# Patient Record
Sex: FEMALE | Race: WHITE | Employment: OTHER | URBAN - METROPOLITAN AREA
[De-identification: names, ages, dates, MRNs, and addresses within clinical notes are randomized per-mention and may not be internally consistent; named-entity substitution may affect disease eponyms.]

---

## 2017-08-31 PROBLEM — R01.1 MURMUR: Status: ACTIVE | Noted: 2017-08-31

## 2017-10-04 PROBLEM — M25.511 CHRONIC RIGHT SHOULDER PAIN: Status: ACTIVE | Noted: 2017-10-04

## 2017-10-04 PROBLEM — M25.512 CHRONIC LEFT SHOULDER PAIN: Status: ACTIVE | Noted: 2017-10-04

## 2017-10-04 PROBLEM — G89.29 CHRONIC LEFT SHOULDER PAIN: Status: ACTIVE | Noted: 2017-10-04

## 2017-10-04 PROBLEM — G89.29 CHRONIC RIGHT SHOULDER PAIN: Status: RESOLVED | Noted: 2017-10-04 | Resolved: 2017-10-04

## 2017-10-04 PROBLEM — M25.511 CHRONIC RIGHT SHOULDER PAIN: Status: RESOLVED | Noted: 2017-10-04 | Resolved: 2017-10-04

## 2017-10-04 PROBLEM — G89.29 CHRONIC RIGHT SHOULDER PAIN: Status: ACTIVE | Noted: 2017-10-04

## 2018-02-09 ENCOUNTER — HOSPITAL ENCOUNTER (OUTPATIENT)
Dept: LAB | Age: 78
Discharge: HOME | End: 2018-02-09

## 2018-02-09 PROCEDURE — 88312 SPECIAL STAINS GROUP 1: CPT | Performed by: INTERNAL MEDICINE

## 2018-02-09 PROCEDURE — 88305 TISSUE EXAM BY PATHOLOGIST: CPT | Performed by: INTERNAL MEDICINE

## 2018-03-02 ENCOUNTER — HOSPITAL ENCOUNTER (OUTPATIENT)
Dept: CT IMAGING | Age: 78
Discharge: HOME | End: 2018-03-02
Attending: INTERNAL MEDICINE
Payer: MEDICARE

## 2018-03-02 DIAGNOSIS — R10.13 ABDOMINAL PAIN, EPIGASTRIC: ICD-10-CM

## 2018-03-02 DIAGNOSIS — K92.2 INTESTINAL BLEEDING: ICD-10-CM

## 2018-03-02 DIAGNOSIS — Q27.33 CONGENITAL GASTROINTESTINAL VESSEL ANOMALY: ICD-10-CM

## 2018-03-02 LAB — CREAT BLD-MCNC: 0.8 MG/DL (ref 0.8–1.5)

## 2018-03-02 PROCEDURE — 74011000255 HC RX REV CODE- 255: Performed by: INTERNAL MEDICINE

## 2018-03-02 PROCEDURE — 74177 CT ABD & PELVIS W/CONTRAST: CPT

## 2018-03-02 PROCEDURE — 74011636320 HC RX REV CODE- 636/320: Performed by: INTERNAL MEDICINE

## 2018-03-02 PROCEDURE — 82565 ASSAY OF CREATININE: CPT

## 2018-03-02 PROCEDURE — 74011000258 HC RX REV CODE- 258: Performed by: INTERNAL MEDICINE

## 2018-03-02 RX ORDER — SODIUM CHLORIDE 0.9 % (FLUSH) 0.9 %
10 SYRINGE (ML) INJECTION
Status: COMPLETED | OUTPATIENT
Start: 2018-03-02 | End: 2018-03-02

## 2018-03-02 RX ADMIN — SODIUM CHLORIDE 100 ML: 900 INJECTION, SOLUTION INTRAVENOUS at 14:49

## 2018-03-02 RX ADMIN — IOPAMIDOL 100 ML: 755 INJECTION, SOLUTION INTRAVENOUS at 14:49

## 2018-03-02 RX ADMIN — BARIUM SULFATE 1350 ML: 1 SUSPENSION ORAL at 14:49

## 2018-03-02 RX ADMIN — Medication 10 ML: at 14:49

## 2018-04-04 PROBLEM — I83.893 VARICOSE VEINS OF BOTH LEGS WITH EDEMA: Status: ACTIVE | Noted: 2018-04-04

## 2018-04-04 PROBLEM — I10 ESSENTIAL HYPERTENSION: Status: ACTIVE | Noted: 2018-04-04

## 2018-11-17 ENCOUNTER — APPOINTMENT (OUTPATIENT)
Dept: CT IMAGING | Age: 78
End: 2018-11-17
Attending: EMERGENCY MEDICINE
Payer: MEDICARE

## 2018-11-17 ENCOUNTER — HOSPITAL ENCOUNTER (EMERGENCY)
Age: 78
Discharge: HOME | End: 2018-11-17
Attending: EMERGENCY MEDICINE
Payer: MEDICARE

## 2018-11-17 VITALS
HEART RATE: 74 BPM | OXYGEN SATURATION: 95 % | HEIGHT: 63 IN | RESPIRATION RATE: 16 BRPM | WEIGHT: 152 LBS | SYSTOLIC BLOOD PRESSURE: 110 MMHG | DIASTOLIC BLOOD PRESSURE: 60 MMHG | BODY MASS INDEX: 26.93 KG/M2 | TEMPERATURE: 98 F

## 2018-11-17 DIAGNOSIS — R10.31 ABDOMINAL PAIN, RIGHT LOWER QUADRANT: ICD-10-CM

## 2018-11-17 DIAGNOSIS — N20.0 KIDNEY STONE: ICD-10-CM

## 2018-11-17 DIAGNOSIS — R19.7 DIARRHEA, UNSPECIFIED TYPE: ICD-10-CM

## 2018-11-17 DIAGNOSIS — D72.829 LEUKOCYTOSIS, UNSPECIFIED TYPE: ICD-10-CM

## 2018-11-17 DIAGNOSIS — N39.0 URINARY TRACT INFECTION WITHOUT HEMATURIA, SITE UNSPECIFIED: Primary | ICD-10-CM

## 2018-11-17 LAB
ALBUMIN SERPL-MCNC: 3.2 G/DL (ref 3.2–4.6)
ALBUMIN/GLOB SERPL: 0.7 {RATIO}
ALP SERPL-CCNC: 65 U/L (ref 50–130)
ALT SERPL-CCNC: 34 U/L (ref 12–65)
ANION GAP SERPL CALC-SCNC: 14 MMOL/L
AST SERPL-CCNC: 35 U/L (ref 15–37)
BACTERIA URNS QL MICRO: NORMAL /HPF
BASOPHILS # BLD: 0.1 K/UL (ref 0–0.2)
BASOPHILS NFR BLD: 0 % (ref 0–2)
BILIRUB SERPL-MCNC: 0.4 MG/DL (ref 0.2–1.1)
BUN SERPL-MCNC: 34 MG/DL (ref 8–23)
CALCIUM SERPL-MCNC: 10.1 MG/DL (ref 8.3–10.4)
CASTS URNS QL MICRO: 0 /LPF
CHLORIDE SERPL-SCNC: 95 MMOL/L (ref 98–107)
CO2 SERPL-SCNC: 26 MMOL/L (ref 21–32)
CREAT SERPL-MCNC: 1.11 MG/DL (ref 0.6–1)
CRYSTALS URNS QL MICRO: 0 /LPF
DIFFERENTIAL METHOD BLD: ABNORMAL
EOSINOPHIL # BLD: 0.1 K/UL (ref 0–0.8)
EOSINOPHIL NFR BLD: 1 % (ref 0.5–7.8)
EPI CELLS #/AREA URNS HPF: NORMAL /HPF
ERYTHROCYTE [DISTWIDTH] IN BLOOD BY AUTOMATED COUNT: 12.9 %
GLOBULIN SER CALC-MCNC: 4.6 G/DL (ref 2.3–3.5)
GLUCOSE SERPL-MCNC: 109 MG/DL (ref 65–100)
HCT VFR BLD AUTO: 38.7 % (ref 35.8–46.3)
HGB BLD-MCNC: 12.6 G/DL (ref 11.7–15.4)
IMM GRANULOCYTES # BLD: 0.1 K/UL (ref 0–0.5)
IMM GRANULOCYTES NFR BLD AUTO: 1 % (ref 0–5)
LACTATE BLD-SCNC: 1.13 MMOL/L (ref 0.5–1.9)
LYMPHOCYTES # BLD: 1.4 K/UL (ref 0.5–4.6)
LYMPHOCYTES NFR BLD: 10 % (ref 13–44)
MCH RBC QN AUTO: 29.2 PG (ref 26.1–32.9)
MCHC RBC AUTO-ENTMCNC: 32.6 G/DL (ref 31.4–35)
MCV RBC AUTO: 89.8 FL (ref 79.6–97.8)
MONOCYTES # BLD: 0.8 K/UL (ref 0.1–1.3)
MONOCYTES NFR BLD: 6 % (ref 4–12)
MUCOUS THREADS URNS QL MICRO: 0 /LPF
NEUTS SEG # BLD: 11.1 K/UL (ref 1.7–8.2)
NEUTS SEG NFR BLD: 82 % (ref 43–78)
NRBC # BLD: 0 K/UL (ref 0–0.2)
OTHER OBSERVATIONS,UCOM: NORMAL
PLATELET # BLD AUTO: 366 K/UL (ref 150–450)
PMV BLD AUTO: 9.7 FL (ref 9.4–12.3)
POTASSIUM SERPL-SCNC: 3.5 MMOL/L (ref 3.5–5.1)
PROT SERPL-MCNC: 7.8 G/DL
RBC # BLD AUTO: 4.31 M/UL (ref 4.05–5.2)
RBC #/AREA URNS HPF: >100 /HPF
SODIUM SERPL-SCNC: 135 MMOL/L (ref 136–145)
WBC # BLD AUTO: 13.5 K/UL (ref 4.3–11.1)
WBC URNS QL MICRO: >100 /HPF

## 2018-11-17 PROCEDURE — 74011636320 HC RX REV CODE- 636/320: Performed by: EMERGENCY MEDICINE

## 2018-11-17 PROCEDURE — 85025 COMPLETE CBC W/AUTO DIFF WBC: CPT

## 2018-11-17 PROCEDURE — 83605 ASSAY OF LACTIC ACID: CPT

## 2018-11-17 PROCEDURE — 74011000258 HC RX REV CODE- 258: Performed by: EMERGENCY MEDICINE

## 2018-11-17 PROCEDURE — 87086 URINE CULTURE/COLONY COUNT: CPT

## 2018-11-17 PROCEDURE — 99284 EMERGENCY DEPT VISIT MOD MDM: CPT | Performed by: EMERGENCY MEDICINE

## 2018-11-17 PROCEDURE — 80053 COMPREHEN METABOLIC PANEL: CPT

## 2018-11-17 PROCEDURE — 96365 THER/PROPH/DIAG IV INF INIT: CPT | Performed by: EMERGENCY MEDICINE

## 2018-11-17 PROCEDURE — 81003 URINALYSIS AUTO W/O SCOPE: CPT | Performed by: EMERGENCY MEDICINE

## 2018-11-17 PROCEDURE — 74011250637 HC RX REV CODE- 250/637: Performed by: EMERGENCY MEDICINE

## 2018-11-17 PROCEDURE — 74177 CT ABD & PELVIS W/CONTRAST: CPT

## 2018-11-17 PROCEDURE — 81015 MICROSCOPIC EXAM OF URINE: CPT

## 2018-11-17 PROCEDURE — 96375 TX/PRO/DX INJ NEW DRUG ADDON: CPT | Performed by: EMERGENCY MEDICINE

## 2018-11-17 PROCEDURE — 74011250636 HC RX REV CODE- 250/636: Performed by: EMERGENCY MEDICINE

## 2018-11-17 RX ORDER — HYDROCODONE BITARTRATE AND ACETAMINOPHEN 5; 325 MG/1; MG/1
1-2 TABLET ORAL
Qty: 12 TAB | Refills: 0 | Status: SHIPPED | OUTPATIENT
Start: 2018-11-17 | End: 2019-01-22 | Stop reason: ALTCHOICE

## 2018-11-17 RX ORDER — HYOSCYAMINE SULFATE 0.12 MG/1
0.25 TABLET SUBLINGUAL
Status: COMPLETED | OUTPATIENT
Start: 2018-11-17 | End: 2018-11-17

## 2018-11-17 RX ORDER — ONDANSETRON 2 MG/ML
4 INJECTION INTRAMUSCULAR; INTRAVENOUS
Status: COMPLETED | OUTPATIENT
Start: 2018-11-17 | End: 2018-11-17

## 2018-11-17 RX ORDER — HYOSCYAMINE SULFATE 0.12 MG/1
0.25 TABLET SUBLINGUAL
Qty: 20 TAB | Refills: 0 | Status: SHIPPED | OUTPATIENT
Start: 2018-11-17 | End: 2019-04-03 | Stop reason: ALTCHOICE

## 2018-11-17 RX ORDER — ONDANSETRON 8 MG/1
8 TABLET, ORALLY DISINTEGRATING ORAL
Status: COMPLETED | OUTPATIENT
Start: 2018-11-17 | End: 2018-11-17

## 2018-11-17 RX ORDER — ONDANSETRON 8 MG/1
8 TABLET, ORALLY DISINTEGRATING ORAL
Qty: 12 TAB | Refills: 1 | Status: SHIPPED | OUTPATIENT
Start: 2018-11-17 | End: 2018-12-05 | Stop reason: ALTCHOICE

## 2018-11-17 RX ORDER — HYDROCODONE BITARTRATE AND ACETAMINOPHEN 10; 325 MG/1; MG/1
1 TABLET ORAL
Status: COMPLETED | OUTPATIENT
Start: 2018-11-17 | End: 2018-11-17

## 2018-11-17 RX ORDER — DIPHENOXYLATE HYDROCHLORIDE AND ATROPINE SULFATE 2.5; .025 MG/1; MG/1
2 TABLET ORAL
Qty: 20 TAB | Refills: 0 | Status: SHIPPED | OUTPATIENT
Start: 2018-11-17 | End: 2018-12-05

## 2018-11-17 RX ORDER — SODIUM CHLORIDE 0.9 % (FLUSH) 0.9 %
10 SYRINGE (ML) INJECTION
Status: DISCONTINUED | OUTPATIENT
Start: 2018-11-17 | End: 2018-11-17 | Stop reason: HOSPADM

## 2018-11-17 RX ADMIN — ONDANSETRON 8 MG: 8 TABLET, ORALLY DISINTEGRATING ORAL at 19:23

## 2018-11-17 RX ADMIN — ONDANSETRON 4 MG: 2 INJECTION INTRAMUSCULAR; INTRAVENOUS at 17:29

## 2018-11-17 RX ADMIN — IOPAMIDOL 100 ML: 755 INJECTION, SOLUTION INTRAVENOUS at 18:35

## 2018-11-17 RX ADMIN — SODIUM CHLORIDE 1000 ML: 900 INJECTION, SOLUTION INTRAVENOUS at 15:33

## 2018-11-17 RX ADMIN — CEFTRIAXONE 1 G: 1 INJECTION, POWDER, FOR SOLUTION INTRAMUSCULAR; INTRAVENOUS at 15:34

## 2018-11-17 RX ADMIN — HYOSCYAMINE SULFATE 0.25 MG: 0.12 TABLET ORAL; SUBLINGUAL at 19:23

## 2018-11-17 RX ADMIN — HYDROCODONE BITARTRATE AND ACETAMINOPHEN 1 TABLET: 10; 325 TABLET ORAL at 19:23

## 2018-11-17 RX ADMIN — SODIUM CHLORIDE 100 ML: 900 INJECTION, SOLUTION INTRAVENOUS at 18:35

## 2018-11-17 NOTE — ED TRIAGE NOTES
Pt states she has been sick for one week. Pt states she went to PCP yesterday and they called her today and told her to come to ER for fluids. She is being treated for UTI also.

## 2018-11-17 NOTE — ED PROVIDER NOTES
78-year-old female presents with 1 week of nausea, vomiting, diarrhea and right flank pain. She reports history of kidney stones with prior lithotripsy. Recently seen by her PCP and started on Cipro for a urinary tract infection and told to come to the emergency department because she was dehydrated and her white blood cell count was high. She denies any fevers, significant abdominal pain, chest pain or shortness of breath. PCP gave her ceftriaxone yesterday in the office The history is provided by the patient. No  was used. Abnormal Lab Results Associated symptoms include abdominal pain. Pertinent negatives include no headaches and no shortness of breath. Past Medical History:  
Diagnosis Date  Adverse effect of anesthesia   
 hypotension with anesthesia  Back pain   
 upper back  CAD (coronary artery disease) 11/25/2014  
 stent x 1  
 COPD (chronic obstructive pulmonary disease) (formerly [facility])   
 prn inhaler  Difficulty swallowing 8/23/2013  H/O bone density study 08/15/2017  
 osteoporosis  H/O myocardial perfusion scan 09/08/2017  
 normal  
 History of gastritis  History of kidney stones   
 x 2 with surgical intervention  Hx of skin malignancy   
 removed from face  Hyperlipidemia 8/23/2013  
 no present treatment  Hypotensive episode   
 occassionally  Hypothyroidism   
 managed with medication  Mitral regurgitation   
 mild  Osteoarthritis   
 managed with medication  Osteoporosis   
 managed with medication  Pain and swelling of left ankle 4/9/2015  
 no recent episodes  Palpitations 5/12/2016  
 occassionally  Polymyalgia ([address]   
 managed with medication  Purpura ([address] 8/23/2013 SECONDARY TO STEROID TREATMENT  Rotator cuff tear   
 left  Rotator cuff tear, left 8/23/2013  Rotator cuff tear, right Past Surgical History:  
Procedure Laterality Date  CARDIAC SURG PROCEDURE UNLIST cardiac stent x1  
 HX BLADDER REPAIR    
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  
 gastritis and esophageal polyp-Dr. Mary Jo Palma  HX HYSTERECTOMY  HX HYSTERECTOMY complete  HX LITHOTRIPSY  HX MALIGNANT SKIN LESION EXCISION Dr. Juan Ramon Hightower- right upper ear- SCC  HX OPEN CHOLECYSTECTOMY  HX TUBAL LIGATION Family History:  
Problem Relation Age of Onset  COPD Mother  Alcohol abuse Mother  Stroke Maternal Grandfather  Arthritis-osteo Other GRANDMOTHER  
 Malignant Hyperthermia Neg Hx  Pseudocholinesterase Deficiency Neg Hx  Delayed Awakening Neg Hx  Post-op Nausea/Vomiting Neg Hx  Emergence Delirium Neg Hx  Post-op Cognitive Dysfunction Neg Hx   
 Other Neg Hx Social History Socioeconomic History  Marital status:  Spouse name: Not on file  Number of children: Not on file  Years of education: Not on file  Highest education level: Not on file Social Needs  Financial resource strain: Not on file  Food insecurity - worry: Not on file  Food insecurity - inability: Not on file  Transportation needs - medical: Not on file  Transportation needs - non-medical: Not on file Occupational History  Not on file Tobacco Use  Smoking status: Current Every Day Smoker Packs/day: 1.00 Years: 46.00 Pack years: 46.00  Smokeless tobacco: Never Used  Tobacco comment: would like to quit but don't think that she can.  11/28/17 Substance and Sexual Activity  Alcohol use: No  
 Drug use: No  
 Sexual activity: No  
Other Topics Concern  Not on file Social History Narrative  Not on file ALLERGIES: Codeine; Demerol [meperidine]; Lactose; Levaquin [levofloxacin]; Nuts [tree nut]; and Zocor [simvastatin] Review of Systems Constitutional: Positive for fatigue. Negative for fever. HENT: Negative for sore throat. Respiratory: Negative for cough, chest tightness and shortness of breath. Cardiovascular: Negative for leg swelling. Gastrointestinal: Positive for abdominal pain, nausea and vomiting. Genitourinary: Positive for flank pain. Negative for dysuria. Musculoskeletal: Negative for back pain. Neurological: Negative for syncope and headaches. Psychiatric/Behavioral: Negative for confusion. Vitals:  
 11/17/18 1408 BP: 106/58 Pulse: 75 Resp: 17 Temp: 98.8 °F (37.1 °C) SpO2: 93% Weight: 68.9 kg (152 lb) Height: 5' 3\" (1.6 m) Physical Exam  
Constitutional: She is oriented to person, place, and time. She appears well-developed and well-nourished. No distress. HENT:  
Head: Normocephalic and atraumatic. Eyes: Conjunctivae and EOM are normal. Pupils are equal, round, and reactive to light. Neck: Normal range of motion. Neck supple. Cardiovascular: Normal rate, regular rhythm and normal heart sounds. Pulmonary/Chest: Effort normal and breath sounds normal. No respiratory distress. She has no wheezes. She has no rales. Abdominal: Soft. She exhibits no distension. There is no tenderness. There is no rebound. Positive CVA tenderness on the right. Musculoskeletal: Normal range of motion. She exhibits no edema or tenderness. Neurological: She is alert and oriented to person, place, and time. Skin: Skin is warm and dry. No rash noted. She is not diaphoretic. Psychiatric: She has a normal mood and affect. Her behavior is normal.  
Vitals reviewed. MDM Number of Diagnoses or Management Options Diarrhea, unspecified type: new and requires workup Leukocytosis, unspecified type: new and requires workup Urinary tract infection without hematuria, site unspecified: new and requires workup Diagnosis management comments: Leukocytosis noted.   Urine has evidence of possible infection as well as hematuria suggesting possible kidney stone. We will obtain CT scan to evaluate for possible obstructing stone, diverticulitis or other acute pathology. Signed out to oncoming provider. Discussed plan of care with patient and oncoming provider. Matthew Boogie MD; 11/17/2018 @5:32 PM Voice dictation software was used during the making of this note. This software is not perfect and grammatical and other typographical errors may be present. This note has not been proofread for errors. 
=================================================================== Amount and/or Complexity of Data Reviewed Clinical lab tests: reviewed and ordered (Results for orders placed or performed during the hospital encounter of 11/17/18 
-CBC WITH AUTOMATED DIFF Result                      Value             Ref Range WBC                         13.5 (H)          4.3 - 11.1 K* 
     RBC                         4.31              4.05 - 5.2 M* HGB                         12.6              11.7 - 15.4 * HCT                         38.7              35.8 - 46.3 % MCV                         89.8              79.6 - 97.8 * MCH                         29.2              26.1 - 32.9 * MCHC                        32.6              31.4 - 35.0 * RDW                         12.9              % PLATELET                    366               150 - 450 K/* MPV                         9.7               9.4 - 12.3 FL ABSOLUTE NRBC               0.00              0.0 - 0.2 K/* DF                          AUTOMATED NEUTROPHILS                 82 (H)            43 - 78 % LYMPHOCYTES                 10 (L)            13 - 44 % MONOCYTES                   6                 4.0 - 12.0 % EOSINOPHILS                 1                 0.5 - 7.8 % BASOPHILS                   0                 0.0 - 2.0 % IMMATURE GRANULOCYTES       1                 0.0 - 5.0 %   
     ABS. NEUTROPHILS            11.1 (H)          1.7 - 8.2 K/* ABS. LYMPHOCYTES            1.4               0.5 - 4.6 K/* ABS. MONOCYTES              0.8               0.1 - 1.3 K/* ABS. EOSINOPHILS            0.1               0.0 - 0.8 K/* ABS. BASOPHILS              0.1               0.0 - 0.2 K/* ABS. IMM. GRANS.            0.1               0.0 - 0.5 K/* 
-METABOLIC PANEL, COMPREHENSIVE Result                      Value             Ref Range Sodium                      135 (L)           136 - 145 mm* Potassium                   3.5               3.5 - 5.1 mm* Chloride                    95 (L)            98 - 107 mmo* CO2                         26                21 - 32 mmol* Anion gap                   14                mmol/L Glucose                     109 (H)           65 - 100 mg/* BUN                         34 (H)            8 - 23 MG/DL Creatinine                  1.11 (H)          0.6 - 1.0 MG* 
     GFR est AA                  >60               >60 ml/min/1* GFR est non-AA              50                ml/min/1.73m2 Calcium                     10.1              8.3 - 10.4 M* Bilirubin, total            0.4               0.2 - 1.1 MG* ALT (SGPT)                  34                12 - 65 U/L   
     AST (SGOT)                  35                15 - 37 U/L Alk. phosphatase            65                50 - 130 U/L Protein, total              7.8               g/dL Albumin                     3.2               3.2 - 4.6 g/* Globulin                    4.6 (H)           2.3 - 3.5 g/* A-G Ratio                   0.7 -URINE MICROSCOPIC 
 Result                      Value             Ref Range WBC                         >100              0 /hpf        
     RBC                         >100              0 /hpf Epithelial cells            5-10              0 /hpf Bacteria                    TRACE             0 /hpf Casts                       0                 0 /lpf Crystals, urine             0                 0 /LPF Mucus                       0                 0 /lpf Other observations RESULTS VERIFIED MANUALLY 
-POC LACTIC ACID Result                      Value             Ref Range Lactic Acid (POC)           1.13              0.5 - 1.9 mm* 
) Tests in the radiology section of CPT®: ordered Review and summarize past medical records: yes Discuss the patient with other providers: yes Independent visualization of images, tracings, or specimens: yes Risk of Complications, Morbidity, and/or Mortality Presenting problems: moderate Diagnostic procedures: moderate Management options: moderate Patient Progress Patient progress: stable Procedures

## 2018-11-18 NOTE — ED NOTES
I have reviewed discharge instructions with the patient. The patient verbalized understanding. Patient left ED via Discharge Method: ambulatory to Home with self. Opportunity for questions and clarification provided. Patient given 4 scripts. To continue your aftercare when you leave the hospital, you may receive an automated call from our care team to check in on how you are doing. This is a free service and part of our promise to provide the best care and service to meet your aftercare needs.  If you have questions, or wish to unsubscribe from this service please call [phone]. Thank you for Choosing our [facility] Emergency Department.

## 2018-11-18 NOTE — DISCHARGE INSTRUCTIONS
Abdominal Pain: Care Instructions  Your Care Instructions    Abdominal pain has many possible causes. Some aren't serious and get better on their own in a few days. Others need more testing and treatment. If your pain continues or gets worse, you need to be rechecked and may need more tests to find out what is wrong. You may need surgery to correct the problem. Don't ignore new symptoms, such as fever, nausea and vomiting, urination problems, pain that gets worse, and dizziness. These may be signs of a more serious problem. Your doctor may have recommended a follow-up visit in the next 8 to 12 hours. If you are not getting better, you may need more tests or treatment. The doctor has checked you carefully, but problems can develop later. If you notice any problems or new symptoms, get medical treatment right away. Follow-up care is a key part of your treatment and safety. Be sure to make and go to all appointments, and call your doctor if you are having problems. It's also a good idea to know your test results and keep a list of the medicines you take. How can you care for yourself at home? · Rest until you feel better. · To prevent dehydration, drink plenty of fluids, enough so that your urine is light yellow or clear like water. Choose water and other caffeine-free clear liquids until you feel better. If you have kidney, heart, or liver disease and have to limit fluids, talk with your doctor before you increase the amount of fluids you drink. · If your stomach is upset, eat mild foods, such as rice, dry toast or crackers, bananas, and applesauce. Try eating several small meals instead of two or three large ones. · Wait until 48 hours after all symptoms have gone away before you have spicy foods, alcohol, and drinks that contain caffeine. · Do not eat foods that are high in fat. · Avoid anti-inflammatory medicines such as aspirin, ibuprofen (Advil, Motrin), and naproxen (Aleve).  These can cause stomach upset. Talk to your doctor if you take daily aspirin for another health problem. When should you call for help? Call 911 anytime you think you may need emergency care. For example, call if:    · You passed out (lost consciousness).     · You pass maroon or very bloody stools.     · You vomit blood or what looks like coffee grounds.     · You have new, severe belly pain.    Call your doctor now or seek immediate medical care if:    · Your pain gets worse, especially if it becomes focused in one area of your belly.     · You have a new or higher fever.     · Your stools are black and look like tar, or they have streaks of blood.     · You have unexpected vaginal bleeding.     · You have symptoms of a urinary tract infection. These may include:  ? Pain when you urinate. ? Urinating more often than usual.  ? Blood in your urine.     · You are dizzy or lightheaded, or you feel like you may faint.    Watch closely for changes in your health, and be sure to contact your doctor if:    · You are not getting better after 1 day (24 hours). Where can you learn more? Go to http://wendy-rut.info/. Enter J759 in the search box to learn more about \"Abdominal Pain: Care Instructions. \"  Current as of: November 20, 2017  Content Version: 11.8  © 6506-6630 [employer]. Care instructions adapted under license by [employer] (which disclaims liability or warranty for this information). If you have questions about a medical condition or this instruction, always ask your healthcare professional. [place] [address] any warranty or liability for your use of this information. Kidney Stone: Care Instructions  Your Care Instructions    Kidney stones are formed when salts, minerals, and other substances normally found in the urine clump together. They can be as small as grains of sand or, rarely, as large as golf balls.   While the stone is traveling through the ureter, which is the tube that carries urine from the kidney to the bladder, you will probably feel pain. The pain may be mild or very severe. You may also have some blood in your urine. As soon as the stone reaches the bladder, any intense pain should go away. If a stone is too large to pass on its own, you may need a medical procedure to help you pass the stone. The doctor has checked you carefully, but problems can develop later. If you notice any problems or new symptoms, get medical treatment right away. Follow-up care is a key part of your treatment and safety. Be sure to make and go to all appointments, and call your doctor if you are having problems. It's also a good idea to know your test results and keep a list of the medicines you take. How can you care for yourself at home? · Drink plenty of fluids, enough so that your urine is light yellow or clear like water. If you have kidney, heart, or liver disease and have to limit fluids, talk with your doctor before you increase the amount of fluids you drink. · Take pain medicines exactly as directed. Call your doctor if you think you are having a problem with your medicine. ? If the doctor gave you a prescription medicine for pain, take it as prescribed. ? If you are not taking a prescription pain medicine, ask your doctor if you can take an over-the-counter medicine. Read and follow all instructions on the label. · Your doctor may ask you to strain your urine so that you can collect your kidney stone when it passes. You can use a kitchen strainer or a tea strainer to catch the stone. Store it in a plastic bag until you see your doctor again. Preventing future kidney stones  Some changes in your diet may help prevent kidney stones. Depending on the cause of your stones, your doctor may recommend that you:  · Drink plenty of fluids, enough so that your urine is light yellow or clear like water.  If you have kidney, heart, or liver disease and have to limit fluids, talk with your doctor before you increase the amount of fluids you drink. · Limit coffee, tea, and alcohol. Also avoid grapefruit juice. · Do not take more than the recommended daily dose of vitamins C and D.  · Avoid antacids such as Gaviscon, Maalox, Mylanta, or Tums. · Limit the amount of salt (sodium) in your diet. · Eat a balanced diet that is not too high in protein. · Limit foods that are high in a substance called oxalate, which can cause kidney stones. These foods include dark green vegetables, rhubarb, chocolate, wheat bran, nuts, cranberries, and beans. When should you call for help? Call your doctor now or seek immediate medical care if:    · You cannot keep down fluids.     · Your pain gets worse.     · You have a fever or chills.     · You have new or worse pain in your back just below your rib cage (the flank area).     · You have new or more blood in your urine.    Watch closely for changes in your health, and be sure to contact your doctor if:    · You do not get better as expected. Where can you learn more? Go to http://wendy-rut.info/. Enter V202 in the search box to learn more about \"Kidney Stone: Care Instructions. \"  Current as of: March 15, 2018  Content Version: 11.8  © 4037-8071 [employer]. Care instructions adapted under license by [employer] (which disclaims liability or warranty for this information). If you have questions about a medical condition or this instruction, always ask your healthcare professional. [place] [address] any warranty or liability for your use of this information. Urinary Tract Infection in Pregnancy: Care Instructions  Your Care Instructions    A urinary tract infection, or UTI, is an infection of the bladder and other urinary structures. Most UTIs occur in the bladder. They often cause pain or burning when you urinate.  UTI is the most common bacterial infection in pregnancy. If untreated, a UTI could lead to problems such as a kidney infection or  labor. Most UTIs can be cured with antibiotics. Your doctor will prescribe an antibiotic that is safe during pregnancy. Be sure to finish your medicine so that the infection does not spread to your kidneys. Follow-up care is a key part of your treatment and safety. Be sure to make and go to all appointments, and call your doctor if you are having problems. It's also a good idea to know your test results and keep a list of the medicines you take. How can you care for yourself at home? · Take your antibiotics as directed. Do not stop taking them just because you feel better. You need to take the full course of antibiotics. · Drink extra water and other fluids for the next day or two. This will help wash out the bacteria causing the infection. If you have kidney, heart, or liver disease and have to limit fluids, talk with your doctor before you increase the amount of fluids you drink. · Do not drink alcohol. · Urinate often. Try to empty your bladder each time. Preventing UTIs  · Drink plenty of fluids, enough so that your urine is light yellow or clear like water. This helps you urinate often, which clears bacteria from your system. If you have kidney, heart, or liver disease and have to limit fluids, talk with your doctor before you increase the amount of fluids you drink. · Urinate when you first have the urge. · Urinate right after you have sex. This is the best way for women to avoid UTIs. · When going to the bathroom, wipe from front to back to keep bacteria from entering the vagina or urethra. When should you call for help? Call your doctor now or seek immediate medical care if:    · You have symptoms of a worse urinary tract infection. These may include:  ? Pain or burning when you urinate. ? A frequent need to urinate without being able to pass much urine.   ? Pain in the flank, which is just below the rib cage and above the waist on either side of the back. ? Blood in your urine. ? A fever.    Watch closely for changes in your health, and be sure to contact your doctor if:    · You do not get better as expected. Where can you learn more? Go to http://wendy-rut.info/. Enter M982 in the search box to learn more about \"Urinary Tract Infection in Pregnancy: Care Instructions. \"  Current as of: November 21, 2017  Content Version: 11.8  © 9574-3475 [employer]. Care instructions adapted under license by [employer] (which disclaims liability or warranty for this information). If you have questions about a medical condition or this instruction, always ask your healthcare professional. [address] any warranty or liability for your use of this information.

## 2018-11-20 LAB
BACTERIA SPEC CULT: NORMAL
SERVICE CMNT-IMP: NORMAL

## 2019-04-03 PROBLEM — N18.30 STAGE 3 CHRONIC KIDNEY DISEASE (HCC): Status: ACTIVE | Noted: 2019-04-03

## 2019-04-10 ENCOUNTER — HOSPITAL ENCOUNTER (OUTPATIENT)
Dept: GENERAL RADIOLOGY | Age: 79
Discharge: HOME | End: 2019-04-10

## 2019-04-10 DIAGNOSIS — R50.9 FEVER, UNSPECIFIED FEVER CAUSE: ICD-10-CM

## 2019-04-10 DIAGNOSIS — R05.9 COUGH: ICD-10-CM

## 2019-04-10 NOTE — PROGRESS NOTES
Area in lingula that could be early pneumonia  Stop the Macrodantin  Start Ceftin 250mg twice daily #20 and will treat the lung and kidney infection

## 2019-04-10 NOTE — PROGRESS NOTES
Pt notified of chest x-ray results. She will stop the Macrodantin and start on the Ceftin that was sent to [facility].  [phone])

## 2019-09-03 ENCOUNTER — HOSPITAL ENCOUNTER (OUTPATIENT)
Dept: CT IMAGING | Age: 79
Discharge: HOME | End: 2019-09-03
Attending: NURSE PRACTITIONER

## 2019-09-03 DIAGNOSIS — R50.9 FEVER AND CHILLS: ICD-10-CM

## 2019-09-03 DIAGNOSIS — Z72.0 TOBACCO USE: ICD-10-CM

## 2019-09-03 DIAGNOSIS — R22.2 LOCALIZED SWELLING OF CHEST WALL: ICD-10-CM

## 2019-09-03 RX ORDER — SODIUM CHLORIDE 0.9 % (FLUSH) 0.9 %
10 SYRINGE (ML) INJECTION
Status: COMPLETED | OUTPATIENT
Start: 2019-09-03 | End: 2019-09-03

## 2019-09-03 RX ADMIN — Medication 10 ML: at 14:30

## 2019-09-03 NOTE — PROGRESS NOTES
Left detailed message on personal VM letting patient know chest CT is normal. Ask that patient return call with any questions or concerns.

## 2019-09-04 RX ORDER — CEFAZOLIN SODIUM/WATER 2 G/20 ML
2 SYRINGE (ML) INTRAVENOUS
Status: CANCELLED | OUTPATIENT
Start: 2019-09-04 | End: 2019-09-04

## 2019-09-05 ENCOUNTER — ANESTHESIA EVENT (OUTPATIENT)
Dept: SURGERY | Age: 79
End: 2019-09-05
Payer: MEDICARE

## 2019-09-06 ENCOUNTER — ANESTHESIA (OUTPATIENT)
Dept: SURGERY | Age: 79
End: 2019-09-06
Payer: MEDICARE

## 2019-09-06 ENCOUNTER — HOSPITAL ENCOUNTER (OUTPATIENT)
Age: 79
Setting detail: OUTPATIENT SURGERY
Discharge: HOME | End: 2019-09-06
Attending: UROLOGY | Admitting: UROLOGY
Payer: MEDICARE

## 2019-09-06 VITALS
DIASTOLIC BLOOD PRESSURE: 49 MMHG | HEART RATE: 82 BPM | BODY MASS INDEX: 24.51 KG/M2 | WEIGHT: 142.8 LBS | OXYGEN SATURATION: 96 % | RESPIRATION RATE: 16 BRPM | TEMPERATURE: 97.6 F | SYSTOLIC BLOOD PRESSURE: 110 MMHG

## 2019-09-06 DIAGNOSIS — N20.0 NEPHROLITHIASIS: Primary | ICD-10-CM

## 2019-09-06 LAB
POTASSIUM BLD-SCNC: 3.5 MMOL/L (ref 3.5–5.1)
POTASSIUM BLD-SCNC: 7.1 MMOL/L (ref 3.5–5.1)

## 2019-09-06 PROCEDURE — 74011250636 HC RX REV CODE- 250/636: Performed by: ANESTHESIOLOGY

## 2019-09-06 PROCEDURE — 76060000032 HC ANESTHESIA 0.5 TO 1 HR: Performed by: UROLOGY

## 2019-09-06 PROCEDURE — 50590 FRAGMENTING OF KIDNEY STONE: CPT | Performed by: UROLOGY

## 2019-09-06 PROCEDURE — 74011000250 HC RX REV CODE- 250

## 2019-09-06 PROCEDURE — 74011250636 HC RX REV CODE- 250/636

## 2019-09-06 PROCEDURE — 76210000021 HC REC RM PH II 0.5 TO 1 HR: Performed by: UROLOGY

## 2019-09-06 PROCEDURE — 74011250636 HC RX REV CODE- 250/636: Performed by: UROLOGY

## 2019-09-06 PROCEDURE — 76010000138 HC OR TIME 0.5 TO 1 HR: Performed by: UROLOGY

## 2019-09-06 PROCEDURE — 76210000063 HC OR PH I REC FIRST 0.5 HR: Performed by: UROLOGY

## 2019-09-06 PROCEDURE — 77030010509 HC AIRWY LMA MSK TELE -A: Performed by: ANESTHESIOLOGY

## 2019-09-06 PROCEDURE — 77030040361 HC SLV COMPR DVT MDII -B: Performed by: UROLOGY

## 2019-09-06 PROCEDURE — 74011250637 HC RX REV CODE- 250/637: Performed by: ANESTHESIOLOGY

## 2019-09-06 PROCEDURE — 84132 ASSAY OF SERUM POTASSIUM: CPT

## 2019-09-06 RX ORDER — MIDAZOLAM HYDROCHLORIDE 1 MG/ML
2 INJECTION, SOLUTION INTRAMUSCULAR; INTRAVENOUS
Status: DISCONTINUED | OUTPATIENT
Start: 2019-09-06 | End: 2019-09-06 | Stop reason: HOSPADM

## 2019-09-06 RX ORDER — ACETAMINOPHEN 500 MG
1000 TABLET ORAL
Status: DISCONTINUED | OUTPATIENT
Start: 2019-09-06 | End: 2019-09-06 | Stop reason: HOSPADM

## 2019-09-06 RX ORDER — TAMSULOSIN HYDROCHLORIDE 0.4 MG/1
0.4 CAPSULE ORAL DAILY
Qty: 30 CAP | Refills: 1 | Status: SHIPPED | OUTPATIENT
Start: 2019-09-06 | End: 2019-11-12

## 2019-09-06 RX ORDER — HYDROMORPHONE HYDROCHLORIDE 2 MG/ML
0.5 INJECTION, SOLUTION INTRAMUSCULAR; INTRAVENOUS; SUBCUTANEOUS
Status: DISCONTINUED | OUTPATIENT
Start: 2019-09-06 | End: 2019-09-06 | Stop reason: HOSPADM

## 2019-09-06 RX ORDER — GUAIFENESIN 100 MG/5ML
81 LIQUID (ML) ORAL ONCE
Status: COMPLETED | OUTPATIENT
Start: 2019-09-06 | End: 2019-09-06

## 2019-09-06 RX ORDER — CEFAZOLIN SODIUM/WATER 2 G/20 ML
2 SYRINGE (ML) INTRAVENOUS
Status: COMPLETED | OUTPATIENT
Start: 2019-09-06 | End: 2019-09-06

## 2019-09-06 RX ORDER — EPHEDRINE SULFATE 50 MG/ML
INJECTION, SOLUTION INTRAVENOUS AS NEEDED
Status: DISCONTINUED | OUTPATIENT
Start: 2019-09-06 | End: 2019-09-06 | Stop reason: HOSPADM

## 2019-09-06 RX ORDER — SODIUM CHLORIDE, SODIUM LACTATE, POTASSIUM CHLORIDE, CALCIUM CHLORIDE 600; 310; 30; 20 MG/100ML; MG/100ML; MG/100ML; MG/100ML
150 INJECTION, SOLUTION INTRAVENOUS CONTINUOUS
Status: DISCONTINUED | OUTPATIENT
Start: 2019-09-06 | End: 2019-09-06 | Stop reason: HOSPADM

## 2019-09-06 RX ORDER — FAMOTIDINE 20 MG/1
20 TABLET, FILM COATED ORAL ONCE
Status: COMPLETED | OUTPATIENT
Start: 2019-09-06 | End: 2019-09-06

## 2019-09-06 RX ORDER — LIDOCAINE HYDROCHLORIDE 10 MG/ML
0.1 INJECTION INFILTRATION; PERINEURAL AS NEEDED
Status: DISCONTINUED | OUTPATIENT
Start: 2019-09-06 | End: 2019-09-06 | Stop reason: HOSPADM

## 2019-09-06 RX ORDER — PROPOFOL 10 MG/ML
INJECTION, EMULSION INTRAVENOUS AS NEEDED
Status: DISCONTINUED | OUTPATIENT
Start: 2019-09-06 | End: 2019-09-06 | Stop reason: HOSPADM

## 2019-09-06 RX ORDER — HYDROCODONE BITARTRATE AND ACETAMINOPHEN 5; 325 MG/1; MG/1
1 TABLET ORAL AS NEEDED
Status: DISCONTINUED | OUTPATIENT
Start: 2019-09-06 | End: 2019-09-06 | Stop reason: HOSPADM

## 2019-09-06 RX ORDER — LIDOCAINE HYDROCHLORIDE 20 MG/ML
INJECTION, SOLUTION EPIDURAL; INFILTRATION; INTRACAUDAL; PERINEURAL AS NEEDED
Status: DISCONTINUED | OUTPATIENT
Start: 2019-09-06 | End: 2019-09-06 | Stop reason: HOSPADM

## 2019-09-06 RX ORDER — ONDANSETRON 2 MG/ML
INJECTION INTRAMUSCULAR; INTRAVENOUS AS NEEDED
Status: DISCONTINUED | OUTPATIENT
Start: 2019-09-06 | End: 2019-09-06 | Stop reason: HOSPADM

## 2019-09-06 RX ORDER — GUAIFENESIN 100 MG/5ML
81 LIQUID (ML) ORAL DAILY
Status: DISCONTINUED | OUTPATIENT
Start: 2019-09-07 | End: 2019-09-06

## 2019-09-06 RX ORDER — DEXAMETHASONE SODIUM PHOSPHATE 4 MG/ML
INJECTION, SOLUTION INTRA-ARTICULAR; INTRALESIONAL; INTRAMUSCULAR; INTRAVENOUS; SOFT TISSUE AS NEEDED
Status: DISCONTINUED | OUTPATIENT
Start: 2019-09-06 | End: 2019-09-06 | Stop reason: HOSPADM

## 2019-09-06 RX ORDER — SODIUM CHLORIDE 9 MG/ML
50 INJECTION, SOLUTION INTRAVENOUS CONTINUOUS
Status: DISCONTINUED | OUTPATIENT
Start: 2019-09-06 | End: 2019-09-06 | Stop reason: HOSPADM

## 2019-09-06 RX ORDER — FENTANYL CITRATE 50 UG/ML
100 INJECTION, SOLUTION INTRAMUSCULAR; INTRAVENOUS ONCE
Status: DISCONTINUED | OUTPATIENT
Start: 2019-09-06 | End: 2019-09-06 | Stop reason: HOSPADM

## 2019-09-06 RX ORDER — OXYCODONE AND ACETAMINOPHEN 5; 325 MG/1; MG/1
1 TABLET ORAL
Qty: 20 TAB | Refills: 0 | Status: SHIPPED | OUTPATIENT
Start: 2019-09-06 | End: 2019-09-13

## 2019-09-06 RX ADMIN — FAMOTIDINE 20 MG: 20 TABLET ORAL at 11:27

## 2019-09-06 RX ADMIN — EPHEDRINE SULFATE 10 MG: 50 INJECTION, SOLUTION INTRAVENOUS at 14:03

## 2019-09-06 RX ADMIN — ASPIRIN 81 MG 81 MG: 81 TABLET ORAL at 12:20

## 2019-09-06 RX ADMIN — ONDANSETRON 4 MG: 2 INJECTION INTRAMUSCULAR; INTRAVENOUS at 14:23

## 2019-09-06 RX ADMIN — SODIUM CHLORIDE, SODIUM LACTATE, POTASSIUM CHLORIDE, AND CALCIUM CHLORIDE 150 ML/HR: 600; 310; 30; 20 INJECTION, SOLUTION INTRAVENOUS at 11:27

## 2019-09-06 RX ADMIN — Medication 2 G: at 13:52

## 2019-09-06 RX ADMIN — PROPOFOL 150 MG: 10 INJECTION, EMULSION INTRAVENOUS at 13:50

## 2019-09-06 RX ADMIN — LIDOCAINE HYDROCHLORIDE 100 MG: 20 INJECTION, SOLUTION EPIDURAL; INFILTRATION; INTRACAUDAL; PERINEURAL at 13:50

## 2019-09-06 RX ADMIN — DEXAMETHASONE SODIUM PHOSPHATE 4 MG: 4 INJECTION, SOLUTION INTRA-ARTICULAR; INTRALESIONAL; INTRAMUSCULAR; INTRAVENOUS; SOFT TISSUE at 14:23

## 2019-09-06 NOTE — PERIOP NOTES
trev-op flank skin position warm, dry and intact.   Lead shield : yes  Patient ear protection : yes  Gel applied to patient's flank :  Yes  Starting power level:  7  Shock start time: 1355  Shock stop time: 1425   Ending power level : 11  Total shocks: 3,000  Total fluoroscopy time : 1.50  Postoperative flank skin condition:WNL

## 2019-09-06 NOTE — ANESTHESIA PREPROCEDURE EVALUATION
Anesthetic History   No history of anesthetic complications            Review of Systems / Medical History  Patient summary reviewed, nursing notes reviewed and pertinent labs reviewed    Pulmonary    COPD (Emphysema): moderate      Smoker         Neuro/Psych   Within defined limits           Cardiovascular    Hypertension: well controlled          CAD (2 yrs s/p TOMA), cardiac stents (2014, no angina, taking her asa) and hyperlipidemia    Exercise tolerance[de-identified] Borderline 4 METS     GI/Hepatic/Renal     GERD: well controlled    Renal disease: stones      Comments: Dysphagia Endo/Other      Hypothyroidism: well controlled  Arthritis    Comments: Cushingoid Other Findings              Physical Exam    Airway  Mallampati: I  TM Distance: 4 - 6 cm  Neck ROM: normal range of motion   Mouth opening: Normal     Cardiovascular  Regular rate and rhythm,  S1 and S2 normal,  no murmur, click, rub, or gallop  Rhythm: regular  Rate: normal         Dental    Dentition: Full upper dentures     Pulmonary      Decreased breath sounds: bilateral           Abdominal  GI exam deferred       Other Findings            Anesthetic Plan    ASA: 3  Anesthesia type: general          Induction: Intravenous  Anesthetic plan and risks discussed with: Patient

## 2019-09-06 NOTE — OP NOTES
Operative Note                Patient: Arnoldo Yanez [id number]    Date of Surgery: 09/06/19    Preoperative Diagnosis: Right Kidney Stone    Postoperative Diagnosis:  same    Surgeon(s) and Role:     * Bubba Garcia M.D. - Primary     Anesthesia:  General     Procedure: Procedure(s):  RIGHT EXTRACORPORAL SHOCKWAVE LITHOTRIPSY (ESWL)     Indications:     Discussed the risk of surgery including infection, hematoma, bleeding, recurrence or persistence of symptoms or stone, and the risks of general anesthetic. The patient understands the risks, any and all questions were answered to the patient's satisfaction and they freely signed the consent for operation. Procedure in Detail:  Patient was taken to the lithotripsy suite. Anesthesia was induced via the anesthesiology service. Using flouroscopy for guidance, a total of 3000 shocks were delivered. We began in a non-gaited fashion. Minor cardiac ectopy was experienced at 200 shocks. The remainder of the case was then completed in a gaited fashion with no further cardiac ectopy experienced. Shock rate was begun at 60 shocks per minute. Energy level was begun at 3 and gradually increased to a maximum of 11. Findings: Patient tolerated the procedure well. At the end of the procedure, the stone appeared to have fractured into a few visible pieces. Estimated Blood Loss:  none    Specimens: * No specimens in log *             Drains: None                 Implants: * No implants in log *    Bubba Garcia M.D.     [facility]  [place] [address]  Phone: [phone]  Fax: [phone]

## 2019-10-18 ENCOUNTER — HOSPITAL ENCOUNTER (OUTPATIENT)
Dept: MAMMOGRAPHY | Age: 79
Discharge: HOME | End: 2019-10-18
Attending: NURSE PRACTITIONER

## 2019-10-18 DIAGNOSIS — Z12.31 SCREENING MAMMOGRAM, ENCOUNTER FOR: ICD-10-CM

## 2019-10-23 ENCOUNTER — ANESTHESIA EVENT (OUTPATIENT)
Dept: SURGERY | Age: 79
End: 2019-10-23
Payer: MEDICARE

## 2019-10-24 ENCOUNTER — ANESTHESIA (OUTPATIENT)
Dept: SURGERY | Age: 79
End: 2019-10-24
Payer: MEDICARE

## 2019-10-24 ENCOUNTER — HOSPITAL ENCOUNTER (OUTPATIENT)
Age: 79
Setting detail: OUTPATIENT SURGERY
Discharge: HOME | End: 2019-10-24
Attending: UROLOGY | Admitting: UROLOGY
Payer: MEDICARE

## 2019-10-24 VITALS
DIASTOLIC BLOOD PRESSURE: 67 MMHG | BODY MASS INDEX: 24.41 KG/M2 | HEART RATE: 65 BPM | WEIGHT: 143 LBS | HEIGHT: 64 IN | SYSTOLIC BLOOD PRESSURE: 123 MMHG | RESPIRATION RATE: 16 BRPM | TEMPERATURE: 98 F | OXYGEN SATURATION: 98 %

## 2019-10-24 DIAGNOSIS — N20.0 NEPHROLITHIASIS: Primary | ICD-10-CM

## 2019-10-24 LAB
APPEARANCE UR: ABNORMAL
BACTERIA URNS QL MICRO: ABNORMAL /HPF
BILIRUB UR QL: NEGATIVE
COLOR UR: YELLOW
EPI CELLS #/AREA URNS HPF: ABNORMAL /HPF
GLUCOSE UR STRIP.AUTO-MCNC: NEGATIVE MG/DL
HGB UR QL STRIP: ABNORMAL
KETONES UR QL STRIP.AUTO: NEGATIVE MG/DL
LEUKOCYTE ESTERASE UR QL STRIP.AUTO: NEGATIVE
NITRITE UR QL STRIP.AUTO: POSITIVE
OTHER OBSERVATIONS,UCOM: ABNORMAL
PH UR STRIP: 6 [PH] (ref 5–9)
POTASSIUM BLD-SCNC: 4.1 MMOL/L (ref 3.5–5.1)
PROT UR STRIP-MCNC: NEGATIVE MG/DL
RBC #/AREA URNS HPF: ABNORMAL /HPF
SP GR UR REFRACTOMETRY: 1.02 (ref 1–1.02)
UROBILINOGEN UR QL STRIP.AUTO: 0.2 EU/DL (ref 0.2–1)
WBC URNS QL MICRO: ABNORMAL /HPF

## 2019-10-24 PROCEDURE — 74011250636 HC RX REV CODE- 250/636: Performed by: UROLOGY

## 2019-10-24 PROCEDURE — 87086 URINE CULTURE/COLONY COUNT: CPT

## 2019-10-24 PROCEDURE — 87186 SC STD MICRODIL/AGAR DIL: CPT

## 2019-10-24 PROCEDURE — 77030018836 HC SOL IRR NACL ICUM -A: Performed by: UROLOGY

## 2019-10-24 PROCEDURE — 74011000250 HC RX REV CODE- 250: Performed by: REGISTERED NURSE

## 2019-10-24 PROCEDURE — 77030010509 HC AIRWY LMA MSK TELE -A: Performed by: ANESTHESIOLOGY

## 2019-10-24 PROCEDURE — 76210000020 HC REC RM PH II FIRST 0.5 HR: Performed by: UROLOGY

## 2019-10-24 PROCEDURE — 76060000032 HC ANESTHESIA 0.5 TO 1 HR: Performed by: UROLOGY

## 2019-10-24 PROCEDURE — 76210000063 HC OR PH I REC FIRST 0.5 HR: Performed by: UROLOGY

## 2019-10-24 PROCEDURE — C1894 INTRO/SHEATH, NON-LASER: HCPCS | Performed by: UROLOGY

## 2019-10-24 PROCEDURE — 76010000138 HC OR TIME 0.5 TO 1 HR: Performed by: UROLOGY

## 2019-10-24 PROCEDURE — 84132 ASSAY OF SERUM POTASSIUM: CPT

## 2019-10-24 PROCEDURE — 77030040361 HC SLV COMPR DVT MDII -B: Performed by: UROLOGY

## 2019-10-24 PROCEDURE — C1769 GUIDE WIRE: HCPCS | Performed by: UROLOGY

## 2019-10-24 PROCEDURE — C2617 STENT, NON-COR, TEM W/O DEL: HCPCS | Performed by: UROLOGY

## 2019-10-24 PROCEDURE — 74011000250 HC RX REV CODE- 250

## 2019-10-24 PROCEDURE — 77030019927 HC TBNG IRR CYSTO BAXT -A: Performed by: UROLOGY

## 2019-10-24 PROCEDURE — 74011250636 HC RX REV CODE- 250/636: Performed by: REGISTERED NURSE

## 2019-10-24 PROCEDURE — 87088 URINE BACTERIA CULTURE: CPT

## 2019-10-24 PROCEDURE — 74011250636 HC RX REV CODE- 250/636: Performed by: ANESTHESIOLOGY

## 2019-10-24 PROCEDURE — 81003 URINALYSIS AUTO W/O SCOPE: CPT

## 2019-10-24 DEVICE — URETERAL STENT
Type: IMPLANTABLE DEVICE | Site: URETER | Status: FUNCTIONAL
Brand: PERCUFLEX™ PLUS

## 2019-10-24 RX ORDER — SODIUM CHLORIDE, SODIUM LACTATE, POTASSIUM CHLORIDE, CALCIUM CHLORIDE 600; 310; 30; 20 MG/100ML; MG/100ML; MG/100ML; MG/100ML
75 INJECTION, SOLUTION INTRAVENOUS CONTINUOUS
Status: DISCONTINUED | OUTPATIENT
Start: 2019-10-24 | End: 2019-10-24 | Stop reason: HOSPADM

## 2019-10-24 RX ORDER — PROPOFOL 10 MG/ML
INJECTION, EMULSION INTRAVENOUS AS NEEDED
Status: DISCONTINUED | OUTPATIENT
Start: 2019-10-24 | End: 2019-10-24 | Stop reason: HOSPADM

## 2019-10-24 RX ORDER — OXYCODONE HYDROCHLORIDE 5 MG/1
5 TABLET ORAL
Status: DISCONTINUED | OUTPATIENT
Start: 2019-10-24 | End: 2019-10-24 | Stop reason: HOSPADM

## 2019-10-24 RX ORDER — HYDROMORPHONE HYDROCHLORIDE 2 MG/ML
0.2 INJECTION, SOLUTION INTRAMUSCULAR; INTRAVENOUS; SUBCUTANEOUS
Status: DISCONTINUED | OUTPATIENT
Start: 2019-10-24 | End: 2019-10-24 | Stop reason: HOSPADM

## 2019-10-24 RX ORDER — DEXAMETHASONE SODIUM PHOSPHATE 4 MG/ML
INJECTION, SOLUTION INTRA-ARTICULAR; INTRALESIONAL; INTRAMUSCULAR; INTRAVENOUS; SOFT TISSUE AS NEEDED
Status: DISCONTINUED | OUTPATIENT
Start: 2019-10-24 | End: 2019-10-24 | Stop reason: HOSPADM

## 2019-10-24 RX ORDER — PHENAZOPYRIDINE HYDROCHLORIDE 200 MG/1
200 TABLET, FILM COATED ORAL
Qty: 9 TAB | Refills: 0 | Status: SHIPPED | OUTPATIENT
Start: 2019-10-24 | End: 2019-10-27

## 2019-10-24 RX ORDER — HYDROCODONE BITARTRATE AND ACETAMINOPHEN 5; 325 MG/1; MG/1
1 TABLET ORAL
Qty: 20 TAB | Refills: 0 | Status: SHIPPED | OUTPATIENT
Start: 2019-10-24 | End: 2019-10-31

## 2019-10-24 RX ORDER — NITROFURANTOIN 25; 75 MG/1; MG/1
100 CAPSULE ORAL 2 TIMES DAILY
Qty: 14 CAP | Refills: 0 | Status: SHIPPED | OUTPATIENT
Start: 2019-10-24 | End: 2019-10-31

## 2019-10-24 RX ORDER — HYOSCYAMINE SULFATE 0.12 MG/1
0.12 TABLET SUBLINGUAL
Qty: 30 TAB | Refills: 1 | Status: SHIPPED | OUTPATIENT
Start: 2019-10-24 | End: 2019-11-12

## 2019-10-24 RX ORDER — FENTANYL CITRATE 50 UG/ML
INJECTION, SOLUTION INTRAMUSCULAR; INTRAVENOUS AS NEEDED
Status: DISCONTINUED | OUTPATIENT
Start: 2019-10-24 | End: 2019-10-24 | Stop reason: HOSPADM

## 2019-10-24 RX ORDER — ONDANSETRON 2 MG/ML
4 INJECTION INTRAMUSCULAR; INTRAVENOUS
Status: DISCONTINUED | OUTPATIENT
Start: 2019-10-24 | End: 2019-10-24 | Stop reason: HOSPADM

## 2019-10-24 RX ORDER — EPHEDRINE SULFATE 50 MG/ML
INJECTION, SOLUTION INTRAVENOUS AS NEEDED
Status: DISCONTINUED | OUTPATIENT
Start: 2019-10-24 | End: 2019-10-24 | Stop reason: HOSPADM

## 2019-10-24 RX ORDER — LIDOCAINE HYDROCHLORIDE 10 MG/ML
0.1 INJECTION INFILTRATION; PERINEURAL AS NEEDED
Status: DISCONTINUED | OUTPATIENT
Start: 2019-10-24 | End: 2019-10-24 | Stop reason: HOSPADM

## 2019-10-24 RX ORDER — ONDANSETRON 2 MG/ML
INJECTION INTRAMUSCULAR; INTRAVENOUS AS NEEDED
Status: DISCONTINUED | OUTPATIENT
Start: 2019-10-24 | End: 2019-10-24 | Stop reason: HOSPADM

## 2019-10-24 RX ORDER — DIPHENHYDRAMINE HYDROCHLORIDE 50 MG/ML
12.5 INJECTION, SOLUTION INTRAMUSCULAR; INTRAVENOUS
Status: DISCONTINUED | OUTPATIENT
Start: 2019-10-24 | End: 2019-10-24 | Stop reason: HOSPADM

## 2019-10-24 RX ORDER — LIDOCAINE HYDROCHLORIDE 20 MG/ML
INJECTION, SOLUTION EPIDURAL; INFILTRATION; INTRACAUDAL; PERINEURAL AS NEEDED
Status: DISCONTINUED | OUTPATIENT
Start: 2019-10-24 | End: 2019-10-24 | Stop reason: HOSPADM

## 2019-10-24 RX ORDER — CEFAZOLIN SODIUM/WATER 2 G/20 ML
2 SYRINGE (ML) INTRAVENOUS
Status: COMPLETED | OUTPATIENT
Start: 2019-10-24 | End: 2019-10-24

## 2019-10-24 RX ORDER — SODIUM CHLORIDE, SODIUM LACTATE, POTASSIUM CHLORIDE, CALCIUM CHLORIDE 600; 310; 30; 20 MG/100ML; MG/100ML; MG/100ML; MG/100ML
100 INJECTION, SOLUTION INTRAVENOUS CONTINUOUS
Status: DISCONTINUED | OUTPATIENT
Start: 2019-10-24 | End: 2019-10-24 | Stop reason: HOSPADM

## 2019-10-24 RX ADMIN — EPHEDRINE SULFATE 5 MG: 50 INJECTION, SOLUTION INTRAVENOUS at 10:58

## 2019-10-24 RX ADMIN — DEXAMETHASONE SODIUM PHOSPHATE 4 MG: 4 INJECTION, SOLUTION INTRAMUSCULAR; INTRAVENOUS at 11:18

## 2019-10-24 RX ADMIN — EPHEDRINE SULFATE 5 MG: 50 INJECTION, SOLUTION INTRAVENOUS at 11:07

## 2019-10-24 RX ADMIN — EPHEDRINE SULFATE 5 MG: 50 INJECTION, SOLUTION INTRAVENOUS at 11:19

## 2019-10-24 RX ADMIN — PROPOFOL 150 MG: 10 INJECTION, EMULSION INTRAVENOUS at 10:58

## 2019-10-24 RX ADMIN — EPHEDRINE SULFATE 5 MG: 50 INJECTION, SOLUTION INTRAVENOUS at 11:15

## 2019-10-24 RX ADMIN — FENTANYL CITRATE 25 MCG: 50 INJECTION INTRAMUSCULAR; INTRAVENOUS at 11:12

## 2019-10-24 RX ADMIN — ONDANSETRON 4 MG: 2 INJECTION INTRAMUSCULAR; INTRAVENOUS at 11:18

## 2019-10-24 RX ADMIN — LIDOCAINE HYDROCHLORIDE 40 MG: 20 INJECTION, SOLUTION EPIDURAL; INFILTRATION; INTRACAUDAL; PERINEURAL at 10:58

## 2019-10-24 RX ADMIN — SODIUM CHLORIDE, SODIUM LACTATE, POTASSIUM CHLORIDE, AND CALCIUM CHLORIDE 100 ML/HR: 600; 310; 30; 20 INJECTION, SOLUTION INTRAVENOUS at 10:07

## 2019-10-24 RX ADMIN — Medication 2 G: at 11:04

## 2019-10-24 NOTE — DISCHARGE INSTRUCTIONS
CYSTOSCOPY    ACTIVITY   · As tolerated and as directed by your doctor. · Bathe or shower as directed by your doctor. DIET  · Clear liquids until no nausea or vomiting; then light diet for the first day. · Drink plenty of liquids. At least 8 glasses of water to help flush out bladder. Limit amount of caffeine. · Advance to regular diet on second day, unless your doctor orders otherwise. · If nausea and vomiting continues, call your doctor. PAIN  · Take pain medication as directed by your doctor. · Call your doctor if pain is NOT relieved by medication. · DO NOT take aspirin or blood thinners until directed by your doctor. CALL YOUR DOCTOR IF  · Expect blood-tinged urine. Call your doctor if it lasts more than 72 hours or if you cannot see through the urine. · Temperature of 101 degrees or above. · Unable to empty bladder. AFTER ANESTHESIA  · For the next 24 hours: DO NOT Drive, drink alcoholic beverages, or Make important decisions. · Be aware of dizziness following anesthesia and while taking pain medications    APPOINTMENT DATE/ TIME    YOUR DOCTOR'S PHONE NUMBER      DISCHARGE SUMMARY from Nurse    PATIENT INSTRUCTIONS:    After general anesthesia or intravenous sedation, for 24 hours or while taking prescription Narcotics:  · Limit your activities  · Do not drive and operate hazardous machinery  · Do not make important personal or business decisions  · Do  not drink alcoholic beverages  · If you have not urinated within 8 hours after discharge, please contact your surgeon on call. *  Please give a list of your current medications to your Primary Care Provider. *  Please update this list whenever your medications are discontinued, doses are      changed, or new medications (including over-the-counter products) are added. *  Please carry medication information at all times in case of emergency situations.       These are general instructions for a healthy lifestyle:    No smoking/ No tobacco products/ Avoid exposure to second hand smoke    Surgeon General's Warning:  Quitting smoking now greatly reduces serious risk to your health. Obesity, smoking, and sedentary lifestyle greatly increases your risk for illness    A healthy diet, regular physical exercise & weight monitoring are important for maintaining a healthy lifestyle    You may be retaining fluid if you have a history of heart failure or if you experience any of the following symptoms:  Weight gain of 3 pounds or more overnight or 5 pounds in a week, increased swelling in our hands or feet or shortness of breath while lying flat in bed. Please call your doctor as soon as you notice any of these symptoms; do not wait until your next office visit. Recognize signs and symptoms of STROKE:    F-face looks uneven    A-arms unable to move or move unevenly    S-speech slurred or non-existent    T-time-call 911 as soon as signs and symptoms begin-DO NOT go       Back to bed or wait to see if you get better-TIME IS BRAIN. ACTIVITY  · As tolerated and as directed by your doctor. · Bathe or shower as directed by your doctor. DIET  · Clear liquids until no nausea or vomiting; then light diet for the first day. · Advance to regular diet on second day, unless your doctor orders otherwise. · If nausea and vomiting continues, call your doctor. PAIN  · Take pain medication as directed by your doctor. · Call your doctor if pain is NOT relieved by medication. · DO NOT take aspirin of blood thinners unless directed by your doctor. DRESSING CARE       CALL YOUR DOCTOR IF   · Excessive bleeding that does not stop after holding pressure over the area  · Temperature of 101 degrees F or above  · Excessive redness, swelling or bruising, and/ or green or yellow, smelly discharge from incision    AFTER ANESTHESIA   · For the first 24 hours: DO NOT Drive, Drink alcoholic beverages, or Make important decisions.    · Be aware of dizziness following anesthesia and while taking pain medication. APPOINTMENT DATE/ TIME    YOUR DOCTOR'S PHONE NUMBER       DISCHARGE SUMMARY from Nurse    PATIENT INSTRUCTIONS:    After general anesthesia or intravenous sedation, for 24 hours or while taking prescription Narcotics:  · Limit your activities  · Do not drive and operate hazardous machinery  · Do not make important personal or business decisions  · Do  not drink alcoholic beverages  · If you have not urinated within 8 hours after discharge, please contact your surgeon on call. *  Please give a list of your current medications to your Primary Care Provider. *  Please update this list whenever your medications are discontinued, doses are      changed, or new medications (including over-the-counter products) are added. *  Please carry medication information at all times in case of emergency situations. These are general instructions for a healthy lifestyle:    No smoking/ No tobacco products/ Avoid exposure to second hand smoke    Surgeon General's Warning:  Quitting smoking now greatly reduces serious risk to your health. Obesity, smoking, and sedentary lifestyle greatly increases your risk for illness    A healthy diet, regular physical exercise & weight monitoring are important for maintaining a healthy lifestyle    You may be retaining fluid if you have a history of heart failure or if you experience any of the following symptoms:  Weight gain of 3 pounds or more overnight or 5 pounds in a week, increased swelling in our hands or feet or shortness of breath while lying flat in bed. Please call your doctor as soon as you notice any of these symptoms; do not wait until your next office visit.     Recognize signs and symptoms of STROKE:    F-face looks uneven    A-arms unable to move or move unevenly    S-speech slurred or non-existent    T-time-call 911 as soon as signs and symptoms begin-DO NOT go       Back to bed or wait to see if you get better-TIME IS BRAIN.

## 2019-10-24 NOTE — ANESTHESIA PREPROCEDURE EVALUATION
Anesthetic History   No history of anesthetic complications            Review of Systems / Medical History  Patient summary reviewed and pertinent labs reviewed    Pulmonary    COPD (Emphysema): moderate      Smoker         Neuro/Psych   Within defined limits           Cardiovascular    Hypertension: well controlled          CAD (2 yrs s/p TOMA), cardiac stents (2014, no angina, taking her asa) and hyperlipidemia    Exercise tolerance[de-identified] Borderline 4 METS  Comments: Normal stress test 6/2019   GI/Hepatic/Renal     GERD: well controlled    Renal disease: stones      Comments: Dysphagia Endo/Other      Hypothyroidism: well controlled  Arthritis (OA)     Other Findings   Comments: Polymyalgia rheumatica - on Prednisone 5mg daily           Physical Exam    Airway  Mallampati: I  TM Distance: 4 - 6 cm  Neck ROM: normal range of motion   Mouth opening: Normal     Cardiovascular  Regular rate and rhythm,  S1 and S2 normal,  no murmur, click, rub, or gallop  Rhythm: regular  Rate: normal         Dental    Dentition: Full upper dentures     Pulmonary      Decreased breath sounds: bilateral           Abdominal  GI exam deferred       Other Findings            Anesthetic Plan    ASA: 3  Anesthesia type: general          Induction: Intravenous  Anesthetic plan and risks discussed with: Patient

## 2019-10-24 NOTE — ANESTHESIA POSTPROCEDURE EVALUATION
Procedure(s):  CYSTOSCOPY RIGHT STENT PLACEMENT. general    Anesthesia Post Evaluation      Multimodal analgesia: multimodal analgesia used between 6 hours prior to anesthesia start to PACU discharge  Patient location during evaluation: PACU  Patient participation: complete - patient participated  Level of consciousness: awake  Pain management: adequate  Airway patency: patent  Anesthetic complications: no  Cardiovascular status: acceptable  Respiratory status: spontaneous ventilation and acceptable  Hydration status: acceptable  Post anesthesia nausea and vomiting:  none      Vitals Value Taken Time   /53 10/24/2019 12:05 PM   Temp 36.4 °C (97.6 °F) 10/24/2019 11:40 AM   Pulse 68 10/24/2019 12:03 PM   Resp 14 10/24/2019 12:04 PM   SpO2 97 % 10/24/2019 12:03 PM   Vitals shown include unvalidated device data.

## 2019-10-24 NOTE — OP NOTES
[address] [place] [address]  OPERATIVE REPORT    Name:  Kimberlee Hernandez  MR#:  [id number]  :  1940  ACCOUNT #:  [de-identified]  DATE OF SERVICE:  10/24/2019    PREOPERATIVE DIAGNOSIS:  Right ureteral stone. POSTOPERATIVE DIAGNOSIS:  Right ureteral stone. PROCEDURE PERFORMED:  Cystoscopy, right ureteral stent placement. SURGEON:  Amy Caicedo MD    ASSISTANT:  None. ANESTHESIA:  General.    COMPLICATIONS:  None. SPECIMENS REMOVED:  None. IMPLANTS:  A 6 x 24 double-J right ureteral stent without strings. ESTIMATED BLOOD LOSS:  1 mL. FINDINGS:  Cloudy urine. No purulent drainage from right UO noted, however. INDICATIONS FOR OPERATIVE PROCEDURE:  The patient is an 58-year-old female with a history of symptomatic right UPJ stone. She underwent a right ESWL, but unfortunately had a fragment that did not pass and is still symptomatic. She presented to the OR today after counseling for a right-sided ureteroscopy with laser litho; however, unfortunately, her preop urine culture was concerning for a UTI and she had not been on preop antibiotics. At this point, she was counseled on options and we opted to go ahead and proceed just with a right ureteral stent placement instead of the ureteroscopy given the concern for an active UTI. She will then be started on empiric antibiotics and urine culture sent today from preop. She will be then treated in the future with an interval right ureteroscopy once her infection has cleared from the urine. DESCRIPTION OF OPERATIVE PROCEDURE:  After informed consent was obtained and the correct patient was identified in the preoperative holding area, she was taken back to the operating suite and placed on table in the supine position. Time-out was performed confirming the correct patient and planned procedure. She received 2 g of IV Ancef prior to smooth induction of general endotracheal anesthesia.   She was moved into the dorsal lithotomy position, prepped and draped in usual sterile fashion. Upon initial inspection, she had significant pelvic organ prolapse predominantly from the apex of the vagina, which did make entry into the bladder somewhat difficult as the urethral meatus was obscured by her severe pelvic organ prolapse. Eventually, I was able to navigate my 22-Welsh rigid cystoscope with a 30-degree lens into the urethra and advanced it into the patient's bladder. Overall, the bladder appeared to be well supported, so the majority of the prolapse appeared to be vaginal and the ureteral orifice was identified on the right side in the orthotopic position. I cannulated it with a guidewire passed this under fluoroscopic guidance into the right renal pelvis. Once the wire was in place on fluoroscopy, I then passed a 6 x 24 double-J right ureteral stent without strings over the wire under fluoroscopic guidance into the right renal pelvis. Once the stent was in position, I pulled the wire noting good curl in the renal pelvis as well as the bladder. The stent was noted to be effluxing cloudy urine at the end of the case. I then drained the bladder completely and removed my rigid cystoscope. The patient was awoken from anesthesia, transferred to the PACU in stable condition. She tolerated the procedure well. There were no complications. All counts were correct at the end of procedure. I will go ahead and follow up on her urine culture and start her on empiric antibiotics for likely UTI and I will have my surgery scheduler call to schedule her for an interval right ureteroscopy with laser lithotripsy once her infection has cleared. Strict warnings were also given for her to call immediately if she develops fevers above 100.5, chills, nausea, vomiting, uncontrolled pain.       Yoselyn Hatfield MD      [id number]  D:  10/24/2019 11:41  T:  10/24/2019 15:01  JOB #:  [id number]

## 2019-10-24 NOTE — PERIOP NOTES
UA + for blood, nitrates, and bacteria. Dr. Shanika Del Rosario informed. Order for UA culture received. This nurse called lab, the sample is still there so cx will be run that sample. Dr. Shanika Del Rosario aware.    Signed By: Ben Byrd     October 24, 2019

## 2019-10-24 NOTE — BRIEF OP NOTE
BRIEF OPERATIVE NOTE    Date of Procedure: 10/24/2019   Preoperative Diagnosis: Kidney stone [N20.0]  Postoperative Diagnosis: Kidney stone [N20.0]    Procedure(s):  CYSTOSCOPY RIGHT STENT PLACEMENT  Surgeon(s) and Role:     * Marco A Trinh MD - Primary         Surgical Assistant: None    Surgical Staff:  Circ-1: Raúl Mathis RN  Event Time In Time Out   Incision Start 1113    Incision Close 1121      Anesthesia: General   Estimated Blood Loss: 1 cc  Specimens: * No specimens in log *   Findings: Cloudy urine. No purulent drainage. Complications: None   Implants:   Implant Name Type Inv.  Item Serial No.  Lot No. LRB No. Used Action   STENT URET 6F 24CM -- PERCUFLEX PLUS [id number] - [id number]  STENT URET 6F 24CM -- [facility] [id number]  [employer] [address] [id number] Right 1 Implanted

## 2019-10-24 NOTE — H&P
H&P Update:  Teri Chandra was seen and examined. History and physical has been reviewed. Significant clinical changes have occurred as noted:      U/A in pre-op today shows concern for UTI. Patient denies fevers/chills or UTI symptoms. However, she is not currently on antibiotics. I recommended that we just perform cystoscopy, right ureteral stent placement today to decompress her kidney and relieve per pain from the obstructing R ureteral stone, as it is not safe to perform ureteroscopy with a UTI. I will also send urine for culture, give her a dose of IV antibiotic prior to stent placement and then start her on empiric treatment for a UTI with antibiotics while we await culture results. Her stone will then need to be treated at a future date after the UTI has resolved. She understands the need for possible hospitalization if purulent drainage is found upon stent placement or if fevers develop. Risks/benefits of stent discussed in detail and she agrees to proceed. Bubba Garcia M.D. [facility]  [address]  Phone: [phone]  Fax: [phone]       Teri Chandra  : 1940         Chief Complaint   Patient presents with    Follow-up      HPI      Teri Chandra is a [de-identified] y.o. female with a symptomatic R kidney stone who is s/p R ESWL on 19. Returns today for post-op follow up.     She denies passage of stone fragment and remains with intermittent R flank pain since the surgery. KUB today shows a persistent 5 mm R stone fragment near the R UPJ. No ureteral stones identified.       No hematuria. Some urgency/frequency.   No fevers/chills.           Past Medical History:   Diagnosis Date    Adverse effect of anesthesia       hypotension with anesthesia    Back pain       upper back    CAD (coronary artery disease) 2014     Followed by [facility] nicotine dependence      COPD (chronic obstructive pulmonary disease) ([facility])       prn inhaler    Difficulty swallowing 08/23/2013     pt denies    GERD (gastroesophageal reflux disease)       managed well with meds    H/O bone density study 08/15/2017     osteoporosis    H/O heart artery stent       X1    H/O myocardial perfusion scan 09/08/2017     normal    History of gastritis      History of kidney stones       x 2 with surgical intervention     History of squamous cell carcinoma       removed from face and right ear    Hyperlipidemia 8/23/2013     no present treatment     Hypotensive episode       occassionally    Hypothyroidism       managed with medication     Kidney stone      Mitral regurgitation       mild    On prednisone therapy      Osteoarthritis       managed with medication     Osteoporosis       managed with medication     Pain and swelling of left ankle 4/9/2015     no recent episodes    Palpitations 5/12/2016     occassionally    Polymyalgia rheumatica (HCC)       Prednisone 5mg daily and Prednisone 1mg at bedtime    Purpura ([address] 8/23/2013     SECONDARY TO STEROID TREATMENT    Rotator cuff tear, left 8/23/2013    Rotator cuff tear, right      Statin intolerance              Past Surgical History:   Procedure Laterality Date    CARDIAC SURG PROCEDURE UNLIST         cardiac stent x1    HX BLADDER REPAIR        HX CARPAL TUNNEL RELEASE Right      HX COLONOSCOPY   02/09/2018     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 Heather Chavis    HX LITHOTRIPSY        HX MALIGNANT SKIN LESION EXCISION         Dr. Griffin Mallory- right upper ear- SCC    HX OPEN CHOLECYSTECTOMY        HX MIREYA AND BSO        HX TUBAL LIGATION                 Current Outpatient Medications   Medication Sig Dispense Refill    acetaminophen (TYLENOL ARTHRITIS PAIN) 650 mg TbER Take 650 mg by mouth every eight (8) hours.        losartan (COZAAR) 50 mg tablet TAKE ONE TABLET BY MOUTH ONCE DAILY 30 Tab 10    tamsulosin (FLOMAX) 0.4 mg capsule Take 1 Cap by mouth daily. 30 Cap 1    Cetirizine (ZYRTEC) 10 mg cap Take 10 mg by mouth daily.        levothyroxine (SYNTHROID) 150 mcg tablet TAKE ONE TABLET BY MOUTH EVERY DAY BEFORE BREAKFAST 90 Tab 2    nicotine (NICODERM CQ) 21 mg/24 hr 1 Patch by TransDERmal route every twenty-four (24) hours.        estradiol (ESTRACE) 0.5 mg tablet TAKE ONE TABLET BY MOUTH ONCE DAILY 90 Tab 1    furosemide (LASIX) 40 mg tablet TAKE 1 TAB BY MOUTH EVERY MORNING. 90 Tab 2    omeprazole (PRILOSEC) 40 mg capsule TAKE ONE CAPSULE BY MOUTH ONCE DAILY 90 Cap 0    predniSONE (DELTASONE) 5 mg tablet Take 5 mg by mouth daily.        albuterol (PROVENTIL HFA, VENTOLIN HFA, PROAIR HFA) 90 mcg/actuation inhaler Take 2 Puffs by inhalation every four (4) hours as needed for Wheezing or Shortness of Breath. 1 Inhaler 5    diphenoxylate-atropine (LOMOTIL) 2.5-0.025 mg per tablet Take 1 Tab by mouth four (4) times daily as needed for Diarrhea. Max Daily Amount: 4 Tabs. 20 Tab 1    predniSONE (DELTASONE) 1 mg tablet TAKE 1 TABLET BY MOUTH NIGHTLY. 100 Tab 3    nitroglycerin (NITROSTAT) 0.4 mg SL tablet 1 Tab by SubLINGual route every five (5) minutes as needed for Chest Pain. Up to 3 in 5 min 1 Bottle 5    CALCIUM CARBONATE/VITAMIN D3 (CALCIUM 500 + D PO) Take 2 Tabs by mouth every evening.  Ca 250, D 350         cholecalciferol (VITAMIN D3) 1,000 unit cap Take 1,000 Units by mouth every evening.        magnesium 250 mg tab Take 250 mg by mouth every evening.        aspirin delayed-release 81 mg tablet Take 81 mg by mouth every morning.                Allergies   Allergen Reactions    Codeine Nausea and Vomiting    Demerol [Meperidine] Unknown (comments)    Lactose Diarrhea    Levaquin [Levofloxacin] Diarrhea    Nuts [Tree Nut] Shortness of Breath       Specifically black walnuts per an allergy test.    Zocor [Simvastatin] Myalgia      Social History            Socioeconomic History    Marital status:        Spouse name: Not on file    Number of children: Not on file    Years of education: Not on file    Highest education level: Not on file   Occupational History    Not on file   Social Needs    Financial resource strain: Not on file    Food insecurity:       Worry: Not on file       Inability: Not on file    Transportation needs:       Medical: Not on file       Non-medical: Not on file   Tobacco Use    Smoking status: Current Every Day Smoker       Packs/day: 1.00       Years: 46.00       Pack years: 46.00    Smokeless tobacco: Never Used    Tobacco comment: would like to quit but don't think that she can.  11/28/17   Substance and Sexual Activity    Alcohol use: No    Drug use: No    Sexual activity: Never   Lifestyle    Physical activity:       Days per week: Not on file       Minutes per session: Not on file    Stress: Not on file   Relationships    Social connections:       Talks on phone: Not on file       Gets together: Not on file       Attends Orthodox service: Not on file       Active member of club or organization: Not on file       Attends meetings of clubs or organizations: Not on file       Relationship status: Not on file    Intimate partner violence:       Fear of current or ex partner: Not on file       Emotionally abused: Not on file       Physically abused: Not on file       Forced sexual activity: Not on file   Other Topics Concern    Not on file   Social History Narrative    Not on file            Family History   Problem Relation Age of Onset    COPD Mother      Alcohol abuse Mother      Stroke Maternal Grandfather      Arthritis-osteo Other           GRANDMOTHER         Review of Systems  GI: Positive for abdominal pain. Genitourinary: Positive for urgency and frequent urination.   Musculoskeletal: Positive for back pain.        Urinalysis  UA - Dipstick Results for orders placed or performed in visit on 10/04/19   AMB POC URINALYSIS DIP STICK AUTO W/O MICRO (PGU)     Status: None   Result Value Ref Range Status     Glucose (UA POC) Negative Negative mg/dL Final     Bilirubin (UA POC) Negative Negative Final     Ketones (UA POC) Negative Negative Final     Specific gravity (UA POC) 1.010 1.001 - 1.035 Final     Blood (UA POC) Negative Negative Final     pH (UA POC) 6.0 4.6 - 8.0 Final     Protein (UA POC) Negative Negative Final     Urobilinogen (POC) 0.2 mg/dL   Final     Nitrites (UA POC) Negative Negative Final     Leukocyte esterase (UA POC) Negative Negative Final   Results for orders placed or performed in visit on 01/22/19   AMB POC URINALYSIS DIP STICK AUTO W/ MICRO (PGU)     Status: None   Result Value Ref Range Status     Glucose (UA POC) Negative Negative mg/dL Final     Bilirubin (UA POC) Negative Negative Final     Ketones (UA POC) Negative Negative Final     Specific gravity (UA POC) 1.010 1.001 - 1.035 Final     Blood (UA POC) Negative Negative Final     pH (UA POC) 5.5 4.6 - 8.0 Final     Protein (UA POC) Negative Negative Final     Urobilinogen (POC) 0.2 mg/dL   Final     Nitrites (UA POC) Negative Negative Final     Leukocyte esterase (UA POC) Negative Negative Final         Visit Vitals  /53   Pulse 92   Temp 96.2 °F (35.7 °C) (Tympanic)   Wt 143 lb (64.9 kg)   BMI (P) 24.55 kg/m²         GENERAL: No acute distress, Awake, Alert, Oriented X 3, Gait normal  CARDIAC: regular rate and rhythm  CHEST AND LUNG: Easy work of breathing, clear to auscultation bilaterally, no cyanosis  ABDOMEN: soft, non tender, non-distended, positive bowel sounds, no organomegaly, no palpable masses, no guarding, no rebound tenderness  : R CVA TTP   SKIN: No rash, no erythema, no lacerations or abrasions, no ecchymosis  NEUROLOGIC: cranial nerves 2-12 grossly intact               Assessment and Plan      ICD-10-CM ICD-9-CM     1.  Kidney stones N20.0 592.0 AMB POC URINALYSIS DIP STICK AUTO W/O MICRO (PGU)         AMB POC XRAY ABDOMEN 1 VIEW      R Kidney Stones:   I reviewed the risks/benefits/alternatives for stone treatment today in detail with the patient. Treatment options discussed include medical expulsive therapy (MET) vs. Repeat ESWL vs. Ureteroscopy/laser lithotropsy vs. PCNL. After our discussion, the patient would like to proceed with RIGHT Ureteroscopy/Laser Lithotripsy/Basket Extraction of Stone/Stent Placement. Risks of ureteroscopy that we discussed were bleeding, infection, injury to the bladder/ureter/kidney and surrounding structures, incomplete fragmentation of stones, steinstrasse, inability to pass stone fragments requiring additional operative intervention in the form of second look ureteroscopy/laser lithotripsy and/or stent placement, ureteral stricture, stent migration, stent pain, urinary retention, risks of general anesthesia, recurrent stones. The patient expressed understanding of the above.       Surgery scheduler will call to schedule     >50% of visit spent counseling patient on the above.     Bubba Myers M.D.  [facility]  [address]  Phone: [phone]  Fax: [phone]

## 2019-10-27 LAB
BACTERIA SPEC CULT: ABNORMAL
SERVICE CMNT-IMP: ABNORMAL

## 2019-10-27 NOTE — PROGRESS NOTES
S[place],    Please call Mrs. Janita Sicard to let her know that her urine culture returned positive for a UTI. Please have her stop the macrobid and start bactrim DS 1 tab PO BID X 7 days instead. Dispense # 14. Refills 0. Please call this into her pharmacy. Her culture is resistant to the macrobid that she is on. Thanks!

## 2019-11-01 RX ORDER — CEFAZOLIN SODIUM/WATER 2 G/20 ML
2 SYRINGE (ML) INTRAVENOUS
Status: CANCELLED | OUTPATIENT
Start: 2019-11-01 | End: 2019-11-01

## 2019-11-06 ENCOUNTER — ANESTHESIA EVENT (OUTPATIENT)
Dept: SURGERY | Age: 79
End: 2019-11-06
Payer: MEDICARE

## 2019-11-07 ENCOUNTER — HOSPITAL ENCOUNTER (OUTPATIENT)
Age: 79
Setting detail: OUTPATIENT SURGERY
Discharge: HOME | End: 2019-11-07
Attending: UROLOGY | Admitting: UROLOGY
Payer: MEDICARE

## 2019-11-07 ENCOUNTER — ANESTHESIA (OUTPATIENT)
Dept: SURGERY | Age: 79
End: 2019-11-07
Payer: MEDICARE

## 2019-11-07 VITALS
DIASTOLIC BLOOD PRESSURE: 64 MMHG | SYSTOLIC BLOOD PRESSURE: 141 MMHG | HEART RATE: 61 BPM | WEIGHT: 140 LBS | RESPIRATION RATE: 16 BRPM | OXYGEN SATURATION: 92 % | BODY MASS INDEX: 23.9 KG/M2 | TEMPERATURE: 97.6 F | HEIGHT: 64 IN

## 2019-11-07 DIAGNOSIS — N20.0 NEPHROLITHIASIS: Primary | ICD-10-CM

## 2019-11-07 LAB
APPEARANCE UR: ABNORMAL
BACTERIA URNS QL MICRO: 0 /HPF
BILIRUB UR QL: NEGATIVE
CASTS URNS QL MICRO: ABNORMAL /LPF
COLOR UR: YELLOW
EPI CELLS #/AREA URNS HPF: ABNORMAL /HPF
GLUCOSE UR STRIP.AUTO-MCNC: NEGATIVE MG/DL
HGB UR QL STRIP: ABNORMAL
KETONES UR QL STRIP.AUTO: NEGATIVE MG/DL
LEUKOCYTE ESTERASE UR QL STRIP.AUTO: ABNORMAL
NITRITE UR QL STRIP.AUTO: NEGATIVE
PH UR STRIP: 5 [PH] (ref 5–9)
POTASSIUM BLD-SCNC: 4.6 MMOL/L (ref 3.5–5.1)
PROT UR STRIP-MCNC: 30 MG/DL
RBC #/AREA URNS HPF: ABNORMAL /HPF
SP GR UR REFRACTOMETRY: 1.01 (ref 1–1.02)
UROBILINOGEN UR QL STRIP.AUTO: 0.2 EU/DL (ref 0.2–1)
WBC URNS QL MICRO: ABNORMAL /HPF

## 2019-11-07 PROCEDURE — 77030038846 HC SCPE URETSCP LITHOVUE DISP BSC -H: Performed by: UROLOGY

## 2019-11-07 PROCEDURE — 84132 ASSAY OF SERUM POTASSIUM: CPT

## 2019-11-07 PROCEDURE — 81001 URINALYSIS AUTO W/SCOPE: CPT

## 2019-11-07 PROCEDURE — 82355 CALCULUS ANALYSIS QUAL: CPT

## 2019-11-07 PROCEDURE — 76060000033 HC ANESTHESIA 1 TO 1.5 HR: Performed by: UROLOGY

## 2019-11-07 PROCEDURE — 77030040361 HC SLV COMPR DVT MDII -B: Performed by: UROLOGY

## 2019-11-07 PROCEDURE — 74011250636 HC RX REV CODE- 250/636: Performed by: NURSE ANESTHETIST, CERTIFIED REGISTERED

## 2019-11-07 PROCEDURE — C1769 GUIDE WIRE: HCPCS | Performed by: UROLOGY

## 2019-11-07 PROCEDURE — 76210000063 HC OR PH I REC FIRST 0.5 HR: Performed by: UROLOGY

## 2019-11-07 PROCEDURE — 74011250636 HC RX REV CODE- 250/636: Performed by: UROLOGY

## 2019-11-07 PROCEDURE — 76210000020 HC REC RM PH II FIRST 0.5 HR: Performed by: UROLOGY

## 2019-11-07 PROCEDURE — 74011000258 HC RX REV CODE- 258: Performed by: UROLOGY

## 2019-11-07 PROCEDURE — 77030020463 HC FCPS ENDOSC STN BSC -C: Performed by: UROLOGY

## 2019-11-07 PROCEDURE — 76010000161 HC OR TIME 1 TO 1.5 HR INTENSV-TIER 1: Performed by: UROLOGY

## 2019-11-07 PROCEDURE — 74011000250 HC RX REV CODE- 250: Performed by: NURSE ANESTHETIST, CERTIFIED REGISTERED

## 2019-11-07 PROCEDURE — 74011636320 HC RX REV CODE- 636/320: Performed by: UROLOGY

## 2019-11-07 PROCEDURE — 77030035011 HC LSR FBR HOLM FLXIVA TRAC TIP BSC -F: Performed by: UROLOGY

## 2019-11-07 PROCEDURE — 74011250636 HC RX REV CODE- 250/636: Performed by: ANESTHESIOLOGY

## 2019-11-07 PROCEDURE — 88300 SURGICAL PATH GROSS: CPT

## 2019-11-07 PROCEDURE — 77030039425 HC BLD LARYNG TRULITE DISP TELE -A: Performed by: ANESTHESIOLOGY

## 2019-11-07 PROCEDURE — 77030018836 HC SOL IRR NACL ICUM -A: Performed by: UROLOGY

## 2019-11-07 PROCEDURE — C2617 STENT, NON-COR, TEM W/O DEL: HCPCS | Performed by: UROLOGY

## 2019-11-07 PROCEDURE — 77030037088 HC TUBE ENDOTRACH ORAL NSL COVD-A: Performed by: ANESTHESIOLOGY

## 2019-11-07 PROCEDURE — C1894 INTRO/SHEATH, NON-LASER: HCPCS | Performed by: UROLOGY

## 2019-11-07 DEVICE — URETERAL STENT
Type: IMPLANTABLE DEVICE | Site: URETER | Status: FUNCTIONAL
Brand: PERCUFLEX™ PLUS

## 2019-11-07 RX ORDER — MIDAZOLAM HYDROCHLORIDE 1 MG/ML
2 INJECTION, SOLUTION INTRAMUSCULAR; INTRAVENOUS
Status: DISCONTINUED | OUTPATIENT
Start: 2019-11-07 | End: 2019-11-07 | Stop reason: HOSPADM

## 2019-11-07 RX ORDER — GLYCOPYRROLATE 0.2 MG/ML
INJECTION INTRAMUSCULAR; INTRAVENOUS AS NEEDED
Status: DISCONTINUED | OUTPATIENT
Start: 2019-11-07 | End: 2019-11-07 | Stop reason: HOSPADM

## 2019-11-07 RX ORDER — HYDROCODONE BITARTRATE AND ACETAMINOPHEN 5; 325 MG/1; MG/1
1 TABLET ORAL
Qty: 10 TAB | Refills: 0 | Status: SHIPPED | OUTPATIENT
Start: 2019-11-07 | End: 2019-11-10

## 2019-11-07 RX ORDER — SODIUM CHLORIDE, SODIUM LACTATE, POTASSIUM CHLORIDE, CALCIUM CHLORIDE 600; 310; 30; 20 MG/100ML; MG/100ML; MG/100ML; MG/100ML
75 INJECTION, SOLUTION INTRAVENOUS CONTINUOUS
Status: DISCONTINUED | OUTPATIENT
Start: 2019-11-07 | End: 2019-11-07 | Stop reason: HOSPADM

## 2019-11-07 RX ORDER — PROPOFOL 10 MG/ML
INJECTION, EMULSION INTRAVENOUS AS NEEDED
Status: DISCONTINUED | OUTPATIENT
Start: 2019-11-07 | End: 2019-11-07 | Stop reason: HOSPADM

## 2019-11-07 RX ORDER — OXYCODONE HYDROCHLORIDE 5 MG/1
5 TABLET ORAL
Status: DISCONTINUED | OUTPATIENT
Start: 2019-11-07 | End: 2019-11-07 | Stop reason: HOSPADM

## 2019-11-07 RX ORDER — HYDROCODONE BITARTRATE AND ACETAMINOPHEN 5; 325 MG/1; MG/1
2 TABLET ORAL AS NEEDED
Status: DISCONTINUED | OUTPATIENT
Start: 2019-11-07 | End: 2019-11-07 | Stop reason: HOSPADM

## 2019-11-07 RX ORDER — ROCURONIUM BROMIDE 10 MG/ML
INJECTION, SOLUTION INTRAVENOUS AS NEEDED
Status: DISCONTINUED | OUTPATIENT
Start: 2019-11-07 | End: 2019-11-07 | Stop reason: HOSPADM

## 2019-11-07 RX ORDER — ONDANSETRON 2 MG/ML
INJECTION INTRAMUSCULAR; INTRAVENOUS AS NEEDED
Status: DISCONTINUED | OUTPATIENT
Start: 2019-11-07 | End: 2019-11-07 | Stop reason: HOSPADM

## 2019-11-07 RX ORDER — FENTANYL CITRATE 50 UG/ML
INJECTION, SOLUTION INTRAMUSCULAR; INTRAVENOUS AS NEEDED
Status: DISCONTINUED | OUTPATIENT
Start: 2019-11-07 | End: 2019-11-07 | Stop reason: HOSPADM

## 2019-11-07 RX ORDER — LIDOCAINE HYDROCHLORIDE 10 MG/ML
0.1 INJECTION INFILTRATION; PERINEURAL AS NEEDED
Status: DISCONTINUED | OUTPATIENT
Start: 2019-11-07 | End: 2019-11-07 | Stop reason: HOSPADM

## 2019-11-07 RX ORDER — EPHEDRINE SULFATE/0.9% NACL/PF 50 MG/5 ML
SYRINGE (ML) INTRAVENOUS AS NEEDED
Status: DISCONTINUED | OUTPATIENT
Start: 2019-11-07 | End: 2019-11-07 | Stop reason: HOSPADM

## 2019-11-07 RX ORDER — FENTANYL CITRATE 50 UG/ML
100 INJECTION, SOLUTION INTRAMUSCULAR; INTRAVENOUS ONCE
Status: DISCONTINUED | OUTPATIENT
Start: 2019-11-07 | End: 2019-11-07 | Stop reason: HOSPADM

## 2019-11-07 RX ORDER — DEXAMETHASONE SODIUM PHOSPHATE 4 MG/ML
INJECTION, SOLUTION INTRA-ARTICULAR; INTRALESIONAL; INTRAMUSCULAR; INTRAVENOUS; SOFT TISSUE AS NEEDED
Status: DISCONTINUED | OUTPATIENT
Start: 2019-11-07 | End: 2019-11-07 | Stop reason: HOSPADM

## 2019-11-07 RX ORDER — HYDROMORPHONE HYDROCHLORIDE 2 MG/ML
0.5 INJECTION, SOLUTION INTRAMUSCULAR; INTRAVENOUS; SUBCUTANEOUS
Status: DISCONTINUED | OUTPATIENT
Start: 2019-11-07 | End: 2019-11-07 | Stop reason: HOSPADM

## 2019-11-07 RX ORDER — MIDAZOLAM HYDROCHLORIDE 1 MG/ML
5 INJECTION, SOLUTION INTRAMUSCULAR; INTRAVENOUS ONCE
Status: DISCONTINUED | OUTPATIENT
Start: 2019-11-07 | End: 2019-11-07 | Stop reason: HOSPADM

## 2019-11-07 RX ORDER — LIDOCAINE HYDROCHLORIDE 20 MG/ML
INJECTION, SOLUTION EPIDURAL; INFILTRATION; INTRACAUDAL; PERINEURAL AS NEEDED
Status: DISCONTINUED | OUTPATIENT
Start: 2019-11-07 | End: 2019-11-07 | Stop reason: HOSPADM

## 2019-11-07 RX ADMIN — DEXAMETHASONE SODIUM PHOSPHATE 4 MG: 4 INJECTION, SOLUTION INTRAMUSCULAR; INTRAVENOUS at 12:35

## 2019-11-07 RX ADMIN — SODIUM CHLORIDE, SODIUM LACTATE, POTASSIUM CHLORIDE, AND CALCIUM CHLORIDE 75 ML/HR: 600; 310; 30; 20 INJECTION, SOLUTION INTRAVENOUS at 10:45

## 2019-11-07 RX ADMIN — Medication 10 MG: at 12:17

## 2019-11-07 RX ADMIN — Medication 10 MG: at 12:40

## 2019-11-07 RX ADMIN — LIDOCAINE HYDROCHLORIDE 100 MG: 20 INJECTION, SOLUTION EPIDURAL; INFILTRATION; INTRACAUDAL; PERINEURAL at 12:05

## 2019-11-07 RX ADMIN — CEFTRIAXONE 2 G: 1 INJECTION, POWDER, FOR SOLUTION INTRAMUSCULAR; INTRAVENOUS at 12:09

## 2019-11-07 RX ADMIN — ONDANSETRON 4 MG: 2 INJECTION INTRAMUSCULAR; INTRAVENOUS at 12:36

## 2019-11-07 RX ADMIN — GLYCOPYRROLATE 0.4 MG: 0.2 INJECTION, SOLUTION INTRAMUSCULAR; INTRAVENOUS at 12:57

## 2019-11-07 RX ADMIN — PROPOFOL 150 MG: 10 INJECTION, EMULSION INTRAVENOUS at 12:05

## 2019-11-07 RX ADMIN — ROCURONIUM BROMIDE 35 MG: 10 INJECTION, SOLUTION INTRAVENOUS at 12:05

## 2019-11-07 RX ADMIN — FENTANYL CITRATE 50 MCG: 50 INJECTION INTRAMUSCULAR; INTRAVENOUS at 12:26

## 2019-11-07 NOTE — OP NOTES
[address] [place] [address]  OPERATIVE REPORT    Name:  Sylvia Jacinto  MR#:  [id number]  :  1940  ACCOUNT #:  [de-identified]  DATE OF SERVICE:  2019    PREOPERATIVE DIAGNOSIS:  Right ureter stone. POSTOPERATIVE DIAGNOSIS:  Right kidney stone. PROCEDURE PERFORMED:  Cystoscopy, right ureteroscopy, laser lithotripsy, retrograde pyelogram, basket of stone, stent exchange, and flexible ureteroscopy. SURGEON:  Blayne Gonzalez MD    SURGICAL ASSISTANT:  None. ANESTHESIA:  General.    COMPLICATIONS:  None. SPECIMENS REMOVED:  Right kidney stone for analysis. IMPLANTS:  A 6 x 24 right ureteral stent with strings. ESTIMATED BLOOD LOSS:  1 mL. FINDINGS:  5-mm right lower pole kidney stone fragmented and removed. INDICATIONS FOR OPERATIVE PROCEDURE:  The patient is an 79-year-old female with a right kidney stone that failed to pass after ESWL who presented today for ureteroscopy for removal of the stone. She was previously stented and treated for a UTI with a preop urine culture today showing no further evidence of infection. DESCRIPTION OF PROCEDURE:  After informed consent was obtained, the correct patient was identified in preoperative holding area. She was taken back to operating room suite and placed on table in the supine position. Time-out was performed confirming the correct patient and planned procedure. She received IV Rocephin 1 g prior to smooth induction of general endotracheal anesthesia. She was moved into the dorsal lithotomy position and prepped and draped in the usual sterile fashion. I began the case by inserting a 22-[place] rigid cystoscope with a 30-degree lens in the urethra and advanced into the patient's bladder. Pancystoscopy was performed was unremarkable, except for a right ureteral stent extruding from the right ureteral orifice.   I cannulated the orifice with a sensor wire and passed alongside the stent under fluoroscopic guidance into the right renal pelvis. Once the wire was in position alongside the stent, I then used a flexible stent grasper to grasp the stent and removed it completely intact leaving the wire in place. I then switched to pressure bag and my semi-rigid ureteroscope. I inserted this into the ureter under direct vision and did not visualize any ureteral stones. I did see a 5 mm lower pole kidney stone on x-ray however and therefore placed a second wire through my semi-rigid ureteroscope and then removed the semi-rigid ureteroscope and placed 11 x 13 Axxcess catheter into the proximal ureter. I removed the wire and the inner sheath of the Axxcess catheter leaving the safety wire in place. I then switched to my flexible ureteroscope and went into the patient's proximal ureter. The proximal ureter was very tortuous and it was very difficult to enter the renal pelvis  S-shaped deformity. Likely from the patient's prior right upper quadrant surgery, she likely has some scar tissue kinking the ureter where it enters the kidney. However with some manipulation, I was able to get my ureteroscope into the kidney. I performed pan pyeloscopy and the only stone was noted to be is one in the right lower pole. I used a 200-micron laser fiber with the settings of 0.8 joules and 8 Hz. I lasered the stone into very small pieces. A few of the larger pieces were basketed using a Tricep basket and sent off for analysis. Remainder of the pieces was too small to basket. Once I had gotten all of the sizable stone pieces, I again performed pan pyeloscopy. I shot a retrograde pyelogram which was normal and used it as a map to inspect each and every luci. I then backed my ureteroscope down the patient's ureter and did not note any ureter stones or abnormalities on the way out. I left a sensor wire in place.   I loaded this onto the rigid cystoscope and then passed a 6 x 24 right ureteral stent with strings attached over the wire under fluoroscopic guidance into the right renal pelvis. Once the stent was in position, I pulled the wire noting good curl in the right renal pelvis as well as the bladder. I drained the bladder completely. I left a string extruding from the meatus. The patient was awoken from anesthesia and transferred to PACU in stable condition. She tolerated the procedure well. There were no complications. All counts were correct at the end of procedure.       MD LEXII [id number]  D:  11/07/2019 13:07  T:  11/07/2019 14:42  JOB #:  [id number]

## 2019-11-07 NOTE — ANESTHESIA POSTPROCEDURE EVALUATION
Procedure(s):  CYSTOSCOPY RIGHT URETEROSCOPY/ RIGHT RETROGRADE PYELOGRAM/  LASER LITHO RIGHT STENT EXCHANGE. general    Anesthesia Post Evaluation      Multimodal analgesia: multimodal analgesia used between 6 hours prior to anesthesia start to PACU discharge  Patient location during evaluation: bedside  Patient participation: complete - patient participated  Level of consciousness: awake and alert  Pain score: 3  Pain management: adequate  Airway patency: patent  Anesthetic complications: no  Cardiovascular status: acceptable and hemodynamically stable  Respiratory status: acceptable  Hydration status: acceptable  Post anesthesia nausea and vomiting:  none      Vitals Value Taken Time   /64 11/7/2019  1:40 PM   Temp 36.4 °C (97.6 °F) 11/7/2019  1:38 PM   Pulse 60 11/7/2019  1:41 PM   Resp 16 11/7/2019  1:40 PM   SpO2 93 % 11/7/2019  1:41 PM   Vitals shown include unvalidated device data.

## 2019-11-07 NOTE — H&P
[address] Urology H&P Note                                           11/07/19     Patient: Fatmata Rolon  MRN: [id number]    Admission Date:  11/7/2019, 0  Admission Diagnosis: Ureteral stone [N20.1]    ASSESSMENT: [de-identified] y.o. female with a residual R ureteral fragment after ESWL who has a R ureteral stent placed due to positive UTI in the setting of obstructing fragment. Completed course of antibiotics and now presents for stone treatment. PLAN:  -To OR for Cystoscopy, RIGHT Ureteroscopy, Laser Lithotripsy, RIGHT Ureteral Stent Exchange  -F/U U/A in pre-op - NO BACTERIA SEEN. OK to proceed. -NPO for surgery  -Antibiotics on call to OR      __________________________________________________________________________________    HPI:     Fatmata Rolon is a [de-identified] y.o. female with a residual R ureteral fragment after ESWL who has a R ureteral stent placed due to positive UTI in the setting of obstructing fragment. Completed course of antibiotics and now presents for stone treatment. Completed course of antibiotics. No changes in medical history since last seen. No UTI symptoms today.      Past Medical History:  Past Medical History:   Diagnosis Date    Adverse effect of anesthesia     hypotension with anesthesia    Back pain     upper back    CAD (coronary artery disease) 11/25/2014    2014- 1 stent, denies Mi    COPD (chronic obstructive pulmonary disease) ([facility])     prn inhaler- smoker- 1 ppd- can climb 2 flights of steps    Difficulty swallowing 08/23/2013    pt denies    GERD (gastroesophageal reflux disease)     managed well with meds-    H/O bone density study 08/15/2017    osteoporosis    H/O heart artery stent     X1    H/O myocardial perfusion scan 09/08/2017    normal    History of gastritis     History of kidney stones     x 2 with surgical intervention     History of squamous cell carcinoma     removed from face and right ear    Hyperlipidemia 8/23/2013    no present treatment     Hypotensive episode     occassionally    Hypothyroidism     managed with medication     Kidney stone     Murmur 09/22/2017 9/22/17 ECHO LVEF 65% Mitral regurgitation -     On prednisone therapy     Osteoarthritis     managed with medication     Osteoporosis     managed with medication     Pain and swelling of left ankle 4/9/2015    no recent episodes    Palpitations 5/12/2016    occassionally    Polymyalgia rheumatica (HCC)     Prednisone 5mg daily and Prednisone 1mg at bedtime    Purpura ([address] 8/23/2013    SECONDARY TO STEROID TREATMENT    Recurrent UTI 10/2019    treated with Bactrim    Rotator cuff tear, left 8/23/2013    Rotator cuff tear, right     Smoker     smokes 1 ppd x since age 28    Statin intolerance        Past Surgical History:  Past Surgical History:   Procedure Laterality Date    HX BLADDER REPAIR      HX CARPAL TUNNEL RELEASE Right     HX COLONOSCOPY  02/09/2018    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 Tati Bills    HX HEART CATHETERIZATION  2014    cardiac stent x1    HX LITHOTRIPSY  2019    HX Garima Murguia- right upper ear- SCC    HX OPEN CHOLECYSTECTOMY      HX MIREYA AND BSO      HX TUBAL LIGATION         Medications:  No current facility-administered medications on file prior to encounter. Current Outpatient Medications on File Prior to Encounter   Medication Sig Dispense Refill    trimethoprim-sulfamethoxazole (BACTRIM, SEPTRA)  mg per tablet Take 1 Tab by mouth daily for 7 days. Start after current prescription completed. 7 Tab 0    hyoscyamine SL (LEVSIN/SL) 0.125 mg SL tablet 1 Tab by SubLINGual route every four (4) hours as needed for Cramping (bladder spasms). 30 Tab 1    predniSONE (DELTASONE) 1 mg tablet TAKE 1 TABLET BY MOUTH NIGHTLY.  90 Tab 2    losartan (COZAAR) 50 mg tablet TAKE ONE TABLET BY MOUTH ONCE DAILY 30 Tab 10    tamsulosin (FLOMAX) 0.4 mg capsule Take 1 Cap by mouth daily. 30 Cap 1    Cetirizine (ZYRTEC) 10 mg cap Take 10 mg by mouth daily.  levothyroxine (SYNTHROID) 150 mcg tablet TAKE ONE TABLET BY MOUTH EVERY DAY BEFORE BREAKFAST 90 Tab 2    estradiol (ESTRACE) 0.5 mg tablet TAKE ONE TABLET BY MOUTH ONCE DAILY 90 Tab 1    furosemide (LASIX) 40 mg tablet TAKE 1 TAB BY MOUTH EVERY MORNING. 90 Tab 2    omeprazole (PRILOSEC) 40 mg capsule TAKE ONE CAPSULE BY MOUTH ONCE DAILY (Patient taking differently: Take 40 mg by mouth every morning.) 90 Cap 0    albuterol (PROVENTIL HFA, VENTOLIN HFA, PROAIR HFA) 90 mcg/actuation inhaler Take 2 Puffs by inhalation every four (4) hours as needed for Wheezing or Shortness of Breath. 1 Inhaler 5    diphenoxylate-atropine (LOMOTIL) 2.5-0.025 mg per tablet Take 1 Tab by mouth four (4) times daily as needed for Diarrhea. Max Daily Amount: 4 Tabs. 20 Tab 1    nitroglycerin (NITROSTAT) 0.4 mg SL tablet 1 Tab by SubLINGual route every five (5) minutes as needed for Chest Pain. Up to 3 in 5 min 1 Bottle 5    CALCIUM CARBONATE/VITAMIN D3 (CALCIUM 500 + D PO) Take 2 Tabs by mouth every evening. Ca 250, D 350       cholecalciferol (VITAMIN D3) 1,000 unit cap Take 1,000 Units by mouth every evening.  magnesium 250 mg tab Take 250 mg by mouth every other day.  aspirin delayed-release 81 mg tablet Take 81 mg by mouth every morning. Allergies:   Allergies   Allergen Reactions    Macrobid [Nitrofurantoin Monohyd/M-Cryst] Other (comments)     delirium    Codeine Nausea and Vomiting    Demerol [Meperidine] Nausea and Vomiting    Lactose Diarrhea    Levaquin [Levofloxacin] Diarrhea    Nuts [Tree Nut] Shortness of Breath     Specifically black walnuts per an allergy test.    Zocor [Simvastatin] Myalgia       Social History:  Social History     Socioeconomic History    Marital status:      Spouse name: Not on file    Number of children: Not on file    Years of education: Not on file    Highest education level: Not on file   Occupational History    Not on file   Social Needs    Financial resource strain: Not on file    Food insecurity:     Worry: Not on file     Inability: Not on file    Transportation needs:     Medical: Not on file     Non-medical: Not on file   Tobacco Use    Smoking status: Current Every Day Smoker     Packs/day: 1.00     Years: 46.00     Pack years: 46.00    Smokeless tobacco: Never Used    Tobacco comment: would like to quit but don't think that she can.  11/28/17   Substance and Sexual Activity    Alcohol use: No    Drug use: No    Sexual activity: Never   Lifestyle    Physical activity:     Days per week: Not on file     Minutes per session: Not on file    Stress: Not on file   Relationships    Social connections:     Talks on phone: Not on file     Gets together: Not on file     Attends Bahai service: Not on file     Active member of club or organization: Not on file     Attends meetings of clubs or organizations: Not on file     Relationship status: Not on file    Intimate partner violence:     Fear of current or ex partner: Not on file     Emotionally abused: Not on file     Physically abused: Not on file     Forced sexual activity: Not on file   Other Topics Concern    Not on file   Social History Narrative    Not on file       Family History:  Family History   Problem Relation Age of Onset    COPD Mother     Alcohol abuse Mother     Stroke Maternal Grandfather     Arthritis-osteo Other         GRANDMOTHER       ROS:  Review of Systems - General ROS: negative for - chills, fatigue or fever  Respiratory ROS: no cough, shortness of breath, or wheezing  Cardiovascular ROS: no chest pain or dyspnea on exertion  Gastrointestinal ROS: no abdominal pain, change in bowel habits, or black or bloody stools    Vitals:  Visit Vitals  /57 (BP 1 Location: Left arm, BP Patient Position: Sitting)   Pulse (!) 53   Temp 98.4 °F (36.9 °C)   Resp 18   Wt 140 lb (63.5 kg)   SpO2 97%   BMI 24.03 kg/m²       Intake/Output:  No intake or output data in the 24 hours ending 11/07/19 1013     Physical Exam:   Visit Vitals  /57 (BP 1 Location: Left arm, BP Patient Position: Sitting)   Pulse (!) 53   Temp 98.4 °F (36.9 °C)   Resp 18   Wt 140 lb (63.5 kg)   SpO2 97%   BMI 24.03 kg/m²        General AAO *3  CARDIAC: regular rate and rhythm  CHEST AND LUNG: Easy work of breathing  ABDOMEN: soft, non tender, non-distended  SKIN: No rash, no erythema, no lacerations or abrasions, no ecchymosis  NEUROLOGIC: cranial nerves 2-12 grossly intact     Lab/Radiology/Other Diagnostic Tests:  No results for input(s): HGB, HCT, WBC, NA, K, CL, CO2, BUN, CR, GLU, CA, MG, ALBUMIN, ALT, PREALB, INR, HGBEXT, HCTEXT, INREXT in the last 72 hours. No lab exists for component: PLTCT, PO4, TOTPROT, TOTBILI, AST, ALKPHOS, LAURENT, LIPASE, PT, PTT      Bubba Topete M.D.     [facility]  [facility]  [address] [place] [address] [address]  Phone: [phone]  Fax: [phone]

## 2019-11-07 NOTE — BRIEF OP NOTE
BRIEF OPERATIVE NOTE    Date of Procedure: 11/7/2019   Preoperative Diagnosis: Ureteral stone [N20.1]  Postoperative Diagnosis: Ureteral stone [N20.1]    Procedure(s):  CYSTOSCOPY RIGHT URETEROSCOPY/ RIGHT RETROGRADE PYELOGRAM/  LASER LITHO RIGHT STENT EXCHANGE  Surgeon(s) and Role:     * Alexander Horne MD - Primary         Surgical Assistant: None    Surgical Staff:  Circ-1: Anali Childs RN  Event Time In Time Out   Incision Start 1217    Incision Close 1256      Anesthesia: General   Estimated Blood Loss: 1 cc   Specimens: Right Kidney Stone - analyssi  Findings: 5 mm R lower pole stone fragmented/removed. Complications: None   Implants:   Implant Name Type Inv.  Item Serial No.  Lot No. LRB No. Used Action   [place] [employer] 6F 24CM - [id number]  STENT URET 6F 24CM  [employer] [facility] [id number] Right 1 Implanted

## 2019-11-07 NOTE — DISCHARGE INSTRUCTIONS
Ureteral Stent Placement: What to Expect at [address] [place] [address]  A ureteral (say \"you-REE-ter-ul\") stent is a thin, hollow tube that is placed in the ureter to help urine pass from the kidney into the bladder. Ureters are the tubes that connect the kidneys to the bladder. You may have a small amount of blood in your urine for 1 to 3 days after the procedure. While the stent is in place, you may have to urinate more often, feel a sudden need to urinate, or feel like you cannot completely empty your bladder. You may feel some pain when you urinate or do strenuous activity. You also may notice a small amount of blood in your urine after strenuous activities. These side effects usually do not prevent people from doing their normal daily activities. You may have a string attached to your stent. Do not to pull the string unless the doctor tells you to. The doctor will use the string to pull out the stent when you no longer need it. After the procedure, urine may flow better from your kidneys to your bladder. A ureteral stent may be left in place for several days or for as long as several months. Your doctor will take it out when you no longer need it. Cystoscopy: What to Expect at [address] [facility]  A cystoscopy is a procedure that lets a doctor look inside of the bladder and the urethra. The urethra is the tube that carries urine from the bladder to outside the body. The doctor uses a thin, lighted tube called a cystoscope to look for kidney or bladder stones, tumors, bleeding, or infection. After the cystoscopy, your urethra may be sore at first, and it may burn when you urinate for the first few days after the procedure. You may feel the need to urinate more often, and your urine may be pink. These symptoms should get better in 1 or 2 days. You will probably be able to go back to work or most of your usual activities in 1 or 2 days. How can you care for yourself at home?   Activity  · Rest when you feel tired. Getting enough sleep will help you recover. · Try to walk each day. Start by walking a little more than you did the day before. Bit by bit, increase the amount you walk. Walking boosts blood flow and helps prevent pneumonia and constipation. · Avoid strenuous activities, such as bicycle riding, jogging, weight lifting, or aerobic exercise, until your doctor says it is okay. · Ask your doctor when you can drive again. · Most people are able to return to work within 1 or 2 days after the procedure. · You may shower and take baths as usual.  · Ask your doctor when it is okay for you to have sex. Diet  · You can eat your normal diet. If your stomach is upset, try bland, low-fat foods like plain rice, broiled chicken, toast, and yogurt. · Drink plenty of fluids (unless your doctor tells you not to). Medicines  · Take pain medicines exactly as directed. ¨ If the doctor gave you a prescription medicine for pain, take it as prescribed. ¨ If you are not taking a prescription pain medicine, ask your doctor if you can take an over-the-counter medicine. · If you think your pain medicine is making you sick to your stomach:  ¨ Take your medicine after meals (unless your doctor has told you not to). ¨ Ask your doctor for a different pain medicine. · If your doctor prescribed antibiotics, take them as directed. Do not stop taking them just because you feel better. You need to take the full course of antibiotics. Follow-up care is a key part of your treatment and safety. Be sure to make and go to all appointments, and call your doctor if you are having problems. It's also a good idea to know your test results and keep a list of the medicines you take. When should you call for help? Call 911 anytime you think you may need emergency care. For example, call if:  · You passed out (lost consciousness). · You have severe trouble breathing.   · You have sudden chest pain and shortness of breath, or you cough up blood.  · You have severe belly pain. Call your doctor now or seek immediate medical care if:  · You are sick to your stomach or cannot keep fluids down. · Your urine is still red or you see blood clots after you have urinated several times. · You have trouble passing urine or stool, especially if you have pain or swelling in your lower belly. · You have signs of a blood clot, such as:  ¨ Pain in your calf, back of the knee, thigh, or groin. ¨ Redness and swelling in your leg or groin. · You develop a fever or severe chills. · You have pain in your back just below your rib cage. This is called flank pain. Watch closely for changes in your health, and be sure to contact your doctor if:  · A burning feeling is normal for a day or two after the test, but call if it does not get better. · You have a frequent urge to urinate but can pass only small amounts of urine. · It is normal for the urine to have a pinkish color for a few days after the test, but call if it does not get better or if your urine is cloudy, smells bad, or has pus in it. After general anesthesia or intravenous sedation, for 24 hours or while taking prescription Narcotics:  · Limit your activities  · A responsible adult needs to be with you for the next 24 hours  · Do not drive and operate hazardous machinery  · Do not make important personal or business decisions  · Do  not drink alcoholic beverages  · If you have not urinated within 8 hours after discharge, please contact your surgeon on call. · If you have sleep apnea and have a CPAP machine, please use it for all naps and sleeping. · Please use caution when taking narcotics and any of your home medications that may cause drowsiness. *  Please give a list of your current medications to your Primary Care Provider. *  Please update this list whenever your medications are discontinued, doses are      changed, or new medications (including over-the-counter products) are added.   * Please carry medication information at all times in case of emergency situations. These are general instructions for a healthy lifestyle:  No smoking/ No tobacco products/ Avoid exposure to second hand smoke  Surgeon General's Warning:  Quitting smoking now greatly reduces serious risk to your health. Obesity, smoking, and sedentary lifestyle greatly increases your risk for illness  A healthy diet, regular physical exercise & weight monitoring are important for maintaining a healthy lifestyle    You may be retaining fluid if you have a history of heart failure or if you experience any of the following symptoms:  Weight gain of 3 pounds or more overnight or 5 pounds in a week, increased swelling in our hands or feet or shortness of breath while lying flat in bed. Please call your doctor as soon as you notice any of these symptoms; do not wait until your next office visit.

## 2019-11-07 NOTE — ANESTHESIA PREPROCEDURE EVALUATION
Anesthetic History   No history of anesthetic complications            Review of Systems / Medical History  Patient summary reviewed and pertinent labs reviewed    Pulmonary    COPD (Emphysema): moderate      Smoker         Neuro/Psych   Within defined limits           Cardiovascular    Hypertension: well controlled          CAD (2 yrs s/p TOMA), cardiac stents (2014, no angina, taking her asa) and hyperlipidemia    Exercise tolerance[de-identified] Borderline 4 METS  Comments: Normal stress test 6/2019   GI/Hepatic/Renal     GERD: well controlled    Renal disease: stones      Comments: Dysphagia Endo/Other      Hypothyroidism: well controlled  Arthritis (OA)     Other Findings   Comments: Polymyalgia rheumatica - on Prednisone 5mg daily           Physical Exam    Airway  Mallampati: I  TM Distance: 4 - 6 cm  Neck ROM: normal range of motion   Mouth opening: Normal     Cardiovascular  Regular rate and rhythm,  S1 and S2 normal,  no murmur, click, rub, or gallop  Rhythm: regular  Rate: normal         Dental    Dentition: Full upper dentures     Pulmonary  Breath sounds clear to auscultation               Abdominal  GI exam deferred       Other Findings            Anesthetic Plan    ASA: 3  Anesthesia type: general          Induction: Intravenous  Anesthetic plan and risks discussed with: Patient

## 2020-02-13 ENCOUNTER — HOSPITAL ENCOUNTER (OUTPATIENT)
Dept: LAB | Age: 80
Discharge: HOME | End: 2020-02-13
Payer: MEDICARE

## 2020-02-13 DIAGNOSIS — I25.119 ATHEROSCLEROSIS OF NATIVE CORONARY ARTERY OF NATIVE HEART WITH ANGINA PECTORIS (HCC): ICD-10-CM

## 2020-02-13 LAB
ANION GAP SERPL CALC-SCNC: 3 MMOL/L (ref 7–16)
BASOPHILS # BLD: 0.1 K/UL (ref 0–0.2)
BASOPHILS NFR BLD: 1 % (ref 0–2)
BUN SERPL-MCNC: 18 MG/DL (ref 8–23)
CALCIUM SERPL-MCNC: 9.9 MG/DL (ref 8.3–10.4)
CHLORIDE SERPL-SCNC: 104 MMOL/L (ref 98–107)
CO2 SERPL-SCNC: 29 MMOL/L (ref 21–32)
CREAT SERPL-MCNC: 0.97 MG/DL (ref 0.6–1)
DIFFERENTIAL METHOD BLD: ABNORMAL
EOSINOPHIL # BLD: 0.3 K/UL (ref 0–0.8)
EOSINOPHIL NFR BLD: 2 % (ref 0.5–7.8)
ERYTHROCYTE [DISTWIDTH] IN BLOOD BY AUTOMATED COUNT: 13.2 % (ref 11.9–14.6)
GLUCOSE SERPL-MCNC: 107 MG/DL (ref 65–100)
HCT VFR BLD AUTO: 37.5 % (ref 35.8–46.3)
HGB BLD-MCNC: 12.3 G/DL (ref 11.7–15.4)
IMM GRANULOCYTES # BLD AUTO: 0.1 K/UL (ref 0–0.5)
IMM GRANULOCYTES NFR BLD AUTO: 1 % (ref 0–5)
INR PPP: 0.9
LYMPHOCYTES # BLD: 2.7 K/UL (ref 0.5–4.6)
LYMPHOCYTES NFR BLD: 23 % (ref 13–44)
MCH RBC QN AUTO: 30.8 PG (ref 26.1–32.9)
MCHC RBC AUTO-ENTMCNC: 32.8 G/DL (ref 31.4–35)
MCV RBC AUTO: 93.8 FL (ref 79.6–97.8)
MONOCYTES # BLD: 0.7 K/UL (ref 0.1–1.3)
MONOCYTES NFR BLD: 6 % (ref 4–12)
NEUTS SEG # BLD: 7.6 K/UL (ref 1.7–8.2)
NEUTS SEG NFR BLD: 67 % (ref 43–78)
NRBC # BLD: 0 K/UL (ref 0–0.2)
PLATELET # BLD AUTO: 291 K/UL (ref 150–450)
PMV BLD AUTO: 9 FL (ref 9.4–12.3)
POTASSIUM SERPL-SCNC: 4.5 MMOL/L (ref 3.5–5.1)
PROTHROMBIN TIME: 12.1 SEC (ref 12–14.7)
RBC # BLD AUTO: 4 M/UL (ref 4.05–5.2)
SODIUM SERPL-SCNC: 136 MMOL/L (ref 136–145)
WBC # BLD AUTO: 11.4 K/UL (ref 4.3–11.1)

## 2020-02-13 PROCEDURE — 80048 BASIC METABOLIC PNL TOTAL CA: CPT

## 2020-02-13 PROCEDURE — 36415 COLL VENOUS BLD VENIPUNCTURE: CPT

## 2020-02-13 PROCEDURE — 85610 PROTHROMBIN TIME: CPT

## 2020-02-13 PROCEDURE — 85025 COMPLETE CBC W/AUTO DIFF WBC: CPT

## 2020-02-18 ENCOUNTER — HOSPITAL ENCOUNTER (OUTPATIENT)
Dept: CARDIAC CATH/INVASIVE PROCEDURES | Age: 80
Discharge: HOME | End: 2020-02-19
Attending: INTERNAL MEDICINE | Admitting: INTERNAL MEDICINE
Payer: MEDICARE

## 2020-02-18 PROBLEM — R07.9 CHEST PAIN: Status: ACTIVE | Noted: 2020-02-18

## 2020-02-18 LAB
ANION GAP SERPL CALC-SCNC: 8 MMOL/L (ref 7–16)
BUN SERPL-MCNC: 14 MG/DL (ref 8–23)
CALCIUM SERPL-MCNC: 9.6 MG/DL (ref 8.3–10.4)
CHLORIDE SERPL-SCNC: 106 MMOL/L (ref 98–107)
CO2 SERPL-SCNC: 26 MMOL/L (ref 21–32)
CREAT SERPL-MCNC: 0.99 MG/DL (ref 0.6–1)
GLUCOSE SERPL-MCNC: 129 MG/DL (ref 65–100)
POTASSIUM SERPL-SCNC: 3.6 MMOL/L (ref 3.5–5.1)
SODIUM SERPL-SCNC: 140 MMOL/L (ref 136–145)

## 2020-02-18 PROCEDURE — 94640 AIRWAY INHALATION TREATMENT: CPT

## 2020-02-18 PROCEDURE — 93458 L HRT ARTERY/VENTRICLE ANGIO: CPT

## 2020-02-18 PROCEDURE — 77030016699 HC CATH ANGI DX INFN1 CARD -A

## 2020-02-18 PROCEDURE — C1769 GUIDE WIRE: HCPCS

## 2020-02-18 PROCEDURE — 99152 MOD SED SAME PHYS/QHP 5/>YRS: CPT

## 2020-02-18 PROCEDURE — 74011250636 HC RX REV CODE- 250/636: Performed by: INTERNAL MEDICINE

## 2020-02-18 PROCEDURE — C1887 CATHETER, GUIDING: HCPCS

## 2020-02-18 PROCEDURE — 92928 PRQ TCAT PLMT NTRAC ST 1 LES: CPT

## 2020-02-18 PROCEDURE — 94760 N-INVAS EAR/PLS OXIMETRY 1: CPT

## 2020-02-18 PROCEDURE — 74011250637 HC RX REV CODE- 250/637: Performed by: INTERNAL MEDICINE

## 2020-02-18 PROCEDURE — 74011000258 HC RX REV CODE- 258: Performed by: INTERNAL MEDICINE

## 2020-02-18 PROCEDURE — 77030015766

## 2020-02-18 PROCEDURE — 77030012468 HC VLV BLEEDBK CNTRL ABBT -B

## 2020-02-18 PROCEDURE — C1725 CATH, TRANSLUMIN NON-LASER: HCPCS

## 2020-02-18 PROCEDURE — 77030029997 HC DEV COM RDL R BND TELE -B

## 2020-02-18 PROCEDURE — C1874 STENT, COATED/COV W/DEL SYS: HCPCS

## 2020-02-18 PROCEDURE — 80048 BASIC METABOLIC PNL TOTAL CA: CPT

## 2020-02-18 PROCEDURE — C1894 INTRO/SHEATH, NON-LASER: HCPCS

## 2020-02-18 PROCEDURE — 74011000250 HC RX REV CODE- 250: Performed by: INTERNAL MEDICINE

## 2020-02-18 PROCEDURE — 99153 MOD SED SAME PHYS/QHP EA: CPT

## 2020-02-18 PROCEDURE — 74011636320 HC RX REV CODE- 636/320: Performed by: INTERNAL MEDICINE

## 2020-02-18 RX ORDER — FUROSEMIDE 40 MG/1
40 TABLET ORAL
Status: DISCONTINUED | OUTPATIENT
Start: 2020-02-19 | End: 2020-02-19 | Stop reason: HOSPADM

## 2020-02-18 RX ORDER — HEPARIN SODIUM 200 [USP'U]/100ML
2 INJECTION, SOLUTION INTRAVENOUS CONTINUOUS
Status: DISCONTINUED | OUTPATIENT
Start: 2020-02-18 | End: 2020-02-18 | Stop reason: HOSPADM

## 2020-02-18 RX ORDER — FAMOTIDINE 20 MG/1
40 TABLET, FILM COATED ORAL
Status: DISCONTINUED | OUTPATIENT
Start: 2020-02-19 | End: 2020-02-19 | Stop reason: HOSPADM

## 2020-02-18 RX ORDER — LOSARTAN POTASSIUM 50 MG/1
50 TABLET ORAL
Status: DISCONTINUED | OUTPATIENT
Start: 2020-02-18 | End: 2020-02-19 | Stop reason: HOSPADM

## 2020-02-18 RX ORDER — CLOPIDOGREL BISULFATE 75 MG/1
600 TABLET ORAL ONCE
Status: COMPLETED | OUTPATIENT
Start: 2020-02-18 | End: 2020-02-18

## 2020-02-18 RX ORDER — SODIUM CHLORIDE 9 MG/ML
75 INJECTION, SOLUTION INTRAVENOUS CONTINUOUS
Status: DISCONTINUED | OUTPATIENT
Start: 2020-02-18 | End: 2020-02-19 | Stop reason: HOSPADM

## 2020-02-18 RX ORDER — CALCIUM CARBONATE 500(1250)
1000 TABLET ORAL EVERY EVENING
Status: DISCONTINUED | OUTPATIENT
Start: 2020-02-18 | End: 2020-02-19 | Stop reason: HOSPADM

## 2020-02-18 RX ORDER — LEVOTHYROXINE SODIUM 150 UG/1
150 TABLET ORAL
Status: DISCONTINUED | OUTPATIENT
Start: 2020-02-19 | End: 2020-02-19 | Stop reason: HOSPADM

## 2020-02-18 RX ORDER — ASPIRIN 81 MG/1
81 TABLET ORAL
Status: DISCONTINUED | OUTPATIENT
Start: 2020-02-19 | End: 2020-02-19 | Stop reason: HOSPADM

## 2020-02-18 RX ORDER — ESTRADIOL 1 MG/1
0.5 TABLET ORAL DAILY
Status: DISCONTINUED | OUTPATIENT
Start: 2020-02-19 | End: 2020-02-19 | Stop reason: HOSPADM

## 2020-02-18 RX ORDER — LANOLIN ALCOHOL/MO/W.PET/CERES
200 CREAM (GRAM) TOPICAL EVERY OTHER DAY
Status: DISCONTINUED | OUTPATIENT
Start: 2020-02-18 | End: 2020-02-19 | Stop reason: HOSPADM

## 2020-02-18 RX ORDER — GUAIFENESIN 100 MG/5ML
81 LIQUID (ML) ORAL ONCE
Status: ACTIVE | OUTPATIENT
Start: 2020-02-18 | End: 2020-02-18

## 2020-02-18 RX ORDER — PANTOPRAZOLE SODIUM 40 MG/1
40 TABLET, DELAYED RELEASE ORAL
Status: DISCONTINUED | OUTPATIENT
Start: 2020-02-19 | End: 2020-02-19 | Stop reason: HOSPADM

## 2020-02-18 RX ORDER — FENTANYL CITRATE 50 UG/ML
25-100 INJECTION, SOLUTION INTRAMUSCULAR; INTRAVENOUS
Status: DISCONTINUED | OUTPATIENT
Start: 2020-02-18 | End: 2020-02-18 | Stop reason: HOSPADM

## 2020-02-18 RX ORDER — ALBUTEROL SULFATE 0.83 MG/ML
2.5 SOLUTION RESPIRATORY (INHALATION)
Status: DISCONTINUED | OUTPATIENT
Start: 2020-02-18 | End: 2020-02-19 | Stop reason: HOSPADM

## 2020-02-18 RX ORDER — MAG HYDROX/ALUMINUM HYD/SIMETH 200-200-20
30 SUSPENSION, ORAL (FINAL DOSE FORM) ORAL AS NEEDED
Status: DISCONTINUED | OUTPATIENT
Start: 2020-02-18 | End: 2020-02-18 | Stop reason: HOSPADM

## 2020-02-18 RX ORDER — CLOPIDOGREL BISULFATE 75 MG/1
75 TABLET ORAL DAILY
Status: DISCONTINUED | OUTPATIENT
Start: 2020-02-19 | End: 2020-02-19 | Stop reason: HOSPADM

## 2020-02-18 RX ORDER — LIDOCAINE HYDROCHLORIDE 10 MG/ML
2-20 INJECTION, SOLUTION EPIDURAL; INFILTRATION; INTRACAUDAL; PERINEURAL
Status: DISCONTINUED | OUTPATIENT
Start: 2020-02-18 | End: 2020-02-18 | Stop reason: HOSPADM

## 2020-02-18 RX ORDER — DIPHENOXYLATE HYDROCHLORIDE AND ATROPINE SULFATE 2.5; .025 MG/1; MG/1
1 TABLET ORAL
Status: DISCONTINUED | OUTPATIENT
Start: 2020-02-18 | End: 2020-02-19 | Stop reason: HOSPADM

## 2020-02-18 RX ORDER — NITROGLYCERIN 0.4 MG/1
0.4 TABLET SUBLINGUAL
Status: DISCONTINUED | OUTPATIENT
Start: 2020-02-18 | End: 2020-02-19 | Stop reason: HOSPADM

## 2020-02-18 RX ORDER — MIDAZOLAM HYDROCHLORIDE 1 MG/ML
.5-2 INJECTION, SOLUTION INTRAMUSCULAR; INTRAVENOUS
Status: DISCONTINUED | OUTPATIENT
Start: 2020-02-18 | End: 2020-02-18 | Stop reason: HOSPADM

## 2020-02-18 RX ORDER — ONDANSETRON 2 MG/ML
4 INJECTION INTRAMUSCULAR; INTRAVENOUS
Status: DISCONTINUED | OUTPATIENT
Start: 2020-02-18 | End: 2020-02-19 | Stop reason: HOSPADM

## 2020-02-18 RX ADMIN — Medication 200 MG: at 16:46

## 2020-02-18 RX ADMIN — LOSARTAN POTASSIUM 50 MG: 50 TABLET, FILM COATED ORAL at 20:26

## 2020-02-18 RX ADMIN — BIVALIRUDIN 1.75 MG/KG/HR: 250 INJECTION, POWDER, LYOPHILIZED, FOR SOLUTION INTRAVENOUS at 13:04

## 2020-02-18 RX ADMIN — HEPARIN SODIUM 2 ML/HR: 200 INJECTION, SOLUTION INTRAVENOUS at 12:31

## 2020-02-18 RX ADMIN — NITROGLYCERIN 0.15 MG: 200 INJECTION, SOLUTION INTRAVENOUS at 13:24

## 2020-02-18 RX ADMIN — DIPHENOXYLATE HYDROCHLORIDE AND ATROPINE SULFATE 1 TABLET: 2.5; .025 TABLET ORAL at 20:30

## 2020-02-18 RX ADMIN — IOPAMIDOL 135 ML: 755 INJECTION, SOLUTION INTRAVENOUS at 13:28

## 2020-02-18 RX ADMIN — HEPARIN SODIUM 2 ML: 10000 INJECTION, SOLUTION INTRAVENOUS; SUBCUTANEOUS at 12:57

## 2020-02-18 RX ADMIN — ONDANSETRON 4 MG: 2 INJECTION INTRAMUSCULAR; INTRAVENOUS at 18:15

## 2020-02-18 RX ADMIN — ALBUTEROL SULFATE 2.5 MG: 2.5 SOLUTION RESPIRATORY (INHALATION) at 15:00

## 2020-02-18 RX ADMIN — Medication 1000 MG: at 18:21

## 2020-02-18 RX ADMIN — ALUMINUM HYDROXIDE, MAGNESIUM HYDROXIDE, AND SIMETHICONE 30 ML: 200; 200; 20 SUSPENSION ORAL at 13:31

## 2020-02-18 RX ADMIN — ALBUTEROL SULFATE 2.5 MG: 2.5 SOLUTION RESPIRATORY (INHALATION) at 21:25

## 2020-02-18 RX ADMIN — CLOPIDOGREL BISULFATE 600 MG: 75 TABLET ORAL at 13:31

## 2020-02-18 RX ADMIN — FENTANYL CITRATE 25 MCG: 50 INJECTION, SOLUTION INTRAMUSCULAR; INTRAVENOUS at 13:07

## 2020-02-18 RX ADMIN — LIDOCAINE HYDROCHLORIDE 4 ML: 10 INJECTION, SOLUTION EPIDURAL; INFILTRATION; INTRACAUDAL; PERINEURAL at 12:55

## 2020-02-18 RX ADMIN — FENTANYL CITRATE 25 MCG: 50 INJECTION, SOLUTION INTRAMUSCULAR; INTRAVENOUS at 12:47

## 2020-02-18 RX ADMIN — MIDAZOLAM 1 MG: 1 INJECTION INTRAMUSCULAR; INTRAVENOUS at 12:47

## 2020-02-18 RX ADMIN — MIDAZOLAM 1 MG: 1 INJECTION INTRAMUSCULAR; INTRAVENOUS at 13:07

## 2020-02-18 RX ADMIN — SODIUM CHLORIDE 75 ML/HR: 900 INJECTION, SOLUTION INTRAVENOUS at 11:03

## 2020-02-18 NOTE — PROGRESS NOTES
TRANSFER - IN REPORT:    Verbal report received from [facility] RN(name) on Naren Butcher  being received from cath lab(unit) for routine progression of care      Report consisted of patients Situation, Background, Assessment and   Recommendations(SBAR). Information from the following report(s) Procedure Summary was reviewed with the receiving nurse. Opportunity for questions and clarification was provided. Assessment completed upon patients arrival to unit and care assumed.

## 2020-02-18 NOTE — PROCEDURES
Brief Cardiac Procedure Note    Patient: Pa Stout MRN: [id number]  SSN: [id number]    YOB: 1940  Age: [de-identified] y.o. Sex: female      Date of Procedure: 2/18/2020     Pre-procedure Diagnosis: Typical Angina    Post-procedure Diagnosis: Coronary Artery Disease    Reason for Procedure: New Onset Angina < or = 2 Months    Procedure: Left Heart Catheterization with Percutaneous Coronary Intervention    Brief Description of Procedure: lhc via right radial, tr    Performed By: Kayleen Rayo MD     Assistants: none    Anesthesia: Moderate Sedation    Estimated Blood Loss: Less than 10 mL      Specimens: None    Implants: None    Findings: mickie lad, nl lvef    Complications: None    Recommendations: Continue medical therapy.     Signed By: Kayleen Rayo MD     February 18, 2020

## 2020-02-18 NOTE — PROGRESS NOTES
Report received from [facility] RN. Procedural findings communicated. Intra procedural  medication administration reviewed. Progression of care discussed.      Patient received into [address] 1 post sheath removal.     Access site without bleeding or swelling yes    Dressing dry and intact yes    Patient instructed to limit movement to right upper extremity    Routine post procedural vital signs and site assessment initiated yes

## 2020-02-18 NOTE — PROGRESS NOTES
Patient received to [address] [place] [address] room # 11  Ambulatory from [facility]. Patient scheduled for [facility] today with Dr Al Petersen. Procedure reviewed & questions answered, voiced good understanding consent obtained & placed on chart. All medications and medical history reviewed. Will prep patient per orders. Patient & family updated on plan of care. The patient has a fraility score of 3-MANAGING WELL, based on ability to perform ADLs independently. Pt took ASA 81mg x 4 at 0600.

## 2020-02-18 NOTE — PROGRESS NOTES
TRANSFER - OUT REPORT:    Verbal report given to Analisa Barraza RN(name) on [facility]  being transferred to cpru(unit) for routine progression of care       Report consisted of patients Situation, Background, Assessment and   Recommendations(SBAR). Information from the following report(s) Procedure Summary, MAR and Cardiac Rhythm SB was reviewed with the receiving nurse. Lines:   Peripheral IV 02/18/20 Right Antecubital (Active)       Peripheral IV 02/18/20 Left Arm (Active)        Opportunity for questions and clarification was provided.       Patient transported with:   Registered Nurse     [facility] Dr Al Petersen  POBA/Stent x1 LAD  Right radial access- R band @ 1329 w/ 9 ml air In band - site C/D/I  Angiomax on @ 1303  Angiomax off @  NTG 0.15 mg IC  Versed 2 mg IV  Fent 50 mcg IV

## 2020-02-18 NOTE — PROGRESS NOTES
TRANSFER - OUT REPORT:    Verbal report given to Siena MCALLISTER on Reginald Gitelman  being transferred to 2232(unit) for routine progression of care       Report consisted of patients Situation, Background, Assessment and   Recommendations(SBAR). Information from the following report(s) Procedure Summary was reviewed with the receiving nurse. Lines:   Peripheral IV 02/18/20 Right Antecubital (Active)       Peripheral IV 02/18/20 Left Arm (Active)        Opportunity for questions and clarification was provided.       Patient transported with:   Registered Nurse

## 2020-02-18 NOTE — PROGRESS NOTES
Patient pre-assessment complete for [facility] poss with Dr Raúl Yen, scheduled for 20 at 12:30pm,, arrival at 10:30am. Patient verified using . Patient instructed to bring all home medications in labeled bottles on the day of procedure. NPO status reinforced. Patient informed to take a full dose aspirin 325mg  or 81 mg x 4 on the day of procedure. Patient instructed to HOLD lasix in am. Instructed they can take all other medications excluding vitamins & supplements. Patient verbalizes understanding of all instructions & denies any questions at this time.

## 2020-02-18 NOTE — PROGRESS NOTES
TRANSFER - IN REPORT:    Verbal report received from Nando Kirkland RN (name) on Yamliex Pardo  being received from [facility]ICU (unit) for routine progression of care      Report consisted of patients Situation, Background, Assessment and   Recommendations(SBAR). Information from the following report(s) SBAR, Kardex, Intake/Output, MAR, Recent Results and Cardiac Rhythm NSR was reviewed with the receiving nurse. Opportunity for questions and clarification was provided. Assessment completed upon patients arrival to unit and care assumed. Dual skin assessment completed with second RN, sacrum intact with no redness or breakdown or noted, heels intact no breakdown or redness noted, right radial cath site, TR band in place no bleeding or hematoma present at this time.

## 2020-02-19 VITALS
WEIGHT: 144.4 LBS | RESPIRATION RATE: 18 BRPM | OXYGEN SATURATION: 96 % | SYSTOLIC BLOOD PRESSURE: 116 MMHG | TEMPERATURE: 97.5 F | DIASTOLIC BLOOD PRESSURE: 57 MMHG | HEART RATE: 64 BPM | HEIGHT: 64 IN | BODY MASS INDEX: 24.65 KG/M2

## 2020-02-19 LAB
ANION GAP SERPL CALC-SCNC: 8 MMOL/L (ref 7–16)
BUN SERPL-MCNC: 11 MG/DL (ref 8–23)
CALCIUM SERPL-MCNC: 10 MG/DL (ref 8.3–10.4)
CHLORIDE SERPL-SCNC: 106 MMOL/L (ref 98–107)
CO2 SERPL-SCNC: 28 MMOL/L (ref 21–32)
CREAT SERPL-MCNC: 0.91 MG/DL (ref 0.6–1)
GLUCOSE SERPL-MCNC: 86 MG/DL (ref 65–100)
POTASSIUM SERPL-SCNC: 3.1 MMOL/L (ref 3.5–5.1)
SODIUM SERPL-SCNC: 142 MMOL/L (ref 136–145)

## 2020-02-19 PROCEDURE — 74011250637 HC RX REV CODE- 250/637: Performed by: NURSE PRACTITIONER

## 2020-02-19 PROCEDURE — 36415 COLL VENOUS BLD VENIPUNCTURE: CPT

## 2020-02-19 PROCEDURE — 94640 AIRWAY INHALATION TREATMENT: CPT

## 2020-02-19 PROCEDURE — 80048 BASIC METABOLIC PNL TOTAL CA: CPT

## 2020-02-19 PROCEDURE — 74011000250 HC RX REV CODE- 250: Performed by: INTERNAL MEDICINE

## 2020-02-19 PROCEDURE — 94760 N-INVAS EAR/PLS OXIMETRY 1: CPT

## 2020-02-19 PROCEDURE — 74011250637 HC RX REV CODE- 250/637: Performed by: INTERNAL MEDICINE

## 2020-02-19 RX ORDER — ROSUVASTATIN CALCIUM 20 MG/1
20 TABLET, COATED ORAL DAILY
Qty: 30 TAB | Refills: 11 | Status: SHIPPED | OUTPATIENT
Start: 2020-02-19 | End: 2020-06-17 | Stop reason: SDUPTHER

## 2020-02-19 RX ORDER — POTASSIUM CHLORIDE 20 MEQ/1
40 TABLET, EXTENDED RELEASE ORAL
Status: COMPLETED | OUTPATIENT
Start: 2020-02-19 | End: 2020-02-19

## 2020-02-19 RX ORDER — CLOPIDOGREL BISULFATE 75 MG/1
75 TABLET ORAL DAILY
Qty: 30 TAB | Refills: 11 | Status: SHIPPED | OUTPATIENT
Start: 2020-02-19 | End: 2020-02-19 | Stop reason: SDUPTHER

## 2020-02-19 RX ORDER — ROSUVASTATIN CALCIUM 20 MG/1
20 TABLET, COATED ORAL DAILY
Qty: 30 TAB | Refills: 11 | Status: SHIPPED | OUTPATIENT
Start: 2020-02-19 | End: 2020-02-19 | Stop reason: SDUPTHER

## 2020-02-19 RX ORDER — ROSUVASTATIN CALCIUM 20 MG/1
20 TABLET, COATED ORAL DAILY
Status: DISCONTINUED | OUTPATIENT
Start: 2020-02-19 | End: 2020-02-19 | Stop reason: HOSPADM

## 2020-02-19 RX ORDER — CLOPIDOGREL BISULFATE 75 MG/1
75 TABLET ORAL DAILY
Qty: 30 TAB | Refills: 11 | Status: SHIPPED | OUTPATIENT
Start: 2020-02-19 | End: 2020-06-17 | Stop reason: SDUPTHER

## 2020-02-19 RX ADMIN — ESTRADIOL 0.5 MG: 1 TABLET ORAL at 09:01

## 2020-02-19 RX ADMIN — POTASSIUM CHLORIDE 40 MEQ: 20 TABLET, EXTENDED RELEASE ORAL at 11:06

## 2020-02-19 RX ADMIN — PANTOPRAZOLE SODIUM 40 MG: 40 TABLET, DELAYED RELEASE ORAL at 05:59

## 2020-02-19 RX ADMIN — CLOPIDOGREL BISULFATE 75 MG: 75 TABLET ORAL at 09:00

## 2020-02-19 RX ADMIN — ROSUVASTATIN CALCIUM 20 MG: 20 TABLET, COATED ORAL at 09:01

## 2020-02-19 RX ADMIN — FAMOTIDINE 40 MG: 20 TABLET, FILM COATED ORAL at 05:59

## 2020-02-19 RX ADMIN — LEVOTHYROXINE SODIUM 150 MCG: 150 TABLET ORAL at 05:59

## 2020-02-19 RX ADMIN — ASPIRIN 81 MG: 81 TABLET ORAL at 05:59

## 2020-02-19 RX ADMIN — FUROSEMIDE 40 MG: 40 TABLET ORAL at 05:59

## 2020-02-19 RX ADMIN — ALBUTEROL SULFATE 2.5 MG: 2.5 SOLUTION RESPIRATORY (INHALATION) at 07:28

## 2020-02-19 NOTE — PROGRESS NOTES
Pt to discharge home this day. No needs voiced for CM at this time. Milestones met.      Care Management Interventions  Transition of Care Consult (CM Consult): Discharge Planning  Discharge Location  Discharge Placement: Home

## 2020-02-19 NOTE — DISCHARGE SUMMARY
[facility] Discharge Summary     Patient ID:  Tamara Spivey  [id number]  89 y.o.  1940    Admit date: 2/18/2020    Discharge date:  02/19/2020    Admitting Physician: Asif Humphrey MD     Discharge Physician: Juana Finney NP/Dr. Sotelo    Admission Diagnoses: Chest pain [R07.9]    Discharge Diagnoses:    Diagnosis    Chest pain        Stage 3 chronic kidney disease ([facility] [address]    Essential hypertension    Chronic left shoulder pain    Murmur    Fatigue    Palpitations    COPD (chronic obstructive pulmonary disease) ([facility] [address]    ASCVD (arteriosclerotic cardiovascular disease)    Esophageal reflux    Insomnia, unspecified    Mixed hyperlipidemia       Cardiology Procedures this admission:  Left heart catheterization with PCI  Consults: None    Hospital Course: Patient was seen at the office of [facility] by Dr. Carmen Spann for complaints of chest pain with exertion and was subsequently scheduled for an AM Admission LH at [facility] on 02/18/2020. Patient underwent cardiac catheterization by Dr. Carmen Spann. Patient was found to have significant stenosis of the LAD that was stented with a Lacho TOMA with 0% residual stenosis. Patient tolerated the procedure well and was taken to the telemetry floor for recovery. The morning of discharge, patient was up feeling well without any complaints of chest pain or shortness of breath. Patient's right radial cath site was clean, dry and intact without hematoma or bruit. Patient's labs were WNL. Patient was seen and examined by Dr. Donna Adkins and determined stable and ready for discharge. Patient was instructed on the importance of medication compliance including taking aspirin and Plavix everyday without missing a dose. After receiving drug eluting stents, the patient will remain on dual anti-platelet therapy for 1 year. For maximized medical therapy for CAD, patient will continue ARB and statin .   The patient will follow up with [address] [facility] -- Dr. Caleb Porter in 2-4 weeks. Patient has been referred to cardiac rehab. DISPOSITION: The patient is being discharged home in stable condition on a low saturated fat, low cholesterol and low salt diet. The patient is instructed to advance activities as tolerated to the limit of fatigue or shortness of breath. The patient is instructed to avoid all heavy lifting, straining, stooping or squatting for 3-5 days. The patient is instructed to watch the cath site for bleeding/oozing; if seen, the patient is instructed to apply firm pressure with a clean cloth and call [address] Cardiology at [phone]. The patient is instructed to watch for signs of infection which include: increasing area of redness, fever/hot to touch or purulent drainage at the catheterization site. The patient is instructed not to soak in a bathtub for 7-10 days, but is cleared to shower. The patient is instructed to call the office or return to the ER for immediate evaluation for any shortness of breath or chest pain not relieved by NTG. Discharge Exam:   Visit Vitals  /58   Pulse 61   Temp 98.4 °F (36.9 °C)   Resp 20   Ht 5' 4\" (1.626 m)   Wt 65.5 kg (144 lb 6.4 oz)   SpO2 92%   Breastfeeding No   BMI 24.79 kg/m²     Patient has been seen by Dr. John Kelly: see his progress note for exam details.     Recent Results (from the past 24 hour(s))   METABOLIC PANEL, BASIC    Collection Time: 02/18/20 10:57 AM   Result Value Ref Range    Sodium 140 136 - 145 mmol/L    Potassium 3.6 3.5 - 5.1 mmol/L    Chloride 106 98 - 107 mmol/L    CO2 26 21 - 32 mmol/L    Anion gap 8 7 - 16 mmol/L    Glucose 129 (H) 65 - 100 mg/dL    BUN 14 8 - 23 MG/DL    Creatinine 0.99 0.6 - 1.0 MG/DL    GFR est AA >60 >60 ml/min/1.73m2    GFR est non-AA 57 (L) >60 ml/min/1.73m2    Calcium 9.6 8.3 - 10.4 MG/DL       Current Discharge Medication List      START taking these medications    Details   rosuvastatin (CRESTOR) 20 mg tablet Take 1 Tab by mouth daily.  Qty: 30 Tab, Refills: 11      clopidogreL (PLAVIX) 75 mg tab Take 1 Tab by mouth daily. Qty: 30 Tab, Refills: 11         CONTINUE these medications which have NOT CHANGED    Details   estradiol (ESTRACE) 0.5 mg tablet TAKE ONE TABLET BY MOUTH ONCE DAILY  Qty: 90 Tab, Refills: 0      omeprazole (PRILOSEC) 40 mg capsule TAKE ONE CAPSULE BY MOUTH ONCE DAILY  Qty: 90 Cap, Refills: 0      raNITIdine (ZANTAC) 300 mg tab TAKE ONE TABLET BY MOUTH ONCE DAILY  Qty: 90 Tab, Refills: 0      !! predniSONE (DELTASONE) 5 mg tablet Take 1 Tab by mouth every morning. Qty: 90 Tab, Refills: 3      !! predniSONE (DELTASONE) 1 mg tablet TAKE 1 TABLET BY MOUTH NIGHTLY. Qty: 90 Tab, Refills: 2      losartan (COZAAR) 50 mg tablet TAKE ONE TABLET BY MOUTH ONCE DAILY  Qty: 30 Tab, Refills: 10      Cetirizine (ZYRTEC) 10 mg cap Take 10 mg by mouth daily. levothyroxine (SYNTHROID) 150 mcg tablet TAKE ONE TABLET BY MOUTH EVERY DAY BEFORE BREAKFAST  Qty: 90 Tab, Refills: 2      furosemide (LASIX) 40 mg tablet TAKE 1 TAB BY MOUTH EVERY MORNING. Qty: 90 Tab, Refills: 2      CALCIUM CARBONATE/VITAMIN D3 (CALCIUM 500 + D PO) Take 2 Tabs by mouth every evening. Ca 250, D 350       cholecalciferol (VITAMIN D3) 1,000 unit cap Take 1,000 Units by mouth every evening.      magnesium 250 mg tab Take 250 mg by mouth every other day. aspirin delayed-release 81 mg tablet Take 81 mg by mouth every morning. albuterol (PROVENTIL HFA, VENTOLIN HFA, PROAIR HFA) 90 mcg/actuation inhaler Take 2 Puffs by inhalation every four (4) hours as needed for Wheezing or Shortness of Breath. Qty: 1 Inhaler, Refills: 5      diphenoxylate-atropine (LOMOTIL) 2.5-0.025 mg per tablet Take 1 Tab by mouth four (4) times daily as needed for Diarrhea. Max Daily Amount: 4 Tabs.   Qty: 20 Tab, Refills: 1    Associated Diagnoses: Diarrhea, unspecified type      nitroglycerin (NITROSTAT) 0.4 mg SL tablet 1 Tab by SubLINGual route every five (5) minutes as needed for Chest Pain. Up to 3 in 5 min  Qty: 1 Bottle, Refills: 5       !! - Potential duplicate medications found. Please discuss with provider.         Patient Instructions:         Signed:  Michael Begum, NP  2/19/2020  7:23 AM

## 2020-02-19 NOTE — PROGRESS NOTES
Cardiac Rehab: Spoke with patient regarding referral to cardiac rehab. Patient meets admission criteria based on PCI (02/18/20). Written information about Cardiac Rehab given and reviewed with patient. Discussed lifestyle modifications to promote cardiac wellness. Patient indicated that she does not want to participate in the cardiac rehab program at this time. 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 She was encouraged to tour our program when in for her follow up appt and to discuss further with her MD at follow up appt. Her Cardiologist is Dr. Denny Calix.       Thank you,  FATEMEH Oswald, RN  Cardiopulmonary Rehabilitation Nurse Liaison  [facility]

## 2020-02-19 NOTE — PROGRESS NOTES
am  2/19/2020 6:19 AM    Admit Date: 2/18/2020    Admit Diagnosis: Chest pain [R07.9]      Subjective:    Patient sp stenting of lad. On dapt. Looks good.      Objective:      Visit Vitals  /58   Pulse 61   Temp 98.4 °F (36.9 °C)   Resp 20   Ht 5' 4\" (1.626 m)   Wt 65.5 kg (144 lb 6.4 oz)   SpO2 92%   Breastfeeding No   BMI 24.79 kg/m²       ROS:  General ROS: negative for - chills  Hematological and Lymphatic ROS: negative for - blood clots or jaundice  Respiratory ROS: no cough, shortness of breath, or wheezing  Cardiovascular ROS: no chest pain or dyspnea on exertion  Gastrointestinal ROS: no abdominal pain, change in bowel habits, or black or bloody stools  Neurological ROS: no TIA or stroke symptoms    Physical Exam:    Physical Examination: General appearance - alert, well appearing, and in no distress  Mental status - alert, oriented to person, place, and time  Eyes - pupils equal and reactive, extraocular eye movements intact  Neck/lymph - supple, no significant adenopathy  Chest/CV - clear to auscultation, no wheezes, rales or rhonchi, symmetric air entry  Heart - normal rate, regular rhythm, normal S1, S2, no murmurs, rubs, clicks or gallops  Abdomen/GI - soft, nontender, nondistended, no masses or organomegaly  Musculoskeletal - no joint tenderness, deformity or swelling  Extremities - peripheral pulses normal, no pedal edema, no clubbing or cyanosis  Skin - normal coloration and turgor, no rashes, no suspicious skin lesions noted    Current Facility-Administered Medications   Medication Dose Route Frequency    0.9% sodium chloride infusion  75 mL/hr IntraVENous CONTINUOUS    bivalirudin (ANGIOMAX) 250 mg in 0.9% sodium chloride (MBP/ADV) 50 mL infusion  1.75 mg/kg/hr IntraVENous CONTINUOUS    aspirin delayed-release tablet 81 mg  81 mg Oral 7am    calcium carbonate (OS-DULCE MARIA) tablet 1,000 mg [elemental]  1,000 mg Oral QPM    magnesium oxide (MAG-OX) tablet 200 mg  200 mg Oral EVERY OTHER DAY    nitroglycerin (NITROSTAT) tablet 0.4 mg  0.4 mg SubLINGual Q5MIN PRN    diphenoxylate-atropine (LOMOTIL) tablet 1 Tab  1 Tab Oral QID PRN    albuterol (PROVENTIL VENTOLIN) nebulizer solution 2.5 mg  2.5 mg Nebulization QID RT    furosemide (LASIX) tablet 40 mg  40 mg Oral 7am    levothyroxine (SYNTHROID) tablet 150 mcg  150 mcg Oral 6am    losartan (COZAAR) tablet 50 mg  50 mg Oral QHS    pantoprazole (PROTONIX) tablet 40 mg  40 mg Oral ACB    famotidine (PEPCID) tablet 40 mg  40 mg Oral ACB    estradioL (ESTRACE) tablet 0.5 mg  0.5 mg Oral DAILY    clopidogreL (PLAVIX) tablet 75 mg  75 mg Oral DAILY    ondansetron (ZOFRAN) injection 4 mg  4 mg IntraVENous Q4H PRN       Data Review:   @LABRCNT(Na,K,BUN,CREA,WBC,HGB,HCT,PLT,INR,TRP,TCHOL*,Triglyceride*,LDL*,LDLCPOC HDL*,HDL])@    TELEMETRY: nsr    Assessment/Plan:     Principal Problem:    ASCVD (arteriosclerotic cardiovascular disease) (10/29/2015)    Active Problems:    Chest pain (2/18/2020)      Stage 3 chronic kidney disease ([address] (4/3/2019)    Lad stenting dapt home today add statin    Pt given an opportunity to ask questions. Issues discussed. Questions addressed. If further issues develop pt instructed to contact office or make appointment.         Severo Rama, MD

## 2020-02-19 NOTE — PROGRESS NOTES
Problem: Falls - Risk of  Goal: *Absence of Falls  Description  Document [place] Fall Risk and appropriate interventions in the flowsheet.   Outcome: Progressing Towards Goal  Note: Fall Risk Interventions:            Medication Interventions: Patient to call before getting OOB

## 2020-02-19 NOTE — PROCEDURES
[facility]  CARDIAC CATH    Name:  Alirio Mcgarry  MR#:  [id number]  :  1940  ACCOUNT #:  [de-identified]  DATE OF SERVICE:  2020    PROCEDURES PERFORMED:  Left heart catheterization was performed via the right radial artery with a 5-Sudanese Tiger catheter and angled pigtail. TR band was placed with good hemostasis. PREOPERATIVE DIAGNOSIS:  Chest pain. POSTOPERATIVE DIAGNOSIS:  Coronary artery disease.     SURGEON:  Yasemin Wetzel MD.    ASSISTANT:  none    ESTIMATED BLOOD LOSS:  5 cc    SPECIMENS REMOVED:  none    COMPLICATIONS:  none    IMPLANTS:  2.5x 18 mickie    ANESTHESIA:  consious sedation given by Niels Baker rn with 2mg versed and 50 mcg fentanyl starting at 1222 and ending 1300    FINDINGS- lm nl  Lad 80 to 0 after 2.5x 18 mickie lad  lcx 50 focal isr  rca 30 prox  lv- ef 55          MD PATTI [email or web address]  D:  2020 13:44  T:  2020 0:41  JOB #:  [id number]

## 2020-02-19 NOTE — PROGRESS NOTES
Bedside and Verbal shift change report given to Broderick Mireles RN (oncoming nurse) by Amparo Hurt (offgoing nurse). Report included the following information SBAR, Kardex, Intake/Output, MAR, Recent Results and Cardiac Rhythm NSR.

## 2020-02-19 NOTE — DISCHARGE INSTRUCTIONS
The patient is being discharged home in stable condition on a low saturated fat, low cholesterol and low salt diet. The patient is instructed to advance activities as tolerated to the limit of fatigue or shortness of breath. The patient is instructed to avoid all heavy lifting, straining, stooping or squatting for 3-5 days. The patient is instructed to watch the cath site for bleeding/oozing; if seen, the patient is instructed to apply firm pressure with a clean cloth and call [address] Cardiology at [phone]. The patient is instructed to watch for signs of infection which include: increasing area of redness, fever/hot to touch or purulent drainage at the catheterization site. The patient is instructed not to soak in a bathtub for 7-10 days, but is cleared to shower. The patient is instructed to call the office or return to the ER for immediate evaluation for any shortness of breath or chest pain not relieved by NTG. DISCHARGE SUMMARY from Nurse    PATIENT INSTRUCTIONS:    After general anesthesia or intravenous sedation, for 24 hours or while taking prescription Narcotics:  · Limit your activities  · Do not drive and operate hazardous machinery  · Do not make important personal or business decisions  · Do  not drink alcoholic beverages    Report the following to your surgeon:  · Excessive pain, swelling, redness or odor of or around the surgical area  · Temperature over 100.5  · Nausea and vomiting lasting longer than 4 hours or if unable to take medications  · Any signs of decreased circulation or nerve impairment to extremity: change in color, persistent  numbness, tingling, coldness or increase pain  · Any questions      *  Please give a list of your current medications to your Primary Care Provider. *  Please update this list whenever your medications are discontinued, doses are      changed, or new medications (including over-the-counter products) are added.     *  Please carry medication information at all times in case of emergency situations. These are general instructions for a healthy lifestyle:    No smoking/ No tobacco products/ Avoid exposure to second hand smoke  Surgeon General's Warning:  Quitting smoking now greatly reduces serious risk to your health. Obesity, smoking, and sedentary lifestyle greatly increases your risk for illness    A healthy diet, regular physical exercise & weight monitoring are important for maintaining a healthy lifestyle    You may be retaining fluid if you have a history of heart failure or if you experience any of the following symptoms:  Weight gain of 3 pounds or more overnight or 5 pounds in a week, increased swelling in our hands or feet or shortness of breath while lying flat in bed. Please call your doctor as soon as you notice any of these symptoms; do not wait until your next office visit. The discharge information has been reviewed with the patient. The patient verbalized understanding. Discharge medications reviewed with the patient and appropriate educational materials and side effects teaching were provided. ___________________________________________________________________________________________________________________________________    Patient Education        Percutaneous Coronary Intervention: What to Expect at Home  Your Recovery    Percutaneous coronary intervention (PCI) is the name for procedures that are used to open a narrowed or blocked coronary artery. The two most common PCI procedures are coronary angioplasty and coronary stent placement. Your groin or arm may have a bruise and feel sore for a day or two after a percutaneous coronary intervention (PCI). You can do light activities around the house, but nothing strenuous for several days. This care sheet gives you a general idea about how long it will take for you to recover. But each person recovers at a different pace.  Follow the steps below to get better as quickly as possible. How can you care for yourself at home? Activity    · If the doctor gave you a sedative:  ? For 24 hours, don't do anything that requires attention to detail, such as going to work, making important decisions, or signing any legal documents. It takes time for the medicine's effects to completely wear off.  ? For your safety, do not drive or operate any machinery that could be dangerous. Wait until the medicine wears off and you can think clearly and react easily.     · Do not do strenuous exercise and do not lift, pull, or push anything heavy until your doctor says it is okay. This may be for a day or two. You can walk around the house and do light activity, such as cooking.     · If the catheter was placed in your groin, try not to walk up stairs for the first couple of days.     · If the catheter was placed in your arm near your wrist, do not bend your wrist deeply for the first couple of days. Be careful using your hand to get into and out of a chair or bed.     · Carry your stent identification card with you at all times.     · If your doctor recommends it, get more exercise. Walking is a good choice. Bit by bit, increase the amount you walk every day. Try for at least 30 minutes on most days of the week. Diet    · Drink plenty of fluids to help your body flush out the dye. If you have kidney, heart, or liver disease and have to limit fluids, talk with your doctor before you increase the amount of fluids you drink.     · Keep eating a heart-healthy diet that has lots of fruits, vegetables, and whole grains. If you have not been eating this way, talk to your doctor. You also may want to talk to a dietitian. This expert can help you to learn about healthy foods and plan meals. Medicines    · Your doctor will tell you if and when you can restart your medicines.  He or she will also give you instructions about taking any new medicines.     · If you take blood thinners, such as warfarin (Coumadin), clopidogrel (Plavix), or aspirin, be sure to talk to your doctor. He or she will tell you if and when to start taking those medicines again. Make sure that you understand exactly what your doctor wants you to do.     · Your doctor will prescribe blood-thinning medicines. You will likely take aspirin plus another antiplatelet, such as clopidogrel (Plavix). It is very important that you take these medicines exactly as directed. These medicines help keep the coronary artery open and reduce your risk of a heart attack.     · Call your doctor if you think you are having a problem with your medicine.    Care of the catheter site    · For 1 or 2 days, keep a bandage over the spot where the catheter was inserted. The bandage probably will fall off in this time.     · Put ice or a cold pack on the area for 10 to 20 minutes at a time to help with soreness or swelling. Put a thin cloth between the ice and your skin.     · You may shower 24 to 48 hours after the procedure, if your doctor okays it. Pat the incision dry.     · Do not soak the catheter site until it is healed. Don't take a bath for 1 week, or until your doctor tells you it is okay.     · Watch for bleeding from the site. A small amount of blood (up to the size of a quarter) on the bandage can be normal.     · If you are bleeding, lie down and press on the area for 15 minutes to try to make it stop. If the bleeding does not stop, call your doctor or seek immediate medical care. Follow-up care is a key part of your treatment and safety. Be sure to make and go to all appointments, and call your doctor if you are having problems. It's also a good idea to know your test results and keep a list of the medicines you take. When should you call for help? Call 911 anytime you think you may need emergency care.  For example, call if:    · You passed out (lost consciousness).     · You have severe trouble breathing.     · You have sudden chest pain and shortness of breath, or you cough up blood.     · You have symptoms of a heart attack, such as:  ? Chest pain or pressure. ? Sweating. ? Shortness of breath. ? Nausea or vomiting. ? Pain that spreads from the chest to the neck, jaw, or one or both shoulders or arms. ? Dizziness or lightheadedness. ? A fast or uneven pulse. After calling 911, chew 1 adult-strength aspirin. Wait for an ambulance. Do not try to drive yourself.     · You have been diagnosed with angina, and you have angina symptoms that do not go away with rest or are not getting better within 5 minutes after you take one dose of nitroglycerin.    Call your doctor now or seek immediate medical care if:    · You are bleeding from the area where the catheter was put in your artery.     · You have a fast-growing, painful lump at the catheter site.     · You have signs of infection, such as:  ? Increased pain, swelling, warmth, or redness. ? Red streaks leading from the catheter site. ? Pus draining from the catheter site. ? A fever.     · Your leg, arm, or hand is painful, looks blue, or feels cold, numb, or tingly.    Watch closely for changes in your health, and be sure to contact your doctor if you have any problems. Where can you learn more? Go to http://wendy-rut.info/. Enter E866 in the search box to learn more about \"Percutaneous Coronary Intervention: What to Expect at Home. \"  Current as of: April 9, 2019  Content Version: 12.2  © 2504-8389 [employer]. Care instructions adapted under license by [employer] (which disclaims liability or warranty for this information). If you have questions about a medical condition or this instruction, always ask your healthcare professional. [place] [address] any warranty or liability for your use of this information.   Patient Education        Cardiac Rehabilitation: Care Instructions  Your Care Instructions    Cardiac rehabilitation is a program for people who have a heart problem, such as a heart attack, heart failure, or a heart valve disease. The program includes exercise, lifestyle changes, education, and emotional support. Cardiac rehab can help you improve the quality of your life through better overall health. It can help you lose weight and feel better about yourself. On your cardiac rehab team, you may have your doctor, a nurse specialist, a dietitian, and a physical therapist. They will design your cardiac rehab program specifically for you. You will learn how to reduce your risk for heart problems, how to manage stress, and how to eat a heart-healthy diet. By the end of the program, you will be ready to maintain a healthier lifestyle on your own. Follow-up care is a key part of your treatment and safety. Be sure to make and go to all appointments, and call your doctor if you are having problems. It's also a good idea to know your test results and keep a list of the medicines you take. How can you care for yourself at home? · Take your medicines exactly as prescribed. Call your doctor if you think you are having a problem with your medicine. You will get more details on the specific medicines your doctor prescribes. · Weigh yourself every day if your doctor tells you to. Watch for sudden weight gain. Weigh yourself on the same scale with the same amount of clothing at the same time of day. · Plan your meals so that you are eating heart-healthy foods. ? Eat a variety of foods daily. Fresh fruits and vegetables and whole-grains are good choices. ? Limit your fat intake, especially saturated and trans fat. ? Limit salt (sodium). ? Increase fiber in your diet. ? Limit alcohol. · Learn how to take your pulse so that you can track your heart rate during exercise. · Always check with your doctor before you begin a new exercise program.  · Warm up before you exercise and cool down afterward for at least 15 minutes each.  This will help your heart gradually prepare for and recover from exercise and avoid pushing your heart too hard. · Stop exercising if you have any unusual discomfort, such as chest pain. · Do not smoke. Smoking can make heart problems worse. If you need help quitting, talk to your doctor about stop-smoking programs and medicines. These can increase your chances of quitting for good. When should you call for help? Call 911 anytime you think you may need emergency care. For example, call if:    · You have severe trouble breathing.     · You cough up pink, foamy mucus and you have trouble breathing.     · You have symptoms of a heart attack. These may include:  ? Chest pain or pressure, or a strange feeling in the chest.  ? Sweating. ? Shortness of breath. ? Nausea or vomiting. ? Pain, pressure, or a strange feeling in the back, neck, jaw, or upper belly or in one or both shoulders or arms. ? Lightheadedness or sudden weakness. ? A fast or irregular heartbeat. After you call 911, the  may tell you to chew 1 adult-strength or 2 to 4 low-dose aspirin. Wait for an ambulance. Do not try to drive yourself.     · You have angina symptoms (such as chest pain or pressure) that do not go away with rest or are not getting better within 5 minutes after you take a dose of nitroglycerin.     · You have symptoms of a stroke. These may include:  ? Sudden numbness, tingling, weakness, or loss of movement in your face, arm, or leg, especially on only one side of your body. ? Sudden vision changes. ? Sudden trouble speaking. ? Sudden confusion or trouble understanding simple statements. ? Sudden problems with walking or balance.   ? A sudden, severe headache that is different from past headaches.     · You passed out (lost consciousness).    Call your doctor now or seek immediate medical care if:    · You have new or increased shortness of breath.     · You are dizzy or lightheaded, or you feel like you may faint.     · You gain weight suddenly, such as more than 2 to 3 pounds in a day or 5 pounds in a week. (Your doctor may suggest a different range of weight gain.)     · You have increased swelling in your legs, ankles, or feet.    Watch closely for changes in your health, and be sure to contact your doctor if you have any problems. Where can you learn more? Go to http://wendy-rut.info/. Enter A908 in the search box to learn more about \"Cardiac Rehabilitation: Care Instructions. \"  Current as of: April 9, 2019  Content Version: 12.2  © 7062-9843 [employer]. Care instructions adapted under license by [employer] (which disclaims liability or warranty for this information). If you have questions about a medical condition or this instruction, always ask your healthcare professional. [address] any warranty or liability for your use of this information.

## 2020-02-19 NOTE — PROCEDURES
[address] [facility]  CARDIAC CATH    Name:  Brielle Tovar  MR#:  [id number]  :  1940  ACCOUNT #:  [de-identified]  DATE OF SERVICE:  2020    PROCEDURES PERFORMED:  Left heart catheterization via the right radial artery with a 5-Micronesian Tiger catheter, angled pigtail. Angioplasty and stenting of the LAD was performed with a 6-Micronesian XB 3.0 guide, a Prowater wire for the LAD, a  50 in the diagonal branch. Angiomax was used for anticoagulation and Plavix was loaded at the end of the procedure. PREOPERATIVE DIAGNOSIS:  Chest pain. POSTOPERATIVE DIAGNOSIS:  Coronary artery disease. SURGEON:  Terri Irby MD    ASSISTANT:  None. ESTIMATED BLOOD LOSS:  10 mL. SPECIMENS REMOVED:  None. COMPLICATIONS:  None. IMPLANTS:  A 2.5 x 18 Lacho drug-eluting stent. ANESTHESIA:  Conscious sedation was given by Sid Zaidi RN beginning at 12:47, concluding at 13:43 with a total of 2 mg Versed, 50 mcg fentanyl. Vital signs and saturation stable throughout    FINDINGS:  The left main coronary artery is in normal anatomic position, free of significant disease. Bifurcates into LAD and circumflex system. The LAD has 20% proximal narrowing. There is a 75-80% mid vessel narrowing. The distal vessel is free of significant disease. Circumflex has a stent with 50% distal edge in-stent restenosis. The remainder of the circumflex vessel is also free of significant disease. The right coronary is a large dominant vessel in normal anatomic position, smooth 30% proximal narrowing. This terminates in the PDA and small posterolateral obtuse marginal branches. Left ventriculogram reveals normal LV systolic function. Ejection fraction is 60%. Left ventricular end-diastolic pressure is 15 mmHg with an opening aortic pressure of 125/55. No gradient across the aortic valve.     After Angiomax was given, a  50 wire was placed in the diagonal branch, placed over, this was less than a 2-mm vessel, but was a long vessel. A Prowater wire was placed in the distal LAD. The lesion was predilated with 2.5 x 12 NC Trek, stented with a 2.5 x 18 Lacho drug-eluting stent and postdilated with 2.5 x 12 NC balloon with inflations to 18 atmospheres. There was 0% residual and GERALDO III flow. The wire had been pulled back and then placed down the diagonal through the stent struts, but a balloon could not be passed through given the very small size of the diagonal branch and it was basically unchanged from the beginning of the procedure. Wire was removed and the procedure was terminated. CONCLUSIONS:  1. Successful angioplasty and stenting of the mid left anterior descending. 2.  Moderate diffuse coronary atherosclerotic heart disease. 3.  Preserved left ventricular systolic function. 4.  TR band for hemostasis.         Constanza Posadas MD      [id number]  D:  02/18/2020 13:47  T:  02/19/2020 9:52  JOB #:  [id number]

## 2020-02-19 NOTE — PROGRESS NOTES
Discharge instructions, follow up appointments and prescriptions reviewed with patient and family. Both verbalize understanding. All personal belongings taken with patient. Family member will drive patient home. Patient escorted to discharge area via wheelchair. Patient is stable at discharge. IV removed.

## 2020-02-19 NOTE — PROGRESS NOTES
[facility] Opportunity for questions, concerns. Patient involved.

## 2020-02-24 ENCOUNTER — HOSPITAL ENCOUNTER (OUTPATIENT)
Dept: LAB | Age: 80
Discharge: HOME | End: 2020-02-24
Payer: MEDICARE

## 2020-02-24 LAB
ANION GAP SERPL CALC-SCNC: 7 MMOL/L (ref 7–16)
BUN SERPL-MCNC: 18 MG/DL (ref 8–23)
CALCIUM SERPL-MCNC: 9.5 MG/DL (ref 8.3–10.4)
CHLORIDE SERPL-SCNC: 106 MMOL/L (ref 98–107)
CO2 SERPL-SCNC: 28 MMOL/L (ref 21–32)
CREAT SERPL-MCNC: 0.9 MG/DL (ref 0.6–1)
GLUCOSE SERPL-MCNC: 99 MG/DL (ref 65–100)
POTASSIUM SERPL-SCNC: 4 MMOL/L (ref 3.5–5.1)
SODIUM SERPL-SCNC: 141 MMOL/L (ref 136–145)

## 2020-02-24 PROCEDURE — 36415 COLL VENOUS BLD VENIPUNCTURE: CPT

## 2020-02-24 PROCEDURE — 80048 BASIC METABOLIC PNL TOTAL CA: CPT

## 2020-05-29 ENCOUNTER — HOSPITAL ENCOUNTER (OUTPATIENT)
Dept: CT IMAGING | Age: 80
Discharge: HOME | End: 2020-05-29
Attending: INTERNAL MEDICINE

## 2020-05-29 DIAGNOSIS — T14.8XXA HEMATOMA AND CONTUSION: ICD-10-CM

## 2020-05-29 DIAGNOSIS — R51.9 ACUTE INTRACTABLE HEADACHE, UNSPECIFIED HEADACHE TYPE: ICD-10-CM

## 2020-05-29 NOTE — PROGRESS NOTES
No bleed noted on CT but with her bruising and need for the Plavix - I would change her ASA therapy to 3 times a week- Mon, Wed, and Fri only  And restart the Plavix daily and will need to be on it for a yr due to the Stent  So will have to be careful of falls and cuts etc  Notify pt

## 2020-06-09 PROCEDURE — 88305 TISSUE EXAM BY PATHOLOGIST: CPT

## 2020-06-11 ENCOUNTER — HOSPITAL ENCOUNTER (OUTPATIENT)
Dept: LAB | Age: 80
Discharge: HOME | End: 2020-06-11

## 2020-08-03 PROBLEM — K61.39 ISCHIORECTAL ABSCESS: Status: ACTIVE | Noted: 2020-08-03

## 2020-08-03 PROBLEM — L02.31 ABSCESS OF BUTTOCK, LEFT: Status: ACTIVE | Noted: 2020-08-03

## 2020-08-03 PROBLEM — L02.31 ABSCESS OF BUTTOCK, LEFT: Status: RESOLVED | Noted: 2020-08-03 | Resolved: 2020-08-03

## 2020-09-09 PROBLEM — R25.2 LEG CRAMPS: Status: ACTIVE | Noted: 2020-09-09

## 2021-03-10 PROBLEM — R07.89 CHRONIC CHEST WALL PAIN: Status: ACTIVE | Noted: 2020-02-18

## 2021-03-10 PROBLEM — G89.29 CHRONIC CHEST WALL PAIN: Status: ACTIVE | Noted: 2020-02-18

## 2021-05-04 ENCOUNTER — APPOINTMENT (OUTPATIENT)
Dept: GENERAL RADIOLOGY | Age: 81
End: 2021-05-04
Attending: EMERGENCY MEDICINE
Payer: MEDICARE

## 2021-05-04 ENCOUNTER — HOSPITAL ENCOUNTER (EMERGENCY)
Age: 81
Discharge: HOME | End: 2021-05-04
Attending: EMERGENCY MEDICINE
Payer: MEDICARE

## 2021-05-04 VITALS
WEIGHT: 149.91 LBS | HEIGHT: 64 IN | BODY MASS INDEX: 25.59 KG/M2 | DIASTOLIC BLOOD PRESSURE: 74 MMHG | TEMPERATURE: 98.7 F | HEART RATE: 90 BPM | RESPIRATION RATE: 24 BRPM | OXYGEN SATURATION: 95 % | SYSTOLIC BLOOD PRESSURE: 175 MMHG

## 2021-05-04 DIAGNOSIS — J20.9 BRONCHITIS WITH BRONCHOSPASM: Primary | ICD-10-CM

## 2021-05-04 DIAGNOSIS — Z72.0 TOBACCO ABUSE: ICD-10-CM

## 2021-05-04 LAB
ANION GAP SERPL CALC-SCNC: 6 MMOL/L (ref 7–16)
BASOPHILS # BLD: 0.1 K/UL (ref 0–0.2)
BASOPHILS NFR BLD: 1 % (ref 0–2)
BUN SERPL-MCNC: 19 MG/DL (ref 8–23)
CALCIUM SERPL-MCNC: 9.8 MG/DL (ref 8.3–10.4)
CHLORIDE SERPL-SCNC: 105 MMOL/L (ref 98–107)
CO2 SERPL-SCNC: 30 MMOL/L (ref 21–32)
CREAT SERPL-MCNC: 0.87 MG/DL (ref 0.6–1)
DIFFERENTIAL METHOD BLD: ABNORMAL
EOSINOPHIL # BLD: 0.4 K/UL (ref 0–0.8)
EOSINOPHIL NFR BLD: 3 % (ref 0.5–7.8)
ERYTHROCYTE [DISTWIDTH] IN BLOOD BY AUTOMATED COUNT: 13.2 % (ref 11.9–14.6)
GLUCOSE SERPL-MCNC: 91 MG/DL (ref 65–100)
HCT VFR BLD AUTO: 37.4 % (ref 35.8–46.3)
HGB BLD-MCNC: 12.1 G/DL (ref 11.7–15.4)
IMM GRANULOCYTES # BLD AUTO: 0.1 K/UL (ref 0–0.5)
IMM GRANULOCYTES NFR BLD AUTO: 1 % (ref 0–5)
LYMPHOCYTES # BLD: 5.3 K/UL (ref 0.5–4.6)
LYMPHOCYTES NFR BLD: 44 % (ref 13–44)
MAGNESIUM SERPL-MCNC: 2 MG/DL (ref 1.8–2.4)
MCH RBC QN AUTO: 30.3 PG (ref 26.1–32.9)
MCHC RBC AUTO-ENTMCNC: 32.4 G/DL (ref 31.4–35)
MCV RBC AUTO: 93.5 FL (ref 79.6–97.8)
MONOCYTES # BLD: 0.8 K/UL (ref 0.1–1.3)
MONOCYTES NFR BLD: 6 % (ref 4–12)
NEUTS SEG # BLD: 5.6 K/UL (ref 1.7–8.2)
NEUTS SEG NFR BLD: 46 % (ref 43–78)
NRBC # BLD: 0 K/UL (ref 0–0.2)
PLATELET # BLD AUTO: 275 K/UL (ref 150–450)
PMV BLD AUTO: 9.5 FL (ref 9.4–12.3)
POTASSIUM SERPL-SCNC: 3.5 MMOL/L (ref 3.5–5.1)
RBC # BLD AUTO: 4 M/UL (ref 4.05–5.2)
SODIUM SERPL-SCNC: 141 MMOL/L (ref 136–145)
WBC # BLD AUTO: 12.1 K/UL (ref 4.3–11.1)

## 2021-05-04 PROCEDURE — 80048 BASIC METABOLIC PNL TOTAL CA: CPT

## 2021-05-04 PROCEDURE — 74011250636 HC RX REV CODE- 250/636: Performed by: EMERGENCY MEDICINE

## 2021-05-04 PROCEDURE — 99284 EMERGENCY DEPT VISIT MOD MDM: CPT

## 2021-05-04 PROCEDURE — 71046 X-RAY EXAM CHEST 2 VIEWS: CPT

## 2021-05-04 PROCEDURE — 96374 THER/PROPH/DIAG INJ IV PUSH: CPT

## 2021-05-04 PROCEDURE — 94640 AIRWAY INHALATION TREATMENT: CPT

## 2021-05-04 PROCEDURE — 94664 DEMO&/EVAL PT USE INHALER: CPT

## 2021-05-04 PROCEDURE — 83735 ASSAY OF MAGNESIUM: CPT

## 2021-05-04 PROCEDURE — 74011000250 HC RX REV CODE- 250: Performed by: EMERGENCY MEDICINE

## 2021-05-04 PROCEDURE — 74011250637 HC RX REV CODE- 250/637: Performed by: EMERGENCY MEDICINE

## 2021-05-04 PROCEDURE — 85025 COMPLETE CBC W/AUTO DIFF WBC: CPT

## 2021-05-04 PROCEDURE — 77030013140 HC MSK NEB VYRM -A

## 2021-05-04 RX ORDER — IPRATROPIUM BROMIDE AND ALBUTEROL SULFATE 2.5; .5 MG/3ML; MG/3ML
3 SOLUTION RESPIRATORY (INHALATION)
Status: COMPLETED | OUTPATIENT
Start: 2021-05-04 | End: 2021-05-04

## 2021-05-04 RX ORDER — ALBUTEROL SULFATE 90 UG/1
2 AEROSOL, METERED RESPIRATORY (INHALATION)
Qty: 1 INHALER | Refills: 2 | Status: SHIPPED | OUTPATIENT
Start: 2021-05-04 | End: 2021-06-18 | Stop reason: ALTCHOICE

## 2021-05-04 RX ORDER — DOXYCYCLINE 100 MG/1
100 CAPSULE ORAL
Status: COMPLETED | OUTPATIENT
Start: 2021-05-04 | End: 2021-05-04

## 2021-05-04 RX ORDER — ALBUTEROL SULFATE 2.5 MG/.5ML
5 SOLUTION RESPIRATORY (INHALATION)
Status: COMPLETED | OUTPATIENT
Start: 2021-05-04 | End: 2021-05-04

## 2021-05-04 RX ORDER — DOXYCYCLINE HYCLATE 100 MG
100 TABLET ORAL 2 TIMES DAILY
Qty: 14 TAB | Refills: 0 | Status: SHIPPED | OUTPATIENT
Start: 2021-05-04 | End: 2021-05-11

## 2021-05-04 RX ADMIN — DOXYCYCLINE HYCLATE 100 MG: 100 CAPSULE ORAL at 23:26

## 2021-05-04 RX ADMIN — IPRATROPIUM BROMIDE AND ALBUTEROL SULFATE 3 ML: .5; 3 SOLUTION RESPIRATORY (INHALATION) at 20:34

## 2021-05-04 RX ADMIN — METHYLPREDNISOLONE SODIUM SUCCINATE 40 MG: 125 INJECTION, POWDER, FOR SOLUTION INTRAMUSCULAR; INTRAVENOUS at 21:45

## 2021-05-04 RX ADMIN — ALBUTEROL SULFATE 5 MG: 2.5 SOLUTION RESPIRATORY (INHALATION) at 21:52

## 2021-05-05 NOTE — DISCHARGE INSTRUCTIONS
Increase your prednisone to 20 mg/day   Albuterol inhaler with spacer: 2 puffs every 6 hours  Antibiotic: Doxycycline 100 mg twice daily  Over-the-counter medication for cough and allergy symptoms  Return to ER or follow-up with Dr. TRAN [facility] with any worsening

## 2021-05-05 NOTE — ED NOTES
I have reviewed discharge instructions with the patient. The patient verbalized understanding. Patient left ED via Discharge Method: ambulatory to Home with self). Opportunity for questions and clarification provided. Patient given 2 scripts. To continue your aftercare when you leave the hospital, you may receive an automated call from our care team to check in on how you are doing. This is a free service and part of our promise to provide the best care and service to meet your aftercare needs.  If you have questions, or wish to unsubscribe from this service please call [phone]. Thank you for Choosing our [facility] [employer] [facility] Department.

## 2021-05-05 NOTE — ED PROVIDER NOTES
Smoker with underlying COPD when outside and worked and felt as though she developed some sinus symptoms. She then developed some wheezing that is persisting. She is short of breath. Had Dasilva Sandro Covid vaccine several months ago. She is out of any inhalers at home. She is daily on prednisone 5 mg in the morning and 1 mg in the evening for polymyalgia rheumatica. No chest pain    The history is provided by the patient. Shortness of Breath  Associated symptoms include cough, sputum production and wheezing. Pertinent negatives include no fever, no hemoptysis, no PND, no orthopnea, no chest pain, no syncope, no leg pain and no leg swelling. Risk factors include smoking. Associated medical issues include asthma.         Past Medical History:   Diagnosis Date    Adverse effect of anesthesia     hypotension with anesthesia    Back pain     upper back    CAD (coronary artery disease) 11/25/2014 2014- 1 stent, denies Mi    COPD (chronic obstructive pulmonary disease) ([facility])     prn inhaler- smoker- 1 ppd- can climb 2 flights of steps    Difficulty swallowing 08/23/2013    pt denies    GERD (gastroesophageal reflux disease)     managed well with meds-    H/O bone density study 08/15/2017    osteoporosis    H/O heart artery stent 02/2020    X1- LAD    H/O myocardial perfusion scan 09/08/2017    normal    History of gastritis     History of kidney stones     x 2 with surgical intervention     History of squamous cell carcinoma     removed from face and right ear    Hyperlipidemia 8/23/2013    no present treatment     Hypertension     Hypotensive episode     occassionally    Hypothyroidism     managed with medication     Kidney stone     Murmur 09/22/2017 9/22/17 ECHO LVEF 65% Mitral regurgitation -     On prednisone therapy     Osteoarthritis     managed with medication     Osteoporosis     managed with medication     Pain and swelling of left ankle 4/9/2015    no recent episodes    Palpitations 5/12/2016    occassionally    Polymyalgia rheumatica (HCC)     Prednisone 5mg daily and Prednisone 1mg at bedtime    Purpura ([address] 8/23/2013    SECONDARY TO STEROID TREATMENT    Recurrent UTI 10/2019    treated with Bactrim    Rotator cuff tear, left 8/23/2013    Rotator cuff tear, right     Routine eye exam 2020    cataracts    Smoker     smokes 1 ppd x since age 28    Statin intolerance        Past Surgical History:   Procedure Laterality Date    HX BLADDER REPAIR      HX CARPAL TUNNEL RELEASE Right     HX COLONOSCOPY  02/09/2018    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 Jay Vela    HX HEART CATHETERIZATION  2014    cardiac stent x1    HX LITHOTRIPSY  2019    HX Lamar Ayala- right upper ear- SCC    HX OPEN CHOLECYSTECTOMY      HX MIREYA AND BSO      HX TUBAL LIGATION           Family History:   Problem Relation Age of Onset    COPD Mother     Alcohol abuse Mother     Stroke Maternal Grandfather     Arthritis-osteo Other         GRANDMOTHER       Social History     Socioeconomic History    Marital status:      Spouse name: Not on file    Number of children: Not on file    Years of education: Not on file    Highest education level: Not on file   Occupational History    Not on file   Social Needs    Financial resource strain: Not on file    Food insecurity     Worry: Not on file     Inability: Not on file    Transportation needs     Medical: Not on file     Non-medical: Not on file   Tobacco Use    Smoking status: Current Every Day Smoker     Packs/day: 1.00     Years: 46.00     Pack years: 46.00    Smokeless tobacco: Never Used    Tobacco comment: would like to quit but don't think that she can.  11/28/17   Substance and Sexual Activity    Alcohol use: No    Drug use: No    Sexual activity: Never   Lifestyle    Physical activity Days per week: Not on file     Minutes per session: Not on file    Stress: Not on file   Relationships    Social connections     Talks on phone: Not on file     Gets together: Not on file     Attends Spiritism service: Not on file     Active member of club or organization: Not on file     Attends meetings of clubs or organizations: Not on file     Relationship status: Not on file    Intimate partner violence     Fear of current or ex partner: Not on file     Emotionally abused: Not on file     Physically abused: Not on file     Forced sexual activity: Not on file   Other Topics Concern    Not on file   Social History Narrative    Not on file         ALLERGIES: Macrobid [nitrofurantoin monohyd/m-cryst], Codeine, Demerol [meperidine], Lactose, Levaquin [levofloxacin], Nuts [tree nut], and Zocor [simvastatin]    Review of Systems   Constitutional: Negative for fever. Respiratory: Positive for cough, sputum production, shortness of breath and wheezing. Negative for hemoptysis. Cardiovascular: Negative for chest pain, orthopnea, leg swelling, syncope and PND. Gastrointestinal: Negative. Neurological: Negative. Psychiatric/Behavioral: Negative. All other systems reviewed and are negative. Vitals:    05/04/21 2002 05/04/21 2035   BP: (!) 178/76    Pulse: 86    Resp: 24    Temp: 98.7 °F (37.1 °C)    SpO2: 91% 96%   Weight: 68 kg (149 lb 14.6 oz)    Height: 5' 4\" (1.626 m)             Physical Exam  Vitals signs and nursing note reviewed. Constitutional:       General: She is not in acute distress. Appearance: She is well-developed. HENT:      Head: Atraumatic. Eyes:      General: No scleral icterus. Neck:      Musculoskeletal: Neck supple. Cardiovascular:      Rate and Rhythm: Normal rate. Pulmonary:      Effort: Pulmonary effort is normal. No respiratory distress. Breath sounds: Wheezing present. No decreased breath sounds or rhonchi.       Comments: Juanpablo Ulloa had fairly diffuse expiratory wheezes though after breathing treatments in our department has improvement to rare expiratory wheeze with good exchange and excellent oxygen saturations at discharge  Chest:      Chest wall: No tenderness. Musculoskeletal:      Right lower leg: She exhibits no tenderness. Left lower leg: She exhibits no tenderness. Skin:     General: Skin is warm and dry. Coloration: Skin is not cyanotic or pale. Findings: No erythema. Neurological:      General: No focal deficit present. Psychiatric:         Mood and Affect: Mood normal.         Behavior: Behavior normal.         Thought Content: Thought content normal.          MDM  Number of Diagnoses or Management Options  Bronchitis with bronchospasm  Tobacco abuse  Diagnosis management comments: Patient with some bronchitis type changes with associated bronchospasm. Chest x-ray does not show any acute infectious pneumonic type changes. Lab work is essentially stable and she has excellent improvement with treatments in our department. She is quite adamant that she does not wish to be admitted. She has had Covid vaccine multiple months ago. Does not have classic Covid symptoms at present.        Amount and/or Complexity of Data Reviewed  Clinical lab tests: ordered and reviewed  Tests in the radiology section of CPT®: ordered and reviewed  Decide to obtain previous medical records or to obtain history from someone other than the patient: yes  Review and summarize past medical records: yes  Independent visualization of images, tracings, or specimens: yes    Risk of Complications, Morbidity, and/or Mortality  Presenting problems: moderate  Diagnostic procedures: moderate  Management options: moderate    Patient Progress  Patient progress: improved         Procedures

## 2021-05-05 NOTE — ED TRIAGE NOTES
Pt states that she has had a sinus infection for the past few days and now is having shortness of breath. Audible wheezes noted.  Masked on arrival

## 2022-01-25 PROBLEM — G89.29 CHRONIC BILATERAL LOW BACK PAIN WITH BILATERAL SCIATICA: Status: ACTIVE | Noted: 2022-01-25

## 2022-01-25 PROBLEM — M48.062 SPINAL STENOSIS OF LUMBAR REGION WITH NEUROGENIC CLAUDICATION: Status: ACTIVE | Noted: 2022-01-25

## 2022-01-25 PROBLEM — M54.41 CHRONIC BILATERAL LOW BACK PAIN WITH BILATERAL SCIATICA: Status: ACTIVE | Noted: 2022-01-25

## 2022-01-25 PROBLEM — M54.42 CHRONIC BILATERAL LOW BACK PAIN WITH BILATERAL SCIATICA: Status: ACTIVE | Noted: 2022-01-25

## 2022-02-05 ENCOUNTER — HOSPITAL ENCOUNTER (EMERGENCY)
Age: 82
Discharge: HOME | End: 2022-02-05
Attending: EMERGENCY MEDICINE
Payer: MEDICARE

## 2022-02-05 ENCOUNTER — APPOINTMENT (OUTPATIENT)
Dept: GENERAL RADIOLOGY | Age: 82
End: 2022-02-05
Attending: EMERGENCY MEDICINE
Payer: MEDICARE

## 2022-02-05 VITALS
RESPIRATION RATE: 18 BRPM | WEIGHT: 154 LBS | TEMPERATURE: 98.8 F | SYSTOLIC BLOOD PRESSURE: 149 MMHG | OXYGEN SATURATION: 97 % | BODY MASS INDEX: 26.29 KG/M2 | HEIGHT: 64 IN | DIASTOLIC BLOOD PRESSURE: 67 MMHG | HEART RATE: 53 BPM

## 2022-02-05 DIAGNOSIS — R60.0 BILATERAL LEG EDEMA: Primary | ICD-10-CM

## 2022-02-05 LAB
ALBUMIN SERPL-MCNC: 3.6 G/DL (ref 3.2–4.6)
ALBUMIN/GLOB SERPL: 1.1 {RATIO} (ref 1.2–3.5)
ALP SERPL-CCNC: 39 U/L (ref 50–130)
ALT SERPL-CCNC: 24 U/L (ref 12–65)
ANION GAP SERPL CALC-SCNC: 18 MMOL/L (ref 7–16)
APPEARANCE UR: NORMAL
AST SERPL-CCNC: 17 U/L (ref 15–37)
BASOPHILS # BLD: 0.1 K/UL (ref 0–0.2)
BASOPHILS NFR BLD: 1 % (ref 0–2)
BILIRUB SERPL-MCNC: 0.3 MG/DL (ref 0.2–1.1)
BILIRUB UR QL: NEGATIVE
BNP SERPL-MCNC: 150 PG/ML
BUN SERPL-MCNC: 20 MG/DL (ref 8–23)
CALCIUM SERPL-MCNC: 9.3 MG/DL (ref 8.3–10.4)
CHLORIDE SERPL-SCNC: 107 MMOL/L (ref 98–107)
CO2 SERPL-SCNC: 16 MMOL/L (ref 21–32)
COLOR UR: YELLOW
CREAT SERPL-MCNC: 1.2 MG/DL (ref 0.6–1)
DIFFERENTIAL METHOD BLD: ABNORMAL
EOSINOPHIL # BLD: 0.1 K/UL (ref 0–0.8)
EOSINOPHIL NFR BLD: 1 % (ref 0.5–7.8)
ERYTHROCYTE [DISTWIDTH] IN BLOOD BY AUTOMATED COUNT: 14.1 % (ref 11.9–14.6)
GLOBULIN SER CALC-MCNC: 3.3 G/DL (ref 2.3–3.5)
GLUCOSE SERPL-MCNC: 106 MG/DL (ref 65–100)
GLUCOSE UR STRIP.AUTO-MCNC: NEGATIVE MG/DL
HCT VFR BLD AUTO: 39.3 % (ref 35.8–46.3)
HGB BLD-MCNC: 12.9 G/DL (ref 11.7–15.4)
HGB UR QL STRIP: NEGATIVE
IMM GRANULOCYTES # BLD AUTO: 0.3 K/UL (ref 0–0.5)
IMM GRANULOCYTES NFR BLD AUTO: 2 % (ref 0–5)
KETONES UR QL STRIP.AUTO: NEGATIVE MG/DL
LACTATE SERPL-SCNC: 1.8 MMOL/L (ref 0.4–2)
LEUKOCYTE ESTERASE UR QL STRIP.AUTO: NEGATIVE
LIPASE SERPL-CCNC: 114 U/L (ref 73–393)
LYMPHOCYTES # BLD: 2.5 K/UL (ref 0.5–4.6)
LYMPHOCYTES NFR BLD: 20 % (ref 13–44)
MCH RBC QN AUTO: 30.8 PG (ref 26.1–32.9)
MCHC RBC AUTO-ENTMCNC: 32.8 G/DL (ref 31.4–35)
MCV RBC AUTO: 93.8 FL (ref 79.6–97.8)
MONOCYTES # BLD: 0.7 K/UL (ref 0.1–1.3)
MONOCYTES NFR BLD: 6 % (ref 4–12)
NEUTS SEG # BLD: 8.6 K/UL (ref 1.7–8.2)
NEUTS SEG NFR BLD: 70 % (ref 43–78)
NITRITE UR QL STRIP.AUTO: NEGATIVE
NRBC # BLD: 0 K/UL (ref 0–0.2)
PH UR STRIP: 5.5 [PH] (ref 5–9)
PLATELET # BLD AUTO: 285 K/UL (ref 150–450)
PMV BLD AUTO: 9.5 FL (ref 9.4–12.3)
POTASSIUM SERPL-SCNC: 3.6 MMOL/L (ref 3.5–5.1)
PROT SERPL-MCNC: 6.9 G/DL (ref 6.3–8.2)
PROT UR STRIP-MCNC: NEGATIVE MG/DL
RBC # BLD AUTO: 4.19 M/UL (ref 4.05–5.2)
SODIUM SERPL-SCNC: 141 MMOL/L (ref 136–145)
SP GR UR REFRACTOMETRY: 1.02 (ref 1–1.02)
TROPONIN-HIGH SENSITIVITY: 11.5 PG/ML (ref 0–14)
UROBILINOGEN UR QL STRIP.AUTO: 0.2 EU/DL (ref 0.2–1)
WBC # BLD AUTO: 12.3 K/UL (ref 4.3–11.1)

## 2022-02-05 PROCEDURE — 96361 HYDRATE IV INFUSION ADD-ON: CPT

## 2022-02-05 PROCEDURE — 96374 THER/PROPH/DIAG INJ IV PUSH: CPT

## 2022-02-05 PROCEDURE — 84484 ASSAY OF TROPONIN QUANT: CPT

## 2022-02-05 PROCEDURE — 99285 EMERGENCY DEPT VISIT HI MDM: CPT

## 2022-02-05 PROCEDURE — 81003 URINALYSIS AUTO W/O SCOPE: CPT

## 2022-02-05 PROCEDURE — 83605 ASSAY OF LACTIC ACID: CPT

## 2022-02-05 PROCEDURE — 85025 COMPLETE CBC W/AUTO DIFF WBC: CPT

## 2022-02-05 PROCEDURE — 93005 ELECTROCARDIOGRAM TRACING: CPT | Performed by: NURSE PRACTITIONER

## 2022-02-05 PROCEDURE — 74011250636 HC RX REV CODE- 250/636: Performed by: PHYSICIAN ASSISTANT

## 2022-02-05 PROCEDURE — 80053 COMPREHEN METABOLIC PANEL: CPT

## 2022-02-05 PROCEDURE — 71045 X-RAY EXAM CHEST 1 VIEW: CPT

## 2022-02-05 PROCEDURE — 83690 ASSAY OF LIPASE: CPT

## 2022-02-05 PROCEDURE — 83880 ASSAY OF NATRIURETIC PEPTIDE: CPT

## 2022-02-05 RX ORDER — FUROSEMIDE 10 MG/ML
40 INJECTION INTRAMUSCULAR; INTRAVENOUS
Status: COMPLETED | OUTPATIENT
Start: 2022-02-05 | End: 2022-02-05

## 2022-02-05 RX ADMIN — FUROSEMIDE 40 MG: 10 INJECTION, SOLUTION INTRAMUSCULAR; INTRAVENOUS at 15:18

## 2022-02-05 RX ADMIN — SODIUM CHLORIDE 500 ML: 900 INJECTION, SOLUTION INTRAVENOUS at 15:19

## 2022-02-05 NOTE — ED PROVIDER NOTES
The history is provided by the patient. Ankle swelling  This is a chronic problem. The problem occurs constantly. The problem has been gradually worsening. Associated symptoms include shortness of breath. Pertinent negatives include no chest pain. Associated symptoms comments: Left rib pain . Nothing aggravates the symptoms. Nothing relieves the symptoms. Treatments tried: at home meds. The treatment provided no relief. Shortness of Breath  Associated symptoms include shortness of breath. Pertinent negatives include no chest pain.         Past Medical History:   Diagnosis Date    Adverse effect of anesthesia     hypotension with anesthesia    Back pain     upper back    CAD (coronary artery disease) 11/25/2014    2014- 1 stent, denies Mi    COPD (chronic obstructive pulmonary disease) ([facility])     prn inhaler- smoker- 1 ppd- can climb 2 flights of steps    Difficulty swallowing 08/23/2013    pt denies    GERD (gastroesophageal reflux disease)     managed well with meds-    H/O bone density study 08/15/2017    osteoporosis    H/O heart artery stent 02/2020    X1- LAD    H/O myocardial perfusion scan 09/08/2017    normal    History of gastritis     History of kidney stones     x 2 with surgical intervention     History of squamous cell carcinoma     removed from face and right ear    Hyperlipidemia 8/23/2013    no present treatment     Hypertension     Hypotensive episode     occassionally    Hypothyroidism     managed with medication     Kidney stone     Murmur 09/22/2017 9/22/17 ECHO LVEF 65% Mitral regurgitation -     On prednisone therapy     Osteoarthritis     managed with medication     Osteoporosis     managed with medication     Pain and swelling of left ankle 4/9/2015    no recent episodes    Palpitations 5/12/2016    occassionally    Polymyalgia rheumatica (HCC)     Prednisone 5mg daily and Prednisone 1mg at bedtime    Purpura [address] [address] 8/23/2013    SECONDARY TO STEROID TREATMENT    Recurrent UTI 10/2019    treated with Bactrim    Rotator cuff tear, left 8/23/2013    Rotator cuff tear, right     Routine eye exam 2020    cataracts    Smoker     smokes 1 ppd x since age 28    Statin intolerance        Past Surgical History:   Procedure Laterality Date    HX BLADDER REPAIR      HX CARPAL TUNNEL RELEASE Right     HX CATARACT REMOVAL Bilateral     HX COLONOSCOPY  02/09/2018    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 Em Mendoza    HX HEART CATHETERIZATION  2014    cardiac stent x1    HX LITHOTRIPSY  2019   Arleta Andreia      Dr. Amirah Llamas- right upper ear- SCC    HX OPEN CHOLECYSTECTOMY      HX MIREYA AND BSO      HX TUBAL LIGATION           Family History:   Problem Relation Age of Onset    COPD Mother     Alcohol abuse Mother     Stroke Maternal Grandfather     OSTEOARTHRITIS Other         GRANDMOTHER       Social History     Socioeconomic History    Marital status:      Spouse name: Not on file    Number of children: Not on file    Years of education: Not on file    Highest education level: Not on file   Occupational History    Not on file   Tobacco Use    Smoking status: Current Every Day Smoker     Packs/day: 1.00     Years: 46.00     Pack years: 46.00    Smokeless tobacco: Never Used    Tobacco comment: would like to quit but don't think that she can.  11/28/17   Substance and Sexual Activity    Alcohol use: No    Drug use: No    Sexual activity: Never   Other Topics Concern    Not on file   Social History Narrative    Not on file     Social Determinants of Health     Financial Resource Strain:     Difficulty of Paying Living Expenses: Not on file   Food Insecurity:     Worried About Running Out of Food in the Last Year: Not on file    Jerome of Food in the Last Year: Not on file   Transportation Needs:     Lack of Transportation (Medical): Not on file    Lack of Transportation (Non-Medical): Not on file   Physical Activity:     Days of Exercise per Week: Not on file    Minutes of Exercise per Session: Not on file   Stress:     Feeling of Stress : Not on file   Social Connections:     Frequency of Communication with Friends and Family: Not on file    Frequency of Social Gatherings with Friends and Family: Not on file    Attends [employer] Services: Not on file    Active Member of [address] [place] [address] [employer] or Organizations: Not on file    Attends Club or Organization Meetings: Not on file    Marital Status: Not on file   Intimate Partner Violence:     Fear of Current or Ex-Partner: Not on file    Emotionally Abused: Not on file    Physically Abused: Not on file    Sexually Abused: Not on file   Housing Stability:     Unable to Pay for Housing in the Last Year: Not on file    Number of [place] in the Last Year: Not on file    Unstable Housing in the Last Year: Not on file         ALLERGIES: Macrobid [nitrofurantoin monohyd/m-cryst], Codeine, Demerol [meperidine], Lactose, Levaquin [levofloxacin], Nuts [tree nut], and Zocor [simvastatin]    Review of Systems   Respiratory: Positive for shortness of breath. Cardiovascular: Negative for chest pain. All other systems reviewed and are negative. Vitals:    02/05/22 1225   BP: (!) 160/69   Pulse: 97   Resp: 20   Temp: 98.8 °F (37.1 °C)   SpO2: 97%   Weight: 69.9 kg (154 lb)   Height: 5' 4\" (1.626 m)            Physical Exam  Vitals and nursing note reviewed. Constitutional:       General: She is not in acute distress. Appearance: She is well-developed and normal weight. She is not diaphoretic. HENT:      Head: Normocephalic and atraumatic. Mouth/Throat:      Pharynx: Oropharynx is clear. Eyes:      Pupils: Pupils are equal, round, and reactive to light. Cardiovascular:      Rate and Rhythm: Normal rate and regular rhythm.    Pulmonary:      Effort: Pulmonary effort is normal.      Breath sounds: Normal breath sounds. No decreased breath sounds, wheezing, rhonchi or rales. Chest:      Chest wall: Tenderness present. Comments: Pain to palpation left lateral rib area,no crepitus, increased with deep breathing   Abdominal:      General: Bowel sounds are normal.      Palpations: Abdomen is soft. Musculoskeletal:         General: Swelling present. Normal range of motion. Cervical back: Normal range of motion and neck supple. Comments: Bilateral lower extremities with +2 edema chronic skin changes from peripheral vascular disease. She has intact bilateral dorsalis pedis pulses. Patient has contusion to right medial calf area from recent trauma she states it is getting better but still quite tender. Skin:     General: Skin is warm. Neurological:      Mental Status: She is alert and oriented to person, place, and time. Psychiatric:         Mood and Affect: Mood normal.         Behavior: Behavior normal.          MDM  Number of Diagnoses or Management Options  Diagnosis management comments: Labs Reviewed  CBC WITH AUTOMATED DIFF - Abnormal; Notable for the following components:     WBC                           12.3 (*)               ABS.  NEUTROPHILS              8.6 (*)             All other components within normal limits  METABOLIC PANEL, COMPREHENSIVE - Abnormal; Notable for the following components:     CO2                           16 (*)                 Anion gap                     18 (*)                 Glucose                       106 (*)                Creatinine                    1.20 (*)               GFR est AA                    55 (*)                 GFR est non-AA                46 (*)                 Alk. phosphatase              39 (*)                 A-G Ratio                     1.1 (*)             All other components within normal limits  LIPASE  LACTIC ACID  NT-PRO BNP  TROPONIN-HIGH SENSITIVITY  URINALYSIS W/ RFLX MICROSCOPIC    EKG interpretation shows normal sinus rhythm at 64 bpm no ectopy no signs of acute MI  XR CHEST PORT   Final Result    No consolidation. Patient discussed with Dr. Arlette Vivas, due to patient's 6 pound weight gain overnight and she states 11 pounds over the past week we will give her an additional 40 mg of Lasix IV today along with 500 mg of normal saline IV.   Patient is to push her fluids over the next 2 days see her primary physician on Monday or Tuesday for recheck return to ER if symptoms worsen       Amount and/or Complexity of Data Reviewed  Clinical lab tests: reviewed  Tests in the radiology section of CPT®: reviewed  Decide to obtain previous medical records or to obtain history from someone other than the patient: yes    Risk of Complications, Morbidity, and/or Mortality  Presenting problems: moderate  Diagnostic procedures: moderate  Management options: moderate    Patient Progress  Patient progress: improved         Procedures

## 2022-02-05 NOTE — ED NOTES
58-year-old female presents to the ED with complaint of bilateral lower extremity pain and swelling, abdominal pain, chest pressure. States she began experiencing bilateral leg pain and swelling for approximately 1 month and this has progressively worsened since onset. States that she noticed abdominal pain and distention upon waking this morning. Reports 6 pound weight gain in the last 24 hours. Denies cough or hemoptysis, fever chills, known injury, other pain complaint. Denies history of CAD with stent placement x2, COPD. Denies history of CHF, DVT or PE, liver disease. Patient evaluated initially in triage. Rapid Medical Evaluation was conducted and necessary orders have been placed. I have performed a medical screening exam.  Care will now be transferred to the provider in the back of the emergency department.   Gillian Calloway NP 12:29 PM

## 2022-02-05 NOTE — ED TRIAGE NOTES
Patient presents from home complaining of bilateral leg swelling and shortness of breath. Reports 6 pound weight gain since yesterday. 2+ edema BLE.

## 2022-02-05 NOTE — DISCHARGE INSTRUCTIONS
Continue at home medications, increase water intake.   See your primary care physician Monday or Tuesday for routine recheck return to ER for worsening symptoms

## 2022-02-06 LAB
ATRIAL RATE: 64 BPM
CALCULATED P AXIS, ECG09: 59 DEGREES
CALCULATED R AXIS, ECG10: -56 DEGREES
CALCULATED T AXIS, ECG11: 59 DEGREES
DIAGNOSIS, 93000: NORMAL
P-R INTERVAL, ECG05: 144 MS
Q-T INTERVAL, ECG07: 374 MS
QRS DURATION, ECG06: 84 MS
QTC CALCULATION (BEZET), ECG08: 385 MS
VENTRICULAR RATE, ECG03: 64 BPM

## 2022-02-16 ENCOUNTER — HOSPITAL ENCOUNTER (OUTPATIENT)
Dept: MRI IMAGING | Age: 82
Discharge: HOME | End: 2022-02-16
Attending: INTERNAL MEDICINE
Payer: MEDICARE

## 2022-02-16 DIAGNOSIS — M54.16 LUMBAR BACK PAIN WITH RADICULOPATHY AFFECTING LOWER EXTREMITY: ICD-10-CM

## 2022-02-16 DIAGNOSIS — M48.062 SPINAL STENOSIS OF LUMBAR REGION WITH NEUROGENIC CLAUDICATION: ICD-10-CM

## 2022-02-16 PROCEDURE — 72148 MRI LUMBAR SPINE W/O DYE: CPT

## 2022-02-16 NOTE — PROGRESS NOTES
Has significant arthritis and stenosis in her back  Since she does not want to consider surgery would consider getting NICHOLAS and can make referral to Pain management for spinal injection  Notify pt

## 2022-03-16 PROBLEM — R60.0 FLUID RETENTION IN LEGS: Status: ACTIVE | Noted: 2022-03-16

## 2022-03-18 PROBLEM — R01.1 MURMUR: Status: ACTIVE | Noted: 2017-08-31

## 2022-03-18 PROBLEM — M25.512 CHRONIC LEFT SHOULDER PAIN: Status: ACTIVE | Noted: 2017-10-04

## 2022-03-18 PROBLEM — G89.29 CHRONIC LEFT SHOULDER PAIN: Status: ACTIVE | Noted: 2017-10-04

## 2022-03-19 PROBLEM — M54.42 CHRONIC BILATERAL LOW BACK PAIN WITH BILATERAL SCIATICA: Status: ACTIVE | Noted: 2022-01-25

## 2022-03-19 PROBLEM — M48.062 SPINAL STENOSIS OF LUMBAR REGION WITH NEUROGENIC CLAUDICATION: Status: ACTIVE | Noted: 2022-01-25

## 2022-03-19 PROBLEM — R25.2 LEG CRAMPS: Status: ACTIVE | Noted: 2020-09-09

## 2022-03-19 PROBLEM — I10 ESSENTIAL HYPERTENSION: Status: ACTIVE | Noted: 2018-04-04

## 2022-03-19 PROBLEM — I83.893 VARICOSE VEINS OF BOTH LEGS WITH EDEMA: Status: ACTIVE | Noted: 2018-04-04

## 2022-03-19 PROBLEM — G89.29 CHRONIC CHEST WALL PAIN: Status: ACTIVE | Noted: 2020-02-18

## 2022-03-19 PROBLEM — N18.30 STAGE 3 CHRONIC KIDNEY DISEASE (HCC): Status: ACTIVE | Noted: 2019-04-03

## 2022-03-19 PROBLEM — G89.29 CHRONIC BILATERAL LOW BACK PAIN WITH BILATERAL SCIATICA: Status: ACTIVE | Noted: 2022-01-25

## 2022-03-19 PROBLEM — M54.41 CHRONIC BILATERAL LOW BACK PAIN WITH BILATERAL SCIATICA: Status: ACTIVE | Noted: 2022-01-25

## 2022-03-19 PROBLEM — R07.89 CHRONIC CHEST WALL PAIN: Status: ACTIVE | Noted: 2020-02-18

## 2022-03-19 PROBLEM — K61.39 ISCHIORECTAL ABSCESS: Status: ACTIVE | Noted: 2020-08-03

## 2022-03-24 PROBLEM — R60.0 FLUID RETENTION IN LEGS: Status: ACTIVE | Noted: 2022-03-16

## 2022-05-03 RX ORDER — CEFAZOLIN SODIUM/WATER 2 G/20 ML
2 SYRINGE (ML) INTRAVENOUS ONCE
Status: CANCELLED | OUTPATIENT
Start: 2022-05-03 | End: 2022-05-03

## 2022-05-04 ENCOUNTER — HOSPITAL ENCOUNTER (OUTPATIENT)
Dept: SURGERY | Age: 82
Discharge: HOME | End: 2022-05-04
Attending: ORTHOPAEDIC SURGERY
Payer: MEDICARE

## 2022-05-04 VITALS
DIASTOLIC BLOOD PRESSURE: 69 MMHG | RESPIRATION RATE: 16 BRPM | TEMPERATURE: 97.9 F | OXYGEN SATURATION: 96 % | HEIGHT: 64 IN | BODY MASS INDEX: 26.03 KG/M2 | SYSTOLIC BLOOD PRESSURE: 133 MMHG | WEIGHT: 152.5 LBS | HEART RATE: 96 BPM

## 2022-05-04 LAB
BACTERIA SPEC CULT: NORMAL
CREAT SERPL-MCNC: 0.83 MG/DL (ref 0.6–1)
HGB BLD-MCNC: 13.5 G/DL (ref 11.7–15.4)
POTASSIUM SERPL-SCNC: 3.6 MMOL/L (ref 3.5–5.1)
SERVICE CMNT-IMP: NORMAL

## 2022-05-04 PROCEDURE — 84132 ASSAY OF SERUM POTASSIUM: CPT

## 2022-05-04 PROCEDURE — 87641 MR-STAPH DNA AMP PROBE: CPT

## 2022-05-04 PROCEDURE — 82565 ASSAY OF CREATININE: CPT

## 2022-05-04 PROCEDURE — 77030027138 HC INCENT SPIROMETER -A

## 2022-05-04 PROCEDURE — 85018 HEMOGLOBIN: CPT

## 2022-05-04 RX ORDER — ALBUTEROL SULFATE 90 UG/1
AEROSOL, METERED RESPIRATORY (INHALATION) AS NEEDED
COMMUNITY

## 2022-05-04 RX ORDER — OMEPRAZOLE 40 MG/1
40 CAPSULE, DELAYED RELEASE ORAL DAILY
COMMUNITY

## 2022-05-04 NOTE — PERIOP NOTES
Patient verified name, , and surgery as listed in [facility]. Patient provided medical/health information and PTA medications to the best of their ability. TYPE  CASE: 2  Orders per surgeon: Orders received  Labs per surgeon: MRSA/MSSA. Results: pending  Labs per anesthesia protocol: Hgb, Potassium, and Creatinine. Results pending. EKG:  ECHO 4.6./ECHO 2.5.22/ Cardiac office note 4.. - pt moderate risk for back surgery. Chart flagged to have anesthesia review. Nasal Swab collected per MD order. Patient provided with and instructed on education handouts including Guide to Surgery, blood transfusions, pain management, and hand hygiene for the family and community, and [facility] brochure. Road to Recovery Spine surgery patient guide given. Instructed on incentive spirometry. Hibiclens and instructions given per hospital policy. Original medication prescription bottles were visualized during patient appointment. Patient teach back successful and patient demonstrates knowledge of instruction.

## 2022-05-04 NOTE — PERIOP NOTES
PLEASE CONTINUE TAKING ALL PRESCRIPTION MEDICATIONS UP TO THE DAY OF SURGERY UNLESS OTHERWISE DIRECTED BELOW. DISCONTINUE all vitamins and supplements 7 days prior to surgery. DISCONTINUE Non-Steriodal Anti-Inflammatory (NSAIDS) such as Advil, Ibuprofen, Excedrin, Motrin, and Aleve 5 days prior to surgery. Home Medications to take  the day of surgery   Aspirin 81mg  Cetirizine (Zyrtec)  Estradiol (Estrace)  Prednisone (Deltasone)   Levothyroxine (Synthyroid)  Omeprazole (Prilosec)   Bring Proair inhaler the day of surgery. Use nebulizer the morning of surgery as instructed. Home Medications   to Hold   Calcium  Vitamin D     Magnesium     Comments     MARCIA-HEX shower the night before surgery and the morning of surgery. On the day before surgery please take Acetaminophen 1000mg in the morning and then again before bed. You may substitute for Tylenol 650 mg. Please do not bring home medications with you on the day of surgery unless otherwise directed by your nurse. If you are instructed to bring home medications, please give them to your nurse as they will be administered by the nursing staff. If you have any questions, please call [facility] [phone] or [facility] [phone]. A copy of this note was provided to the patient for reference. How to Use Your Incentive Spirometer       About Your Incentive Spirometer  An incentive spirometer is a device that will expand your lungs by helping you to breathe more deeply and fully. The parts of your incentive spirometer are labeled in Figure 1. Using your incentive spirometer  When youre using your incentive spirometer, make sure to breathe through your mouth. If you breathe through your nose, the incentive spirometer wont work properly. You can hold your nose if you have trouble. DO NOT BLOW INTO THE DEVICE. If you feel dizzy at any time, stop and rest. Try again at a later time.   1. Sit upright in a chair or in bed. Hold the incentive spirometer at eye level. 2. Put the mouthpiece in your mouth and close your lips tightly around it. Slowly breathe out (exhale) completely. 3. Breathe in (inhale) slowly through your mouth as deeply as you can. As you take the breath, you will see the piston rise inside the large column. While the piston rises, the indicator on the right should move upwards. It should stay in between the 2 arrows (see Figure 1). 4. Try to get the piston as high as you can, while keeping the indicator between the arrows. If the indicator doesnt stay between the arrows, youre breathing either too fast or too slow. 5. When you get it as high as you can, hold your breath for 10 seconds, or as long as possible. While youre holding your breath, the piston will slowly fall to the base of the spirometer. 6. Once the piston reaches the bottom of the spirometer, breathe out slowly through your mouth. Rest for a few seconds. 7. Repeat 10 times. Try to get the piston to the same level with each breath. 8. After each set of 10 breaths, try to cough as coughing will help loosen or clear any mucus in your lungs. 9. Put the marker at the level the piston reached on your incentive spirometer. This will be your goal next time. Repeat these steps every hour that youre awake.   Cover the mouthpiece of the incentive spirometer when you arent using it

## 2022-05-09 ENCOUNTER — ANESTHESIA EVENT (OUTPATIENT)
Dept: SURGERY | Age: 82
End: 2022-05-09
Payer: MEDICARE

## 2022-05-10 ENCOUNTER — ANESTHESIA (OUTPATIENT)
Dept: SURGERY | Age: 82
End: 2022-05-10
Payer: MEDICARE

## 2022-05-10 ENCOUNTER — APPOINTMENT (OUTPATIENT)
Dept: GENERAL RADIOLOGY | Age: 82
End: 2022-05-10
Attending: ORTHOPAEDIC SURGERY
Payer: MEDICARE

## 2022-05-10 ENCOUNTER — HOSPITAL ENCOUNTER (OUTPATIENT)
Age: 82
Setting detail: OUTPATIENT SURGERY
Discharge: HOME | End: 2022-05-10
Attending: ORTHOPAEDIC SURGERY | Admitting: ORTHOPAEDIC SURGERY
Payer: MEDICARE

## 2022-05-10 VITALS
DIASTOLIC BLOOD PRESSURE: 65 MMHG | TEMPERATURE: 97.7 F | HEART RATE: 50 BPM | SYSTOLIC BLOOD PRESSURE: 143 MMHG | OXYGEN SATURATION: 94 % | RESPIRATION RATE: 16 BRPM

## 2022-05-10 DIAGNOSIS — M48.062 SPINAL STENOSIS OF LUMBAR REGION WITH NEUROGENIC CLAUDICATION: Primary | ICD-10-CM

## 2022-05-10 LAB — POTASSIUM BLD-SCNC: 4.1 MMOL/L (ref 3.5–5.1)

## 2022-05-10 PROCEDURE — 74011250636 HC RX REV CODE- 250/636: Performed by: ORTHOPAEDIC SURGERY

## 2022-05-10 PROCEDURE — 77030040922 HC BLNKT HYPOTHRM STRY -A: Performed by: ANESTHESIOLOGY

## 2022-05-10 PROCEDURE — 74011250637 HC RX REV CODE- 250/637: Performed by: ORTHOPAEDIC SURGERY

## 2022-05-10 PROCEDURE — 77030037088 HC TUBE ENDOTRACH ORAL NSL COVD-A: Performed by: ANESTHESIOLOGY

## 2022-05-10 PROCEDURE — 74011000250 HC RX REV CODE- 250: Performed by: ANESTHESIOLOGY

## 2022-05-10 PROCEDURE — 2709999900 HC NON-CHARGEABLE SUPPLY: Performed by: ORTHOPAEDIC SURGERY

## 2022-05-10 PROCEDURE — 76210000006 HC OR PH I REC 0.5 TO 1 HR: Performed by: ORTHOPAEDIC SURGERY

## 2022-05-10 PROCEDURE — 77030003028 HC SUT VCRL J&J -A: Performed by: ORTHOPAEDIC SURGERY

## 2022-05-10 PROCEDURE — 74011000250 HC RX REV CODE- 250: Performed by: NURSE ANESTHETIST, CERTIFIED REGISTERED

## 2022-05-10 PROCEDURE — 77030031139 HC SUT VCRL2 J&J -A: Performed by: ORTHOPAEDIC SURGERY

## 2022-05-10 PROCEDURE — 74011250636 HC RX REV CODE- 250/636: Performed by: ANESTHESIOLOGY

## 2022-05-10 PROCEDURE — 74011000250 HC RX REV CODE- 250: Performed by: ORTHOPAEDIC SURGERY

## 2022-05-10 PROCEDURE — 74011000272 HC RX REV CODE- 272: Performed by: ORTHOPAEDIC SURGERY

## 2022-05-10 PROCEDURE — 76060000033 HC ANESTHESIA 1 TO 1.5 HR: Performed by: ORTHOPAEDIC SURGERY

## 2022-05-10 PROCEDURE — 77030012894: Performed by: ORTHOPAEDIC SURGERY

## 2022-05-10 PROCEDURE — 77030039425 HC BLD LARYNG TRULITE DISP TELE -A: Performed by: ANESTHESIOLOGY

## 2022-05-10 PROCEDURE — 74011250636 HC RX REV CODE- 250/636: Performed by: NURSE ANESTHETIST, CERTIFIED REGISTERED

## 2022-05-10 PROCEDURE — 63048 LAM FACETEC &FORAMOT EA ADDL: CPT | Performed by: ORTHOPAEDIC SURGERY

## 2022-05-10 PROCEDURE — 76210000021 HC REC RM PH II 0.5 TO 1 HR: Performed by: ORTHOPAEDIC SURGERY

## 2022-05-10 PROCEDURE — 76010000161 HC OR TIME 1 TO 1.5 HR INTENSV-TIER 1: Performed by: ORTHOPAEDIC SURGERY

## 2022-05-10 PROCEDURE — 84132 ASSAY OF SERUM POTASSIUM: CPT

## 2022-05-10 PROCEDURE — 77030028271 HC SRGFL HEMSTAT MTRX KT J&J -C: Performed by: ORTHOPAEDIC SURGERY

## 2022-05-10 PROCEDURE — 63047 LAM FACETEC & FORAMOT LUMBAR: CPT | Performed by: ORTHOPAEDIC SURGERY

## 2022-05-10 RX ORDER — LIDOCAINE HYDROCHLORIDE 10 MG/ML
0.1 INJECTION INFILTRATION; PERINEURAL AS NEEDED
Status: DISCONTINUED | OUTPATIENT
Start: 2022-05-10 | End: 2022-05-10 | Stop reason: HOSPADM

## 2022-05-10 RX ORDER — SODIUM CHLORIDE, SODIUM LACTATE, POTASSIUM CHLORIDE, CALCIUM CHLORIDE 600; 310; 30; 20 MG/100ML; MG/100ML; MG/100ML; MG/100ML
75 INJECTION, SOLUTION INTRAVENOUS CONTINUOUS
Status: DISCONTINUED | OUTPATIENT
Start: 2022-05-10 | End: 2022-05-10 | Stop reason: HOSPADM

## 2022-05-10 RX ORDER — BUPIVACAINE HYDROCHLORIDE AND EPINEPHRINE 5; 5 MG/ML; UG/ML
INJECTION, SOLUTION EPIDURAL; INTRACAUDAL; PERINEURAL AS NEEDED
Status: DISCONTINUED | OUTPATIENT
Start: 2022-05-10 | End: 2022-05-10 | Stop reason: HOSPADM

## 2022-05-10 RX ORDER — LIDOCAINE HYDROCHLORIDE 20 MG/ML
INJECTION, SOLUTION EPIDURAL; INFILTRATION; INTRACAUDAL; PERINEURAL AS NEEDED
Status: DISCONTINUED | OUTPATIENT
Start: 2022-05-10 | End: 2022-05-10 | Stop reason: HOSPADM

## 2022-05-10 RX ORDER — HYDROMORPHONE HYDROCHLORIDE 2 MG/ML
0.5 INJECTION, SOLUTION INTRAMUSCULAR; INTRAVENOUS; SUBCUTANEOUS
Status: DISCONTINUED | OUTPATIENT
Start: 2022-05-10 | End: 2022-05-10 | Stop reason: HOSPADM

## 2022-05-10 RX ORDER — FENTANYL CITRATE 50 UG/ML
INJECTION, SOLUTION INTRAMUSCULAR; INTRAVENOUS AS NEEDED
Status: DISCONTINUED | OUTPATIENT
Start: 2022-05-10 | End: 2022-05-10 | Stop reason: HOSPADM

## 2022-05-10 RX ORDER — ONDANSETRON 2 MG/ML
INJECTION INTRAMUSCULAR; INTRAVENOUS AS NEEDED
Status: DISCONTINUED | OUTPATIENT
Start: 2022-05-10 | End: 2022-05-10 | Stop reason: HOSPADM

## 2022-05-10 RX ORDER — NALOXONE HYDROCHLORIDE 0.4 MG/ML
0.1 INJECTION, SOLUTION INTRAMUSCULAR; INTRAVENOUS; SUBCUTANEOUS
Status: DISCONTINUED | OUTPATIENT
Start: 2022-05-10 | End: 2022-05-10 | Stop reason: HOSPADM

## 2022-05-10 RX ORDER — FLUMAZENIL 0.1 MG/ML
0.2 INJECTION INTRAVENOUS AS NEEDED
Status: DISCONTINUED | OUTPATIENT
Start: 2022-05-10 | End: 2022-05-10 | Stop reason: HOSPADM

## 2022-05-10 RX ORDER — NEOSTIGMINE METHYLSULFATE 1 MG/ML
INJECTION, SOLUTION INTRAVENOUS AS NEEDED
Status: DISCONTINUED | OUTPATIENT
Start: 2022-05-10 | End: 2022-05-10 | Stop reason: HOSPADM

## 2022-05-10 RX ORDER — KETOROLAC TROMETHAMINE 30 MG/ML
INJECTION, SOLUTION INTRAMUSCULAR; INTRAVENOUS AS NEEDED
Status: DISCONTINUED | OUTPATIENT
Start: 2022-05-10 | End: 2022-05-10 | Stop reason: HOSPADM

## 2022-05-10 RX ORDER — CEFAZOLIN SODIUM/WATER 2 G/20 ML
2 SYRINGE (ML) INTRAVENOUS ONCE
Status: COMPLETED | OUTPATIENT
Start: 2022-05-10 | End: 2022-05-10

## 2022-05-10 RX ORDER — HYDROCORTISONE SODIUM SUCCINATE 100 MG/2ML
100 INJECTION, POWDER, FOR SOLUTION INTRAMUSCULAR; INTRAVENOUS ONCE
Status: COMPLETED | OUTPATIENT
Start: 2022-05-10 | End: 2022-05-10

## 2022-05-10 RX ORDER — OXYCODONE HYDROCHLORIDE 5 MG/1
5 TABLET ORAL
Status: DISCONTINUED | OUTPATIENT
Start: 2022-05-10 | End: 2022-05-10 | Stop reason: HOSPADM

## 2022-05-10 RX ORDER — VANCOMYCIN HYDROCHLORIDE 1 G/20ML
INJECTION, POWDER, LYOPHILIZED, FOR SOLUTION INTRAVENOUS AS NEEDED
Status: DISCONTINUED | OUTPATIENT
Start: 2022-05-10 | End: 2022-05-10 | Stop reason: HOSPADM

## 2022-05-10 RX ORDER — DIPHENHYDRAMINE HYDROCHLORIDE 50 MG/ML
12.5 INJECTION, SOLUTION INTRAMUSCULAR; INTRAVENOUS
Status: DISCONTINUED | OUTPATIENT
Start: 2022-05-10 | End: 2022-05-10 | Stop reason: HOSPADM

## 2022-05-10 RX ORDER — PROPOFOL 10 MG/ML
INJECTION, EMULSION INTRAVENOUS AS NEEDED
Status: DISCONTINUED | OUTPATIENT
Start: 2022-05-10 | End: 2022-05-10 | Stop reason: HOSPADM

## 2022-05-10 RX ORDER — DEXAMETHASONE SODIUM PHOSPHATE 4 MG/ML
INJECTION, SOLUTION INTRA-ARTICULAR; INTRALESIONAL; INTRAMUSCULAR; INTRAVENOUS; SOFT TISSUE AS NEEDED
Status: DISCONTINUED | OUTPATIENT
Start: 2022-05-10 | End: 2022-05-10 | Stop reason: HOSPADM

## 2022-05-10 RX ORDER — ROCURONIUM BROMIDE 10 MG/ML
INJECTION, SOLUTION INTRAVENOUS AS NEEDED
Status: DISCONTINUED | OUTPATIENT
Start: 2022-05-10 | End: 2022-05-10 | Stop reason: HOSPADM

## 2022-05-10 RX ORDER — OXYCODONE HYDROCHLORIDE 5 MG/1
10 TABLET ORAL
Status: DISCONTINUED | OUTPATIENT
Start: 2022-05-10 | End: 2022-05-10 | Stop reason: HOSPADM

## 2022-05-10 RX ORDER — ACETAMINOPHEN 500 MG
1000 TABLET ORAL
COMMUNITY

## 2022-05-10 RX ORDER — TRAMADOL HYDROCHLORIDE 50 MG/1
50 TABLET ORAL
Qty: 28 TABLET | Refills: 0 | Status: SHIPPED | OUTPATIENT
Start: 2022-05-10 | End: 2022-05-17

## 2022-05-10 RX ORDER — GLYCOPYRROLATE 0.2 MG/ML
INJECTION INTRAMUSCULAR; INTRAVENOUS AS NEEDED
Status: DISCONTINUED | OUTPATIENT
Start: 2022-05-10 | End: 2022-05-10 | Stop reason: HOSPADM

## 2022-05-10 RX ADMIN — ONDANSETRON 4 MG: 2 INJECTION INTRAMUSCULAR; INTRAVENOUS at 09:54

## 2022-05-10 RX ADMIN — PROPOFOL 150 MG: 10 INJECTION, EMULSION INTRAVENOUS at 09:08

## 2022-05-10 RX ADMIN — LIDOCAINE HYDROCHLORIDE 60 MG: 20 INJECTION, SOLUTION EPIDURAL; INFILTRATION; INTRACAUDAL; PERINEURAL at 09:08

## 2022-05-10 RX ADMIN — SODIUM CHLORIDE, POTASSIUM CHLORIDE, SODIUM LACTATE AND CALCIUM CHLORIDE 75 ML/HR: 600; 310; 30; 20 INJECTION, SOLUTION INTRAVENOUS at 07:12

## 2022-05-10 RX ADMIN — HYDROMORPHONE HYDROCHLORIDE 0.5 MG: 2 INJECTION, SOLUTION INTRAMUSCULAR; INTRAVENOUS; SUBCUTANEOUS at 10:40

## 2022-05-10 RX ADMIN — KETOROLAC TROMETHAMINE 30 MG: 30 INJECTION, SOLUTION INTRAMUSCULAR; INTRAVENOUS at 09:54

## 2022-05-10 RX ADMIN — ROCURONIUM BROMIDE 10 MG: 50 INJECTION, SOLUTION INTRAVENOUS at 09:43

## 2022-05-10 RX ADMIN — DEXAMETHASONE SODIUM PHOSPHATE 10 MG: 4 INJECTION, SOLUTION INTRAMUSCULAR; INTRAVENOUS at 09:54

## 2022-05-10 RX ADMIN — GLYCOPYRROLATE 0.4 MG: 0.2 INJECTION, SOLUTION INTRAMUSCULAR; INTRAVENOUS at 10:06

## 2022-05-10 RX ADMIN — Medication 3 MG: at 10:06

## 2022-05-10 RX ADMIN — Medication 3 AMPULE: at 07:12

## 2022-05-10 RX ADMIN — HYDROCORTISONE SODIUM SUCCINATE 100 MG: 100 INJECTION, POWDER, FOR SOLUTION INTRAMUSCULAR; INTRAVENOUS at 07:13

## 2022-05-10 RX ADMIN — HYDROMORPHONE HYDROCHLORIDE 0.5 MG: 2 INJECTION, SOLUTION INTRAMUSCULAR; INTRAVENOUS; SUBCUTANEOUS at 10:35

## 2022-05-10 RX ADMIN — ROCURONIUM BROMIDE 30 MG: 50 INJECTION, SOLUTION INTRAVENOUS at 09:08

## 2022-05-10 RX ADMIN — FENTANYL CITRATE 50 MCG: 50 INJECTION INTRAMUSCULAR; INTRAVENOUS at 09:18

## 2022-05-10 RX ADMIN — FENTANYL CITRATE 50 MCG: 50 INJECTION INTRAMUSCULAR; INTRAVENOUS at 09:08

## 2022-05-10 RX ADMIN — Medication 2 G: at 09:13

## 2022-05-10 NOTE — ANESTHESIA POSTPROCEDURE EVALUATION
Procedure(s):  SPINE LUMBAR LAMINECTOMY L4-S1.    general    Anesthesia Post Evaluation      Multimodal analgesia: multimodal analgesia used between 6 hours prior to anesthesia start to PACU discharge  Patient location during evaluation: PACU  Patient participation: complete - patient participated  Level of consciousness: awake  Pain management: adequate  Airway patency: patent  Anesthetic complications: no  Cardiovascular status: acceptable and hemodynamically stable  Respiratory status: acceptable  Hydration status: acceptable  Comments: Acceptable for discharge from PACU.   Post anesthesia nausea and vomiting:  none  Final Post Anesthesia Temperature Assessment:  Normothermia (36.0-37.5 degrees C)      INITIAL Post-op Vital signs:   Vitals Value Taken Time   /65 05/10/22 1050   Temp 36.6 °C (97.9 °F) 05/10/22 1022   Pulse 52 05/10/22 1050   Resp 16 05/10/22 1045   SpO2 100 % 05/10/22 1050

## 2022-05-10 NOTE — ANESTHESIA PREPROCEDURE EVALUATION
Relevant Problems   RESPIRATORY SYSTEM   (+) COPD (chronic obstructive pulmonary disease) (HCC)      CARDIOVASCULAR   (+) Atherosclerosis of native coronary artery of native heart with angina pectoris (HCC)   (+) Essential hypertension   (+) Murmur      GASTROINTESTINAL   (+) Esophageal reflux      RENAL FAILURE   (+) Nephrolithiasis   (+) Stage 3 chronic kidney disease (HCC)      ENDOCRINE   (+) Hypothyroidism       Anesthetic History   No history of anesthetic complications            Review of Systems / Medical History  Patient summary reviewed and pertinent labs reviewed    Pulmonary    COPD: moderate      Smoker         Neuro/Psych   Within defined limits           Cardiovascular    Hypertension          CAD, cardiac stents (5/20 - remains on bASA) and hyperlipidemia    Exercise tolerance: >4 METS  Comments: Echo 4/22 - normal EF, no sig valve dz   GI/Hepatic/Renal     GERD: well controlled    Renal disease: CRI       Endo/Other      Hypothyroidism: well controlled  Arthritis (on Prednisone 5mg bid)     Other Findings              Physical Exam    Airway  Mallampati: II  TM Distance: 4 - 6 cm  Neck ROM: normal range of motion   Mouth opening: Normal     Cardiovascular    Rhythm: regular  Rate: normal         Dental    Dentition: Full upper dentures     Pulmonary  Breath sounds clear to auscultation               Abdominal         Other Findings            Anesthetic Plan    ASA: 3  Anesthesia type: general          Induction: Intravenous  Anesthetic plan and risks discussed with: Patient and Son / Daughter

## 2022-05-10 NOTE — OP NOTES
[address] [address]   [phone]    OPERATIVE REPORT    Patient ID:Elvi Miller  [id number]  1940  79 y.o. DATE OF SURGERY: 5/10/2022    SURGEON: Dr. Sabina Bennett. PREOPERATIVE DIAGNOSIS: Lumbar stenosis    POSTOPERATIVE DIAGNOSIS: Lumbar stenosis    PROCEDURE:    1. Lumbar laminectomy L4, L5 and S1 with bilateral foraminotomies and L4, L5 and S1 root decompression. (CPT [id number], 13998 x 2)     ANESTHESIA: General.    ESTIMATED BLOOD LOSS: 50 ml    POSTOPERATIVE CONDITION: Stable. INTRAOPERATIVE COMPLICATIONS: None. INDICATIONS FOR PROCEDURE: Back and leg pain consistent with claudication/lumbar radiculitis that is no longer responsive to conservative measures. Walking and standing tolerances have diminished. Imaging studies are concordant, showing lumbar stenosis. In the outpatient setting, the risks, benefits, and potential complications of the above listed procedure were discussed and an informed consent was obtained. DESCRIPTION OF PROCEDURE: After adequate induction of general anesthesia, the patient was positioned prone on the Jeff spinal table. Care was taken to pad all bony prominences. The shoulders and elbows were placed in the 90/90 position. The abdomen was allowed to hang free to decrease intraabdominal and venous pressure. The lumbar area was prepped and draped in the usual sterile fashion. Preoperative antibiotic was administered. A time out was called to confirm the appropriate patient, proposed procedure and proposed incision sites. With this conformation, an incision was created midline, over the lumbar spinous processes. Dissection was carried down to the lumbodorsal fascia. A Kocher clamp was attached to a spinous process and a cross-table lateral fluoroscopic image was obtained to confirm appropriate spinal level.  With this confirmation, the spinous process was marked with a Leksell rongeur and the lumbodorsal fascia and paraspinous musculature were elevated in a subperiosteal fashion, laterally off of the spinous processes and lamina. The pars interarticularis was exposed at each level. Care was taken to preserve the facet capsule at each level. The deep retractors were placed to facilitate visualization. A Leksell rongeur was used to resect the spinous processes of  L4, L5 and S1. The 4 mm jacqueline was used to thin the lamina to an eggshell like thickness. A triple-0 angled curet was used to elevated the ligamentum flavum off of its origin on the caudal surface of the L5 lamina as well as off the cephalad surface of the adjacent lamina. The ligamentum flavum was elevated from the thecal sac and a plane was created in the epidural space with the [facility]. A 4 mm Kerrison was used to perform a central laminectomy of  L4, L5, and S1. The central laminectomy was widened to the medial border of the pedicle at each level. With the central laminectomy completed, a 4 mm Kerrison was used to under cut the lateral recess along the entire length of the laminectomy site. Then using the [facility] elevator to retract the thecal sac and identify each of the nerve roots, partial foraminotomies were performed and each nerve was visualized and decompressed bilaterally. There was felt to be no significant facet instability and a fusion was not deemed to be necessary. With the decompression completed, the wound was liberally irrigated with saline solution. The lumbodorsal fascia was approximated with a #1 Vicryl suture in an interrupted fashion. The subcutaneous tissue and skin were approximated in a layered fashion. Dermabond was applied. Sterile dressings were applied. The patient tolerated the procedure well and was returned to the postanesthesia care unit in stable condition. At the end of the case, all sponge, needle, and instrument counts were correct.        Mohini Durand MD

## 2022-05-10 NOTE — DISCHARGE INSTRUCTIONS
Discharge Instructions    Wound Care and Showering  Your wound will typically be covered with a clear plastic dressing when you go home from the hospital. Since it is transparent, you will see the underlying gauze turn red with blood which is normal. You do not need to change the dressing unless it is leaking from the edges. Otherwise leave this dressing in place. The clear plastic dressing is waterproof so you can take a shower while it is on. You may remove the clear plastic dressing and the underlying gauze 3 days after surgery. There will be small tape strips under the gauze which should be left in place. If there is no leaking from the wound, you may take a shower and allow the tape strips to get wet. Some of them may fall off. The remaining strips will be removed once you return to the office. If there is persistent leaking when you first remove the clear dressing, apply new gauze and new clear plastic dressing (typically purchased at a pharmacy) over the wound. Hair washing is permissible in the shower. No tub baths, hot tubs or whirlpools until seen in the office. If any of the following should occur, please call the office:    -Persistent drainage from the incision site.  -Opening of incisions  -Fevers greater than 101 degrees  -Flu-like symptoms  -Increased redness    Exercise  You have unlimited walking and stair climbing privileges. Walking outside or walking on a treadmill without an incline is also allowed. Do NOT lift anything weighing greater than 10-15 lbs. Especially try to avoid lifting or reaching above your head. Sleeping  You may sleep in any comfortable position. Many patients find comfort sleeping in a recliner chair. It is normal to have difficulty sleeping for the first several weeks following your surgery. We recommend trying Benadryl, Melatonin, or Tylenol PM for help sleeping. All are over-the-counter and can be found in drugstores.      Eating  Because of the tubes in your throat while asleep during surgery, it is normal to have a sore throat and some difficulty swallowing solid foods after your surgery. This may persist for several weeks. Eating soft foods like yogurt, macaroni, and mashed potatoes seem to help. Today, you may have bland foods, nothing spicy or greasy. Pain  If you feel you need pain medicine, you may take regular or extra-strength Tylenol. Do NOT take an anti-inflammatory medication such as Advil, Aleve, or Motrin for the first 8 weeks following your surgery. Anti-inflammatory medications like these hinder bone growth and healing, which is critical in the weeks following surgery. Do NOT resume taking Foasamax for 8 weeks after your fusion surgery. To help alleviate persistent soreness around the shoulder blades, apply ice or warm moist compresses. Driving  You may NOT drive a car until told otherwise by your physician. You may be a passenger for short distances (about 20-30 minutes). If you must take a longer trip, be sure to make several pit stops so that you can walk and stretch your legs. Reclining in the passenger seat seems to be the most comfortable position for most patients. In some states, it is illegal to drive a car while wearing a neck brace. Follow up appointments  When you are discharged from the hospital, a follow up appointment will be made for 2-3 weeks from your surgery date. Medication Interaction:  During your procedure you potentially received a medication or medications which may reduce the effectiveness of oral contraceptives. Please consider other forms of contraception for 1 month following your procedure if you are currently using oral contraceptives as your primary form of birth control. In addition to this, we recommend continuing your oral contraceptive as prescribed, unless otherwise instructed by your physician, during this time.       After general anesthesia or intravenous sedation, for 24 hours or while taking prescription Narcotics:  · Limit your activities  · A responsible adult needs to be with you for the next 24 hours  · Do not drive and operate hazardous machinery  · Do not make important personal or business decisions  · Do not drink alcoholic beverages  · If you have not urinated within 8 hours after discharge, and you are experiencing discomfort from urinary retention, please go to the nearest ED. · If you have sleep apnea and have a CPAP machine, please use it for all naps and sleeping. · Please use caution when taking narcotics and any of your home medications that may cause drowsiness. *  Please give a list of your current medications to your Primary Care Provider. *  Please update this list whenever your medications are discontinued, doses are      changed, or new medications (including over-the-counter products) are added. *  Please carry medication information at all times in case of emergency situations. These are general instructions for a healthy lifestyle:  No smoking/ No tobacco products/ Avoid exposure to second hand smoke  Surgeon General's Warning:  Quitting smoking now greatly reduces serious risk to your health. Obesity, smoking, and sedentary lifestyle greatly increases your risk for illness  A healthy diet, regular physical exercise & weight monitoring are important for maintaining a healthy lifestyle    You may be retaining fluid if you have a history of heart failure or if you experience any of the following symptoms:  Weight gain of 3 pounds or more overnight or 5 pounds in a week, increased swelling in our hands or feet or shortness of breath while lying flat in bed. Please call your doctor as soon as you notice any of these symptoms; do not wait until your next office visit.

## 2022-05-10 NOTE — PROGRESS NOTES
's pre-procedure visit requested by patient. Conveyed care and concern for patient and family. Offered prayer as requested for patient, family, and staff.     Nora Lenz MDiv, BS  Board Certified

## 2022-05-26 ENCOUNTER — OFFICE VISIT (OUTPATIENT)
Dept: ORTHOPEDIC SURGERY | Age: 82
End: 2022-05-26

## 2022-05-26 DIAGNOSIS — Z98.890 STATUS POST LUMBAR SURGERY: Primary | ICD-10-CM

## 2022-05-26 PROCEDURE — 99024 POSTOP FOLLOW-UP VISIT: CPT | Performed by: ORTHOPAEDIC SURGERY

## 2022-05-26 NOTE — PROGRESS NOTES
Name: Yazan Gates  YOB: 1940  Gender: female  MRN: [id number]  Age: 79 y.o. Chief Complaint: Lumbar spine surgery follow up    History of Present Illness:      Yazan Gates  is here for 2-week follow up of her  laminectomy  surgery. She reports significant relief of preoperative lower extremity pain, weakness and parasthesias. There is the expected residual back stiffness. Medications:       Current Outpatient Medications:     aspirin 81 MG EC tablet, Take 81 mg by mouth, Disp: , Rfl:     Cetirizine HCl 10 MG CAPS, Take 10 mg by mouth daily, Disp: , Rfl:     vitamin D 25 MCG (1000 UT) CAPS, Take 1,000 Units by mouth every evening, Disp: , Rfl:     estradiol (ESTRACE) 0.5 MG tablet, TAKE ONE TABLET BY MOUTH ONE TIME DAILY, Disp: , Rfl:     famotidine (PEPCID) 20 MG tablet, TAKE ONE TABLET BY MOUTH EVERY NIGHT, Disp: , Rfl:     furosemide (LASIX) 40 MG tablet, TAKE ONE TABLET BY MOUTH ONE TIME DAILY IN THE MORNING, Disp: , Rfl:     gabapentin (NEURONTIN) 100 MG capsule, Take 100 mg by mouth., Disp: , Rfl:     ipratropium-albuterol (DUONEB) 0.5-2.5 (3) MG/3ML SOLN nebulizer solution, Inhale 3 mLs into the lungs every 6 hours as needed, Disp: , Rfl:     levothyroxine (SYNTHROID) 150 MCG tablet, TAKE 1 TABLET BY MOUTH DAILY BEFORE BREAKFAST, Disp: , Rfl:     losartan (COZAAR) 50 MG tablet, TAKE ONE TABLET BY MOUTH ONE TIME DAILY, Disp: , Rfl:     nitroGLYCERIN (NITROSTAT) 0.4 MG SL tablet, Place 0.4 mg under the tongue, Disp: , Rfl:     omeprazole (PRILOSEC) 40 MG delayed release capsule, TAKE ONE CAPSULE BY MOUTH ONE TIME DAILY, Disp: , Rfl:     predniSONE (DELTASONE) 5 MG tablet, Take 5 mg by mouth 2 times daily, Disp: , Rfl:     Allergies:     Allergies   Allergen Reactions    Macadamia Nut Oil Shortness Of Breath     Specifically black walnuts per an allergy test.    Nitrofurantoin Other (See Comments)     delirium    Lactose Diarrhea    Levofloxacin Diarrhea  Simvastatin Other (See Comments)    Codeine Nausea And Vomiting    Meperidine Nausea And Vomiting         Physical Exam:      Respirations are unlabored and there is no evidence of cyanosis      Wound is healing nicely without erythema, drainage or underlying fluctuance    Gait is improved    Sensory testing reveals intact sensation to light touch and in the distribution of the L3-S1 dermatomes bilaterally. Strength testing in the lower extremity reveals the following based on the 5 point grading scale:       HF (L2) H Ab (L5) KE (L3/4) ADF (L4) EHL (L5) A Ev (S1) APF (S1)   Right 5 5 5 5 5 5 5   Left 5 5 5 5 5 5 5       Diagnosis:      ICD-10-CM    1. Status post lumbar surgery  Z98.890        Assessment/Plan: This patient is following the expected course following the spine surgery. I encouraged her to walk as much as possible for exercise. She can also begin a stationary bike, or other low impact aerobic exercise. We will limit lifting to 10 pounds for the next several weeks. Prolonged sitting should be avoided to minimize the strain on the lumbar area. Thereafter, activities will be gradually increased as tolerated. I will have her return for follow up as needed.           Ellie Unger MD    05/26/22  1:40 PM

## 2022-06-06 ENCOUNTER — CARE COORDINATION (OUTPATIENT)
Dept: CARE COORDINATION | Facility: CLINIC | Age: 82
End: 2022-06-06

## 2022-06-06 NOTE — CARE COORDINATION
Ambulatory Care Coordination Note  6/6/2022  CM Risk Score: 2  Charlson 10 Year Mortality Risk Score: 100%     ACC: Denzel Novak, LEANDRO    Summary Note: ccm  Outreached to patient. Patient states she is doing well post surgery. Patient states pain is much better. Patient has lifting precautions of 15lbs or greater. Patient states her edema to her legs have been going down. Patient states she is ambulating better. Patient states no questions or concerns. Ccm discussed that I would follow up in 2 weeks as ccm is on pto next week. Patient agreeable. Lab Results     None              Goals Addressed                 This Visit's Progress     Conditions and Symptoms        I will schedule office visits, as directed by my provider. I will notify my provider of any symptoms that indicate a worsening of my condition. Barriers: none  Plan for overcoming my barriers: N/A  Confidence: 9/10  Anticipated Goal Completion Date: 8/6/22              Prior to Admission medications    Medication Sig Start Date End Date Taking?  Authorizing Provider   aspirin 81 MG EC tablet Take 81 mg by mouth    Ar Automatic Reconciliation   Cetirizine HCl 10 MG CAPS Take 10 mg by mouth daily    Ar Automatic Reconciliation   vitamin D 25 MCG (1000 UT) CAPS Take 1,000 Units by mouth every evening    Ar Automatic Reconciliation   estradiol (ESTRACE) 0.5 MG tablet TAKE ONE TABLET BY MOUTH ONE TIME DAILY 3/12/22   Ar Automatic Reconciliation   famotidine (PEPCID) 20 MG tablet TAKE ONE TABLET BY MOUTH EVERY NIGHT 3/12/22   Ar Automatic Reconciliation   furosemide (LASIX) 40 MG tablet TAKE ONE TABLET BY MOUTH ONE TIME DAILY IN THE MORNING 3/12/22   Ar Automatic Reconciliation   gabapentin (NEURONTIN) 100 MG capsule Take 100 mg by mouth. 11/16/21   Ar Automatic Reconciliation   ipratropium-albuterol (DUONEB) 0.5-2.5 (3) MG/3ML SOLN nebulizer solution Inhale 3 mLs into the lungs every 6 hours as needed 5/5/21   Ar Automatic Reconciliation levothyroxine (SYNTHROID) 150 MCG tablet TAKE 1 TABLET BY MOUTH DAILY BEFORE BREAKFAST 3/10/21   Ar Automatic Reconciliation   losartan (COZAAR) 50 MG tablet TAKE ONE TABLET BY MOUTH ONE TIME DAILY 4/6/22   Ar Automatic Reconciliation   nitroGLYCERIN (NITROSTAT) 0.4 MG SL tablet Place 0.4 mg under the tongue 6/17/20   Ar Automatic Reconciliation   omeprazole (PRILOSEC) 40 MG delayed release capsule TAKE ONE CAPSULE BY MOUTH ONE TIME DAILY 3/31/22   Ar Automatic Reconciliation   predniSONE (DELTASONE) 5 MG tablet Take 5 mg by mouth 2 times daily 11/16/21   Ar Automatic Reconciliation       Future Appointments   Date Time Provider Tuan Singleton   7/19/2022  1:30 PM Mariana Gonzales MD [facility]   7/20/2022  2:00 PM Dela Meckel, MD [facility]

## 2022-06-20 ENCOUNTER — CARE COORDINATION (OUTPATIENT)
Dept: CARE COORDINATION | Facility: CLINIC | Age: 82
End: 2022-06-20

## 2022-06-20 NOTE — CARE COORDINATION
Ambulatory Care Coordination Note  6/20/2022  CM Risk Score: 2  Charlson 10 Year Mortality Risk Score: 100%     ACC: Alexis Samuels, RN    Summary Note: ccm outreached to patient. Patient states she is doing well. Patient states she has upcoming MD appointments. Patient states pain has been controlled. Patient is getting around well. Patient states no questions or concerns. Ccm discussed that I would outreach next week. Patient agreeable. Lab Results     None              Goals Addressed                 This Visit's Progress     Conditions and Symptoms   On track     I will schedule office visits, as directed by my provider. I will notify my provider of any symptoms that indicate a worsening of my condition. Barriers: none  Plan for overcoming my barriers: N/A  Confidence: 9/10  Anticipated Goal Completion Date: 8/6/22              Prior to Admission medications    Medication Sig Start Date End Date Taking?  Authorizing Provider   aspirin 81 MG EC tablet Take 81 mg by mouth    Ar Automatic Reconciliation   Cetirizine HCl 10 MG CAPS Take 10 mg by mouth daily    Ar Automatic Reconciliation   vitamin D 25 MCG (1000 UT) CAPS Take 1,000 Units by mouth every evening    Ar Automatic Reconciliation   estradiol (ESTRACE) 0.5 MG tablet TAKE ONE TABLET BY MOUTH ONE TIME DAILY 3/12/22   Ar Automatic Reconciliation   famotidine (PEPCID) 20 MG tablet TAKE ONE TABLET BY MOUTH EVERY NIGHT 3/12/22   Ar Automatic Reconciliation   furosemide (LASIX) 40 MG tablet TAKE ONE TABLET BY MOUTH ONE TIME DAILY IN THE MORNING 3/12/22   Ar Automatic Reconciliation   gabapentin (NEURONTIN) 100 MG capsule Take 100 mg by mouth. 11/16/21   Ar Automatic Reconciliation   ipratropium-albuterol (DUONEB) 0.5-2.5 (3) MG/3ML SOLN nebulizer solution Inhale 3 mLs into the lungs every 6 hours as needed 5/5/21   Ar Automatic Reconciliation   levothyroxine (SYNTHROID) 150 MCG tablet TAKE 1 TABLET BY MOUTH DAILY BEFORE BREAKFAST 3/10/21   Ar Automatic Reconciliation   losartan (COZAAR) 50 MG tablet TAKE ONE TABLET BY MOUTH ONE TIME DAILY 4/6/22   Ar Automatic Reconciliation   nitroGLYCERIN (NITROSTAT) 0.4 MG SL tablet Place 0.4 mg under the tongue 6/17/20   Ar Automatic Reconciliation   omeprazole (PRILOSEC) 40 MG delayed release capsule TAKE ONE CAPSULE BY MOUTH ONE TIME DAILY 3/31/22   Ar Automatic Reconciliation   predniSONE (DELTASONE) 5 MG tablet Take 5 mg by mouth 2 times daily 11/16/21   Ar Automatic Reconciliation       Future Appointments   Date Time Provider Tuan Singleton   7/19/2022  1:30 PM Selvin Murillo MD [facility]   7/20/2022  2:00 PM Blossom Hill MD [facility]

## 2022-06-27 ENCOUNTER — CARE COORDINATION (OUTPATIENT)
Dept: CARE COORDINATION | Facility: CLINIC | Age: 82
End: 2022-06-27

## 2022-06-27 NOTE — CARE COORDINATION
Ambulatory Care Coordination Note  6/27/2022  CM Risk Score: 2  Charlson 10 Year Mortality Risk Score: 100%     ACC: Cesar Hudson, RN    Summary Note: ccm outreached to patient. Patient states she had a fall walking out her back porch last Thursday. Patient states her tailbone area is hurting her. Patient states she has been icing area and using heating pad. Patient states she will notify pcp. Reviewed with patient safety precautions. Patient verbalized understanding. Patient states she is now using walker some for ambulation. Discussed with patient to continue using walker as needed. Patient states no questions or concerns. Ccm discussed that I would outreach in 2 weeks. Patient agreeable. Lab Results     None              Goals Addressed                 This Visit's Progress     Conditions and Symptoms   On track     I will schedule office visits, as directed by my provider. I will notify my provider of any symptoms that indicate a worsening of my condition. Barriers: none  Plan for overcoming my barriers: N/A  Confidence: 9/10  Anticipated Goal Completion Date: 8/6/22              Prior to Admission medications    Medication Sig Start Date End Date Taking?  Authorizing Provider   aspirin 81 MG EC tablet Take 81 mg by mouth    Ar Automatic Reconciliation   Cetirizine HCl 10 MG CAPS Take 10 mg by mouth daily    Ar Automatic Reconciliation   vitamin D 25 MCG (1000 UT) CAPS Take 1,000 Units by mouth every evening    Ar Automatic Reconciliation   estradiol (ESTRACE) 0.5 MG tablet TAKE ONE TABLET BY MOUTH ONE TIME DAILY 3/12/22   Ar Automatic Reconciliation   famotidine (PEPCID) 20 MG tablet TAKE ONE TABLET BY MOUTH EVERY NIGHT 3/12/22   Ar Automatic Reconciliation   furosemide (LASIX) 40 MG tablet TAKE ONE TABLET BY MOUTH ONE TIME DAILY IN THE MORNING 3/12/22   Ar Automatic Reconciliation   gabapentin (NEURONTIN) 100 MG capsule Take 100 mg by mouth. 11/16/21   Ar Automatic Reconciliation ipratropium-albuterol (DUONEB) 0.5-2.5 (3) MG/3ML SOLN nebulizer solution Inhale 3 mLs into the lungs every 6 hours as needed 5/5/21   Ar Automatic Reconciliation   levothyroxine (SYNTHROID) 150 MCG tablet TAKE 1 TABLET BY MOUTH DAILY BEFORE BREAKFAST 3/10/21   Ar Automatic Reconciliation   losartan (COZAAR) 50 MG tablet TAKE ONE TABLET BY MOUTH ONE TIME DAILY 4/6/22   Ar Automatic Reconciliation   nitroGLYCERIN (NITROSTAT) 0.4 MG SL tablet Place 0.4 mg under the tongue 6/17/20   Ar Automatic Reconciliation   omeprazole (PRILOSEC) 40 MG delayed release capsule TAKE ONE CAPSULE BY MOUTH ONE TIME DAILY 3/31/22   Ar Automatic Reconciliation   predniSONE (DELTASONE) 5 MG tablet Take 5 mg by mouth 2 times daily 11/16/21   Ar Automatic Reconciliation       Future Appointments   Date Time Provider Tuan Singleton   7/19/2022  1:30 PM Analia Gamble MD [facility]   7/20/2022  2:00 PM Amy Hussein MD [facility]

## 2022-07-08 ENCOUNTER — HOSPITAL ENCOUNTER (EMERGENCY)
Age: 82
Discharge: HOME | End: 2022-07-08
Attending: STUDENT IN AN ORGANIZED HEALTH CARE EDUCATION/TRAINING PROGRAM
Payer: MEDICARE

## 2022-07-08 ENCOUNTER — APPOINTMENT (OUTPATIENT)
Dept: CT IMAGING | Age: 82
End: 2022-07-08
Payer: MEDICARE

## 2022-07-08 ENCOUNTER — APPOINTMENT (OUTPATIENT)
Dept: GENERAL RADIOLOGY | Age: 82
End: 2022-07-08
Payer: MEDICARE

## 2022-07-08 VITALS
DIASTOLIC BLOOD PRESSURE: 63 MMHG | TEMPERATURE: 99 F | BODY MASS INDEX: 26.46 KG/M2 | SYSTOLIC BLOOD PRESSURE: 167 MMHG | HEART RATE: 71 BPM | HEIGHT: 64 IN | RESPIRATION RATE: 18 BRPM | WEIGHT: 155 LBS | OXYGEN SATURATION: 97 %

## 2022-07-08 DIAGNOSIS — S51.012A SKIN TEAR OF LEFT ELBOW WITHOUT COMPLICATION, INITIAL ENCOUNTER: ICD-10-CM

## 2022-07-08 DIAGNOSIS — S20.212A CONTUSION OF RIB ON LEFT SIDE, INITIAL ENCOUNTER: ICD-10-CM
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
## 2022-07-08 PROCEDURE — 90471 IMMUNIZATION ADMIN: CPT | Performed by: STUDENT IN AN ORGANIZED HEALTH CARE EDUCATION/TRAINING PROGRAM

## 2022-07-08 PROCEDURE — 90714 TD VACC NO PRESV 7 YRS+ IM: CPT | Performed by: STUDENT IN AN ORGANIZED HEALTH CARE EDUCATION/TRAINING PROGRAM

## 2022-07-08 PROCEDURE — 70450 CT HEAD/BRAIN W/O DYE: CPT

## 2022-07-08 PROCEDURE — 6360000002 HC RX W HCPCS: Performed by: STUDENT IN AN ORGANIZED HEALTH CARE EDUCATION/TRAINING PROGRAM

## 2022-07-08 PROCEDURE — 99284 EMERGENCY DEPT VISIT MOD MDM: CPT

## 2022-07-08 PROCEDURE — 71101 X-RAY EXAM UNILAT RIBS/CHEST: CPT

## 2022-07-08 RX ORDER — TETANUS AND DIPHTHERIA TOXOIDS ADSORBED 2; 2 [LF]/.5ML; [LF]/.5ML
0.5 INJECTION INTRAMUSCULAR
Status: COMPLETED | OUTPATIENT
Start: 2022-07-08 | End: 2022-07-08

## 2022-07-08 RX ADMIN — TETANUS AND DIPHTHERIA TOXOIDS ADSORBED 0.5 ML: 2; 2 INJECTION INTRAMUSCULAR at 14:19

## 2022-07-08 ASSESSMENT — LIFESTYLE VARIABLES
HOW OFTEN DO YOU HAVE A DRINK CONTAINING ALCOHOL: NEVER
HOW MANY STANDARD DRINKS CONTAINING ALCOHOL DO YOU HAVE ON A TYPICAL DAY: 1 OR 2
HOW MANY STANDARD DRINKS CONTAINING ALCOHOL DO YOU HAVE ON A TYPICAL DAY: 1 OR 2
HOW OFTEN DO YOU HAVE A DRINK CONTAINING ALCOHOL: NEVER

## 2022-07-08 ASSESSMENT — PAIN DESCRIPTION - ORIENTATION: ORIENTATION: LEFT

## 2022-07-08 ASSESSMENT — ENCOUNTER SYMPTOMS
DIARRHEA: 0
PHOTOPHOBIA: 0
COUGH: 0
SHORTNESS OF BREATH: 0
SORE THROAT: 0
NAUSEA: 0
VOMITING: 0

## 2022-07-08 ASSESSMENT — PAIN - FUNCTIONAL ASSESSMENT: PAIN_FUNCTIONAL_ASSESSMENT: 0-10

## 2022-07-08 ASSESSMENT — PAIN DESCRIPTION - LOCATION: LOCATION: RIB CAGE

## 2022-07-08 ASSESSMENT — PAIN SCALES - GENERAL
PAINLEVEL_OUTOF10: 7
PAINLEVEL_OUTOF10: 8

## 2022-07-08 NOTE — ED PROVIDER NOTES
she has full range of motion and sensation. Tetanus immunization is not up-to-date. Is able to ambulate after the fall. Review of Systems   Constitutional: Negative for chills and fever. HENT: Negative for sore throat. Eyes: Negative for photophobia. Respiratory: Negative for cough and shortness of breath. Cardiovascular: Negative for chest pain. Gastrointestinal: Negative for diarrhea, nausea and vomiting. Genitourinary: Negative for dysuria. Musculoskeletal: Positive for arthralgias. Negative for neck pain and neck stiffness. Skin: Positive for wound. Neurological: Negative for syncope and headaches. Psychiatric/Behavioral: Negative for confusion. All other systems reviewed and are negative. Past Medical History:   Diagnosis Date    Adverse effect of anesthesia     hypotension with anesthesia    Back pain     lower back    CAD (coronary artery disease) 11/25/2014    Followed by [facility].     COPD (chronic obstructive pulmonary disease) (HCC)     managed with PRN inhaler and nebulizers    COVID-19 vaccine series completed     Pfizer vaccine completed    Difficulty swallowing 08/23/2013    pt denies    GERD (gastroesophageal reflux disease)     managed with Prilosec    H/O bone density study 08/15/2017    osteoporosis    H/O heart artery stent 02/2020    X2    H/O myocardial perfusion scan 09/08/2017    normal    History of gastritis     History of kidney stones     x 2 with surgical intervention     History of squamous cell carcinoma     removed from face and right ear    Hyperlipidemia 8/23/2013    no present treatment     Hypertension     Hypotensive episode     occassionally    Hypothyroidism     managed with medication     Murmur 09/22/2017 9/22/17 ECHO LVEF 65% Mitral regurgitation -     On prednisone therapy     Prednisone 5mg BID    Osteoarthritis     managed with medication     Osteoporosis     managed with medication     Pain and swelling of left ankle 4/9/2015    no recent episodes    Palpitations 5/12/2016    occassionally    Polymyalgia rheumatica (HCC)     Prednisone 5mg BID    Purpura ([address] 8/23/2013    SECONDARY TO STEROID TREATMENT    Recurrent UTI 10/2019    pt denies any recent UTI's    Rotator cuff tear, left 8/23/2013    Rotator cuff tear, right     Routine eye exam 2020    cataracts    Seasonal allergic rhinitis     Smoker     smokes 1 ppd x since age 28    Statin intolerance         Past Surgical History:   Procedure Laterality Date    BLADDER REPAIR      CARDIAC CATHETERIZATION  2014    cardiac stent x2    CARPAL TUNNEL RELEASE Right     CATARACT REMOVAL Bilateral     CHOLECYSTECTOMY, OPEN      COLONOSCOPY  02/09/2018    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 Reina Hudson        Family History   Problem Relation Age of Onset    No Known Problems Brother     Stroke Maternal Grandfather     Alcohol Abuse Mother     COPD Mother     Osteoarthritis Other         GRANDMOTHER           Social Connections:     Frequency of Communication with Friends and Family: Not on file    Frequency of Social Gatherings with Friends and Family: Not on file    Attends Tenriism Services: Not on file    Active Member of Clubs or Organizations: Not on file    Attends Club or Organization Meetings: Not on file    Marital Status: Not on file        Allergies   Allergen Reactions    Macadamia Nut Oil Shortness Of Breath     Specifically black walnuts per an allergy test.    Nitrofurantoin Other (See Comments)     delirium    Lactose Diarrhea    Levofloxacin Diarrhea    Simvastatin Other (See Comments)    Codeine Nausea And Vomiting    Meperidine Nausea And Vomiting        Vitals signs and nursing note reviewed. Patient Vitals for the past 4 hrs:   Temp Pulse Resp BP SpO2   07/08/22 1345 -- -- -- (!) 167/61 100 %   07/08/22 1300 -- 71 18 (!) 166/63 99 %   07/08/22 1146 99 °F (37.2 °C) 91 17 (!) 174/72 99 %          Physical Exam  Vitals and nursing note reviewed. Constitutional:       General: She is not in acute distress. Appearance: Normal appearance. HENT:      Head: Normocephalic. Nose: Nose normal.      Mouth/Throat:      Mouth: Mucous membranes are moist.   Eyes:      Extraocular Movements: Extraocular movements intact. Pupils: Pupils are equal, round, and reactive to light. Cardiovascular:      Rate and Rhythm: Normal rate and regular rhythm. Pulses: Normal pulses. Heart sounds: Normal heart sounds. Pulmonary:      Effort: Pulmonary effort is normal. No respiratory distress. Breath sounds: Normal breath sounds. Abdominal:      General: Abdomen is flat. Palpations: Abdomen is soft. Tenderness: There is no abdominal tenderness. Musculoskeletal:         General: No swelling or tenderness. Normal range of motion. Cervical back: Normal range of motion. No rigidity. Skin:     General: Skin is warm. Comments: 3 areas of large skin tear to the dorsal aspect of the left elbow. Largest is 5 x 5 cm, smaller skin tear approximately 2 cm x 2 cm, skin flap is present. Neurological:      General: No focal deficit present. Mental Status: She is alert and oriented to person, place, and time. Cranial Nerves: No cranial nerve deficit. Sensory: No sensory deficit. Motor: No weakness.    Psychiatric:         Mood and Affect: Mood normal.          Lac Repair    Date/Time: 7/8/2022 2:13 PM  Performed by: Jeannie Alfonso DO  Authorized by: Jeannie Alfonso DO     Consent:     Consent obtained:  Verbal    Consent given by:  Patient  Anesthesia (see MAR for exact dosages): Anesthesia method:  None  Laceration details:     Location:  Shoulder/arm    Shoulder/arm location:  L elbow    Wound length (cm): 5x5cm. Repair type:     Repair type:  Simple  Exploration:     Contaminated: yes    Treatment:     Amount of cleaning:  Standard    Irrigation solution:  Tap water    Irrigation method:  Tap    Visualized foreign bodies/material removed: yes    Skin repair:     Repair method:  Steri-Strips    Number of Steri-Strips:  4  Approximation:     Approximation:  Close  Post-procedure details:     Dressing:  Non-adherent dressing    Patient tolerance of procedure: Tolerated well, no immediate complications          Labs Reviewed - No data to display     XR RIBS LEFT INCLUDE CHEST (MIN 3 VIEWS)   Final Result   No displaced left-sided rib fracture. There is no pneumothorax. CT HEAD WO CONTRAST   Final Result   No acute intracranial abnormality. Voice dictation software was used during the making of this note. This software is not perfect and grammatical and other typographical errors may be present. This note has not been completely proofread for errors.         Jovana Raymond DO  07/08/22 1412

## 2022-07-08 NOTE — ED NOTES
I have reviewed discharge instructions with the patient. The patient verbalized understanding. Patient left ED via Discharge Method: wheelchair to Home with friend. Opportunity for questions and clarification provided. Patient given 0 scripts. To continue your aftercare when you leave the hospital, you may receive an automated call from our care team to check in on how you are doing. This is a free service and part of our promise to provide the best care and service to meet your aftercare needs.  If you have questions, or wish to unsubscribe from this service please call [phone]. Thank you for Choosing our [address] [place] [address] Emergency Department.           Emely Salinas RN  07/08/22 0221

## 2022-07-08 NOTE — ED NOTES
Arm wound cleansed with 60cc normal saline and bandaged with non stick pad and coban     Gerri Adamson RN  07/08/22 9615

## 2022-07-08 NOTE — ED TRIAGE NOTES
\"I just tripped and fell because I don't have feeling on my right leg. I hit my head, my left elbow and left ribs.  I am on blood thinners\"

## 2022-07-11 ENCOUNTER — CARE COORDINATION (OUTPATIENT)
Dept: CARE COORDINATION | Facility: CLINIC | Age: 82
End: 2022-07-11

## 2022-07-11 NOTE — CARE COORDINATION
Ambulatory Care Coordination Note  7/11/2022  CM Risk Score: 2  Charlson 10 Year Mortality Risk Score: 100%     ACC: Slime Kurtz RN    Summary Note: ccm outreached to patient. Patient states she had a fall out of her car on 7/8/22. Patient states she went to ER. Patient has skin tear on left elbow. Patient states no s/s infection. Patient states she also had contusions. [facility] sent message to pcp office to notify of ER admission and fall. [facility] also asked for staff to call patient for follow up appointment. Discussed with patient that if SOB worsens she will need to go to ER. Patient verbalized understanding. Patient states no questions or concerns. [facility] discussed that I would outreach next week. Patient agreeable. Lab Results     None              Goals Addressed                 This Visit's Progress     Conditions and Symptoms   On track     I will schedule office visits, as directed by my provider. I will notify my provider of any symptoms that indicate a worsening of my condition. Barriers: none  Plan for overcoming my barriers: N/A  Confidence: 9/10  Anticipated Goal Completion Date: 8/6/22              Prior to Admission medications    Medication Sig Start Date End Date Taking?  Authorizing Provider   aspirin 81 MG EC tablet Take 81 mg by mouth    Ar Automatic Reconciliation   Cetirizine HCl 10 MG CAPS Take 10 mg by mouth daily    Ar Automatic Reconciliation   vitamin D 25 MCG (1000 UT) CAPS Take 1,000 Units by mouth every evening    Ar Automatic Reconciliation   estradiol (ESTRACE) 0.5 MG tablet TAKE ONE TABLET BY MOUTH ONE TIME DAILY 3/12/22   Ar Automatic Reconciliation   famotidine (PEPCID) 20 MG tablet TAKE ONE TABLET BY MOUTH EVERY NIGHT 3/12/22   Ar Automatic Reconciliation   furosemide (LASIX) 40 MG tablet TAKE ONE TABLET BY MOUTH ONE TIME DAILY IN THE MORNING 3/12/22   Ar Automatic Reconciliation   gabapentin (NEURONTIN) 100 MG capsule Take 100 mg by mouth. 11/16/21   Ar Automatic Reconciliation   ipratropium-albuterol (DUONEB) 0.5-2.5 (3) MG/3ML SOLN nebulizer solution Inhale 3 mLs into the lungs every 6 hours as needed 5/5/21   Ar Automatic Reconciliation   levothyroxine (SYNTHROID) 150 MCG tablet TAKE 1 TABLET BY MOUTH DAILY BEFORE BREAKFAST 3/10/21   Ar Automatic Reconciliation   losartan (COZAAR) 50 MG tablet TAKE ONE TABLET BY MOUTH ONE TIME DAILY 4/6/22   Ar Automatic Reconciliation   nitroGLYCERIN (NITROSTAT) 0.4 MG SL tablet Place 0.4 mg under the tongue 6/17/20   Ar Automatic Reconciliation   omeprazole (PRILOSEC) 40 MG delayed release capsule TAKE ONE CAPSULE BY MOUTH ONE TIME DAILY 3/31/22   Ar Automatic Reconciliation   predniSONE (DELTASONE) 5 MG tablet Take 5 mg by mouth 2 times daily 11/16/21   Ar Automatic Reconciliation       Future Appointments   Date Time Provider Tuan Singleton   7/19/2022  1:30 PM [facility]   7/20/2022  2:00 PM Jo Ann Lombardo MD [facility] AMB

## 2022-07-12 ENCOUNTER — OFFICE VISIT (OUTPATIENT)
Dept: INTERNAL MEDICINE CLINIC | Facility: CLINIC | Age: 82
End: 2022-07-12
Payer: MEDICARE

## 2022-07-12 VITALS
WEIGHT: 159.4 LBS | HEIGHT: 64 IN | SYSTOLIC BLOOD PRESSURE: 132 MMHG | BODY MASS INDEX: 27.21 KG/M2 | OXYGEN SATURATION: 100 % | TEMPERATURE: 97.2 F | HEART RATE: 92 BPM | DIASTOLIC BLOOD PRESSURE: 80 MMHG
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
## 2022-07-12 DIAGNOSIS — Z91.81 AT HIGH RISK FOR FALLS: ICD-10-CM

## 2022-07-12 DIAGNOSIS — S41.112D SKIN TEAR OF LEFT UPPER ARM WITHOUT COMPLICATION, SUBSEQUENT ENCOUNTER: Primary | ICD-10-CM

## 2022-07-12 DIAGNOSIS — M35.3 POLYMYALGIA RHEUMATICA (HCC): Chronic | ICD-10-CM

## 2022-07-12 PROCEDURE — 4004F PT TOBACCO SCREEN RCVD TLK: CPT | Performed by: NURSE PRACTITIONER

## 2022-07-12 PROCEDURE — G8400 PT W/DXA NO RESULTS DOC: HCPCS | Performed by: NURSE PRACTITIONER

## 2022-07-12 PROCEDURE — 99214 OFFICE O/P EST MOD 30 MIN: CPT | Performed by: NURSE PRACTITIONER

## 2022-07-12 PROCEDURE — G8417 CALC BMI ABV UP PARAM F/U: HCPCS | Performed by: NURSE PRACTITIONER

## 2022-07-12 PROCEDURE — 1090F PRES/ABSN URINE INCON ASSESS: CPT | Performed by: NURSE PRACTITIONER

## 2022-07-12 PROCEDURE — 1123F ACP DISCUSS/DSCN MKR DOCD: CPT | Performed by: NURSE PRACTITIONER

## 2022-07-12 PROCEDURE — G8427 DOCREV CUR MEDS BY ELIG CLIN: HCPCS | Performed by: NURSE PRACTITIONER

## 2022-07-12 RX ORDER — CEPHALEXIN 500 MG/1
500 CAPSULE ORAL 2 TIMES DAILY
Qty: 14 CAPSULE | Refills: 0 | Status: SHIPPED | OUTPATIENT
Start: 2022-07-12 | End: 2022-07-19

## 2022-07-12 NOTE — PROGRESS NOTES
Supriya Barrett (: 1940) presents today for ER follow up on 2022 from a fall where she suffered a large skin tear. She tripped and fell onto a gravel surface. She landed on her back, yet has left rib pain. She was see in the ER and head CT and CXR were unremarkable. Steri-strips were applied to her skin tear. Chief Complaint   Patient presents with    Shortness of Breath    Other     Rib pain post fall      Reviewed and updated this visit by provider:  Tobacco  Allergies  Meds  Problems  Med Hx  Surg Hx  Fam Hx         Immunizations:  Immunization status: up to date and documented. Review of Systems - Negative except as stated in HPI  History obtained from chart review and the patient  General ROS: negative for - fever  Respiratory ROS: no cough, shortness of breath, or wheezing  positive for - pleuritic pain  Cardiovascular ROS: no chest pain or dyspnea on exertion  positive for - edema  Dermatological ROS: positive for - skin tear - left arm    Visit Vitals  /80 (Site: Right Upper Arm, Position: Sitting)   Pulse 92   Temp 97.2 °F (36.2 °C) (Temporal)   Ht 5' 4\" (1.626 m)   Wt 159 lb 6.4 oz (72.3 kg)   SpO2 100%   BMI 27.36 kg/m²     Physical Examination: General appearance - alert, well appearing, and in no distress, oriented to person, place, and time, normal appearing weight and chronically ill appearing  Mental status - alert, oriented to person, place, and time, normal mood, behavior, speech, dress, motor activity, and thought processes, affect appropriate to mood  Chest - clear to auscultation, no wheezes, rales or rhonchi, symmetric air entry, chest wall tenderness noted left ribs around breast  Heart - normal rate, regular rhythm, normal S1, S2, no murmurs, rubs, clicks or gallops  Extremities - intact peripheral pulses, chronic venous stasis  Skin - full-thickness skin tear to left inner arm near elbow - scant bleeding.  Thin-layer of Silvadene applied; then vaseline gauze and nonstick telfa. Wrapped with cotton gauze. Assessment/Plan:  1. Skin tear of left upper arm without complication, subsequent encounter    2. Fall, subsequent encounter    3. Polymyalgia rheumatica ([address]    4. At high risk for falls      Orders Placed This Encounter   Medications    cephALEXin (KEFLEX) 500 MG capsule     Sig: Take 1 capsule by mouth 2 times daily for 7 days     Dispense:  14 capsule     Refill:  0    silver sulfADIAZINE (SILVADENE) 1 % cream     Sig: Apply topically daily. Dispense:  400 g     Refill:  0     Td was administered in ER. Keflex as prescribed - direction for use and side effects discussed. Keep wound clean/dry/covered. She will redress with thin layer of Silvadene, Vaseline gauze and nonstick telfa every 2-3 days. She does not have transportation to come to the office for dressing changes this week. She will do these with the assistance of her family. Deep breathing exercises recommended. Return if symptoms worsen or fail to improve. On the basis of positive falls risk screening, assessment and plan is as follows: home safety tips provided. On this date 7/12/2022 I have spent 30 minutes reviewing previous notes, test results and face to face with the patient discussing the diagnosis and importance of compliance with the treatment plan as well as documenting on the day of the visit.     Jhoan Sosa NP, APRN - CNP

## 2022-07-18 ENCOUNTER — CARE COORDINATION (OUTPATIENT)
Dept: CARE COORDINATION | Facility: CLINIC | Age: 82
End: 2022-07-18

## 2022-07-18 NOTE — CARE COORDINATION
Ambulatory Care Coordination Note  7/18/2022    ACC: Urbano Shipley, RN    Summary Note: ccm outreached to patient. Patient states she went to pcp appointment on 7/12/22. Patient states she was started on an abt/skin tear infection. Patient states new dressing was applied and she has been changing dressing as needed. Patient states no questions or concerns. [facility] discussed that I would outreach next week. Patient agreeable. Lab Results       None                 Goals Addressed                   This Visit's Progress     Conditions and Symptoms   On track     I will schedule office visits, as directed by my provider. I will notify my provider of any symptoms that indicate a worsening of my condition. Barriers: none  Plan for overcoming my barriers: N/A  Confidence: 9/10  Anticipated Goal Completion Date: 8/6/22                Prior to Admission medications    Medication Sig Start Date End Date Taking? Authorizing Provider   cephALEXin (KEFLEX) 500 MG capsule Take 1 capsule by mouth 2 times daily for 7 days 7/12/22 7/19/22  CARLINE Bhakta CNP   silver sulfADIAZINE (SILVADENE) 1 % cream Apply topically daily.  7/12/22   CARLINE Bhakta CNP   aspirin 81 MG EC tablet Take 81 mg by mouth    Ar Automatic Reconciliation   Cetirizine HCl 10 MG CAPS Take 10 mg by mouth daily    Ar Automatic Reconciliation   vitamin D 25 MCG (1000 UT) CAPS Take 1,000 Units by mouth every evening    Ar Automatic Reconciliation   estradiol (ESTRACE) 0.5 MG tablet TAKE ONE TABLET BY MOUTH ONE TIME DAILY 3/12/22   Ar Automatic Reconciliation   famotidine (PEPCID) 20 MG tablet TAKE ONE TABLET BY MOUTH EVERY NIGHT 3/12/22   Ar Automatic Reconciliation   furosemide (LASIX) 40 MG tablet TAKE ONE TABLET BY MOUTH ONE TIME DAILY IN THE MORNING 3/12/22   Ar Automatic Reconciliation   gabapentin (NEURONTIN) 100 MG capsule Take 100 mg by mouth. 11/16/21   Ar Automatic Reconciliation   ipratropium-albuterol (Maria L Oneil) 0.5-2.5 (3) MG/3ML SOLN nebulizer solution Inhale 3 mLs into the lungs every 6 hours as needed 5/5/21   Ar Automatic Reconciliation   levothyroxine (SYNTHROID) 150 MCG tablet TAKE 1 TABLET BY MOUTH DAILY BEFORE BREAKFAST 3/10/21   Ar Automatic Reconciliation   losartan (COZAAR) 50 MG tablet TAKE ONE TABLET BY MOUTH ONE TIME DAILY 4/6/22   Ar Automatic Reconciliation   nitroGLYCERIN (NITROSTAT) 0.4 MG SL tablet Place 0.4 mg under the tongue 6/17/20   Ar Automatic Reconciliation   omeprazole (PRILOSEC) 40 MG delayed release capsule TAKE ONE CAPSULE BY MOUTH ONE TIME DAILY 3/31/22   Ar Automatic Reconciliation   predniSONE (DELTASONE) 5 MG tablet Take 5 mg by mouth 2 times daily 11/16/21   Ar Automatic Reconciliation       Future Appointments   Date Time Provider Tuan Singleton   7/19/2022  1:30 PM Philly Thompson MD [facility] AMB   7/20/2022  2:00 PM Corey Kelly MD [facility] AMB

## 2022-07-19 ENCOUNTER — OFFICE VISIT (OUTPATIENT)
Dept: CARDIOLOGY CLINIC | Age: 82
End: 2022-07-19
Payer: MEDICARE

## 2022-07-19 VITALS
SYSTOLIC BLOOD PRESSURE: 138 MMHG | DIASTOLIC BLOOD PRESSURE: 60 MMHG | BODY MASS INDEX: 27.74 KG/M2 | HEART RATE: 96 BPM | HEIGHT: 64 IN | WEIGHT: 162.5 LBS

## 2022-07-19 DIAGNOSIS — I10 ESSENTIAL HYPERTENSION: ICD-10-CM

## 2022-07-19 DIAGNOSIS — E78.2 MIXED HYPERLIPIDEMIA: ICD-10-CM

## 2022-07-19 DIAGNOSIS — I25.119 ATHEROSCLEROSIS OF NATIVE CORONARY ARTERY OF NATIVE HEART WITH ANGINA PECTORIS (HCC): Primary | ICD-10-CM

## 2022-07-19 PROCEDURE — 1123F ACP DISCUSS/DSCN MKR DOCD: CPT | Performed by: INTERNAL MEDICINE

## 2022-07-19 PROCEDURE — 4004F PT TOBACCO SCREEN RCVD TLK: CPT | Performed by: INTERNAL MEDICINE

## 2022-07-19 PROCEDURE — 99214 OFFICE O/P EST MOD 30 MIN: CPT | Performed by: INTERNAL MEDICINE

## 2022-07-19 PROCEDURE — 1090F PRES/ABSN URINE INCON ASSESS: CPT | Performed by: INTERNAL MEDICINE

## 2022-07-19 PROCEDURE — G8428 CUR MEDS NOT DOCUMENT: HCPCS | Performed by: INTERNAL MEDICINE

## 2022-07-19 PROCEDURE — G8417 CALC BMI ABV UP PARAM F/U: HCPCS | Performed by: INTERNAL MEDICINE

## 2022-07-19 PROCEDURE — G8400 PT W/DXA NO RESULTS DOC: HCPCS | Performed by: INTERNAL MEDICINE

## 2022-07-19 NOTE — PROGRESS NOTES
[facility] CARDIOLOGY  [address], [address] [facility], [address] [place] [address]  PHONE: [phone]      22    NAME:  Keila Rahman  : 1940  MRN: [id number]       SUBJECTIVE:   Keila Rahman is a 79 y.o. female seen for a follow up visit regarding the following:     Chief Complaint   Patient presents with    Coronary Artery Disease         HPI:    No cp or johansen. No orthopnea or pnd. No palpitations or syncope. Has had falls- no loc- right leg is weak at times. Past Medical History, Past Surgical History, Family history, Social History, and Medications were all reviewed with the patient today and updated as necessary. Current Outpatient Medications   Medication Sig Dispense Refill    cephALEXin (KEFLEX) 500 MG capsule Take 1 capsule by mouth 2 times daily for 7 days 14 capsule 0    aspirin 81 MG EC tablet Take 81 mg by mouth      Cetirizine HCl 10 MG CAPS Take 10 mg by mouth daily      vitamin D 25 MCG (1000 UT) CAPS Take 1,000 Units by mouth every evening      estradiol (ESTRACE) 0.5 MG tablet TAKE ONE TABLET BY MOUTH ONE TIME DAILY      famotidine (PEPCID) 20 MG tablet TAKE ONE TABLET BY MOUTH EVERY NIGHT      furosemide (LASIX) 40 MG tablet TAKE ONE TABLET BY MOUTH ONE TIME DAILY IN THE MORNING      gabapentin (NEURONTIN) 100 MG capsule Take 100 mg by mouth. ipratropium-albuterol (DUONEB) 0.5-2.5 (3) MG/3ML SOLN nebulizer solution Inhale 3 mLs into the lungs every 6 hours as needed      levothyroxine (SYNTHROID) 150 MCG tablet TAKE 1 TABLET BY MOUTH DAILY BEFORE BREAKFAST      losartan (COZAAR) 50 MG tablet TAKE ONE TABLET BY MOUTH ONE TIME DAILY      nitroGLYCERIN (NITROSTAT) 0.4 MG SL tablet Place 0.4 mg under the tongue      omeprazole (PRILOSEC) 40 MG delayed release capsule TAKE ONE CAPSULE BY MOUTH ONE TIME DAILY      silver sulfADIAZINE (SILVADENE) 1 % cream Apply topically daily.  400 g 0    predniSONE (DELTASONE) 5 MG tablet Take 5 mg by mouth 2 times daily       No current facility-administered medications for this visit. Social History     Tobacco Use    Smoking status: Every Day     Packs/day: 1.00     Types: Cigarettes    Smokeless tobacco: Never    Tobacco comments:     Quit smoking: would like to quit but don't think that she can.  11/28/17   Substance Use Topics    Alcohol use: No              PHYSICAL EXAM:   /60   Pulse 96   Ht 5' 4\" (1.626 m)   Wt 162 lb 8 oz (73.7 kg)   BMI 27.89 kg/m²    Constitutional: Oriented to person, place, and time. Appears well-developed and well-nourished. Head: Normocephalic and atraumatic. Neck: Neck supple. Cardiovascular: Normal rate and regular rhythm with no murmur -No JVP  Pulmonary/Chest: Breath sounds normal.   Abdominal: Soft. Musculoskeletal: No edema. Neurological: Alert and oriented to person, place, and time. Skin: Skin is warm and dry. Psychiatric: Normal mood and affect. Vitals reviewed. Wt Readings from Last 3 Encounters:   07/19/22 162 lb 8 oz (73.7 kg)   07/12/22 159 lb 6.4 oz (72.3 kg)   07/08/22 155 lb (70.3 kg)       Medical problems and test results were reviewed with the patient today. ASSESSMENT and PLAN    1. Atherosclerosis of native coronary artery of native heart with angina pectoris (HCC)  Stable. Continue asa      2. Essential hypertension  Stable. Continue cozaar      3. Mixed hyperlipidemia  Stable. Continue meds         Return in about 6 months (around 1/19/2023).          STEVAN DANG MD  7/19/2022  1:23 PM

## 2022-07-20 ENCOUNTER — OFFICE VISIT (OUTPATIENT)
Dept: INTERNAL MEDICINE CLINIC | Facility: CLINIC | Age: 82
End: 2022-07-20
Payer: MEDICARE

## 2022-07-20 VITALS
HEIGHT: 64 IN | WEIGHT: 160.8 LBS | DIASTOLIC BLOOD PRESSURE: 70 MMHG | HEART RATE: 96 BPM | SYSTOLIC BLOOD PRESSURE: 130 MMHG | BODY MASS INDEX: 27.45 KG/M2

## 2022-07-20 DIAGNOSIS — R29.898 WEAKNESS OF RIGHT LEG: ICD-10-CM
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
## 2022-07-20 DIAGNOSIS — J44.9 CHRONIC OBSTRUCTIVE PULMONARY DISEASE, UNSPECIFIED COPD TYPE (HCC): Primary | ICD-10-CM

## 2022-07-20 DIAGNOSIS — E03.9 ACQUIRED HYPOTHYROIDISM: ICD-10-CM

## 2022-07-20 DIAGNOSIS — R60.0 FLUID RETENTION IN LEGS: ICD-10-CM

## 2022-07-20 DIAGNOSIS — I10 ESSENTIAL HYPERTENSION: ICD-10-CM

## 2022-07-20 PROCEDURE — G8400 PT W/DXA NO RESULTS DOC: HCPCS | Performed by: INTERNAL MEDICINE

## 2022-07-20 PROCEDURE — 99214 OFFICE O/P EST MOD 30 MIN: CPT | Performed by: INTERNAL MEDICINE

## 2022-07-20 PROCEDURE — 3023F SPIROM DOC REV: CPT | Performed by: INTERNAL MEDICINE

## 2022-07-20 PROCEDURE — G8417 CALC BMI ABV UP PARAM F/U: HCPCS | Performed by: INTERNAL MEDICINE

## 2022-07-20 PROCEDURE — G8427 DOCREV CUR MEDS BY ELIG CLIN: HCPCS | Performed by: INTERNAL MEDICINE

## 2022-07-20 PROCEDURE — 1090F PRES/ABSN URINE INCON ASSESS: CPT | Performed by: INTERNAL MEDICINE

## 2022-07-20 PROCEDURE — 4004F PT TOBACCO SCREEN RCVD TLK: CPT | Performed by: INTERNAL MEDICINE

## 2022-07-20 PROCEDURE — 1123F ACP DISCUSS/DSCN MKR DOCD: CPT | Performed by: INTERNAL MEDICINE

## 2022-07-20 RX ORDER — FUROSEMIDE 20 MG/1
TABLET ORAL
Qty: 30 TABLET | Refills: 3 | Status: SHIPPED | OUTPATIENT
Start: 2022-07-20

## 2022-07-20 RX ORDER — CEPHALEXIN 500 MG/1
500 CAPSULE ORAL 3 TIMES DAILY
Qty: 30 CAPSULE | Refills: 0 | Status: SHIPPED | OUTPATIENT
Start: 2022-07-20 | End: 2022-07-30

## 2022-07-20 ASSESSMENT — ENCOUNTER SYMPTOMS: SHORTNESS OF BREATH: 1

## 2022-07-20 NOTE — PROGRESS NOTES
HPI: Mane Lopez (: 1940)    Having more fluid retention in both legs    Had a fall and still with skin tear on her right arm, left leg dime size area and right upper thigh with skin tear  Has been on Keflex and would like to stay on it a little longer until skin heals more    Having more weakness in her right leg so fell when trying to get in the car and had bruising and skin tear        Problem List:  Patient Active Problem List   Diagnosis    Atherosclerosis of native coronary artery of native heart with angina pectoris (HCC)    Palpitations    Esophageal reflux    Murmur    Dysesthesia    IBS (irritable bowel syndrome)    Insomnia, unspecified    Gastritis    ASCVD (arteriosclerotic cardiovascular disease)    Chronic left shoulder pain    Leg cramps    Spinal stenosis of lumbar region with neurogenic claudication    Ischiorectal abscess    Rotator cuff tear, left    Varicose veins of both legs with edema    Fatigue    Stage 3 chronic kidney disease ([address]    Essential hypertension    COPD (chronic obstructive pulmonary disease) ([address]    Vitamin D deficiency    Osteoporosis, post-menopausal    Chronic bilateral low back pain with bilateral sciatica    Chronic chest wall pain    Nephrolithiasis    Mixed hyperlipidemia    PMR (polymyalgia rheumatica) ([address]    Difficulty swallowing    Hypothyroidism    Fluid retention in legs    Weakness of right leg       History:  Past Medical History:   Diagnosis Date    Adverse effect of anesthesia     hypotension with anesthesia    Back pain     lower back    CAD (coronary artery disease) 2014    Followed by [address] [place] [address].     COPD (chronic obstructive pulmonary disease) [facility]     managed with PRN inhaler and nebulizers    COVID-19 vaccine series completed     Pfizer vaccine completed    Difficulty swallowing 2013    pt denies    GERD (gastroesophageal reflux disease)     managed with Prilosec    H/O bone density study 08/15/2017    osteoporosis TIME DAILY      famotidine (PEPCID) 20 MG tablet TAKE ONE TABLET BY MOUTH EVERY NIGHT      furosemide (LASIX) 40 MG tablet TAKE ONE TABLET BY MOUTH ONE TIME DAILY IN THE MORNING      gabapentin (NEURONTIN) 100 MG capsule Take 100 mg by mouth. ipratropium-albuterol (DUONEB) 0.5-2.5 (3) MG/3ML SOLN nebulizer solution Inhale 3 mLs into the lungs every 6 hours as needed      levothyroxine (SYNTHROID) 150 MCG tablet TAKE 1 TABLET BY MOUTH DAILY BEFORE BREAKFAST      losartan (COZAAR) 50 MG tablet TAKE ONE TABLET BY MOUTH ONE TIME DAILY      nitroGLYCERIN (NITROSTAT) 0.4 MG SL tablet Place 0.4 mg under the tongue      omeprazole (PRILOSEC) 40 MG delayed release capsule TAKE ONE CAPSULE BY MOUTH ONE TIME DAILY      predniSONE (DELTASONE) 5 MG tablet Take 5 mg by mouth 2 times daily       No current facility-administered medications for this visit. Review of Systems:  Review of Systems   Constitutional:  Negative for unexpected weight change. Respiratory:  Positive for shortness of breath. Cardiovascular:  Positive for leg swelling. Skin:  Positive for wound. Leg upper on right, left upper arm and left lower leg   Hematological:  Bruises/bleeds easily. All other systems reviewed and are negative. Vitals:  /70   Pulse 96   Ht 5' 4\" (1.626 m)   Wt 160 lb 12.8 oz (72.9 kg)   BMI 27.60 kg/m²     Physical Exam:  Physical Exam  Vitals reviewed. Constitutional:       Appearance: Normal appearance. HENT:      Head: Normocephalic and atraumatic. Cardiovascular:      Rate and Rhythm: Normal rate and regular rhythm. Heart sounds: Normal heart sounds. Pulmonary:      Effort: Pulmonary effort is normal.      Breath sounds: Normal breath sounds. Musculoskeletal:      Cervical back: Normal range of motion and neck supple. Skin:     General: Skin is warm and dry. Findings: Bruising and lesion present.       Comments: Dime size area on LL leg with redness and open small wound  Left upper arm with skin tear  Right upper thigh with skin tear   Neurological:      Mental Status: She is alert and oriented to person, place, and time. Motor: Weakness present. Gait: Gait abnormal.   Psychiatric:         Mood and Affect: Mood normal.         Behavior: Behavior normal.         Thought Content: Thought content normal.         Judgment: Judgment normal.        Assessment/Plan:   Sunshine Schmitz was seen today for follow-up. Diagnoses and all orders for this visit:    Chronic obstructive pulmonary disease, unspecified COPD type ([address]  Has Duo Neb  Acquired hypothyroidism  Continue meds  Essential hypertension  BP is controlled  Weakness of right leg  Doing better since her back surgery but still needs therapy and need to use cane  Fluid retention in legs  Increase Lasix to 40mg alt with 60mg every other day  Fall, sequela  Still with skin tear and need to be cleansed and use Silver gel and then use Tegaderm and change every 2 days  Need to use cane or rollator  Wounds cleansed in office and silver gel and tegaderm applied  RF on Keflex for 10 days  Other orders  -     furosemide (LASIX) 20 MG tablet; One po every other day for fluid retention          Current medications are therapeutic at this time; continue as prescribed.         Jo Ann Lombardo MD

## 2022-07-25 ENCOUNTER — CARE COORDINATION (OUTPATIENT)
Dept: CARE COORDINATION | Facility: CLINIC | Age: 82
End: 2022-07-25

## 2022-07-25 NOTE — CARE COORDINATION
Ambulatory Care Coordination Note  7/25/2022    ACC: Marlene Ramirez, RN    Summary Note: ccm outreached to patient. Patient states she is feeling better. Patient did have appointment with pcp on 7/20/22. Patient states she was put on abt therapy and increased lasix. Patient states swelling and SOB has improved. Patient states arm is healing well. Patient is changing dressings. Patient states no questions or concerns. [facility] discussed that I would outreach next week. Patient agreeable. Lab Results       None                 Goals Addressed                   This Visit's Progress     Conditions and Symptoms   On track     I will schedule office visits, as directed by my provider. I will notify my provider of any symptoms that indicate a worsening of my condition. Barriers: none  Plan for overcoming my barriers: N/A  Confidence: 9/10  Anticipated Goal Completion Date: 8/6/22                Prior to Admission medications    Medication Sig Start Date End Date Taking? Authorizing Provider   furosemide (LASIX) 20 MG tablet One po every other day for fluid retention 7/20/22   Moraima Barrios MD   cephALEXin (KEFLEX) 500 MG capsule Take 1 capsule by mouth in the morning and 1 capsule at noon and 1 capsule before bedtime. Do all this for 10 days. 7/20/22 7/30/22  Moraima Barrios MD   silver sulfADIAZINE (SILVADENE) 1 % cream Apply topically daily.  7/12/22   CARLINE Clinton - CNP   aspirin 81 MG EC tablet Take 81 mg by mouth    Ar Automatic Reconciliation   Cetirizine HCl 10 MG CAPS Take 10 mg by mouth daily    Ar Automatic Reconciliation   vitamin D 25 MCG (1000 UT) CAPS Take 1,000 Units by mouth every evening    Ar Automatic Reconciliation   estradiol (ESTRACE) 0.5 MG tablet TAKE ONE TABLET BY MOUTH ONE TIME DAILY 3/12/22   Ar Automatic Reconciliation   famotidine (PEPCID) 20 MG tablet TAKE ONE TABLET BY MOUTH EVERY NIGHT 3/12/22   Ar Automatic Reconciliation   furosemide (LASIX) 40 MG tablet TAKE ONE TABLET BY MOUTH ONE TIME DAILY IN THE MORNING 3/12/22   Ar Automatic Reconciliation   gabapentin (NEURONTIN) 100 MG capsule Take 100 mg by mouth. 11/16/21   Ar Automatic Reconciliation   ipratropium-albuterol (DUONEB) 0.5-2.5 (3) MG/3ML SOLN nebulizer solution Inhale 3 mLs into the lungs every 6 hours as needed 5/5/21   Ar Automatic Reconciliation   levothyroxine (SYNTHROID) 150 MCG tablet TAKE 1 TABLET BY MOUTH DAILY BEFORE BREAKFAST 3/10/21   Ar Automatic Reconciliation   losartan (COZAAR) 50 MG tablet TAKE ONE TABLET BY MOUTH ONE TIME DAILY 4/6/22   Ar Automatic Reconciliation   nitroGLYCERIN (NITROSTAT) 0.4 MG SL tablet Place 0.4 mg under the tongue 6/17/20   Ar Automatic Reconciliation   omeprazole (PRILOSEC) 40 MG delayed release capsule TAKE ONE CAPSULE BY MOUTH ONE TIME DAILY 3/31/22   Ar Automatic Reconciliation   predniSONE (DELTASONE) 5 MG tablet Take 5 mg by mouth 2 times daily 11/16/21   Ar Automatic Reconciliation       Future Appointments   Date Time Provider Tuan Singleton   11/23/2022  2:20 PM Samantha Jeff MD [facility]   1/25/2023  1:15 PM [place]hela [place]oldan [place] [facility] [place] [facility] [place] [place]

## 2022-08-01 ENCOUNTER — CARE COORDINATION (OUTPATIENT)
Dept: CARE COORDINATION | Facility: CLINIC | Age: 82
End: 2022-08-01

## 2022-08-01 NOTE — CARE COORDINATION
Ambulatory Care Coordination Note  8/1/2022    ACC: Abdelrahman Greenwood RN    Summary Note: ccm outreached to patient. Patient states she is staying with her son. Patient states SOB has improved. Patient states she continues abt therapy with no issues. Discussed with patient to ensure she is eating and drinking with medication. Patient verbalized understanding. Patient states no questions or concerns. Ccm discussed that I would outreach in 2 weeks. Patient agreeable. Lab Results       None                 Goals Addressed                   This Visit's Progress     Conditions and Symptoms   On track     I will schedule office visits, as directed by my provider. I will notify my provider of any symptoms that indicate a worsening of my condition. Barriers: none  Plan for overcoming my barriers: N/A  Confidence: 9/10  Anticipated Goal Completion Date: 8/6/22                Prior to Admission medications    Medication Sig Start Date End Date Taking? Authorizing Provider   furosemide (LASIX) 20 MG tablet One po every other day for fluid retention 7/20/22   Joe Graham MD   silver sulfADIAZINE (SILVADENE) 1 % cream Apply topically daily.  7/12/22   CARLINE Mensah - CNP   aspirin 81 MG EC tablet Take 81 mg by mouth    Ar Automatic Reconciliation   Cetirizine HCl 10 MG CAPS Take 10 mg by mouth daily    Ar Automatic Reconciliation   vitamin D 25 MCG (1000 UT) CAPS Take 1,000 Units by mouth every evening    Ar Automatic Reconciliation   estradiol (ESTRACE) 0.5 MG tablet TAKE ONE TABLET BY MOUTH ONE TIME DAILY 3/12/22   Ar Automatic Reconciliation   famotidine (PEPCID) 20 MG tablet TAKE ONE TABLET BY MOUTH EVERY NIGHT 3/12/22   Ar Automatic Reconciliation   furosemide (LASIX) 40 MG tablet TAKE ONE TABLET BY MOUTH ONE TIME DAILY IN THE MORNING 3/12/22   Ar Automatic Reconciliation   gabapentin (NEURONTIN) 100 MG capsule Take 100 mg by mouth. 11/16/21   Ar Automatic Reconciliation   ipratropium-albuterol (DUONEB) 0.5-2.5 (3) MG/3ML SOLN nebulizer solution Inhale 3 mLs into the lungs every 6 hours as needed 5/5/21   Ar Automatic Reconciliation   levothyroxine (SYNTHROID) 150 MCG tablet TAKE 1 TABLET BY MOUTH DAILY BEFORE BREAKFAST 3/10/21   Ar Automatic Reconciliation   losartan (COZAAR) 50 MG tablet TAKE ONE TABLET BY MOUTH ONE TIME DAILY 4/6/22   Ar Automatic Reconciliation   nitroGLYCERIN (NITROSTAT) 0.4 MG SL tablet Place 0.4 mg under the tongue 6/17/20   Ar Automatic Reconciliation   omeprazole (PRILOSEC) 40 MG delayed release capsule TAKE ONE CAPSULE BY MOUTH ONE TIME DAILY 3/31/22   Ar Automatic Reconciliation   predniSONE (DELTASONE) 5 MG tablet Take 5 mg by mouth 2 times daily 11/16/21   Ar Automatic Reconciliation       Future Appointments   Date Time Provider Tuan Singleton   11/23/2022  2:20 PM Latonia Jama MD [facility]   1/25/2023  1:15 PM Carmen Mckeon [place] [facility] [place] [facility] [place] [place]

## 2022-08-15 ENCOUNTER — CARE COORDINATION (OUTPATIENT)
Dept: CARE COORDINATION | Facility: CLINIC | Age: 82
End: 2022-08-15

## 2022-08-15 NOTE — CARE COORDINATION
Ambulatory Care Coordination Note  8/15/2022    ACC: Marlene Ramirez, RN    Summary Note: ccm outreached to patient. Patient states she is feeling better. Patient states swelling to BLE has decreased some. Patient is taking extra diuretic and that seems to be helping much better. Patient states no questions or concerns. [facility] discussed that I would outreach next week. Patient agreeable. Lab Results       None                 Goals Addressed                   This Visit's Progress     Conditions and Symptoms   On track     I will schedule office visits, as directed by my provider. I will notify my provider of any symptoms that indicate a worsening of my condition. Barriers: none  Plan for overcoming my barriers: N/A  Confidence: 9/10  Anticipated Goal Completion Date: 8/6/22                Prior to Admission medications    Medication Sig Start Date End Date Taking? Authorizing Provider   furosemide (LASIX) 20 MG tablet One po every other day for fluid retention 7/20/22   Moraima Barrios MD   silver sulfADIAZINE (SILVADENE) 1 % cream Apply topically daily.  7/12/22   CARLINE Clinton - CNP   aspirin 81 MG EC tablet Take 81 mg by mouth    Ar Automatic Reconciliation   Cetirizine HCl 10 MG CAPS Take 10 mg by mouth daily    Ar Automatic Reconciliation   vitamin D 25 MCG (1000 UT) CAPS Take 1,000 Units by mouth every evening    Ar Automatic Reconciliation   estradiol (ESTRACE) 0.5 MG tablet TAKE ONE TABLET BY MOUTH ONE TIME DAILY 3/12/22   Ar Automatic Reconciliation   famotidine (PEPCID) 20 MG tablet TAKE ONE TABLET BY MOUTH EVERY NIGHT 3/12/22   Ar Automatic Reconciliation   furosemide (LASIX) 40 MG tablet TAKE ONE TABLET BY MOUTH ONE TIME DAILY IN THE MORNING 3/12/22   Ar Automatic Reconciliation   gabapentin (NEURONTIN) 100 MG capsule Take 100 mg by mouth. 11/16/21   Ar Automatic Reconciliation   ipratropium-albuterol (DUONEB) 0.5-2.5 (3) MG/3ML SOLN nebulizer solution Inhale 3 mLs into the lungs every 6 hours as needed 5/5/21   Ar Automatic Reconciliation   levothyroxine (SYNTHROID) 150 MCG tablet TAKE 1 TABLET BY MOUTH DAILY BEFORE BREAKFAST 3/10/21   Ar Automatic Reconciliation   losartan (COZAAR) 50 MG tablet TAKE ONE TABLET BY MOUTH ONE TIME DAILY 4/6/22   Ar Automatic Reconciliation   nitroGLYCERIN (NITROSTAT) 0.4 MG SL tablet Place 0.4 mg under the tongue 6/17/20   Ar Automatic Reconciliation   omeprazole (PRILOSEC) 40 MG delayed release capsule TAKE ONE CAPSULE BY MOUTH ONE TIME DAILY 3/31/22   Ar Automatic Reconciliation   predniSONE (DELTASONE) 5 MG tablet Take 5 mg by mouth 2 times daily 11/16/21   Ar Automatic Reconciliation       Future Appointments   Date Time Provider Tuan Singleton   11/23/2022  2:20 PM Kelsie Blum MD [facility] AMB   1/25/2023  1:15 PM Whit Goel MD [facility] [place] [facility]

## 2022-08-22 ENCOUNTER — CARE COORDINATION (OUTPATIENT)
Dept: CARE COORDINATION | Facility: CLINIC | Age: 82
End: 2022-08-22

## 2022-08-22 NOTE — CARE COORDINATION
Ambulatory Care Management Note        Date/Time:  8/22/2022 8:23 AM    Ccm outreached to patient. No answer at this time, message left. Awaiting response. If no response, ccm will outreach next week.

## 2022-08-29 ENCOUNTER — CARE COORDINATION (OUTPATIENT)
Dept: CARE COORDINATION | Facility: CLINIC | Age: 82
End: 2022-08-29

## 2022-08-29 NOTE — CARE COORDINATION
Reconciliation   levothyroxine (SYNTHROID) 150 MCG tablet TAKE 1 TABLET BY MOUTH DAILY BEFORE BREAKFAST 3/10/21   Ar Automatic Reconciliation   losartan (COZAAR) 50 MG tablet TAKE ONE TABLET BY MOUTH ONE TIME DAILY 4/6/22   Ar Automatic Reconciliation   nitroGLYCERIN (NITROSTAT) 0.4 MG SL tablet Place 0.4 mg under the tongue 6/17/20   Ar Automatic Reconciliation   omeprazole (PRILOSEC) 40 MG delayed release capsule TAKE ONE CAPSULE BY MOUTH ONE TIME DAILY 3/31/22   Ar Automatic Reconciliation   predniSONE (DELTASONE) 5 MG tablet Take 5 mg by mouth 2 times daily 11/16/21   Ar Automatic Reconciliation       Future Appointments   Date Time Provider Tuan Velozisti   11/23/2022  2:20 PM Rusty Wood MD [facility] AM[facility]   1/25/2023  1:15 PM MD [facility]

## 2022-09-12 ENCOUNTER — CARE COORDINATION (OUTPATIENT)
Dept: CARE COORDINATION | Facility: CLINIC | Age: 82
End: 2022-09-12

## 2022-09-12 NOTE — CARE COORDINATION
Ambulatory Care Coordination Note  9/12/2022    ACC: Marlene Ramirez, RN    Summary Note: ccm outreached to patient. Patient states her swelling to ble has been going down. Patient states ble have been weeping and have some red areas. Ccm left message with pcp office to notify of symptoms and have someone call patient. Discussed with patient to elevate legs as much as possible. Patient verbalized understanding. Patient states no questions or concerns. Ccm discussed that I would outreach next week. Patient agreeable. Lab Results       None                 Goals Addressed                   This Visit's Progress     Conditions and Symptoms   On track     I will schedule office visits, as directed by my provider. I will notify my provider of any symptoms that indicate a worsening of my condition. Barriers: none  Plan for overcoming my barriers: N/A  Confidence: 9/10  Anticipated Goal Completion Date: 8/6/22                Prior to Admission medications    Medication Sig Start Date End Date Taking? Authorizing Provider   furosemide (LASIX) 20 MG tablet One po every other day for fluid retention 7/20/22   Moraima Barrios MD   silver sulfADIAZINE (SILVADENE) 1 % cream Apply topically daily.  7/12/22   CARLINE Clinton - CNP   aspirin 81 MG EC tablet Take 81 mg by mouth    Ar Automatic Reconciliation   Cetirizine HCl 10 MG CAPS Take 10 mg by mouth daily    Ar Automatic Reconciliation   vitamin D 25 MCG (1000 UT) CAPS Take 1,000 Units by mouth every evening    Ar Automatic Reconciliation   estradiol (ESTRACE) 0.5 MG tablet TAKE ONE TABLET BY MOUTH ONE TIME DAILY 3/12/22   Ar Automatic Reconciliation   famotidine (PEPCID) 20 MG tablet TAKE ONE TABLET BY MOUTH EVERY NIGHT 3/12/22   Ar Automatic Reconciliation   furosemide (LASIX) 40 MG tablet TAKE ONE TABLET BY MOUTH ONE TIME DAILY IN THE MORNING 3/12/22   Ar Automatic Reconciliation   gabapentin (NEURONTIN) 100 MG capsule Take 100 mg by mouth. 11/16/21 Ar Automatic Reconciliation   ipratropium-albuterol (DUONEB) 0.5-2.5 (3) MG/3ML SOLN nebulizer solution Inhale 3 mLs into the lungs every 6 hours as needed 5/5/21   Ar Automatic Reconciliation   levothyroxine (SYNTHROID) 150 MCG tablet TAKE 1 TABLET BY MOUTH DAILY BEFORE BREAKFAST 3/10/21   Ar Automatic Reconciliation   losartan (COZAAR) 50 MG tablet TAKE ONE TABLET BY MOUTH ONE TIME DAILY 4/6/22   Ar Automatic Reconciliation   nitroGLYCERIN (NITROSTAT) 0.4 MG SL tablet Place 0.4 mg under the tongue 6/17/20   Ar Automatic Reconciliation   omeprazole (PRILOSEC) 40 MG delayed release capsule TAKE ONE CAPSULE BY MOUTH ONE TIME DAILY 3/31/22   Ar Automatic Reconciliation   predniSONE (DELTASONE) 5 MG tablet Take 5 mg by mouth 2 times daily 11/16/21   Ar Automatic Reconciliation       Future Appointments   Date Time Provider Tuan Singleton   11/23/2022  2:20 PM Jo Ann Lombardo MD [facility] AMB   1/25/2023  1:15 PM Gallo Cullen MD [facility] [place] [facility] [place]

## 2022-09-13 ENCOUNTER — OFFICE VISIT (OUTPATIENT)
Dept: INTERNAL MEDICINE CLINIC | Facility: CLINIC | Age: 82
End: 2022-09-13
Payer: MEDICARE

## 2022-09-13 VITALS
HEIGHT: 64 IN | TEMPERATURE: 97 F | OXYGEN SATURATION: 98 % | BODY MASS INDEX: 26.5 KG/M2 | HEART RATE: 88 BPM | DIASTOLIC BLOOD PRESSURE: 74 MMHG | SYSTOLIC BLOOD PRESSURE: 122 MMHG | WEIGHT: 155.2 LBS

## 2022-09-13 DIAGNOSIS — L97.921 ULCER OF LEFT LOWER EXTREMITY, LIMITED TO BREAKDOWN OF SKIN (HCC): Primary | Chronic | ICD-10-CM

## 2022-09-13 DIAGNOSIS — I10 ESSENTIAL (PRIMARY) HYPERTENSION: Chronic | ICD-10-CM

## 2022-09-13 DIAGNOSIS — L97.921 ULCER OF LEFT LOWER EXTREMITY, LIMITED TO BREAKDOWN OF SKIN (HCC): Chronic | ICD-10-CM

## 2022-09-13 DIAGNOSIS — I25.119 ATHEROSCLEROSIS OF NATIVE CORONARY ARTERY OF NATIVE HEART WITH ANGINA PECTORIS (HCC): ICD-10-CM

## 2022-09-13 DIAGNOSIS — N18.32 CHRONIC KIDNEY DISEASE, STAGE 3B (HCC): Chronic | ICD-10-CM

## 2022-09-13 DIAGNOSIS — E03.9 ACQUIRED HYPOTHYROIDISM: ICD-10-CM

## 2022-09-13 DIAGNOSIS — J44.9 CHRONIC OBSTRUCTIVE PULMONARY DISEASE, UNSPECIFIED COPD TYPE (HCC): ICD-10-CM

## 2022-09-13 DIAGNOSIS — R60.0 FLUID RETENTION IN LEGS: Chronic | ICD-10-CM

## 2022-09-13 PROCEDURE — 1123F ACP DISCUSS/DSCN MKR DOCD: CPT | Performed by: NURSE PRACTITIONER

## 2022-09-13 PROCEDURE — G8427 DOCREV CUR MEDS BY ELIG CLIN: HCPCS | Performed by: NURSE PRACTITIONER

## 2022-09-13 PROCEDURE — 1090F PRES/ABSN URINE INCON ASSESS: CPT | Performed by: NURSE PRACTITIONER

## 2022-09-13 PROCEDURE — G8417 CALC BMI ABV UP PARAM F/U: HCPCS | Performed by: NURSE PRACTITIONER

## 2022-09-13 PROCEDURE — G8400 PT W/DXA NO RESULTS DOC: HCPCS | Performed by: NURSE PRACTITIONER

## 2022-09-13 PROCEDURE — 3023F SPIROM DOC REV: CPT | Performed by: NURSE PRACTITIONER

## 2022-09-13 PROCEDURE — 99214 OFFICE O/P EST MOD 30 MIN: CPT | Performed by: NURSE PRACTITIONER

## 2022-09-13 PROCEDURE — 4004F PT TOBACCO SCREEN RCVD TLK: CPT | Performed by: NURSE PRACTITIONER

## 2022-09-13 RX ORDER — PREDNISONE 1 MG/1
TABLET ORAL
Qty: 180 TABLET | Refills: 2 | Status: SHIPPED | OUTPATIENT
Start: 2022-09-13

## 2022-09-13 ASSESSMENT — PATIENT HEALTH QUESTIONNAIRE - PHQ9
SUM OF ALL RESPONSES TO PHQ9 QUESTIONS 1 & 2: 0
1. LITTLE INTEREST OR PLEASURE IN DOING THINGS: 0
SUM OF ALL RESPONSES TO PHQ QUESTIONS 1-9: 0
SUM OF ALL RESPONSES TO PHQ QUESTIONS 1-9: 0
2. FEELING DOWN, DEPRESSED OR HOPELESS: 0
SUM OF ALL RESPONSES TO PHQ QUESTIONS 1-9: 0
SUM OF ALL RESPONSES TO PHQ QUESTIONS 1-9: 0

## 2022-09-13 NOTE — PROGRESS NOTES
applied to two areas of ulcer; non-stick Telfa and cling dressing applied. Assessment/Plan:  1. Ulcer of left lower extremity, limited to breakdown of skin ([address]    2. Fluid retention in legs    3. Chronic kidney disease, stage 3b ([address]    4. Essential (primary) hypertension    5. Chronic obstructive pulmonary disease, unspecified COPD type ([facility]    6. Atherosclerosis of native coronary artery of native heart with angina pectoris ([address]    7. Acquired hypothyroidism      Orders Placed This Encounter   Procedures    Comprehensive Metabolic Panel     Standing Status:   Future     Number of Occurrences:   1     Standing Expiration Date:   9/13/2023    CBC with Auto Differential     Standing Status:   Future     Number of Occurrences:   1     Standing Expiration Date:   9/13/2023    TSH with Reflex     Standing Status:   Future     Number of Occurrences:   1     Standing Expiration Date:   9/13/2023     Labs ordered today - will contact with results. For now, continue current medications. Keep wound clean/dry and bandaged. Elevate legs at rest; avoid salt. Recheck in two days.      Ana Clifford NP, APRN - CNP

## 2022-09-14 LAB
ALBUMIN SERPL-MCNC: 4.3 G/DL (ref 3.2–4.6)
ALBUMIN/GLOB SERPL: 1.6 {RATIO} (ref 1.2–3.5)
ALP SERPL-CCNC: 50 U/L (ref 50–136)
ALT SERPL-CCNC: 20 U/L (ref 12–65)
ANION GAP SERPL CALC-SCNC: 6 MMOL/L (ref 4–13)
AST SERPL-CCNC: 16 U/L (ref 15–37)
BASOPHILS # BLD: 0.1 K/UL (ref 0–0.2)
BASOPHILS NFR BLD: 1 % (ref 0–2)
BILIRUB SERPL-MCNC: 0.4 MG/DL (ref 0.2–1.1)
BUN SERPL-MCNC: 17 MG/DL (ref 8–23)
CALCIUM SERPL-MCNC: 10 MG/DL (ref 8.3–10.4)
CHLORIDE SERPL-SCNC: 101 MMOL/L (ref 101–110)
CO2 SERPL-SCNC: 30 MMOL/L (ref 21–32)
CREAT SERPL-MCNC: 0.8 MG/DL (ref 0.6–1)
DIFFERENTIAL METHOD BLD: ABNORMAL
EOSINOPHIL # BLD: 0 K/UL (ref 0–0.8)
EOSINOPHIL NFR BLD: 0 % (ref 0.5–7.8)
ERYTHROCYTE [DISTWIDTH] IN BLOOD BY AUTOMATED COUNT: 14.3 % (ref 11.9–14.6)
GLOBULIN SER CALC-MCNC: 2.7 G/DL (ref 2.3–3.5)
GLUCOSE SERPL-MCNC: 92 MG/DL (ref 65–100)
HCT VFR BLD AUTO: 43.8 % (ref 35.8–46.3)
HGB BLD-MCNC: 13.3 G/DL (ref 11.7–15.4)
IMM GRANULOCYTES # BLD AUTO: 0.2 K/UL (ref 0–0.5)
IMM GRANULOCYTES NFR BLD AUTO: 1 % (ref 0–5)
LYMPHOCYTES # BLD: 2.3 K/UL (ref 0.5–4.6)
LYMPHOCYTES NFR BLD: 20 % (ref 13–44)
MCH RBC QN AUTO: 30.2 PG (ref 26.1–32.9)
MCHC RBC AUTO-ENTMCNC: 30.4 G/DL (ref 31.4–35)
MCV RBC AUTO: 99.3 FL (ref 79.6–97.8)
MONOCYTES # BLD: 0.7 K/UL (ref 0.1–1.3)
MONOCYTES NFR BLD: 6 % (ref 4–12)
NEUTS SEG # BLD: 8.3 K/UL (ref 1.7–8.2)
NEUTS SEG NFR BLD: 72 % (ref 43–78)
NRBC # BLD: 0 K/UL (ref 0–0.2)
PLATELET # BLD AUTO: 329 K/UL (ref 150–450)
PMV BLD AUTO: 10 FL (ref 9.4–12.3)
POTASSIUM SERPL-SCNC: 4.2 MMOL/L (ref 3.5–5.1)
PROT SERPL-MCNC: 7 G/DL (ref 6.3–8.2)
RBC # BLD AUTO: 4.41 M/UL (ref 4.05–5.2)
SODIUM SERPL-SCNC: 137 MMOL/L (ref 136–145)
TSH W FREE THYROID IF ABNORMAL: 1.61 UIU/ML (ref 0.36–3.74)
WBC # BLD AUTO: 11.5 K/UL (ref 4.3–11.1)

## 2022-09-15 ENCOUNTER — NURSE ONLY (OUTPATIENT)
Dept: INTERNAL MEDICINE CLINIC | Facility: CLINIC | Age: 82
End: 2022-09-15
Payer: MEDICARE

## 2022-09-15 VITALS — DIASTOLIC BLOOD PRESSURE: 70 MMHG | SYSTOLIC BLOOD PRESSURE: 121 MMHG

## 2022-09-15 DIAGNOSIS — L97.921 ULCER OF LEFT LOWER EXTREMITY, LIMITED TO BREAKDOWN OF SKIN (HCC): Primary | ICD-10-CM

## 2022-09-15 PROCEDURE — 99212 OFFICE O/P EST SF 10 MIN: CPT | Performed by: NURSE PRACTITIONER

## 2022-09-15 PROCEDURE — 1123F ACP DISCUSS/DSCN MKR DOCD: CPT | Performed by: NURSE PRACTITIONER

## 2022-09-15 RX ORDER — CEPHALEXIN 500 MG/1
500 CAPSULE ORAL 4 TIMES DAILY
Qty: 28 CAPSULE | Refills: 0 | Status: SHIPPED | OUTPATIENT
Start: 2022-09-15 | End: 2022-09-22

## 2022-09-15 NOTE — PROGRESS NOTES
Oren Kenney (: 1940) for wound check of LLE leg ulceration. She was seen two days ago and dressing was applied; Xeroform with gauze. She states it continues to ooze some. Afebrile. Labs were drawn; WBC 11.5 with neutrophils 72. Chief Complaint   Patient presents with    Wound Check     /70     Exam:  Extremities: chronic venous stasis changes; left lower posterior leg with 3mm ulcerations [id number]. No drainage or warmth. + erythema - mild improvement; no necrosis    Lab Results   Component Value Date    WBC 11.5 (H) 2022    HGB 13.3 2022    HCT 43.8 2022    MCV 99.3 (H) 2022     2022     Sue Sands was seen today for wound check. Diagnoses and all orders for this visit:    Ulcer of left lower extremity, limited to breakdown of skin (HCC)  -     cephALEXin (KEFLEX) 500 MG capsule; Take 1 capsule by mouth 4 times daily for 7 days    Chronic mild elevation of CBC, yet will empirically treat with oral Keflex. Continue Xeroform, gauze. Keep clean/dry/covered. Wound check Monday, 2022.     Sheila Denny NP, APRN - CNP

## 2022-09-19 ENCOUNTER — CARE COORDINATION (OUTPATIENT)
Dept: CARE COORDINATION | Facility: CLINIC | Age: 82
End: 2022-09-19

## 2022-09-19 ENCOUNTER — NURSE ONLY (OUTPATIENT)
Dept: INTERNAL MEDICINE CLINIC | Facility: CLINIC | Age: 82
End: 2022-09-19
Payer: MEDICARE

## 2022-09-19 DIAGNOSIS — L97.821 VENOUS STASIS ULCER OF OTHER PART OF LEFT LOWER LEG LIMITED TO BREAKDOWN OF SKIN WITHOUT VARICOSE VEINS (HCC): Primary | ICD-10-CM

## 2022-09-19 DIAGNOSIS — I87.2 VENOUS STASIS ULCER OF OTHER PART OF LEFT LOWER LEG LIMITED TO BREAKDOWN OF SKIN WITHOUT VARICOSE VEINS (HCC): Primary | ICD-10-CM

## 2022-09-19 PROCEDURE — 1123F ACP DISCUSS/DSCN MKR DOCD: CPT | Performed by: NURSE PRACTITIONER

## 2022-09-19 PROCEDURE — 99212 OFFICE O/P EST SF 10 MIN: CPT | Performed by: NURSE PRACTITIONER

## 2022-09-19 NOTE — CARE COORDINATION
Ambulatory Care Coordination Note  9/19/2022    ACC: Ced Sanchez, LEANDRO    Summary Note: ccm outreached to patient. Patient states she was seen by pcp on 9/13/22 for ble swelling and skin breakdown. Pcp is performing dressing changes. Patient was also started on abt therapy and is taking as prescribed. Patient states she is eating and drinking well. Patient states no questions or concerns. Ccm discussed that I would outreach next week. Patient agreeable. Lab Results       None                 Goals Addressed                   This Visit's Progress     Conditions and Symptoms   On track     I will schedule office visits, as directed by my provider. I will notify my provider of any symptoms that indicate a worsening of my condition. Barriers: none  Plan for overcoming my barriers: N/A  Confidence: 9/10  Anticipated Goal Completion Date: 8/6/22                Prior to Admission medications    Medication Sig Start Date End Date Taking? Authorizing Provider   cephALEXin (KEFLEX) 500 MG capsule Take 1 capsule by mouth 4 times daily for 7 days 9/15/22 9/22/22  CARLINE Triplett CNP   predniSONE (DELTASONE) 5 MG tablet TAKE ONE TABLET BY MOUTH TWICE A DAY 9/13/22   Latonia Jama MD   furosemide (LASIX) 20 MG tablet One po every other day for fluid retention 7/20/22   Latonia Jama MD   silver sulfADIAZINE (SILVADENE) 1 % cream Apply topically daily.  7/12/22   CARLINE Triplett CNP   aspirin 81 MG EC tablet Take 81 mg by mouth    Ar Automatic Reconciliation   Cetirizine HCl 10 MG CAPS Take 10 mg by mouth daily    Ar Automatic Reconciliation   vitamin D 25 MCG (1000 UT) CAPS Take 1,000 Units by mouth every evening    Ar Automatic Reconciliation   estradiol (ESTRACE) 0.5 MG tablet TAKE ONE TABLET BY MOUTH ONE TIME DAILY 3/12/22   Ar Automatic Reconciliation   famotidine (PEPCID) 20 MG tablet TAKE ONE TABLET BY MOUTH EVERY NIGHT 3/12/22   Ar Automatic Reconciliation   furosemide (LASIX) 40 MG tablet TAKE ONE TABLET BY MOUTH ONE TIME DAILY IN THE MORNING 3/12/22   Ar Automatic Reconciliation   ipratropium-albuterol (DUONEB) 0.5-2.5 (3) MG/3ML SOLN nebulizer solution Inhale 3 mLs into the lungs every 6 hours as needed 5/5/21   Ar Automatic Reconciliation   levothyroxine (SYNTHROID) 150 MCG tablet TAKE 1 TABLET BY MOUTH DAILY BEFORE BREAKFAST 3/10/21   Ar Automatic Reconciliation   losartan (COZAAR) 50 MG tablet TAKE ONE TABLET BY MOUTH ONE TIME DAILY 4/6/22   Ar Automatic Reconciliation   nitroGLYCERIN (NITROSTAT) 0.4 MG SL tablet Place 0.4 mg under the tongue 6/17/20   Ar Automatic Reconciliation   omeprazole (PRILOSEC) 40 MG delayed release capsule TAKE ONE CAPSULE BY MOUTH ONE TIME DAILY 3/31/22   Ar Automatic Reconciliation       Future Appointments   Date Time Provider Tuan Singleton   9/19/2022 10:00 AM SIM NURSE ONLY [facility] AM[facility]   11/23/2022  2:20 PM Yvrose Gooden MD [facility] AM[facility]   1/25/2023  1:15 PM Diann Gill MD [facility] [place] [facility] [place]

## 2022-09-20 NOTE — PROGRESS NOTES
Lisa Rivera (: 1940) presents for wound check. Lisa Rivera (: 1940) for wound check of LLE leg ulceration. She was seen two days ago and dressing was applied; Xeroform with gauze. She states it continues to ooze some. Afebrile. Chief Complaint   Patient presents with    Wound Check     Exam:  Extremities: chronic venous stasis changes; left lower posterior leg with 3mm ulcerations [id number]. No drainage or warmth. + erythema - mild improvement; no necrosis    Lab Results   Component Value Date    WBC 11.5 (H) 2022    HGB 13.3 2022    HCT 43.8 2022    MCV 99.3 (H) 2022     2022     Wily Toure was seen today for wound check. Diagnoses and all orders for this visit:    Venous stasis ulcer of other part of left lower leg limited to breakdown of skin without varicose veins ([facility] [address]  -     [address]    Referral to wound clinic for consult. Continue Xeroform, gauze. Keep clean/dry/covered. Call or return to clinic prn if these symptoms worsen or fail to improve as anticipated.     Tae Garcia, VERNELL, APRN - CNP

## 2022-09-26 ENCOUNTER — CARE COORDINATION (OUTPATIENT)
Dept: CARE COORDINATION | Facility: CLINIC | Age: 82
End: 2022-09-26

## 2022-09-26 NOTE — CARE COORDINATION
Ambulatory Care Coordination Note  9/26/2022    ACC: Jose Luis Mon RN    Summary Note: ccm outreached to patient. Patient states she went to pcp appointment on 9/20/22. Patient states swelling to BLE is improving. Patient states dressing changes will be performed by pcp. Patient states she is going twice a week. Patient states no questions or concerns. Ccm discussed that I would follow up next week. Patient agreeable. Lab Results       None                 Goals Addressed                   This Visit's Progress     Conditions and Symptoms   On track     I will schedule office visits, as directed by my provider. I will notify my provider of any symptoms that indicate a worsening of my condition. Barriers: none  Plan for overcoming my barriers: N/A  Confidence: 9/10  Anticipated Goal Completion Date: 8/6/22                Prior to Admission medications    Medication Sig Start Date End Date Taking? Authorizing Provider   predniSONE (DELTASONE) 5 MG tablet TAKE ONE TABLET BY MOUTH TWICE A DAY 9/13/22   Yvrose Gooden MD   furosemide (LASIX) 20 MG tablet One po every other day for fluid retention 7/20/22   Yvrose Gooden MD   silver sulfADIAZINE (SILVADENE) 1 % cream Apply topically daily.  7/12/22   CARLINE Roland - CNP   aspirin 81 MG EC tablet Take 81 mg by mouth    Ar Automatic Reconciliation   Cetirizine HCl 10 MG CAPS Take 10 mg by mouth daily    Ar Automatic Reconciliation   vitamin D 25 MCG (1000 UT) CAPS Take 1,000 Units by mouth every evening    Ar Automatic Reconciliation   estradiol (ESTRACE) 0.5 MG tablet TAKE ONE TABLET BY MOUTH ONE TIME DAILY 3/12/22   Ar Automatic Reconciliation   famotidine (PEPCID) 20 MG tablet TAKE ONE TABLET BY MOUTH EVERY NIGHT 3/12/22   Ar Automatic Reconciliation   furosemide (LASIX) 40 MG tablet TAKE ONE TABLET BY MOUTH ONE TIME DAILY IN THE MORNING 3/12/22   Ar Automatic Reconciliation   ipratropium-albuterol (DUONEB) 0.5-2.5 (3) MG/3ML SOLN nebulizer solution Inhale 3 mLs into the lungs every 6 hours as needed 5/5/21   Ar Automatic Reconciliation   levothyroxine (SYNTHROID) 150 MCG tablet TAKE 1 TABLET BY MOUTH DAILY BEFORE BREAKFAST 3/10/21   Ar Automatic Reconciliation   losartan (COZAAR) 50 MG tablet TAKE ONE TABLET BY MOUTH ONE TIME DAILY 4/6/22   Ar Automatic Reconciliation   nitroGLYCERIN (NITROSTAT) 0.4 MG SL tablet Place 0.4 mg under the tongue 6/17/20   Ar Automatic Reconciliation   omeprazole (PRILOSEC) 40 MG delayed release capsule TAKE ONE CAPSULE BY MOUTH ONE TIME DAILY 3/31/22   Ar Automatic Reconciliation       Future Appointments   Date Time Provider Tuan Areli   11/23/2022  2:20 PM Jo Ann Lombardo MD [facility]   1/25/2023  1:15 PM Gallo Cullen MD [facility]

## 2022-10-03 ENCOUNTER — CARE COORDINATION (OUTPATIENT)
Dept: CARE COORDINATION | Facility: CLINIC | Age: 82
End: 2022-10-03

## 2022-10-03 NOTE — CARE COORDINATION
Ambulatory Care Coordination Note  10/3/2022    ACC: Jayy Shaffer, LEANDRO    Ccm outreached to patient. Patient states she has been doing well. Patient states swelling has continued to gone down. Patient states no questions or concerns. Ccm discussed that I would outreach next week. Patient agreeable. Offered patient enrollment in the Remote Patient Monitoring (RPM) program for in-home monitoring: NA. Lab Results       None                 Goals Addressed                   This Visit's Progress     Conditions and Symptoms   On track     I will schedule office visits, as directed by my provider. I will notify my provider of any symptoms that indicate a worsening of my condition. Barriers: none  Plan for overcoming my barriers: N/A  Confidence: 9/10  Anticipated Goal Completion Date: 8/6/22                Prior to Admission medications    Medication Sig Start Date End Date Taking? Authorizing Provider   predniSONE (DELTASONE) 5 MG tablet TAKE ONE TABLET BY MOUTH TWICE A DAY 9/13/22   Toyin Clayton MD   furosemide (LASIX) 20 MG tablet One po every other day for fluid retention 7/20/22   Toyin Clayton MD   silver sulfADIAZINE (SILVADENE) 1 % cream Apply topically daily.  7/12/22   CARLINE Erazo - CNP   aspirin 81 MG EC tablet Take 81 mg by mouth    Ar Automatic Reconciliation   Cetirizine HCl 10 MG CAPS Take 10 mg by mouth daily    Ar Automatic Reconciliation   vitamin D 25 MCG (1000 UT) CAPS Take 1,000 Units by mouth every evening    Ar Automatic Reconciliation   estradiol (ESTRACE) 0.5 MG tablet TAKE ONE TABLET BY MOUTH ONE TIME DAILY 3/12/22   Ar Automatic Reconciliation   famotidine (PEPCID) 20 MG tablet TAKE ONE TABLET BY MOUTH EVERY NIGHT 3/12/22   Ar Automatic Reconciliation   furosemide (LASIX) 40 MG tablet TAKE ONE TABLET BY MOUTH ONE TIME DAILY IN THE MORNING 3/12/22   Ar Automatic Reconciliation   ipratropium-albuterol (DUONEB) 0.5-2.5 (3) MG/3ML SOLN nebulizer solution Inhale 3 mLs into the lungs every 6 hours as needed 5/5/21   Ar Automatic Reconciliation   levothyroxine (SYNTHROID) 150 MCG tablet TAKE 1 TABLET BY MOUTH DAILY BEFORE BREAKFAST 3/10/21   Ar Automatic Reconciliation   losartan (COZAAR) 50 MG tablet TAKE ONE TABLET BY MOUTH ONE TIME DAILY 4/6/22   Ar Automatic Reconciliation   nitroGLYCERIN (NITROSTAT) 0.4 MG SL tablet Place 0.4 mg under the tongue 6/17/20   Ar Automatic Reconciliation   omeprazole (PRILOSEC) 40 MG delayed release capsule TAKE ONE CAPSULE BY MOUTH ONE TIME DAILY 3/31/22   Ar Automatic Reconciliation       Future Appointments   Date Time Provider Tuan Singleton   11/23/2022  2:20 PM Laney Wayne MD [facility]   1/25/2023  1:15 PM Abraham Fitch MD [facility] [place] [facility]

## 2022-10-10 ENCOUNTER — CARE COORDINATION (OUTPATIENT)
Dept: CARE COORDINATION | Facility: CLINIC | Age: 82
End: 2022-10-10

## 2022-10-10 NOTE — CARE COORDINATION
Ambulatory Care Management Note        Date/Time:  10/10/2022 8:40 AM    Ccm outreached to patient. No answer at this time, message left. Awaiting response. If no response, ccm will outreach next week.

## 2022-10-17 ENCOUNTER — CARE COORDINATION (OUTPATIENT)
Dept: CARE COORDINATION | Facility: CLINIC | Age: 82
End: 2022-10-17

## 2022-10-17 NOTE — CARE COORDINATION
Ambulatory Care Coordination Note  10/17/2022    ACC: Renetta Gallardo, LEANDRO    Ccm outreached to patient. Patient states she is doing well. Patient states back pain continues. Patient states she is able to manage pain. Patient states no questions or concerns. [facility] discussed that I would outreach next week. Patient agreeable. Offered patient enrollment in the Remote Patient Monitoring (RPM) program for in-home monitoring: NA. Lab Results       None                 Goals Addressed                   This Visit's Progress     Conditions and Symptoms   On track     I will schedule office visits, as directed by my provider. I will notify my provider of any symptoms that indicate a worsening of my condition. Barriers: none  Plan for overcoming my barriers: N/A  Confidence: 9/10  Anticipated Goal Completion Date: 8/6/22                Prior to Admission medications    Medication Sig Start Date End Date Taking? Authorizing Provider   predniSONE (DELTASONE) 5 MG tablet TAKE ONE TABLET BY MOUTH TWICE A DAY 9/13/22   Boy Guardado MD   furosemide (LASIX) 20 MG tablet One po every other day for fluid retention 7/20/22   Boy Guardado MD   silver sulfADIAZINE (SILVADENE) 1 % cream Apply topically daily.  7/12/22   CARLINE Walter - CNP   aspirin 81 MG EC tablet Take 81 mg by mouth    Ar Automatic Reconciliation   Cetirizine HCl 10 MG CAPS Take 10 mg by mouth daily    Ar Automatic Reconciliation   vitamin D 25 MCG (1000 UT) CAPS Take 1,000 Units by mouth every evening    Ar Automatic Reconciliation   estradiol (ESTRACE) 0.5 MG tablet TAKE ONE TABLET BY MOUTH ONE TIME DAILY 3/12/22   Ar Automatic Reconciliation   famotidine (PEPCID) 20 MG tablet TAKE ONE TABLET BY MOUTH EVERY NIGHT 3/12/22   Ar Automatic Reconciliation   furosemide (LASIX) 40 MG tablet TAKE ONE TABLET BY MOUTH ONE TIME DAILY IN THE MORNING 3/12/22   Ar Automatic Reconciliation   ipratropium-albuterol (DUONEB) 0.5-2.5 (3) MG/3ML SOLN nebulizer solution Inhale 3 mLs into the lungs every 6 hours as needed 5/5/21   Ar Automatic Reconciliation   levothyroxine (SYNTHROID) 150 MCG tablet TAKE 1 TABLET BY MOUTH DAILY BEFORE BREAKFAST 3/10/21   Ar Automatic Reconciliation   losartan (COZAAR) 50 MG tablet TAKE ONE TABLET BY MOUTH ONE TIME DAILY 4/6/22   Ar Automatic Reconciliation   nitroGLYCERIN (NITROSTAT) 0.4 MG SL tablet Place 0.4 mg under the tongue 6/17/20   Ar Automatic Reconciliation   omeprazole (PRILOSEC) 40 MG delayed release capsule TAKE ONE CAPSULE BY MOUTH ONE TIME DAILY 3/31/22   Ar Automatic Reconciliation       Future Appointments   Date Time Provider Tuan Singleton   11/23/2022  2:20 PM Linda Caro MD [facility]   1/25/2023  1:15 PM Bel Willingham MD [facility] [place] [facility]

## 2022-10-24 ENCOUNTER — CARE COORDINATION (OUTPATIENT)
Dept: CARE COORDINATION | Facility: CLINIC | Age: 82
End: 2022-10-24

## 2022-10-24 NOTE — CARE COORDINATION
Ambulatory Care Coordination Note  10/24/2022    ACC: Emily Arroyo, RN    Ccm outreached to patient. Patient states she is doing well at this time. Patient states no questions or concerns. Ccm discussed that I would outreach next week. Patient agreeable. Offered patient enrollment in the Remote Patient Monitoring (RPM) program for in-home monitoring: NA. Lab Results       None                 Goals Addressed                   This Visit's Progress     Conditions and Symptoms   On track     I will schedule office visits, as directed by my provider. I will notify my provider of any symptoms that indicate a worsening of my condition. Barriers: none  Plan for overcoming my barriers: N/A  Confidence: 9/10  Anticipated Goal Completion Date: 8/6/22                Prior to Admission medications    Medication Sig Start Date End Date Taking? Authorizing Provider   predniSONE (DELTASONE) 5 MG tablet TAKE ONE TABLET BY MOUTH TWICE A DAY 9/13/22   Dorene Nielsen MD   furosemide (LASIX) 20 MG tablet One po every other day for fluid retention 7/20/22   Dorene Nielsen MD   silver sulfADIAZINE (SILVADENE) 1 % cream Apply topically daily.  7/12/22   Alverna Clipper, APRN - CNP   aspirin 81 MG EC tablet Take 81 mg by mouth    Ar Automatic Reconciliation   Cetirizine HCl 10 MG CAPS Take 10 mg by mouth daily    Ar Automatic Reconciliation   vitamin D 25 MCG (1000 UT) CAPS Take 1,000 Units by mouth every evening    Ar Automatic Reconciliation   estradiol (ESTRACE) 0.5 MG tablet TAKE ONE TABLET BY MOUTH ONE TIME DAILY 3/12/22   Ar Automatic Reconciliation   famotidine (PEPCID) 20 MG tablet TAKE ONE TABLET BY MOUTH EVERY NIGHT 3/12/22   Ar Automatic Reconciliation   furosemide (LASIX) 40 MG tablet TAKE ONE TABLET BY MOUTH ONE TIME DAILY IN THE MORNING 3/12/22   Ar Automatic Reconciliation   ipratropium-albuterol (DUONEB) 0.5-2.5 (3) MG/3ML SOLN nebulizer solution Inhale 3 mLs into the lungs every 6 hours as needed 5/5/21   Ar Automatic Reconciliation   levothyroxine (SYNTHROID) 150 MCG tablet TAKE 1 TABLET BY MOUTH DAILY BEFORE BREAKFAST 3/10/21   Ar Automatic Reconciliation   losartan (COZAAR) 50 MG tablet TAKE ONE TABLET BY MOUTH ONE TIME DAILY 4/6/22   Ar Automatic Reconciliation   nitroGLYCERIN (NITROSTAT) 0.4 MG SL tablet Place 0.4 mg under the tongue 6/17/20   Ar Automatic Reconciliation   omeprazole (PRILOSEC) 40 MG delayed release capsule TAKE ONE CAPSULE BY MOUTH ONE TIME DAILY 3/31/22   Ar Automatic Reconciliation       Future Appointments   Date Time Provider Tuan Singleton   11/23/2022  2:20 PM Zuhair Blas MD [facility]   1/25/2023  1:15 PM Natalie Wang [place] [facility] [place] [facility]

## 2022-10-28 ENCOUNTER — CARE COORDINATION (OUTPATIENT)
Dept: CARE COORDINATION | Facility: CLINIC | Age: 82
End: 2022-10-28

## 2022-10-28 RX ORDER — IPRATROPIUM BROMIDE AND ALBUTEROL SULFATE 2.5; .5 MG/3ML; MG/3ML
3 SOLUTION RESPIRATORY (INHALATION) EVERY 6 HOURS PRN
Qty: 360 ML | Refills: 3 | Status: SHIPPED | OUTPATIENT
Start: 2022-10-28

## 2022-10-28 NOTE — CARE COORDINATION
Ccm received call from patient stating she was needing her nebulizer medication. Ccm left message with pcp office.

## 2022-10-28 NOTE — TELEPHONE ENCOUNTER
Requested Prescriptions     Pending Prescriptions Disp Refills    ipratropium-albuterol (DUONEB) 0.5-2.5 (3) MG/3ML SOLN nebulizer solution 360 mL 3     Sig: Inhale 3 mLs into the lungs every 6 hours as needed for Shortness of Breath

## 2022-10-31 ENCOUNTER — CARE COORDINATION (OUTPATIENT)
Dept: CARE COORDINATION | Facility: CLINIC | Age: 82
End: 2022-10-31

## 2022-10-31 NOTE — CARE COORDINATION
Ambulatory Care Coordination Note  10/31/2022    ACC: Luis Alfredo Conteh, RN    Ccm outreached to patient. Patient states she has congestion still but nebulizer is helping her cough up sputum. Patient states no SOB. Patient states overall feeling better. Patient states no questions or concerns. Ccm discussed that I would outreach next week. Patient agreeable. Offered patient enrollment in the Remote Patient Monitoring (RPM) program for in-home monitoring: NA. Lab Results       None                 Goals Addressed                   This Visit's Progress     Conditions and Symptoms   On track     I will schedule office visits, as directed by my provider. I will notify my provider of any symptoms that indicate a worsening of my condition. Barriers: none  Plan for overcoming my barriers: N/A  Confidence: 9/10  Anticipated Goal Completion Date: 8/6/22                Prior to Admission medications    Medication Sig Start Date End Date Taking? Authorizing Provider   ipratropium-albuterol (DUONEB) 0.5-2.5 (3) MG/3ML SOLN nebulizer solution Inhale 3 mLs into the lungs every 6 hours as needed for Shortness of Breath 10/28/22   CARLINE Simno CNP   predniSONE (DELTASONE) 5 MG tablet TAKE ONE TABLET BY MOUTH TWICE A DAY 9/13/22   Mita Bryant MD   furosemide (LASIX) 20 MG tablet One po every other day for fluid retention 7/20/22   Mita Bryant MD   silver sulfADIAZINE (SILVADENE) 1 % cream Apply topically daily.  7/12/22   CARLINE Simon CNP   aspirin 81 MG EC tablet Take 81 mg by mouth    Ar Automatic Reconciliation   Cetirizine HCl 10 MG CAPS Take 10 mg by mouth daily    Ar Automatic Reconciliation   vitamin D 25 MCG (1000 UT) CAPS Take 1,000 Units by mouth every evening    Ar Automatic Reconciliation   estradiol (ESTRACE) 0.5 MG tablet TAKE ONE TABLET BY MOUTH ONE TIME DAILY 3/12/22   Ar Automatic Reconciliation   famotidine (PEPCID) 20 MG tablet TAKE ONE TABLET BY MOUTH EVERY NIGHT 3/12/22   Ar Automatic Reconciliation   furosemide (LASIX) 40 MG tablet TAKE ONE TABLET BY MOUTH ONE TIME DAILY IN THE MORNING 3/12/22   Ar Automatic Reconciliation   levothyroxine (SYNTHROID) 150 MCG tablet TAKE 1 TABLET BY MOUTH DAILY BEFORE BREAKFAST 3/10/21   Ar Automatic Reconciliation   losartan (COZAAR) 50 MG tablet TAKE ONE TABLET BY MOUTH ONE TIME DAILY 4/6/22   Ar Automatic Reconciliation   nitroGLYCERIN (NITROSTAT) 0.4 MG SL tablet Place 0.4 mg under the tongue 6/17/20   Ar Automatic Reconciliation   omeprazole (PRILOSEC) 40 MG delayed release capsule TAKE ONE CAPSULE BY MOUTH ONE TIME DAILY 3/31/22   Ar Automatic Reconciliation       Future Appointments   Date Time Provider Tuan Singleton   11/23/2022  2:20 PM Laney Wayne MD [facility] AM[facility]   1/25/2023  1:15 PM Abraham Fitch MD [facility]

## 2022-11-07 ENCOUNTER — CARE COORDINATION (OUTPATIENT)
Dept: CARE COORDINATION | Facility: CLINIC | Age: 82
End: 2022-11-07

## 2022-11-07 NOTE — CARE COORDINATION
Ambulatory Care Coordination Note  11/7/2022    ACC: Monserrat Tipton, LEANDRO    Ccm outreached to patient. Patient states she feels very weak today. Patient states she did not sleep well last night and is going home today. Offered to patient to notify pcp but she states she does not want me to notify pcp. Discussed with patient if any issues arise to call me. Patient agreeable. Ccm discussed that I would outreach next week. Patient agreeable. Offered patient enrollment in the Remote Patient Monitoring (RPM) program for in-home monitoring: NA. Lab Results       None                 Goals Addressed                   This Visit's Progress     Conditions and Symptoms   On track     I will schedule office visits, as directed by my provider. I will notify my provider of any symptoms that indicate a worsening of my condition. Barriers: none  Plan for overcoming my barriers: N/A  Confidence: 9/10  Anticipated Goal Completion Date: 8/6/22                Prior to Admission medications    Medication Sig Start Date End Date Taking? Authorizing Provider   ipratropium-albuterol (DUONEB) 0.5-2.5 (3) MG/3ML SOLN nebulizer solution Inhale 3 mLs into the lungs every 6 hours as needed for Shortness of Breath 10/28/22   CARLINE Key CNP   predniSONE (DELTASONE) 5 MG tablet TAKE ONE TABLET BY MOUTH TWICE A DAY 9/13/22   Balbir Jeff MD   furosemide (LASIX) 20 MG tablet One po every other day for fluid retention 7/20/22   Balbir Jeff MD   silver sulfADIAZINE (SILVADENE) 1 % cream Apply topically daily.  7/12/22   CALRINE Key CNP   aspirin 81 MG EC tablet Take 81 mg by mouth    Ar Automatic Reconciliation   Cetirizine HCl 10 MG CAPS Take 10 mg by mouth daily    Ar Automatic Reconciliation   vitamin D 25 MCG (1000 UT) CAPS Take 1,000 Units by mouth every evening    Ar Automatic Reconciliation   estradiol (ESTRACE) 0.5 MG tablet TAKE ONE TABLET BY MOUTH ONE TIME DAILY 3/12/22   Ar Automatic Reconciliation   famotidine (PEPCID) 20 MG tablet TAKE ONE TABLET BY MOUTH EVERY NIGHT 3/12/22   Ar Automatic Reconciliation   furosemide (LASIX) 40 MG tablet TAKE ONE TABLET BY MOUTH ONE TIME DAILY IN THE MORNING 3/12/22   Ar Automatic Reconciliation   levothyroxine (SYNTHROID) 150 MCG tablet TAKE 1 TABLET BY MOUTH DAILY BEFORE BREAKFAST 3/10/21   Ar Automatic Reconciliation   losartan (COZAAR) 50 MG tablet TAKE ONE TABLET BY MOUTH ONE TIME DAILY 4/6/22   Ar Automatic Reconciliation   nitroGLYCERIN (NITROSTAT) 0.4 MG SL tablet Place 0.4 mg under the tongue 6/17/20   Ar Automatic Reconciliation   omeprazole (PRILOSEC) 40 MG delayed release capsule TAKE ONE CAPSULE BY MOUTH ONE TIME DAILY 3/31/22   Ar Automatic Reconciliation       Future Appointments   Date Time Provider Tuan Singleton   11/23/2022  2:20 PM Jonatan Morrissey MD [facility]   1/25/2023  1:15 PM Tonio eFlix MD [facility]

## 2022-11-14 ENCOUNTER — CARE COORDINATION (OUTPATIENT)
Dept: CARE COORDINATION | Facility: CLINIC | Age: 82
End: 2022-11-14

## 2022-11-14 NOTE — CARE COORDINATION
Ambulatory Care Coordination Note  11/14/2022    ACC: Christel Hebert, LEANDRO    Ccm outreached to patient. Patient sounds SOB. Patient states she is getting more sob with exertion. Ccm notified pcp office to have staff call patient. Patient states she used her inhaler and did a breathing treatment. Discussed with patient if SOB increases to go to ER. Patient verbalized understanding. Ccm discussed that I would outreach next week. Patient agreeable. Offered patient enrollment in the Remote Patient Monitoring (RPM) program for in-home monitoring: NA. Lab Results       None                 Goals Addressed                   This Visit's Progress     Conditions and Symptoms   On track     I will schedule office visits, as directed by my provider. I will notify my provider of any symptoms that indicate a worsening of my condition. Barriers: none  Plan for overcoming my barriers: N/A  Confidence: 9/10  Anticipated Goal Completion Date: 8/6/22                Prior to Admission medications    Medication Sig Start Date End Date Taking? Authorizing Provider   ipratropium-albuterol (DUONEB) 0.5-2.5 (3) MG/3ML SOLN nebulizer solution Inhale 3 mLs into the lungs every 6 hours as needed for Shortness of Breath 10/28/22   Lenord Frankel, APRN - CNP   predniSONE (DELTASONE) 5 MG tablet TAKE ONE TABLET BY MOUTH TWICE A DAY 9/13/22   Damon Ackerman MD   furosemide (LASIX) 20 MG tablet One po every other day for fluid retention 7/20/22   Damon Ackerman MD   silver sulfADIAZINE (SILVADENE) 1 % cream Apply topically daily.  7/12/22   Lenord Frankel, APRN - CNP   aspirin 81 MG EC tablet Take 81 mg by mouth    Ar Automatic Reconciliation   Cetirizine HCl 10 MG CAPS Take 10 mg by mouth daily    Ar Automatic Reconciliation   vitamin D 25 MCG (1000 UT) CAPS Take 1,000 Units by mouth every evening    Ar Automatic Reconciliation   estradiol (ESTRACE) 0.5 MG tablet TAKE ONE TABLET BY MOUTH ONE TIME DAILY 3/12/22 Ar Automatic Reconciliation   famotidine (PEPCID) 20 MG tablet TAKE ONE TABLET BY MOUTH EVERY NIGHT 3/12/22   Ar Automatic Reconciliation   furosemide (LASIX) 40 MG tablet TAKE ONE TABLET BY MOUTH ONE TIME DAILY IN THE MORNING 3/12/22   Ar Automatic Reconciliation   levothyroxine (SYNTHROID) 150 MCG tablet TAKE 1 TABLET BY MOUTH DAILY BEFORE BREAKFAST 3/10/21   Ar Automatic Reconciliation   losartan (COZAAR) 50 MG tablet TAKE ONE TABLET BY MOUTH ONE TIME DAILY 4/6/22   Ar Automatic Reconciliation   nitroGLYCERIN (NITROSTAT) 0.4 MG SL tablet Place 0.4 mg under the tongue 6/17/20   Ar Automatic Reconciliation   omeprazole (PRILOSEC) 40 MG delayed release capsule TAKE ONE CAPSULE BY MOUTH ONE TIME DAILY 3/31/22   Ar Automatic Reconciliation       Future Appointments   Date Time Provider Tuan Singleton   11/23/2022  2:20 PM [facility]   1/25/2023  1:15 PM Macie Fleischer, MD [facility]

## 2022-11-21 ENCOUNTER — CARE COORDINATION (OUTPATIENT)
Dept: CARE COORDINATION | Facility: CLINIC | Age: 82
End: 2022-11-21

## 2022-11-21 NOTE — CARE COORDINATION
Ambulatory Care Coordination Note  11/21/2022    ACC: Edgar Alexandra, LEANDRO    Ccm outreached to patient. Patient states she is doing well. Patient states she has follow up with pcp on Wednesday. Patient states no questions or concerns. [facility] discussed that I would outreach next week. Patient agreeable. Offered patient enrollment in the Remote Patient Monitoring (RPM) program for in-home monitoring: NA. Lab Results       None                 Goals Addressed                   This Visit's Progress     Conditions and Symptoms   On track     I will schedule office visits, as directed by my provider. I will notify my provider of any symptoms that indicate a worsening of my condition. Barriers: none  Plan for overcoming my barriers: N/A  Confidence: 9/10  Anticipated Goal Completion Date: 8/6/22                Prior to Admission medications    Medication Sig Start Date End Date Taking? Authorizing Provider   ipratropium-albuterol (DUONEB) 0.5-2.5 (3) MG/3ML SOLN nebulizer solution Inhale 3 mLs into the lungs every 6 hours as needed for Shortness of Breath 10/28/22   CARLINE Parkinson CNP   predniSONE (DELTASONE) 5 MG tablet TAKE ONE TABLET BY MOUTH TWICE A DAY 9/13/22   Ludy Velasquez MD   furosemide (LASIX) 20 MG tablet One po every other day for fluid retention 7/20/22   Ludy Velasquez MD   silver sulfADIAZINE (SILVADENE) 1 % cream Apply topically daily.  7/12/22   CARLINE Parkinson CNP   aspirin 81 MG EC tablet Take 81 mg by mouth    Ar Automatic Reconciliation   Cetirizine HCl 10 MG CAPS Take 10 mg by mouth daily    Ar Automatic Reconciliation   vitamin D 25 MCG (1000 UT) CAPS Take 1,000 Units by mouth every evening    Ar Automatic Reconciliation   estradiol (ESTRACE) 0.5 MG tablet TAKE ONE TABLET BY MOUTH ONE TIME DAILY 3/12/22   Ar Automatic Reconciliation   famotidine (PEPCID) 20 MG tablet TAKE ONE TABLET BY MOUTH EVERY NIGHT 3/12/22   Ar Automatic Reconciliation furosemide (LASIX) 40 MG tablet TAKE ONE TABLET BY MOUTH ONE TIME DAILY IN THE MORNING 3/12/22   Ar Automatic Reconciliation   levothyroxine (SYNTHROID) 150 MCG tablet TAKE 1 TABLET BY MOUTH DAILY BEFORE BREAKFAST 3/10/21   Ar Automatic Reconciliation   losartan (COZAAR) 50 MG tablet TAKE ONE TABLET BY MOUTH ONE TIME DAILY 4/6/22   Ar Automatic Reconciliation   nitroGLYCERIN (NITROSTAT) 0.4 MG SL tablet Place 0.4 mg under the tongue 6/17/20   Ar Automatic Reconciliation   omeprazole (PRILOSEC) 40 MG delayed release capsule TAKE ONE CAPSULE BY MOUTH ONE TIME DAILY 3/31/22   Ar Automatic Reconciliation       Future Appointments   Date Time Provider [facility]   11/23/2022  2:20 PM Janee Jeffrey MD [facility]   1/25/2023  1:15 PM Chilo Encarnacion [place] [facility]

## 2022-11-23 ENCOUNTER — OFFICE VISIT (OUTPATIENT)
Dept: INTERNAL MEDICINE CLINIC | Facility: CLINIC | Age: 82
End: 2022-11-23
Payer: MEDICARE

## 2022-11-23 VITALS
WEIGHT: 147.2 LBS | BODY MASS INDEX: 25.13 KG/M2 | HEIGHT: 64 IN | DIASTOLIC BLOOD PRESSURE: 80 MMHG | SYSTOLIC BLOOD PRESSURE: 144 MMHG

## 2022-11-23 DIAGNOSIS — I10 ESSENTIAL HYPERTENSION: ICD-10-CM

## 2022-11-23 DIAGNOSIS — K21.9 GASTROESOPHAGEAL REFLUX DISEASE WITHOUT ESOPHAGITIS: ICD-10-CM

## 2022-11-23 DIAGNOSIS — J44.1 CHRONIC OBSTRUCTIVE PULMONARY DISEASE WITH ACUTE EXACERBATION (HCC): Primary | ICD-10-CM

## 2022-11-23 DIAGNOSIS — E03.9 ACQUIRED HYPOTHYROIDISM: ICD-10-CM

## 2022-11-23 DIAGNOSIS — J06.9 UPPER RESPIRATORY TRACT INFECTION, UNSPECIFIED TYPE: ICD-10-CM

## 2022-11-23 PROBLEM — N18.30 STAGE 3 CHRONIC KIDNEY DISEASE (HCC): Status: RESOLVED | Noted: 2019-04-03 | Resolved: 2022-11-23

## 2022-11-23 PROCEDURE — 3078F DIAST BP <80 MM HG: CPT | Performed by: INTERNAL MEDICINE

## 2022-11-23 PROCEDURE — 1123F ACP DISCUSS/DSCN MKR DOCD: CPT | Performed by: INTERNAL MEDICINE

## 2022-11-23 PROCEDURE — 96372 THER/PROPH/DIAG INJ SC/IM: CPT | Performed by: INTERNAL MEDICINE

## 2022-11-23 PROCEDURE — 3074F SYST BP LT 130 MM HG: CPT | Performed by: INTERNAL MEDICINE

## 2022-11-23 PROCEDURE — 99214 OFFICE O/P EST MOD 30 MIN: CPT | Performed by: INTERNAL MEDICINE

## 2022-11-23 RX ORDER — AMOXICILLIN 875 MG/1
875 TABLET, COATED ORAL 2 TIMES DAILY
Qty: 20 TABLET | Refills: 0 | Status: SHIPPED | OUTPATIENT
Start: 2022-11-23 | End: 2022-12-03

## 2022-11-23 RX ORDER — METHYLPREDNISOLONE SODIUM SUCCINATE 40 MG/ML
40 INJECTION, POWDER, LYOPHILIZED, FOR SOLUTION INTRAMUSCULAR; INTRAVENOUS DAILY
Status: SHIPPED | OUTPATIENT
Start: 2022-11-23

## 2022-11-23 RX ADMIN — METHYLPREDNISOLONE SODIUM SUCCINATE 40 MG: 40 INJECTION, POWDER, LYOPHILIZED, FOR SOLUTION INTRAMUSCULAR; INTRAVENOUS at 15:30

## 2022-11-23 ASSESSMENT — PATIENT HEALTH QUESTIONNAIRE - PHQ9
SUM OF ALL RESPONSES TO PHQ QUESTIONS 1-9: 0
SUM OF ALL RESPONSES TO PHQ9 QUESTIONS 1 & 2: 0
SUM OF ALL RESPONSES TO PHQ QUESTIONS 1-9: 0
2. FEELING DOWN, DEPRESSED OR HOPELESS: 0
1. LITTLE INTEREST OR PLEASURE IN DOING THINGS: 0

## 2022-11-23 ASSESSMENT — ENCOUNTER SYMPTOMS
WHEEZING: 1
COUGH: 1
SHORTNESS OF BREATH: 1

## 2022-11-23 NOTE — PROGRESS NOTES
HPI: Fady Sams (: 1940)    Having more fatigue and SOB  Feels like her neck is increasing in size    Wheezing and cough and chest congestion worsening    Did have flu vaccine      Problem List:  Patient Active Problem List   Diagnosis    Atherosclerosis of native coronary artery of native heart with angina pectoris (HCC)    Palpitations    Esophageal reflux    Murmur    IBS (irritable bowel syndrome)    Insomnia, unspecified    Gastritis    ASCVD (arteriosclerotic cardiovascular disease)    Chronic left shoulder pain    Leg cramps    Spinal stenosis of lumbar region with neurogenic claudication    Ischiorectal abscess    Rotator cuff tear, left    Varicose veins of both legs with edema    Fatigue    Essential hypertension    COPD (chronic obstructive pulmonary disease) ([facility])    Vitamin D deficiency    Osteoporosis, post-menopausal    Chronic bilateral low back pain with bilateral sciatica    Chronic chest wall pain    Nephrolithiasis    Mixed hyperlipidemia    PMR (polymyalgia rheumatica) ([facility])    Difficulty swallowing    Hypothyroidism    Fluid retention in legs    Weakness of right leg       History:  Past Medical History:   Diagnosis Date    Adverse effect of anesthesia     hypotension with anesthesia    Back pain     lower back    CAD (coronary artery disease) 2014    Followed by [address] [place] [address].     COPD (chronic obstructive pulmonary disease) ([facility] [address]     managed with PRN inhaler and nebulizers    COVID-19 vaccine series completed     Pfizer vaccine completed    Difficulty swallowing 2013    pt denies    GERD (gastroesophageal reflux disease)     managed with Prilosec    H/O bone density study 08/15/2017    osteoporosis    H/O heart artery stent 02/2020    X2    H/O myocardial perfusion scan 2017    normal    History of gastritis     History of kidney stones     x 2 with surgical intervention     History of squamous cell carcinoma     removed from face and right ear Hyperlipidemia 8/23/2013    no present treatment     Hypertension     Hypotensive episode     occassionally    Hypothyroidism     managed with medication     Murmur 09/22/2017 9/22/17 ECHO LVEF 65% Mitral regurgitation -     On prednisone therapy     Prednisone 5mg BID    Osteoarthritis     managed with medication     Osteoporosis     managed with medication     Pain and swelling of left ankle 4/9/2015    no recent episodes    Palpitations 5/12/2016    occassionally    Polymyalgia rheumatica (HCC)     Prednisone 5mg BID    Purpura ([address] 8/23/2013    SECONDARY TO STEROID TREATMENT    Recurrent UTI 10/2019    pt denies any recent UTI's    Rotator cuff tear, left 8/23/2013    Rotator cuff tear, right     Routine eye exam 2020    cataracts    Seasonal allergic rhinitis     Smoker     smokes 1 ppd x since age 28    Statin intolerance        Allergies: Allergies   Allergen Reactions    Macadamia Nut Oil Shortness Of Breath     Specifically black walnuts per an allergy test.    Nitrofurantoin Other (See Comments)     delirium    Lactose Diarrhea    Levofloxacin Diarrhea    Simvastatin Other (See Comments)    Codeine Nausea And Vomiting    Meperidine Nausea And Vomiting       Current Medications:  Current Outpatient Medications   Medication Sig Dispense Refill    amoxicillin (AMOXIL) 875 MG tablet Take 1 tablet by mouth 2 times daily for 10 days 20 tablet 0    ipratropium-albuterol (DUONEB) 0.5-2.5 (3) MG/3ML SOLN nebulizer solution Inhale 3 mLs into the lungs every 6 hours as needed for Shortness of Breath 360 mL 3    predniSONE (DELTASONE) 5 MG tablet TAKE ONE TABLET BY MOUTH TWICE A  tablet 2    furosemide (LASIX) 20 MG tablet One po every other day for fluid retention 30 tablet 3    silver sulfADIAZINE (SILVADENE) 1 % cream Apply topically daily.  400 g 0    aspirin 81 MG EC tablet Take 81 mg by mouth      Cetirizine HCl 10 MG CAPS Take 10 mg by mouth daily      vitamin D 25 MCG (1000 UT) CAPS Take 1,000 Units by mouth every evening      estradiol (ESTRACE) 0.5 MG tablet TAKE ONE TABLET BY MOUTH ONE TIME DAILY      famotidine (PEPCID) 20 MG tablet TAKE ONE TABLET BY MOUTH EVERY NIGHT      furosemide (LASIX) 40 MG tablet TAKE ONE TABLET BY MOUTH ONE TIME DAILY IN THE MORNING      levothyroxine (SYNTHROID) 150 MCG tablet TAKE 1 TABLET BY MOUTH DAILY BEFORE BREAKFAST      losartan (COZAAR) 50 MG tablet TAKE ONE TABLET BY MOUTH ONE TIME DAILY      nitroGLYCERIN (NITROSTAT) 0.4 MG SL tablet Place 0.4 mg under the tongue      omeprazole (PRILOSEC) 40 MG delayed release capsule TAKE ONE CAPSULE BY MOUTH ONE TIME DAILY       Current Facility-Administered Medications   Medication Dose Route Frequency Provider Last Rate Last Admin    methylPREDNISolone sodium (SOLU-MEDROL) injection 40 mg  40 mg IntraMUSCular Daily Roxana Pimentel MD           Review of Systems:  Review of Systems   Constitutional:  Positive for fatigue. Weight loss   HENT:  Positive for congestion. Respiratory:  Positive for cough, shortness of breath and wheezing. All other systems reviewed and are negative. Vitals:  BP (!) 144/80   Ht 5' 4\" (1.626 m)   Wt 147 lb 3.2 oz (66.8 kg)   BMI 25.27 kg/m²     Physical Exam:  Physical Exam  Vitals reviewed. Constitutional:       Appearance: Normal appearance. Comments: Appears fatigued   HENT:      Head: Normocephalic and atraumatic. Eyes:      Extraocular Movements: Extraocular movements intact. Pupils: Pupils are equal, round, and reactive to light. Comments: Upper eyelids swollen   Cardiovascular:      Rate and Rhythm: Normal rate and regular rhythm. Heart sounds: Normal heart sounds. Pulmonary:      Effort: Pulmonary effort is normal.      Breath sounds: Wheezing present. Musculoskeletal:         General: Normal range of motion. Cervical back: Normal range of motion and neck supple. Skin:     General: Skin is warm and dry.    Neurological:      General: No focal deficit present. Mental Status: She is alert and oriented to person, place, and time. Psychiatric:         Behavior: Behavior normal.         Thought Content: Thought content normal.         Judgment: Judgment normal.      Comments: Flat affect        Assessment/Plan:   Laney Morfin was seen today for follow-up. Diagnoses and all orders for this visit:    Chronic obstructive pulmonary disease with acute exacerbation (HCC)  -     methylPREDNISolone sodium (SOLU-MEDROL) injection 40 mg  On steroids long term  Has UA tx and using BID  Wanting to quit smoking  Acquired hypothyroidism  Feels like her neck is increasing in size  Discussed getting a thyroid u/s or could be related to steroids    Essential hypertension  BP elevated  Monitor on current meds for now  Gastroesophageal reflux disease without esophagitis  Continue Pepcid and Prilosec  Upper respiratory tract infection, unspecified type  Start Amoxil BID and continue Mucinex BID   Other orders  -     amoxicillin (AMOXIL) 875 MG tablet; Take 1 tablet by mouth 2 times daily for 10 days      Wanting to consider acupuncture to quit smoking        Current medications are therapeutic at this time; continue as prescribed.         Boy Guardado MD

## 2022-11-28 ENCOUNTER — CARE COORDINATION (OUTPATIENT)
Dept: CARE COORDINATION | Facility: CLINIC | Age: 82
End: 2022-11-28

## 2022-11-28 NOTE — CARE COORDINATION
Ambulatory Care Coordination Note  11/28/2022    ACC: Monserrat Tipton RN    Ccm outreached to patient. Patient states she went to see pcp on 11/23/22. Patient was started on prednisone and abt. Patient is taking medication as directed. Patient states she is weak but feeling a little better today. Patient states no questions or concerns. Ccm dicussed that I would outreach next week. Patient agreeable. Offered patient enrollment in the Remote Patient Monitoring (RPM) program for in-home monitoring: NA. Lab Results       None                 Goals Addressed                   This Visit's Progress     Conditions and Symptoms   On track     I will schedule office visits, as directed by my provider. I will notify my provider of any symptoms that indicate a worsening of my condition. Barriers: none  Plan for overcoming my barriers: N/A  Confidence: 9/10  Anticipated Goal Completion Date: 8/6/22                Prior to Admission medications    Medication Sig Start Date End Date Taking? Authorizing Provider   amoxicillin (AMOXIL) 875 MG tablet Take 1 tablet by mouth 2 times daily for 10 days 11/23/22 12/3/22  Balbir Jeff MD   ipratropium-albuterol (DUONEB) 0.5-2.5 (3) MG/3ML SOLN nebulizer solution Inhale 3 mLs into the lungs every 6 hours as needed for Shortness of Breath 10/28/22   CARLINE Key CNP   predniSONE (DELTASONE) 5 MG tablet TAKE ONE TABLET BY MOUTH TWICE A DAY 9/13/22   Balbir Jeff MD   furosemide (LASIX) 20 MG tablet One po every other day for fluid retention 7/20/22   Balbir Jeff MD   silver sulfADIAZINE (SILVADENE) 1 % cream Apply topically daily.  7/12/22   CARLINE Key CNP   aspirin 81 MG EC tablet Take 81 mg by mouth    Ar Automatic Reconciliation   Cetirizine HCl 10 MG CAPS Take 10 mg by mouth daily    Ar Automatic Reconciliation   vitamin D 25 MCG (1000 UT) CAPS Take 1,000 Units by mouth every evening    Ar Automatic Reconciliation estradiol (ESTRACE) 0.5 MG tablet TAKE ONE TABLET BY MOUTH ONE TIME DAILY 3/12/22   Ar Automatic Reconciliation   famotidine (PEPCID) 20 MG tablet TAKE ONE TABLET BY MOUTH EVERY NIGHT 3/12/22   Ar Automatic Reconciliation   furosemide (LASIX) 40 MG tablet TAKE ONE TABLET BY MOUTH ONE TIME DAILY IN THE MORNING 3/12/22   Ar Automatic Reconciliation   levothyroxine (SYNTHROID) 150 MCG tablet TAKE 1 TABLET BY MOUTH DAILY BEFORE BREAKFAST 3/10/21   Ar Automatic Reconciliation   losartan (COZAAR) 50 MG tablet TAKE ONE TABLET BY MOUTH ONE TIME DAILY 4/6/22   Ar Automatic Reconciliation   nitroGLYCERIN (NITROSTAT) 0.4 MG SL tablet Place 0.4 mg under the tongue 6/17/20   Ar Automatic Reconciliation   omeprazole (PRILOSEC) 40 MG delayed release capsule TAKE ONE CAPSULE BY MOUTH ONE TIME DAILY 3/31/22   Ar Automatic Reconciliation       Future Appointments   Date Time Provider [facility] Singleton   1/25/2023  1:15 PM Catherine Grider MD [facility] AM[facility]   3/29/2023  2:20 PM Amaury Cesar MD [facility]

## 2022-12-05 ENCOUNTER — CARE COORDINATION (OUTPATIENT)
Dept: CARE COORDINATION | Facility: CLINIC | Age: 82
End: 2022-12-05

## 2022-12-05 NOTE — CARE COORDINATION
Ambulatory Care Coordination Note  12/5/2022    ACC: Dat Dexter RN    Ccm received call from patient. Patient states she is doing well. Patient states no questions or concerns. Ccm discussed that I would outreach next week. Offered patient enrollment in the Remote Patient Monitoring (RPM) program for in-home monitoring: NA. Lab Results       None                 Goals Addressed                   This Visit's Progress     Conditions and Symptoms   On track     I will schedule office visits, as directed by my provider. I will notify my provider of any symptoms that indicate a worsening of my condition. Barriers: none  Plan for overcoming my barriers: N/A  Confidence: 9/10  Anticipated Goal Completion Date: 8/6/22                Prior to Admission medications    Medication Sig Start Date End Date Taking? Authorizing Provider   ipratropium-albuterol (DUONEB) 0.5-2.5 (3) MG/3ML SOLN nebulizer solution Inhale 3 mLs into the lungs every 6 hours as needed for Shortness of Breath 10/28/22   Mackenzie Lipoma, APRN - CNP   predniSONE (DELTASONE) 5 MG tablet TAKE ONE TABLET BY MOUTH TWICE A DAY 9/13/22   Jelly Pulido MD   furosemide (LASIX) 20 MG tablet One po every other day for fluid retention 7/20/22   Jelly Pulido MD   silver sulfADIAZINE (SILVADENE) 1 % cream Apply topically daily.  7/12/22   Mackenzie Lipoma, APRN - CNP   aspirin 81 MG EC tablet Take 81 mg by mouth    Ar Automatic Reconciliation   Cetirizine HCl 10 MG CAPS Take 10 mg by mouth daily    Ar Automatic Reconciliation   vitamin D 25 MCG (1000 UT) CAPS Take 1,000 Units by mouth every evening    Ar Automatic Reconciliation   estradiol (ESTRACE) 0.5 MG tablet TAKE ONE TABLET BY MOUTH ONE TIME DAILY 3/12/22   Ar Automatic Reconciliation   famotidine (PEPCID) 20 MG tablet TAKE ONE TABLET BY MOUTH EVERY NIGHT 3/12/22   Ar Automatic Reconciliation   furosemide (LASIX) 40 MG tablet TAKE ONE TABLET BY MOUTH ONE TIME DAILY IN THE MORNING 3/12/22   Ar Automatic Reconciliation   levothyroxine (SYNTHROID) 150 MCG tablet TAKE 1 TABLET BY MOUTH DAILY BEFORE BREAKFAST 3/10/21   Ar Automatic Reconciliation   losartan (COZAAR) 50 MG tablet TAKE ONE TABLET BY MOUTH ONE TIME DAILY 4/6/22   Ar Automatic Reconciliation   nitroGLYCERIN (NITROSTAT) 0.4 MG SL tablet Place 0.4 mg under the tongue 6/17/20   Ar Automatic Reconciliation   omeprazole (PRILOSEC) 40 MG delayed release capsule TAKE ONE CAPSULE BY MOUTH ONE TIME DAILY 3/31/22   Ar Automatic Reconciliation       Future Appointments   Date Time Provider [facility]   1/25/2023  1:15 PM Macie Fleischer, MD [facility]   3/29/2023  2:20 PM Katherine Campo MD [facility] AM[facility]

## 2022-12-05 NOTE — CARE COORDINATION
Ambulatory Care Management Note    Date/Time:  12/5/2022 8:45 AM    Ccm outreached to patient. No answer at this time, message left. Awaiting response. If no response, ccm will outreach next week.

## 2022-12-12 ENCOUNTER — CARE COORDINATION (OUTPATIENT)
Dept: CARE COORDINATION | Facility: CLINIC | Age: 82
End: 2022-12-12

## 2022-12-12 NOTE — CARE COORDINATION
Ambulatory Care Coordination Note  12/12/2022    ACC: Amy Shelton RN    Ccm outreached to patient. Patient states she is doing well. Patient states sob has improved and she is feeling better. Patient states she is eating and drinking well. Patient states no questions or concerns. Ccm discussed that I would outreach next week. Patient agreeable. Offered patient enrollment in the Remote Patient Monitoring (RPM) program for in-home monitoring: NA. Lab Results       None                 Goals Addressed                   This Visit's Progress     Conditions and Symptoms   On track     I will schedule office visits, as directed by my provider. I will notify my provider of any symptoms that indicate a worsening of my condition. Barriers: none  Plan for overcoming my barriers: N/A  Confidence: 9/10  Anticipated Goal Completion Date: 8/6/22                Prior to Admission medications    Medication Sig Start Date End Date Taking? Authorizing Provider   ipratropium-albuterol (DUONEB) 0.5-2.5 (3) MG/3ML SOLN nebulizer solution Inhale 3 mLs into the lungs every 6 hours as needed for Shortness of Breath 10/28/22   Lurena Jeans, APRN - CNP   predniSONE (DELTASONE) 5 MG tablet TAKE ONE TABLET BY MOUTH TWICE A DAY 9/13/22   Laney Wayne MD   furosemide (LASIX) 20 MG tablet One po every other day for fluid retention 7/20/22   Laney Wayne MD   silver sulfADIAZINE (SILVADENE) 1 % cream Apply topically daily.  7/12/22   Lurena Jeans, APRN - CNP   aspirin 81 MG EC tablet Take 81 mg by mouth    Ar Automatic Reconciliation   Cetirizine HCl 10 MG CAPS Take 10 mg by mouth daily    Ar Automatic Reconciliation   vitamin D 25 MCG (1000 UT) CAPS Take 1,000 Units by mouth every evening    Ar Automatic Reconciliation   estradiol (ESTRACE) 0.5 MG tablet TAKE ONE TABLET BY MOUTH ONE TIME DAILY 3/12/22   Ar Automatic Reconciliation   famotidine (PEPCID) 20 MG tablet TAKE ONE TABLET BY MOUTH EVERY NIGHT 3/12/22   Ar Automatic Reconciliation   furosemide (LASIX) 40 MG tablet TAKE ONE TABLET BY MOUTH ONE TIME DAILY IN THE MORNING 3/12/22   Ar Automatic Reconciliation   levothyroxine (SYNTHROID) 150 MCG tablet TAKE 1 TABLET BY MOUTH DAILY BEFORE BREAKFAST 3/10/21   Ar Automatic Reconciliation   losartan (COZAAR) 50 MG tablet TAKE ONE TABLET BY MOUTH ONE TIME DAILY 4/6/22   Ar Automatic Reconciliation   nitroGLYCERIN (NITROSTAT) 0.4 MG SL tablet Place 0.4 mg under the tongue 6/17/20   Ar Automatic Reconciliation   omeprazole (PRILOSEC) 40 MG delayed release capsule TAKE ONE CAPSULE BY MOUTH ONE TIME DAILY 3/31/22   Ar Automatic Reconciliation       Future Appointments   Date Time Provider [facility]   1/25/2023  1:15 PM Jairo Sheffield MD [facility]   3/29/2023  2:20 PM Boy Guardado MD [facility]

## 2022-12-19 ENCOUNTER — CARE COORDINATION (OUTPATIENT)
Dept: CARE COORDINATION | Facility: CLINIC | Age: 82
End: 2022-12-19

## 2022-12-19 NOTE — CARE COORDINATION
Ambulatory Care Management Note        Date/Time:  12/19/2022 9:10 AM      Ccm outreached to patient. No answer at this time, message left. Awaiting response. If no response, ccm will outreach in 2 weeks.

## 2023-01-09 ENCOUNTER — CARE COORDINATION (OUTPATIENT)
Dept: CARE COORDINATION | Facility: CLINIC | Age: 83
End: 2023-01-09

## 2023-01-09 NOTE — CARE COORDINATION
Ambulatory Care Coordination Note  1/9/2023    ACC: Parish Arredondo, RN    Ccm outreached to patient. Patient states she is doing well at this time. Patient states no questions or concerns. Ccm discussed that I would outreach next week. Patient agreeable. Offered patient enrollment in the Remote Patient Monitoring (RPM) program for in-home monitoring: NA. Lab Results       None                 Goals Addressed                   This Visit's Progress     Conditions and Symptoms   On track     I will schedule office visits, as directed by my provider. I will notify my provider of any symptoms that indicate a worsening of my condition. Barriers: none  Plan for overcoming my barriers: N/A  Confidence: 9/10  Anticipated Goal Completion Date: 8/6/22                Prior to Admission medications    Medication Sig Start Date End Date Taking? Authorizing Provider   ipratropium-albuterol (DUONEB) 0.5-2.5 (3) MG/3ML SOLN nebulizer solution Inhale 3 mLs into the lungs every 6 hours as needed for Shortness of Breath 10/28/22   CARLINE Anguiano CNP   predniSONE (DELTASONE) 5 MG tablet TAKE ONE TABLET BY MOUTH TWICE A DAY 9/13/22   Jo Ann Lombardo MD   furosemide (LASIX) 20 MG tablet One po every other day for fluid retention 7/20/22   Jo Ann Lombardo MD   silver sulfADIAZINE (SILVADENE) 1 % cream Apply topically daily.  7/12/22   CARLINE Anguiano CNP   aspirin 81 MG EC tablet Take 81 mg by mouth    Ar Automatic Reconciliation   Cetirizine HCl 10 MG CAPS Take 10 mg by mouth daily    Ar Automatic Reconciliation   vitamin D 25 MCG (1000 UT) CAPS Take 1,000 Units by mouth every evening    Ar Automatic Reconciliation   estradiol (ESTRACE) 0.5 MG tablet TAKE ONE TABLET BY MOUTH ONE TIME DAILY 3/12/22   Ar Automatic Reconciliation   famotidine (PEPCID) 20 MG tablet TAKE ONE TABLET BY MOUTH EVERY NIGHT 3/12/22   Ar Automatic Reconciliation   furosemide (LASIX) 40 MG tablet TAKE ONE TABLET BY MOUTH ONE TIME DAILY IN THE MORNING 3/12/22   Ar Automatic Reconciliation   levothyroxine (SYNTHROID) 150 MCG tablet TAKE 1 TABLET BY MOUTH DAILY BEFORE BREAKFAST 3/10/21   Ar Automatic Reconciliation   losartan (COZAAR) 50 MG tablet TAKE ONE TABLET BY MOUTH ONE TIME DAILY 4/6/22   Ar Automatic Reconciliation   nitroGLYCERIN (NITROSTAT) 0.4 MG SL tablet Place 0.4 mg under the tongue 6/17/20   Ar Automatic Reconciliation   omeprazole (PRILOSEC) 40 MG delayed release capsule TAKE ONE CAPSULE BY MOUTH ONE TIME DAILY 3/31/22   Ar Automatic Reconciliation       Future Appointments   Date Time Provider [facility]   1/25/2023  1:15 PM Nancy Celeste MD [facility]   3/29/2023  2:20 PM Mignon Barreto MD [facility]

## 2023-01-16 ENCOUNTER — CARE COORDINATION (OUTPATIENT)
Dept: CARE COORDINATION | Facility: CLINIC | Age: 83
End: 2023-01-16

## 2023-01-16 NOTE — CARE COORDINATION
Ambulatory Care Coordination Note  1/16/2023    ACC: Wendy Elizabeth, RN    Ccm outreached to patient. Patient states she is doing well at this time. Patient states no questions or concerns. Ccm discussed that I would outreach next week. Patient agreeable. Offered patient enrollment in the Remote Patient Monitoring (RPM) program for in-home monitoring: NA. Lab Results       None                 Goals Addressed                   This Visit's Progress     Conditions and Symptoms   On track     I will schedule office visits, as directed by my provider. I will notify my provider of any symptoms that indicate a worsening of my condition. Barriers: none  Plan for overcoming my barriers: N/A  Confidence: 9/10  Anticipated Goal Completion Date: 8/6/22                Prior to Admission medications    Medication Sig Start Date End Date Taking? Authorizing Provider   ipratropium-albuterol (DUONEB) 0.5-2.5 (3) MG/3ML SOLN nebulizer solution Inhale 3 mLs into the lungs every 6 hours as needed for Shortness of Breath 10/28/22   CARLINE Welch CNP   predniSONE (DELTASONE) 5 MG tablet TAKE ONE TABLET BY MOUTH TWICE A DAY 9/13/22   Silvia Lambert MD   furosemide (LASIX) 20 MG tablet One po every other day for fluid retention 7/20/22   Silvia Lambert MD   silver sulfADIAZINE (SILVADENE) 1 % cream Apply topically daily.  7/12/22   CARLINE Welch CNP   aspirin 81 MG EC tablet Take 81 mg by mouth    Ar Automatic Reconciliation   Cetirizine HCl 10 MG CAPS Take 10 mg by mouth daily    Ar Automatic Reconciliation   vitamin D 25 MCG (1000 UT) CAPS Take 1,000 Units by mouth every evening    Ar Automatic Reconciliation   estradiol (ESTRACE) 0.5 MG tablet TAKE ONE TABLET BY MOUTH ONE TIME DAILY 3/12/22   Ar Automatic Reconciliation   famotidine (PEPCID) 20 MG tablet TAKE ONE TABLET BY MOUTH EVERY NIGHT 3/12/22   Ar Automatic Reconciliation   furosemide (LASIX) 40 MG tablet TAKE ONE TABLET BY MOUTH ONE TIME DAILY IN THE MORNING 3/12/22   Ar Automatic Reconciliation   levothyroxine (SYNTHROID) 150 MCG tablet TAKE 1 TABLET BY MOUTH DAILY BEFORE BREAKFAST 3/10/21   Ar Automatic Reconciliation   losartan (COZAAR) 50 MG tablet TAKE ONE TABLET BY MOUTH ONE TIME DAILY 4/6/22   Ar Automatic Reconciliation   nitroGLYCERIN (NITROSTAT) 0.4 MG SL tablet Place 0.4 mg under the tongue 6/17/20   Ar Automatic Reconciliation   omeprazole (PRILOSEC) 40 MG delayed release capsule TAKE ONE CAPSULE BY MOUTH ONE TIME DAILY 3/31/22   Ar Automatic Reconciliation       Future Appointments   Date Time Provider Department Center   1/25/2023  1:15 PM Abhishek Cunningham MD [facility]   3/29/2023  2:20 PM Loraine Farnsworth MD [facility]

## 2023-01-23 ENCOUNTER — CARE COORDINATION (OUTPATIENT)
Dept: CARE COORDINATION | Facility: CLINIC | Age: 83
End: 2023-01-23

## 2023-01-23 NOTE — CARE COORDINATION
Ambulatory Care Coordination Note  1/23/2023    ACC: Marlene Ramirez, RN    Ccm outreached to patient. Patient states she is doing well at this time. Patient states she has podiatrist appointment today. Patient also has follow up with cardiology Wednesday. Patient states no questions or concerns. [facility] discussed that I would outreach next week. Patient agreeable. Offered patient enrollment in the Remote Patient Monitoring (RPM) program for in-home monitoring: NA. Lab Results       None                 Goals Addressed                   This Visit's Progress     Conditions and Symptoms   On track     I will schedule office visits, as directed by my provider. I will notify my provider of any symptoms that indicate a worsening of my condition. Barriers: none  Plan for overcoming my barriers: N/A  Confidence: 9/10  Anticipated Goal Completion Date: 8/6/22                Prior to Admission medications    Medication Sig Start Date End Date Taking? Authorizing Provider   ipratropium-albuterol (DUONEB) 0.5-2.5 (3) MG/3ML SOLN nebulizer solution Inhale 3 mLs into the lungs every 6 hours as needed for Shortness of Breath 10/28/22   CARLINE Clinton CNP   predniSONE (DELTASONE) 5 MG tablet TAKE ONE TABLET BY MOUTH TWICE A DAY 9/13/22   Moraima Barrios MD   furosemide (LASIX) 20 MG tablet One po every other day for fluid retention 7/20/22   Moraima Barrios MD   silver sulfADIAZINE (SILVADENE) 1 % cream Apply topically daily.  7/12/22   CARLINE Clinton CNP   aspirin 81 MG EC tablet Take 81 mg by mouth    Ar Automatic Reconciliation   Cetirizine HCl 10 MG CAPS Take 10 mg by mouth daily    Ar Automatic Reconciliation   vitamin D 25 MCG (1000 UT) CAPS Take 1,000 Units by mouth every evening    Ar Automatic Reconciliation   estradiol (ESTRACE) 0.5 MG tablet TAKE ONE TABLET BY MOUTH ONE TIME DAILY 3/12/22   Ar Automatic Reconciliation   famotidine (PEPCID) 20 MG tablet TAKE ONE TABLET BY MOUTH EVERY NIGHT 3/12/22   Ar Automatic Reconciliation   furosemide (LASIX) 40 MG tablet TAKE ONE TABLET BY MOUTH ONE TIME DAILY IN THE MORNING 3/12/22   Ar Automatic Reconciliation   levothyroxine (SYNTHROID) 150 MCG tablet TAKE 1 TABLET BY MOUTH DAILY BEFORE BREAKFAST 3/10/21   Ar Automatic Reconciliation   losartan (COZAAR) 50 MG tablet TAKE ONE TABLET BY MOUTH ONE TIME DAILY 4/6/22   Ar Automatic Reconciliation   nitroGLYCERIN (NITROSTAT) 0.4 MG SL tablet Place 0.4 mg under the tongue 6/17/20   Ar Automatic Reconciliation   omeprazole (PRILOSEC) 40 MG delayed release capsule TAKE ONE CAPSULE BY MOUTH ONE TIME DAILY 3/31/22   Ar Automatic Reconciliation       Future Appointments   Date Time Provider Tuan Singleton   1/25/2023  1:15 PM Suresh Gaming MD [facility] AM[facility]   3/29/2023  2:20 PM Jasmina Walker MD [facility] AMB

## 2023-01-25 ENCOUNTER — OFFICE VISIT (OUTPATIENT)
Dept: CARDIOLOGY CLINIC | Age: 83
End: 2023-01-25
Payer: MEDICARE

## 2023-01-25 VITALS
WEIGHT: 138.7 LBS | DIASTOLIC BLOOD PRESSURE: 80 MMHG | HEART RATE: 62 BPM | SYSTOLIC BLOOD PRESSURE: 132 MMHG | HEIGHT: 64 IN | BODY MASS INDEX: 23.68 KG/M2

## 2023-01-25 DIAGNOSIS — I25.119 ATHEROSCLEROSIS OF NATIVE CORONARY ARTERY OF NATIVE HEART WITH ANGINA PECTORIS (HCC): Primary | ICD-10-CM

## 2023-01-25 DIAGNOSIS — I10 ESSENTIAL HYPERTENSION: ICD-10-CM

## 2023-01-25 DIAGNOSIS — E78.2 MIXED HYPERLIPIDEMIA: ICD-10-CM

## 2023-01-25 DIAGNOSIS — R00.2 PALPITATIONS: ICD-10-CM

## 2023-01-25 PROCEDURE — G8400 PT W/DXA NO RESULTS DOC: HCPCS | Performed by: INTERNAL MEDICINE

## 2023-01-25 PROCEDURE — 1123F ACP DISCUSS/DSCN MKR DOCD: CPT | Performed by: INTERNAL MEDICINE

## 2023-01-25 PROCEDURE — G8428 CUR MEDS NOT DOCUMENT: HCPCS | Performed by: INTERNAL MEDICINE

## 2023-01-25 PROCEDURE — 4004F PT TOBACCO SCREEN RCVD TLK: CPT | Performed by: INTERNAL MEDICINE

## 2023-01-25 PROCEDURE — 3075F SYST BP GE 130 - 139MM HG: CPT | Performed by: INTERNAL MEDICINE

## 2023-01-25 PROCEDURE — G8484 FLU IMMUNIZE NO ADMIN: HCPCS | Performed by: INTERNAL MEDICINE

## 2023-01-25 PROCEDURE — 99214 OFFICE O/P EST MOD 30 MIN: CPT | Performed by: INTERNAL MEDICINE

## 2023-01-25 PROCEDURE — 3079F DIAST BP 80-89 MM HG: CPT | Performed by: INTERNAL MEDICINE

## 2023-01-25 PROCEDURE — G8420 CALC BMI NORM PARAMETERS: HCPCS | Performed by: INTERNAL MEDICINE

## 2023-01-25 PROCEDURE — 1090F PRES/ABSN URINE INCON ASSESS: CPT | Performed by: INTERNAL MEDICINE

## 2023-01-25 PROCEDURE — 93000 ELECTROCARDIOGRAM COMPLETE: CPT | Performed by: INTERNAL MEDICINE

## 2023-01-25 NOTE — PROGRESS NOTES
[facility] CARDIOLOGY  [address], [id number]  PHONE: [phone]      23    NAME:  Jennifer Young  : 1940  MRN: [id number]       SUBJECTIVE:   Jennifer Young is a 79 y.o. female seen for a follow up visit regarding the following:     Chief Complaint   Patient presents with    Coronary Artery Disease         HPI:    No cp or johansen. No orthopnea or pnd. No palpitations or syncope. Past Medical History, Past Surgical History, Family history, Social History, and Medications were all reviewed with the patient today and updated as necessary. Current Outpatient Medications   Medication Sig Dispense Refill    MAGNESIUM PO Take by mouth      Calcium Carb-Cholecalciferol (CALCIUM 1000 + D PO) Take by mouth      ipratropium-albuterol (DUONEB) 0.5-2.5 (3) MG/3ML SOLN nebulizer solution Inhale 3 mLs into the lungs every 6 hours as needed for Shortness of Breath 360 mL 3    predniSONE (DELTASONE) 5 MG tablet TAKE ONE TABLET BY MOUTH TWICE A DAY (Patient taking differently: Take 10 mg by mouth 2 times daily) 180 tablet 2    furosemide (LASIX) 20 MG tablet One po every other day for fluid retention 30 tablet 3    silver sulfADIAZINE (SILVADENE) 1 % cream Apply topically daily.  400 g 0    aspirin 81 MG EC tablet Take 81 mg by mouth      vitamin D 25 MCG (1000 UT) CAPS Take 1,000 Units by mouth every evening      estradiol (ESTRACE) 0.5 MG tablet TAKE ONE TABLET BY MOUTH ONE TIME DAILY      famotidine (PEPCID) 20 MG tablet TAKE ONE TABLET BY MOUTH EVERY NIGHT      furosemide (LASIX) 40 MG tablet TAKE ONE TABLET BY MOUTH ONE TIME DAILY IN THE MORNING      levothyroxine (SYNTHROID) 150 MCG tablet TAKE 1 TABLET BY MOUTH DAILY BEFORE BREAKFAST      losartan (COZAAR) 50 MG tablet TAKE ONE TABLET BY MOUTH ONE TIME DAILY      nitroGLYCERIN (NITROSTAT) 0.4 MG SL tablet Place 0.4 mg under the tongue      omeprazole (PRILOSEC) 40 MG delayed release capsule TAKE ONE CAPSULE BY MOUTH ONE TIME DAILY       Current Facility-Administered Medications   Medication Dose Route Frequency Provider Last Rate Last Admin    methylPREDNISolone sodium (SOLU-MEDROL) injection 40 mg  40 mg IntraMUSCular Daily Odell Valdes MD   40 mg at 11/23/22 1530               Social History     Tobacco Use    Smoking status: Every Day     Packs/day: 1.00     Types: Cigarettes    Smokeless tobacco: Never    Tobacco comments:     Quit smoking: would like to quit but don't think that she can.  11/28/17   Substance Use Topics    Alcohol use: No              PHYSICAL EXAM:   /80   Pulse 62   Ht 5' 4\" (1.626 m)   Wt 138 lb 11.2 oz (62.9 kg)   BMI 23.81 kg/m²    Constitutional: Oriented to person, place, and time. Appears well-developed and well-nourished. Head: Normocephalic and atraumatic. Neck: Neck supple. Cardiovascular: Normal rate and regular rhythm with no murmur -No JVP  Pulmonary/Chest: Breath sounds normal.   Abdominal: Soft. Musculoskeletal: No edema. Neurological: Alert and oriented to person, place, and time. Skin: Skin is warm and dry. Psychiatric: Normal mood and affect. Vitals reviewed. Wt Readings from Last 3 Encounters:   01/25/23 138 lb 11.2 oz (62.9 kg)   11/23/22 147 lb 3.2 oz (66.8 kg)   09/13/22 155 lb 3.2 oz (70.4 kg)   Ekg-Sinus  Rhythm   Nonspecific T-abnormality. hr 62    Medical problems and test results were reviewed with the patient today. ASSESSMENT and PLAN    1. Atherosclerosis of native coronary artery of native heart with angina pectoris (HCC)  Stable. Continue asa    - EKG 12 Lead    2. Palpitations  Stable. Continue to monitor      3. Essential hypertension  Stable. Continue cozaar      4. Mixed hyperlipidemia  Stable. Continue to monitor         Return in about 6 months (around 7/25/2023).          Josias Jules MD  1/25/2023  1:16 PM

## 2023-01-30 ENCOUNTER — CARE COORDINATION (OUTPATIENT)
Dept: CARE COORDINATION | Facility: CLINIC | Age: 83
End: 2023-01-30

## 2023-01-30 NOTE — CARE COORDINATION
Ambulatory Care Coordination Note  1/30/2023    ACC: Tristan Fischer, RN    Ccm outreached to patient. Patient states she is doing well at this time. Patient states no questions or concerns. Ccm discussed that I would outreach next week. Patient agreeable. Offered patient enrollment in the Remote Patient Monitoring (RPM) program for in-home monitoring: NA. Lab Results       None                 Goals Addressed                   This Visit's Progress     Conditions and Symptoms   On track     I will schedule office visits, as directed by my provider. I will notify my provider of any symptoms that indicate a worsening of my condition. Barriers: none  Plan for overcoming my barriers: N/A  Confidence: 9/10  Anticipated Goal Completion Date: 8/6/22                Prior to Admission medications    Medication Sig Start Date End Date Taking? Authorizing Provider   MAGNESIUM PO Take by mouth    Historical Provider, MD   Calcium Carb-Cholecalciferol (CALCIUM 1000 + D PO) Take by mouth    Historical Provider, MD   ipratropium-albuterol (DUONEB) 0.5-2.5 (3) MG/3ML SOLN nebulizer solution Inhale 3 mLs into the lungs every 6 hours as needed for Shortness of Breath 10/28/22   CARLINE Groves CNP   predniSONE (DELTASONE) 5 MG tablet TAKE ONE TABLET BY MOUTH TWICE A DAY  Patient taking differently: Take 10 mg by mouth 2 times daily 9/13/22   Faby Flores MD   furosemide (LASIX) 20 MG tablet One po every other day for fluid retention 7/20/22   Faby Flores MD   silver sulfADIAZINE (SILVADENE) 1 % cream Apply topically daily.  7/12/22   CARLINE Groves CNP   aspirin 81 MG EC tablet Take 81 mg by mouth    Ar Automatic Reconciliation   vitamin D 25 MCG (1000 UT) CAPS Take 1,000 Units by mouth every evening    Ar Automatic Reconciliation   estradiol (ESTRACE) 0.5 MG tablet TAKE ONE TABLET BY MOUTH ONE TIME DAILY 3/12/22   Ar Automatic Reconciliation   famotidine (PEPCID) 20 MG tablet TAKE ONE TABLET BY MOUTH EVERY NIGHT 3/12/22   Ar Automatic Reconciliation   furosemide (LASIX) 40 MG tablet TAKE ONE TABLET BY MOUTH ONE TIME DAILY IN THE MORNING 3/12/22   Ar Automatic Reconciliation   levothyroxine (SYNTHROID) 150 MCG tablet TAKE 1 TABLET BY MOUTH DAILY BEFORE BREAKFAST 3/10/21   Ar Automatic Reconciliation   losartan (COZAAR) 50 MG tablet TAKE ONE TABLET BY MOUTH ONE TIME DAILY 4/6/22   Ar Automatic Reconciliation   nitroGLYCERIN (NITROSTAT) 0.4 MG SL tablet Place 0.4 mg under the tongue 6/17/20   Ar Automatic Reconciliation   omeprazole (PRILOSEC) 40 MG delayed release capsule TAKE ONE CAPSULE BY MOUTH ONE TIME DAILY 3/31/22   Ar Automatic Reconciliation       Future Appointments   Date Time Provider Tuan Singleton   3/29/2023  2:20 PM [facility]ilson [facility]   7/31/2023  3:45 PM Hayden Cisneros MD [facility] AM[facility]

## 2023-02-06 ENCOUNTER — CARE COORDINATION (OUTPATIENT)
Dept: CARE COORDINATION | Facility: CLINIC | Age: 83
End: 2023-02-06

## 2023-02-06 RX ORDER — PREDNISONE 10 MG/1
10 TABLET ORAL 2 TIMES DAILY
Qty: 100 TABLET | Refills: 0 | Status: SHIPPED | OUTPATIENT
Start: 2023-02-06 | End: 2023-03-28

## 2023-02-06 NOTE — CARE COORDINATION
Ambulatory Care Coordination Note  2/6/2023    ACC: Carol Vazquez, RN    Ccm outreached to patient. Patient states she is doing well at this time. Patient states no questions or concerns. Ccm discussed that I would outreach next week. Patient agreeable. Offered patient enrollment in the Remote Patient Monitoring (RPM) program for in-home monitoring: NA. Lab Results       None                 Goals Addressed                   This Visit's Progress     Conditions and Symptoms   On track     I will schedule office visits, as directed by my provider. I will notify my provider of any symptoms that indicate a worsening of my condition. Barriers: none  Plan for overcoming my barriers: N/A  Confidence: 9/10  Anticipated Goal Completion Date: 8/6/22                Prior to Admission medications    Medication Sig Start Date End Date Taking? Authorizing Provider   MAGNESIUM PO Take by mouth    Historical Provider, MD   Calcium Carb-Cholecalciferol (CALCIUM 1000 + D PO) Take by mouth    Historical Provider, MD   ipratropium-albuterol (DUONEB) 0.5-2.5 (3) MG/3ML SOLN nebulizer solution Inhale 3 mLs into the lungs every 6 hours as needed for Shortness of Breath 10/28/22   CARLINE Church CNP   predniSONE (DELTASONE) 5 MG tablet TAKE ONE TABLET BY MOUTH TWICE A DAY  Patient taking differently: Take 10 mg by mouth 2 times daily 9/13/22   Johan Lamar MD   furosemide (LASIX) 20 MG tablet One po every other day for fluid retention 7/20/22   Johan Lamar MD   silver sulfADIAZINE (SILVADENE) 1 % cream Apply topically daily.  7/12/22   CARLINE Church CNP   aspirin 81 MG EC tablet Take 81 mg by mouth    Ar Automatic Reconciliation   vitamin D 25 MCG (1000 UT) CAPS Take 1,000 Units by mouth every evening    Ar Automatic Reconciliation   estradiol (ESTRACE) 0.5 MG tablet TAKE ONE TABLET BY MOUTH ONE TIME DAILY 3/12/22   Ar Automatic Reconciliation   famotidine (PEPCID) 20 MG tablet TAKE ONE TABLET BY MOUTH EVERY NIGHT 3/12/22   Ar Automatic Reconciliation   furosemide (LASIX) 40 MG tablet TAKE ONE TABLET BY MOUTH ONE TIME DAILY IN THE MORNING 3/12/22   Ar Automatic Reconciliation   levothyroxine (SYNTHROID) 150 MCG tablet TAKE 1 TABLET BY MOUTH DAILY BEFORE BREAKFAST 3/10/21   Ar Automatic Reconciliation   losartan (COZAAR) 50 MG tablet TAKE ONE TABLET BY MOUTH ONE TIME DAILY 4/6/22   Ar Automatic Reconciliation   nitroGLYCERIN (NITROSTAT) 0.4 MG SL tablet Place 0.4 mg under the tongue 6/17/20   Ar Automatic Reconciliation   omeprazole (PRILOSEC) 40 MG delayed release capsule TAKE ONE CAPSULE BY MOUTH ONE TIME DAILY 3/31/22   Ar Automatic Reconciliation       Future Appointments   Date Time Provider Tuan Toribioi   3/29/2023  2:20 PM Abiola De León MD [facility] AMB   7/31/2023  3:45 PM Marian Hull MD [facility] AMB

## 2023-02-13 ENCOUNTER — CARE COORDINATION (OUTPATIENT)
Dept: CARE COORDINATION | Facility: CLINIC | Age: 83
End: 2023-02-13

## 2023-02-13 NOTE — CARE COORDINATION
Ambulatory Care Management Note        Date/Time:  2/13/2023 8:02 AM    Ccm outreached to patient. No answer at this time, message left. Awaiting response. If no response, ccm will outreach next week.

## 2023-02-20 ENCOUNTER — CARE COORDINATION (OUTPATIENT)
Dept: CARE COORDINATION | Facility: CLINIC | Age: 83
End: 2023-02-20

## 2023-02-20 NOTE — CARE COORDINATION
Ambulatory Care Coordination Note  2023    [facility] [place]     ACM contacted the patient by telephone. Verified name and  with patient as identifiers. Provided introduction to self, and explanation of the ACM role. Challenges to be reviewed by the provider   Additional needs identified to be addressed with provider: No  none               Method of communication with provider: phone. ACM: Karyle Bees, RN    Ccm outreached to patient. Patient states she is doing well at this time. Patient states no questions or concerns. Ccm discussed that I would outreach next week. Patient agreeable. Offered patient enrollment in the Remote Patient Monitoring (RPM) program for in-home monitoring: NA. Lab Results       None                 Goals Addressed                   This Visit's Progress     Conditions and Symptoms   On track     I will schedule office visits, as directed by my provider. I will notify my provider of any symptoms that indicate a worsening of my condition.     Barriers: none  Plan for overcoming my barriers: N/A  Confidence: /10  Anticipated Goal Completion Date: 22                Future Appointments   Date Time Provider Tuan Singleton   3/29/2023  2:20 PM Jacob Farrar MD [facility] AMB   2023  3:45 PM Azeb Huitron MD [facility] AMB

## 2023-02-27 ENCOUNTER — CARE COORDINATION (OUTPATIENT)
Dept: CARE COORDINATION | Facility: CLINIC | Age: 83
End: 2023-02-27

## 2023-02-27 NOTE — CARE COORDINATION
Ambulatory Care Coordination Note  2023    Patient Current Location:  [facility]     ACM contacted the patient by telephone. Verified name and  with patient as identifiers. Provided introduction to self, and explanation of the ACM role. Challenges to be reviewed by the provider   Additional needs identified to be addressed with provider: No  none               Method of communication with provider: none. ACM: Omar Samuels RN    Ccm outreached to patient. Patient states she is doing well at this time. Patient states she is breathing well and ambulating well. Patient states she is eating and drinking well. Patient states no questions or concerns. Ccm discussed that I would outreach next week. Patient agreeable. Offered patient enrollment in the Remote Patient Monitoring (RPM) program for in-home monitoring: NA. Lab Results       None                 Goals Addressed                   This Visit's Progress     Conditions and Symptoms   On track     I will schedule office visits, as directed by my provider. I will notify my provider of any symptoms that indicate a worsening of my condition.     Barriers: none  Plan for overcoming my barriers: N/A  Confidence: 9/10  Anticipated Goal Completion Date: 22                Future Appointments   Date Time Provider Tuan Singleton   3/29/2023  2:20 PM MD PHILIP Cast [facility] AMB   2023  3:45 PM MD MARIAH JavedDG [facility] AMB

## 2023-03-06 ENCOUNTER — CARE COORDINATION (OUTPATIENT)
Dept: CARE COORDINATION | Facility: CLINIC | Age: 83
End: 2023-03-06

## 2023-03-06 NOTE — CARE COORDINATION
Ambulatory Care Coordination Note  3/6/2023    Patient Current Location:  [place]     ACM contacted the patient by telephone. Verified name and  with patient as identifiers. Provided introduction to self, and explanation of the ACM role. Challenges to be reviewed by the provider   Additional needs identified to be addressed with provider: No  none               Method of communication with provider: none. ACM: Chio Avalos RN    Ccm outreached to patient. Patient states she is doing well at this time. Patient states no questions or concerns. Ccm discussed that I would outreach in 2 weeks as ccm is on pto. Patient agreeable. Offered patient enrollment in the Remote Patient Monitoring (RPM) program for in-home monitoring: NA. Lab Results       None                 Goals Addressed                   This Visit's Progress     Conditions and Symptoms   On track     I will schedule office visits, as directed by my provider. I will notify my provider of any symptoms that indicate a worsening of my condition.     Barriers: none  Plan for overcoming my barriers: N/A  Confidence: 9/10  Anticipated Goal Completion Date: 22                Future Appointments   Date Time Provider Tuan Singleton   3/29/2023  2:20 PM Alphonso Parker MD [facility] AMB   2023  3:45 PM Pardeep Velazquez MD [facility] AMB

## 2023-03-15 RX ORDER — OMEPRAZOLE 40 MG/1
CAPSULE, DELAYED RELEASE ORAL
Qty: 90 CAPSULE | Refills: 1 | Status: SHIPPED | OUTPATIENT
Start: 2023-03-15

## 2023-03-15 RX ORDER — FAMOTIDINE 20 MG/1
TABLET, FILM COATED ORAL
Qty: 90 TABLET | Refills: 1 | Status: SHIPPED | OUTPATIENT
Start: 2023-03-15

## 2023-03-15 RX ORDER — ESTRADIOL 0.5 MG/1
TABLET ORAL
Qty: 90 TABLET | Refills: 1 | Status: SHIPPED | OUTPATIENT
Start: 2023-03-15

## 2023-03-15 RX ORDER — LOSARTAN POTASSIUM 50 MG/1
TABLET ORAL
Qty: 90 TABLET | Refills: 1 | Status: SHIPPED | OUTPATIENT
Start: 2023-03-15

## 2023-03-16 RX ORDER — FAMOTIDINE 20 MG/1
TABLET, FILM COATED ORAL
Qty: 90 TABLET | Refills: 3 | OUTPATIENT
Start: 2023-03-16

## 2023-03-27 ENCOUNTER — CARE COORDINATION (OUTPATIENT)
Dept: CARE COORDINATION | Facility: CLINIC | Age: 83
End: 2023-03-27

## 2023-03-27 NOTE — CARE COORDINATION
Ambulatory Care Coordination Note  3/27/2023    Patient Current Location:  [place]     ACM contacted the patient by telephone. Verified name and  with patient as identifiers. Provided introduction to self, and explanation of the ACM role. Challenges to be reviewed by the provider   Additional needs identified to be addressed with provider: No  none               Method of communication with provider: none. ACM: Hosea Hickey RN    Ccm outreached to patient. Patient states she is doing well at this time. Patient states her leg swelling is coming down some. Patient states no questions or concerns. Ccm discussed that I would outreach next week. Patient agreeable. Offered patient enrollment in the Remote Patient Monitoring (RPM) program for in-home monitoring: NA. Lab Results       None                 Goals Addressed                   This Visit's Progress     Conditions and Symptoms   On track     I will schedule office visits, as directed by my provider. I will notify my provider of any symptoms that indicate a worsening of my condition.     Barriers: none  Plan for overcoming my barriers: N/A  Confidence: 9/10  Anticipated Goal Completion Date: 22                Future Appointments   Date Time Provider Tuan Singleton   3/29/2023  2:20 PM Buzz Finley MD [facility] AMB   2023  3:45 PM Jai Drew MD [facility]

## 2023-03-29 ENCOUNTER — OFFICE VISIT (OUTPATIENT)
Dept: INTERNAL MEDICINE CLINIC | Facility: CLINIC | Age: 83
End: 2023-03-29
Payer: MEDICARE

## 2023-03-29 VITALS
HEIGHT: 64 IN | SYSTOLIC BLOOD PRESSURE: 132 MMHG | WEIGHT: 139.2 LBS | DIASTOLIC BLOOD PRESSURE: 80 MMHG | BODY MASS INDEX: 23.76 KG/M2

## 2023-03-29 DIAGNOSIS — M35.3 POLYMYALGIA RHEUMATICA (HCC): Primary | ICD-10-CM

## 2023-03-29 DIAGNOSIS — E78.2 MIXED HYPERLIPIDEMIA: ICD-10-CM

## 2023-03-29 DIAGNOSIS — M48.062 SPINAL STENOSIS OF LUMBAR REGION WITH NEUROGENIC CLAUDICATION: ICD-10-CM

## 2023-03-29 DIAGNOSIS — E03.9 ACQUIRED HYPOTHYROIDISM: ICD-10-CM

## 2023-03-29 DIAGNOSIS — J44.1 CHRONIC OBSTRUCTIVE PULMONARY DISEASE WITH ACUTE EXACERBATION (HCC): ICD-10-CM

## 2023-03-29 DIAGNOSIS — I10 ESSENTIAL HYPERTENSION: ICD-10-CM

## 2023-03-29 DIAGNOSIS — K21.9 GASTROESOPHAGEAL REFLUX DISEASE WITHOUT ESOPHAGITIS: ICD-10-CM

## 2023-03-29 PROBLEM — K61.39 ISCHIORECTAL ABSCESS: Status: RESOLVED | Noted: 2020-08-03 | Resolved: 2023-03-29

## 2023-03-29 LAB
ALBUMIN SERPL-MCNC: 3.9 G/DL (ref 3.2–4.6)
ALBUMIN/GLOB SERPL: 1.4 (ref 0.4–1.6)
ALP SERPL-CCNC: 51 U/L (ref 50–136)
ALT SERPL-CCNC: 20 U/L (ref 12–65)
ANION GAP SERPL CALC-SCNC: 5 MMOL/L (ref 2–11)
AST SERPL-CCNC: 14 U/L (ref 15–37)
BASOPHILS # BLD: 0.1 K/UL (ref 0–0.2)
BASOPHILS NFR BLD: 1 % (ref 0–2)
BILIRUB SERPL-MCNC: 0.4 MG/DL (ref 0.2–1.1)
BUN SERPL-MCNC: 18 MG/DL (ref 8–23)
CALCIUM SERPL-MCNC: 9.7 MG/DL (ref 8.3–10.4)
CHLORIDE SERPL-SCNC: 107 MMOL/L (ref 101–110)
CHOLEST SERPL-MCNC: 257 MG/DL
CO2 SERPL-SCNC: 28 MMOL/L (ref 21–32)
CREAT SERPL-MCNC: 0.7 MG/DL (ref 0.6–1)
DIFFERENTIAL METHOD BLD: ABNORMAL
EOSINOPHIL # BLD: 0.1 K/UL (ref 0–0.8)
EOSINOPHIL NFR BLD: 1 % (ref 0.5–7.8)
ERYTHROCYTE [DISTWIDTH] IN BLOOD BY AUTOMATED COUNT: 14.8 % (ref 11.9–14.6)
GLOBULIN SER CALC-MCNC: 2.7 G/DL (ref 2.8–4.5)
GLUCOSE SERPL-MCNC: 79 MG/DL (ref 65–100)
HCT VFR BLD AUTO: 41.4 % (ref 35.8–46.3)
HDLC SERPL-MCNC: 49 MG/DL (ref 40–60)
HDLC SERPL: 5.2
HGB BLD-MCNC: 13.1 G/DL (ref 11.7–15.4)
IMM GRANULOCYTES # BLD AUTO: 0.3 K/UL (ref 0–0.5)
IMM GRANULOCYTES NFR BLD AUTO: 3 % (ref 0–5)
LDLC SERPL CALC-MCNC: 131 MG/DL
LYMPHOCYTES # BLD: 3.9 K/UL (ref 0.5–4.6)
LYMPHOCYTES NFR BLD: 30 % (ref 13–44)
MCH RBC QN AUTO: 30.8 PG (ref 26.1–32.9)
MCHC RBC AUTO-ENTMCNC: 31.6 G/DL (ref 31.4–35)
MCV RBC AUTO: 97.4 FL (ref 82–102)
MONOCYTES # BLD: 1 K/UL (ref 0.1–1.3)
MONOCYTES NFR BLD: 8 % (ref 4–12)
NEUTS SEG # BLD: 7.4 K/UL (ref 1.7–8.2)
NEUTS SEG NFR BLD: 57 % (ref 43–78)
NRBC # BLD: 0 K/UL (ref 0–0.2)
PLATELET # BLD AUTO: 381 K/UL (ref 150–450)
PMV BLD AUTO: 9.6 FL (ref 9.4–12.3)
POTASSIUM SERPL-SCNC: 3.6 MMOL/L (ref 3.5–5.1)
PROT SERPL-MCNC: 6.6 G/DL (ref 6.3–8.2)
RBC # BLD AUTO: 4.25 M/UL (ref 4.05–5.2)
SODIUM SERPL-SCNC: 140 MMOL/L (ref 133–143)
TRIGL SERPL-MCNC: 385 MG/DL (ref 35–150)
TSH, 3RD GENERATION: 0.82 UIU/ML (ref 0.36–3.74)
VLDLC SERPL CALC-MCNC: 77 MG/DL (ref 6–23)
WBC # BLD AUTO: 12.8 K/UL (ref 4.3–11.1)

## 2023-03-29 PROCEDURE — 3075F SYST BP GE 130 - 139MM HG: CPT | Performed by: INTERNAL MEDICINE

## 2023-03-29 PROCEDURE — G8484 FLU IMMUNIZE NO ADMIN: HCPCS | Performed by: INTERNAL MEDICINE

## 2023-03-29 PROCEDURE — 99214 OFFICE O/P EST MOD 30 MIN: CPT | Performed by: INTERNAL MEDICINE

## 2023-03-29 PROCEDURE — G8400 PT W/DXA NO RESULTS DOC: HCPCS | Performed by: INTERNAL MEDICINE

## 2023-03-29 PROCEDURE — 3079F DIAST BP 80-89 MM HG: CPT | Performed by: INTERNAL MEDICINE

## 2023-03-29 PROCEDURE — G8420 CALC BMI NORM PARAMETERS: HCPCS | Performed by: INTERNAL MEDICINE

## 2023-03-29 PROCEDURE — 1090F PRES/ABSN URINE INCON ASSESS: CPT | Performed by: INTERNAL MEDICINE

## 2023-03-29 PROCEDURE — 3023F SPIROM DOC REV: CPT | Performed by: INTERNAL MEDICINE

## 2023-03-29 PROCEDURE — 1123F ACP DISCUSS/DSCN MKR DOCD: CPT | Performed by: INTERNAL MEDICINE

## 2023-03-29 PROCEDURE — 4004F PT TOBACCO SCREEN RCVD TLK: CPT | Performed by: INTERNAL MEDICINE

## 2023-03-29 PROCEDURE — G8427 DOCREV CUR MEDS BY ELIG CLIN: HCPCS | Performed by: INTERNAL MEDICINE

## 2023-03-29 RX ORDER — OMEPRAZOLE 40 MG/1
CAPSULE, DELAYED RELEASE ORAL
Qty: 90 CAPSULE | Refills: 2 | Status: SHIPPED | OUTPATIENT
Start: 2023-03-29

## 2023-03-29 RX ORDER — LEVOTHYROXINE SODIUM 0.15 MG/1
150 TABLET ORAL DAILY
Qty: 90 TABLET | Refills: 2 | Status: SHIPPED | OUTPATIENT
Start: 2023-03-29

## 2023-03-29 RX ORDER — FAMOTIDINE 20 MG/1
20 TABLET, FILM COATED ORAL NIGHTLY
Qty: 90 TABLET | Refills: 2 | Status: SHIPPED | OUTPATIENT
Start: 2023-03-29

## 2023-03-29 RX ORDER — FUROSEMIDE 20 MG/1
TABLET ORAL
Qty: 30 TABLET | Refills: 3 | Status: SHIPPED | OUTPATIENT
Start: 2023-03-29

## 2023-03-29 RX ORDER — PREDNISONE 1 MG/1
5 TABLET ORAL 2 TIMES DAILY
Qty: 180 TABLET | Refills: 2 | Status: SHIPPED | OUTPATIENT
Start: 2023-03-29

## 2023-03-29 RX ORDER — ESTRADIOL 0.5 MG/1
0.5 TABLET ORAL DAILY
Qty: 90 TABLET | Refills: 2 | Status: SHIPPED | OUTPATIENT
Start: 2023-03-29

## 2023-03-29 RX ORDER — FUROSEMIDE 40 MG/1
40 TABLET ORAL DAILY
Qty: 90 TABLET | Refills: 2 | Status: SHIPPED | OUTPATIENT
Start: 2023-03-29

## 2023-03-29 RX ORDER — LOSARTAN POTASSIUM 50 MG/1
50 TABLET ORAL DAILY
Qty: 90 TABLET | Refills: 2 | Status: SHIPPED | OUTPATIENT
Start: 2023-03-29

## 2023-03-29 RX ORDER — IPRATROPIUM BROMIDE AND ALBUTEROL SULFATE 2.5; .5 MG/3ML; MG/3ML
3 SOLUTION RESPIRATORY (INHALATION) EVERY 6 HOURS PRN
Qty: 360 ML | Refills: 3 | Status: SHIPPED | OUTPATIENT
Start: 2023-03-29

## 2023-03-29 SDOH — ECONOMIC STABILITY: HOUSING INSECURITY
IN THE LAST 12 MONTHS, WAS THERE A TIME WHEN YOU DID NOT HAVE A STEADY PLACE TO SLEEP OR SLEPT IN A SHELTER (INCLUDING NOW)?: NO

## 2023-03-29 SDOH — ECONOMIC STABILITY: FOOD INSECURITY: WITHIN THE PAST 12 MONTHS, THE FOOD YOU BOUGHT JUST DIDN'T LAST AND YOU DIDN'T HAVE MONEY TO GET MORE.: NEVER TRUE

## 2023-03-29 SDOH — ECONOMIC STABILITY: INCOME INSECURITY: HOW HARD IS IT FOR YOU TO PAY FOR THE VERY BASICS LIKE FOOD, HOUSING, MEDICAL CARE, AND HEATING?: NOT HARD AT ALL

## 2023-03-29 SDOH — ECONOMIC STABILITY: FOOD INSECURITY: WITHIN THE PAST 12 MONTHS, YOU WORRIED THAT YOUR FOOD WOULD RUN OUT BEFORE YOU GOT MONEY TO BUY MORE.: NEVER TRUE

## 2023-03-29 ASSESSMENT — ENCOUNTER SYMPTOMS
SHORTNESS OF BREATH: 1
BACK PAIN: 1

## 2023-03-29 ASSESSMENT — PATIENT HEALTH QUESTIONNAIRE - PHQ9
1. LITTLE INTEREST OR PLEASURE IN DOING THINGS: 0
SUM OF ALL RESPONSES TO PHQ QUESTIONS 1-9: 0
SUM OF ALL RESPONSES TO PHQ QUESTIONS 1-9: 0
2. FEELING DOWN, DEPRESSED OR HOPELESS: 0
SUM OF ALL RESPONSES TO PHQ QUESTIONS 1-9: 0
SUM OF ALL RESPONSES TO PHQ QUESTIONS 1-9: 0
SUM OF ALL RESPONSES TO PHQ9 QUESTIONS 1 & 2: 0

## 2023-03-29 NOTE — PROGRESS NOTES
the lungs every 6 hours as needed for Shortness of Breath 360 mL 3    famotidine (PEPCID) 20 MG tablet Take 1 tablet by mouth nightly 90 tablet 2    omeprazole (PRILOSEC) 40 MG delayed release capsule TAKE ONE CAPSULE BY MOUTH ONE TIME DAILY 90 capsule 2    losartan (COZAAR) 50 MG tablet Take 1 tablet by mouth daily 90 tablet 2    MAGNESIUM PO Take by mouth      Calcium Carb-Cholecalciferol (CALCIUM 1000 + D PO) Take by mouth      silver sulfADIAZINE (SILVADENE) 1 % cream Apply topically daily. 400 g 0    aspirin 81 MG EC tablet Take 81 mg by mouth      vitamin D 25 MCG (1000 UT) CAPS Take 1,000 Units by mouth every evening      nitroGLYCERIN (NITROSTAT) 0.4 MG SL tablet Place 0.4 mg under the tongue       Current Facility-Administered Medications   Medication Dose Route Frequency Provider Last Rate Last Admin    methylPREDNISolone sodium (SOLU-MEDROL) injection 40 mg  40 mg IntraMUSCular Daily Bernabe Nina MD   40 mg at 11/23/22 1530       Review of Systems:  Review of Systems   Constitutional:  Positive for unexpected weight change. Wt loss   Respiratory:  Positive for shortness of breath. Cardiovascular:  Positive for leg swelling. Negative for chest pain. Gastrointestinal:         Gerd   Musculoskeletal:  Positive for arthralgias, back pain and myalgias. All other systems reviewed and are negative. Vitals:  /80   Ht 5' 4\" (1.626 m)   Wt 139 lb 3.2 oz (63.1 kg)   BMI 23.89 kg/m²     Physical Exam:  Physical Exam  Vitals reviewed. Constitutional:       Appearance: Normal appearance. HENT:      Head: Normocephalic and atraumatic. Eyes:      Extraocular Movements: Extraocular movements intact. Pupils: Pupils are equal, round, and reactive to light. Cardiovascular:      Rate and Rhythm: Normal rate and regular rhythm. Heart sounds: Normal heart sounds. Pulmonary:      Effort: Pulmonary effort is normal.      Breath sounds: Wheezing present.

## 2023-04-03 ENCOUNTER — CARE COORDINATION (OUTPATIENT)
Dept: CARE COORDINATION | Facility: CLINIC | Age: 83
End: 2023-04-03

## 2023-04-03 ENCOUNTER — OFFICE VISIT (OUTPATIENT)
Dept: INTERNAL MEDICINE CLINIC | Facility: CLINIC | Age: 83
End: 2023-04-03
Payer: MEDICARE

## 2023-04-03 ENCOUNTER — TELEPHONE (OUTPATIENT)
Dept: INTERNAL MEDICINE CLINIC | Facility: CLINIC | Age: 83
End: 2023-04-03

## 2023-04-03 VITALS
SYSTOLIC BLOOD PRESSURE: 118 MMHG | HEIGHT: 64 IN | DIASTOLIC BLOOD PRESSURE: 72 MMHG | WEIGHT: 140.8 LBS | BODY MASS INDEX: 24.04 KG/M2 | HEART RATE: 74 BPM | OXYGEN SATURATION: 97 % | TEMPERATURE: 97.1 F
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
## 2023-04-03 DIAGNOSIS — M79.603 PAIN OF UPPER EXTREMITY, UNSPECIFIED LATERALITY: Primary | ICD-10-CM

## 2023-04-03 DIAGNOSIS — L98.9 SKIN LESIONS: ICD-10-CM

## 2023-04-03 DIAGNOSIS — S81.031A PUNCTURE WOUND OF RIGHT KNEE, INITIAL ENCOUNTER: ICD-10-CM

## 2023-04-03 DIAGNOSIS — M79.601 RIGHT ARM PAIN: ICD-10-CM

## 2023-04-03 DIAGNOSIS — M25.551 RIGHT HIP PAIN: ICD-10-CM

## 2023-04-03 DIAGNOSIS — Z00.00 MEDICARE ANNUAL WELLNESS VISIT, SUBSEQUENT: ICD-10-CM

## 2023-04-03 DIAGNOSIS — S51.011A SKIN TEAR OF RIGHT ELBOW WITHOUT COMPLICATION, INITIAL ENCOUNTER: Primary | ICD-10-CM

## 2023-04-03 PROCEDURE — G8427 DOCREV CUR MEDS BY ELIG CLIN: HCPCS | Performed by: NURSE PRACTITIONER

## 2023-04-03 PROCEDURE — 3074F SYST BP LT 130 MM HG: CPT | Performed by: NURSE PRACTITIONER

## 2023-04-03 PROCEDURE — G8420 CALC BMI NORM PARAMETERS: HCPCS | Performed by: NURSE PRACTITIONER

## 2023-04-03 PROCEDURE — 1123F ACP DISCUSS/DSCN MKR DOCD: CPT | Performed by: NURSE PRACTITIONER

## 2023-04-03 PROCEDURE — 3078F DIAST BP <80 MM HG: CPT | Performed by: NURSE PRACTITIONER

## 2023-04-03 PROCEDURE — G8400 PT W/DXA NO RESULTS DOC: HCPCS | Performed by: NURSE PRACTITIONER

## 2023-04-03 PROCEDURE — G0439 PPPS, SUBSEQ VISIT: HCPCS | Performed by: NURSE PRACTITIONER

## 2023-04-03 PROCEDURE — 1090F PRES/ABSN URINE INCON ASSESS: CPT | Performed by: NURSE PRACTITIONER

## 2023-04-03 PROCEDURE — 99213 OFFICE O/P EST LOW 20 MIN: CPT | Performed by: NURSE PRACTITIONER

## 2023-04-03 PROCEDURE — 4004F PT TOBACCO SCREEN RCVD TLK: CPT | Performed by: NURSE PRACTITIONER

## 2023-04-03 RX ORDER — TRAMADOL HYDROCHLORIDE 50 MG/1
50 TABLET ORAL EVERY 6 HOURS PRN
Qty: 30 TABLET | Refills: 1 | Status: SHIPPED | OUTPATIENT
Start: 2023-04-03 | End: 2023-04-10

## 2023-04-03 RX ORDER — CEPHALEXIN 500 MG/1
500 CAPSULE ORAL 2 TIMES DAILY
Qty: 14 CAPSULE | Refills: 0 | Status: SHIPPED | OUTPATIENT
Start: 2023-04-03 | End: 2023-04-10

## 2023-04-03 ASSESSMENT — PATIENT HEALTH QUESTIONNAIRE - PHQ9
2. FEELING DOWN, DEPRESSED OR HOPELESS: 0
SUM OF ALL RESPONSES TO PHQ QUESTIONS 1-9: 0
1. LITTLE INTEREST OR PLEASURE IN DOING THINGS: 0
SUM OF ALL RESPONSES TO PHQ QUESTIONS 1-9: 0
SUM OF ALL RESPONSES TO PHQ9 QUESTIONS 1 & 2: 0

## 2023-04-03 ASSESSMENT — LIFESTYLE VARIABLES
HOW MANY STANDARD DRINKS CONTAINING ALCOHOL DO YOU HAVE ON A TYPICAL DAY: PATIENT DOES NOT DRINK
HOW OFTEN DO YOU HAVE A DRINK CONTAINING ALCOHOL: NEVER

## 2023-04-03 NOTE — PROGRESS NOTES
Take 1 tablet by mouth daily Yes Roxana Pimentel MD   levothyroxine (SYNTHROID) 150 MCG tablet Take 1 tablet by mouth Daily TAKE 1 TABLET BY MOUTH DAILY BEFORE BREAKFAST Yes Roxana Pimentel MD   furosemide (LASIX) 40 MG tablet Take 1 tablet by mouth daily TAKE ONE TABLET BY MOUTH ONE TIME DAILY IN THE MORNING Yes Roxana Pimentel MD   furosemide (LASIX) 20 MG tablet One po every other day for fluid retention Yes Roxana Pimentel MD   predniSONE (DELTASONE) 5 MG tablet Take 1 tablet by mouth 2 times daily Yes Roxana Pimentel MD   ipratropium-albuterol (DUONEB) 0.5-2.5 (3) MG/3ML SOLN nebulizer solution Inhale 3 mLs into the lungs every 6 hours as needed for Shortness of Breath Yes Roxana Pimentel MD   famotidine (PEPCID) 20 MG tablet Take 1 tablet by mouth nightly Yes Roxana Pimentel MD   omeprazole (PRILOSEC) 40 MG delayed release capsule TAKE ONE CAPSULE BY MOUTH ONE TIME DAILY Yes Roxana Pimentel MD   losartan (COZAAR) 50 MG tablet Take 1 tablet by mouth daily Yes Roxana Pimentel MD   MAGNESIUM PO Take by mouth Yes Historical Provider, MD   Calcium Carb-Cholecalciferol (CALCIUM 1000 + D PO) Take by mouth Yes Historical Provider, MD   silver sulfADIAZINE (SILVADENE) 1 % cream Apply topically daily.  Yes CARLINE Chavez - CNP   aspirin 81 MG EC tablet Take 1 tablet by mouth Yes Ar Automatic Reconciliation   vitamin D 25 MCG (1000 UT) CAPS Take 1 capsule by mouth every evening Yes Ar Automatic Reconciliation   nitroGLYCERIN (NITROSTAT) 0.4 MG SL tablet Place 1 tablet under the tongue Yes Ar Automatic Reconciliation       CareTeam (Including outside providers/suppliers regularly involved in providing care):   Patient Care Team:  Roxana Pimentel MD as PCP - Eve Cockayne, MD as PCP - Empaneled Provider  Kranthi Levi RN as Ambulatory Care Manager     Reviewed and updated this visit:  Tobacco  Allergies  Meds  Problems  Med Hx  Surg Hx

## 2023-04-03 NOTE — CARE COORDINATION
Ambulatory Care Coordination Note  4/3/2023    Patient Current Location:  [facility]     ACM contacted the patient by telephone. Verified name and  with patient as identifiers. Provided introduction to self, and explanation of the ACM role. Challenges to be reviewed by the provider   Additional needs identified to be addressed with provider: Yes  Notified pcp that patient had a fall yesterday and has skin tear to right elbow and right knee swelling pain. Method of communication with provider: staff message. ACM: Monserrat Tipton RN    Ccm outreached to patient. Patient states she had a ball fall yesterday. Patient states she called EMS. Patient states she did not want to go to ER. Patient states she has a skin tear to right elbow and her right knee is swollen and has pain. Patient states her son is assisting her as needed. Ccm discussed with patient to use walker when ambulating from now on. Patient verbalized understanding. Ccm notified pcp office of patient's fall and discussed that she should be seen. Patient agreeable. Discussed with patient safety precautions. Patient verbalized understanding. Patient states no other questions or concerns. Ccm discussed that I would outreach next week. Patient agreeable. Offered patient enrollment in the Remote Patient Monitoring (RPM) program for in-home monitoring: NA. Lab Results       None                 Goals Addressed                   This Visit's Progress     Conditions and Symptoms   On track     I will schedule office visits, as directed by my provider. I will notify my provider of any symptoms that indicate a worsening of my condition.     Barriers: none  Plan for overcoming my barriers: N/A  Confidence: 9/10  Anticipated Goal Completion Date: 22                Future Appointments   Date Time Provider Tuan Singleton   2023  3:45 PM Nikita Anderson MD [facility] AMB   2023  2:40 PM Balbir Jeff MD [facility]

## 2023-04-03 NOTE — TELEPHONE ENCOUNTER
From Hien/: Angelito Petersen I just spoke with this patient and she had a fall yesterday and she hurt her right elbow and right leg. She has a skin tear to right elbow. She did not go to er but I think she def needs to be seen. Can someone reach out to her please?

## 2023-04-03 NOTE — TELEPHONE ENCOUNTER
Requested Prescriptions     Pending Prescriptions Disp Refills    traMADol (ULTRAM) 50 MG tablet 30 tablet 1     Sig: Take 1 tablet by mouth every 6 hours as needed for Pain for up to 7 days. Intended supply: 7 days.  Take lowest dose possible to manage pain Max Daily Amount: 200 mg

## 2023-04-05 ENCOUNTER — OFFICE VISIT (OUTPATIENT)
Dept: INTERNAL MEDICINE CLINIC | Facility: CLINIC | Age: 83
End: 2023-04-05
Payer: MEDICARE

## 2023-04-05 VITALS
TEMPERATURE: 97.1 F | SYSTOLIC BLOOD PRESSURE: 122 MMHG | BODY MASS INDEX: 23.9 KG/M2 | HEIGHT: 64 IN | WEIGHT: 140 LBS | DIASTOLIC BLOOD PRESSURE: 64 MMHG

## 2023-04-05 DIAGNOSIS — S51.011D SKIN TEAR OF RIGHT ELBOW WITHOUT COMPLICATION, SUBSEQUENT ENCOUNTER: Primary | ICD-10-CM

## 2023-04-05 DIAGNOSIS — S81.031A PUNCTURE WOUND OF RIGHT KNEE, INITIAL ENCOUNTER: ICD-10-CM

## 2023-04-05 DIAGNOSIS — M79.601 RIGHT ARM PAIN: ICD-10-CM

## 2023-04-05 PROCEDURE — 3078F DIAST BP <80 MM HG: CPT | Performed by: NURSE PRACTITIONER

## 2023-04-05 PROCEDURE — 99213 OFFICE O/P EST LOW 20 MIN: CPT | Performed by: NURSE PRACTITIONER

## 2023-04-05 PROCEDURE — 1090F PRES/ABSN URINE INCON ASSESS: CPT | Performed by: NURSE PRACTITIONER

## 2023-04-05 PROCEDURE — 3074F SYST BP LT 130 MM HG: CPT | Performed by: NURSE PRACTITIONER

## 2023-04-05 PROCEDURE — G8400 PT W/DXA NO RESULTS DOC: HCPCS | Performed by: NURSE PRACTITIONER

## 2023-04-05 PROCEDURE — G8420 CALC BMI NORM PARAMETERS: HCPCS | Performed by: NURSE PRACTITIONER

## 2023-04-05 PROCEDURE — 4004F PT TOBACCO SCREEN RCVD TLK: CPT | Performed by: NURSE PRACTITIONER

## 2023-04-05 PROCEDURE — 1123F ACP DISCUSS/DSCN MKR DOCD: CPT | Performed by: NURSE PRACTITIONER

## 2023-04-05 PROCEDURE — G8427 DOCREV CUR MEDS BY ELIG CLIN: HCPCS | Performed by: NURSE PRACTITIONER

## 2023-04-05 ASSESSMENT — PATIENT HEALTH QUESTIONNAIRE - PHQ9
SUM OF ALL RESPONSES TO PHQ9 QUESTIONS 1 & 2: 0
SUM OF ALL RESPONSES TO PHQ QUESTIONS 1-9: 0
1. LITTLE INTEREST OR PLEASURE IN DOING THINGS: 0
SUM OF ALL RESPONSES TO PHQ QUESTIONS 1-9: 0
2. FEELING DOWN, DEPRESSED OR HOPELESS: 0
SUM OF ALL RESPONSES TO PHQ QUESTIONS 1-9: 0
SUM OF ALL RESPONSES TO PHQ QUESTIONS 1-9: 0

## 2023-04-05 NOTE — PROGRESS NOTES
Toney Siegel (: 1940) presents today c/o for two day wound check. She had a fall 4 days ago onto rocks on her patio. She hit her right arm, elbow, knee and hip. She was started on Keflex and PRN Ultram on 2023. Symptoms continue to improve, including pain, swelling. She has redressed the wounds with Xeroform dressing and cotton gauze. Afebrile. Chief Complaint   Patient presents with    Wound Check     Patient Active Problem List   Diagnosis    Palpitations    Esophageal reflux    Murmur    IBS (irritable bowel syndrome)    Insomnia, unspecified    Gastritis    ASCVD (arteriosclerotic cardiovascular disease)    Chronic left shoulder pain    Leg cramps    Spinal stenosis of lumbar region with neurogenic claudication    Rotator cuff tear, left    Varicose veins of both legs with edema    Fatigue    Essential hypertension    COPD (chronic obstructive pulmonary disease) (HCC)    Vitamin D deficiency    Osteoporosis, post-menopausal    Chronic bilateral low back pain with bilateral sciatica    Chronic chest wall pain    Nephrolithiasis    Mixed hyperlipidemia    Polymyalgia rheumatica (HCC)    Difficulty swallowing    Hypothyroidism    Fluid retention in legs    Weakness of right leg      Reviewed and updated this visit by provider:  Tobacco  Allergies  Meds  Problems  Med Hx  Surg Hx  Fam Hx       Immunizations:  Immunization status: up to date and documented.     Review of Systems - Negative except as stated in HPI  History obtained from chart review and the patient  General ROS: negative for - fever  Musculoskeletal ROS: positive for - joint pain and pain in right arm, right elbow, and right hip  Neurological ROS: no TIA or stroke symptoms  Dermatological ROS: positive for - skin lesion changes and skin tear (right elbow)    Visit Vitals  /64 (Site: Left Upper Arm, Position: Sitting)   Temp 97.1 °F (36.2 °C) (Temporal)   Ht 5' 4\" (1.626 m)   Wt 140 lb (63.5 kg)   BMI 24.03

## 2023-04-17 ENCOUNTER — CARE COORDINATION (OUTPATIENT)
Dept: CARE COORDINATION | Facility: CLINIC | Age: 83
End: 2023-04-17

## 2023-04-17 NOTE — CARE COORDINATION
Ambulatory Care Coordination Note  2023    Patient Current Location:  [facility], [place]     ACM contacted the patient by telephone. Verified name and  with patient as identifiers. Provided introduction to self, and explanation of the ACM role. Challenges to be reviewed by the provider   Additional needs identified to be addressed with provider: No  none               Method of communication with provider: none. ACM: Miranda Shirley RN    Ccm outreached to patient. Patient states she is doing much better today. Patient states no SOB at this time. Patient states no questions or concerns. Ccm discussed that I would outreach next week. Patient agreeable. Offered patient enrollment in the Remote Patient Monitoring (RPM) program for in-home monitoring: NA. Lab Results       None                 Goals Addressed                   This Visit's Progress     Conditions and Symptoms   On track     I will schedule office visits, as directed by my provider. I will notify my provider of any symptoms that indicate a worsening of my condition.     Barriers: none  Plan for overcoming my barriers: N/A  Confidence: 9/10  Anticipated Goal Completion Date: 22                Future Appointments   Date Time Provider Tuan Singleton   2023  2:30 PM Flaquito Puente MD [facility] AMB   2023  3:45 PM MD MARIAH DrummondDG [facility] AMB   2023  2:40 PM Mary Hood MD [facility] AMB

## 2023-04-18 ENCOUNTER — OFFICE VISIT (OUTPATIENT)
Dept: SURGERY | Age: 83
End: 2023-04-18
Payer: MEDICARE

## 2023-04-18 VITALS
BODY MASS INDEX: 24.41 KG/M2 | WEIGHT: 143 LBS | OXYGEN SATURATION: 98 % | HEIGHT: 64 IN | DIASTOLIC BLOOD PRESSURE: 64 MMHG | SYSTOLIC BLOOD PRESSURE: 138 MMHG | HEART RATE: 84 BPM

## 2023-04-18 DIAGNOSIS — Z85.828 HISTORY OF SKIN CANCER: ICD-10-CM

## 2023-04-18 DIAGNOSIS — L98.9 SKIN LESION OF LEFT ARM: Primary | ICD-10-CM

## 2023-04-18 DIAGNOSIS — L90.8 ATROPHIA CUTIS SENILIS: ICD-10-CM

## 2023-04-18 DIAGNOSIS — F17.210 CIGARETTE SMOKER: ICD-10-CM

## 2023-04-18 DIAGNOSIS — L98.9 SKIN LESION OF RIGHT UPPER EXTREMITY: ICD-10-CM

## 2023-04-18 DIAGNOSIS — L57.8 SUN-DAMAGED SKIN: ICD-10-CM

## 2023-04-18 DIAGNOSIS — L90.9 SKIN ATROPHY: ICD-10-CM

## 2023-04-18 DIAGNOSIS — Z79.52 LONG-TERM CORTICOSTEROID USE: ICD-10-CM

## 2023-04-18 PROCEDURE — 3078F DIAST BP <80 MM HG: CPT | Performed by: SURGERY

## 2023-04-18 PROCEDURE — 99214 OFFICE O/P EST MOD 30 MIN: CPT | Performed by: SURGERY

## 2023-04-18 PROCEDURE — 1090F PRES/ABSN URINE INCON ASSESS: CPT | Performed by: SURGERY

## 2023-04-18 PROCEDURE — G8420 CALC BMI NORM PARAMETERS: HCPCS | Performed by: SURGERY

## 2023-04-18 PROCEDURE — 1123F ACP DISCUSS/DSCN MKR DOCD: CPT | Performed by: SURGERY

## 2023-04-18 PROCEDURE — 4004F PT TOBACCO SCREEN RCVD TLK: CPT | Performed by: SURGERY

## 2023-04-18 PROCEDURE — G8427 DOCREV CUR MEDS BY ELIG CLIN: HCPCS | Performed by: SURGERY

## 2023-04-18 PROCEDURE — 3074F SYST BP LT 130 MM HG: CPT | Performed by: SURGERY

## 2023-04-18 PROCEDURE — G8399 PT W/DXA RESULTS DOCUMENT: HCPCS | Performed by: SURGERY

## 2023-04-24 ENCOUNTER — CARE COORDINATION (OUTPATIENT)
Dept: CARE COORDINATION | Facility: CLINIC | Age: 83
End: 2023-04-24

## 2023-04-24 NOTE — CARE COORDINATION
Ambulatory Care Coordination Note  2023    Patient Current Location:  [place]     ACM contacted the patient by telephone. Verified name and  with patient as identifiers. Provided introduction to self, and explanation of the ACM role. Challenges to be reviewed by the provider   Additional needs identified to be addressed with provider: No  none               Method of communication with provider: none. ACM: William Garcia RN    Ccm outreached to patient. Patient states overall she is doing well. Patient states no questions or concerns. Ccm discussed that I would outreach next week. Patient agreeable. Offered patient enrollment in the Remote Patient Monitoring (RPM) program for in-home monitoring: NA. Lab Results       None                 Goals Addressed                   This Visit's Progress     Conditions and Symptoms   On track     I will schedule office visits, as directed by my provider. I will notify my provider of any symptoms that indicate a worsening of my condition.     Barriers: none  Plan for overcoming my barriers: N/A  Confidence: 10  Anticipated Goal Completion Date: 22                Future Appointments   Date Time Provider Tuan Singleton   2023  3:45 PM Simin Franco MD [facility] AMB   2023  2:40 PM Blane Bryan MD [facility] AMB

## 2023-05-01 ENCOUNTER — CARE COORDINATION (OUTPATIENT)
Dept: CARE COORDINATION | Facility: CLINIC | Age: 83
End: 2023-05-01

## 2023-05-01 NOTE — CARE COORDINATION
Ambulatory Care Coordination Note  2023    Patient Current Location:  [facility], [place]     ACM contacted the patient by telephone. Verified name and  with patient as identifiers. Provided introduction to self, and explanation of the ACM role. Challenges to be reviewed by the provider   Additional needs identified to be addressed with provider: No  none               Method of communication with provider: none. ACM: Willy Shi RN    Ccm outreached to patient. Patient states she is doing well at this time. Patient states no questions or concerns. Ccm discussed that I would outreach next week. Patient agreeable. Offered patient enrollment in the Remote Patient Monitoring (RPM) program for in-home monitoring: NA. Lab Results       None                 Goals Addressed                   This Visit's Progress     Conditions and Symptoms   On track     I will schedule office visits, as directed by my provider. I will notify my provider of any symptoms that indicate a worsening of my condition.     Barriers: none  Plan for overcoming my barriers: N/A  Confidence: 9/10  Anticipated Goal Completion Date: 22                Future Appointments   Date Time Provider Tuan Singleton   2023  3:45 PM MD CHAZ Frank [facility] AMB   2023  2:40 PM Maryjane Shannon MD [facility] AMB

## 2023-05-08 ENCOUNTER — CARE COORDINATION (OUTPATIENT)
Dept: CARE COORDINATION | Facility: CLINIC | Age: 83
End: 2023-05-08

## 2023-05-08 NOTE — CARE COORDINATION
Ambulatory Care Coordination Note  2023    Patient Current Location:  [facility]     ACM contacted the patient by telephone. Verified name and  with patient as identifiers. Provided introduction to self, and explanation of the ACM role. Challenges to be reviewed by the provider   Additional needs identified to be addressed with provider: No  none               Method of communication with provider: none. ACM: Rodriguez Hui RN    Ccm outreached to patient. Patient states she is doing well at this time. Patient states no SOB and she is getting around well. Patient states no questions or concerns. Ccm discussed that I would outreach next week. Patient agreeable. Offered patient enrollment in the Remote Patient Monitoring (RPM) program for in-home monitoring: NA. Lab Results       None                 Goals Addressed                   This Visit's Progress     Conditions and Symptoms   On track     I will schedule office visits, as directed by my provider. I will notify my provider of any symptoms that indicate a worsening of my condition.     Barriers: none  Plan for overcoming my barriers: N/A  Confidence: 9/10  Anticipated Goal Completion Date: 22                Future Appointments   Date Time Provider Tuan Singleton   2023  3:45 PM Carlita Kim MD [facility] AMB   2023  2:40 PM Dario Tee MD [facility] AMB

## 2023-05-22 ENCOUNTER — CARE COORDINATION (OUTPATIENT)
Dept: CARE COORDINATION | Facility: CLINIC | Age: 83
End: 2023-05-22

## 2023-05-22 NOTE — CARE COORDINATION
Ambulatory Care Coordination Note  2023    Patient Current Location:  [facility]     ACM contacted the patient by telephone. Verified name and  with patient as identifiers. Provided introduction to self, and explanation of the ACM role. Challenges to be reviewed by the provider   Additional needs identified to be addressed with provider: No  none               Method of communication with provider: none. ACM: Mayra Zafar, RN    Ccm outreached to patient. Patient states she is doing well at this time. Patient states no questions or concerns. Ccm dicussed that I would outreach next week. Patient agreeable. Offered patient enrollment in the Remote Patient Monitoring (RPM) program for in-home monitoring: NA. Lab Results       None                 Goals Addressed                   This Visit's Progress     Conditions and Symptoms   On track     I will schedule office visits, as directed by my provider. I will notify my provider of any symptoms that indicate a worsening of my condition.     Barriers: none  Plan for overcoming my barriers: N/A  Confidence: 9/10  Anticipated Goal Completion Date: 22                Future Appointments   Date Time Provider Tuan Signleton   2023  3:45 PM Joesphine Lundborg, MD [facility] AMB   2023  2:40 PM Medina Tuttle MD [facility] AMB

## 2023-06-05 ENCOUNTER — CARE COORDINATION (OUTPATIENT)
Dept: CARE COORDINATION | Facility: CLINIC | Age: 83
End: 2023-06-05

## 2023-06-05 NOTE — CARE COORDINATION
Ambulatory Care Coordination Note  2023    Patient Current Location:  [address] [place] [address]     ACM contacted the patient by telephone. Verified name and  with patient as identifiers. Provided introduction to self, and explanation of the ACM role. Challenges to be reviewed by the provider   Additional needs identified to be addressed with provider: No  none               Method of communication with provider: none. ACM: Laila Ordonez RN    Ccm outreached to patient. Patient states she is doing well at this time. Pt states no questions or concerns. Ccm discussed that I would outreach next week. Pt agreeable. Offered patient enrollment in the Remote Patient Monitoring (RPM) program for in-home monitoring: NA. Lab Results       None                 Goals Addressed                   This Visit's Progress     Conditions and Symptoms   On track     I will schedule office visits, as directed by my provider. I will notify my provider of any symptoms that indicate a worsening of my condition.     Barriers: none  Plan for overcoming my barriers: N/A  Confidence: 9/10  Anticipated Goal Completion Date: 22                Future Appointments   Date Time Provider Tuan Singleton   2023  3:45 PM Catherine Alonso MD [facility] AMB   2023  2:40 PM Saad Martin MD [facility] AMB

## 2023-06-19 ENCOUNTER — CARE COORDINATION (OUTPATIENT)
Dept: CARE COORDINATION | Facility: CLINIC | Age: 83
End: 2023-06-19

## 2023-06-19 NOTE — CARE COORDINATION
Ambulatory Care Coordination Note  2023    Patient Current Location:  [facility]     ACM contacted the patient by telephone. Verified name and  with patient as identifiers. Provided introduction to self, and explanation of the ACM role. Challenges to be reviewed by the provider   Additional needs identified to be addressed with provider: No  none               Method of communication with provider: none. ACM: Isabel Arredondo RN    Ccm outreached to patient. Patient states she is doing well at this time. Pt states no questions or concerns. Ccm discussed that I would outreach next week. Patient agreeable. Offered patient enrollment in the Remote Patient Monitoring (RPM) program for in-home monitoring: NA. Lab Results       None                 Goals Addressed                   This Visit's Progress     Conditions and Symptoms   On track     I will schedule office visits, as directed by my provider. I will notify my provider of any symptoms that indicate a worsening of my condition.     Barriers: none  Plan for overcoming my barriers: N/A  Confidence: 9/10  Anticipated Goal Completion Date: 22                Future Appointments   Date Time Provider Tuan Singleton   2023  3:45 PM Jolie Kwan MD [facility] AMB   2023  2:40 PM Kervin Ha MD [facility] AMB

## 2023-06-26 ENCOUNTER — CARE COORDINATION (OUTPATIENT)
Dept: CARE COORDINATION | Facility: CLINIC | Age: 83
End: 2023-06-26

## 2023-07-10 ENCOUNTER — CARE COORDINATION (OUTPATIENT)
Dept: CARE COORDINATION | Facility: CLINIC | Age: 83
End: 2023-07-10

## 2023-07-10 NOTE — CARE COORDINATION
Ambulatory Care Coordination Note  7/10/2023    Patient Current Location:  [facility], [place]     ACM contacted the patient by telephone. Verified name and  with patient as identifiers. Provided introduction to self, and explanation of the ACM role. Challenges to be reviewed by the provider   Additional needs identified to be addressed with provider: No  none               Method of communication with provider: none. ACM: Linda Mendoza RN    Ccm outreached to patient. Patient states last week she was put on abt for staph for open area on leg. Patient states wound is healing and she has been in contact with pcp. Patient states she is cleaning wound and she is doing well at this time. Patient states no questions or concerns. Ccm discussed that I would outreach next week. Patient agreeable. Offered patient enrollment in the Remote Patient Monitoring (RPM) program for in-home monitoring: NA. Lab Results       None                 Goals Addressed                   This Visit's Progress     Conditions and Symptoms   On track     I will schedule office visits, as directed by my provider. I will notify my provider of any symptoms that indicate a worsening of my condition.     Barriers: none  Plan for overcoming my barriers: N/A  Confidence: 9/10  Anticipated Goal Completion Date: 22                Future Appointments   Date Time Provider [address]   2023  3:45 PM Kei Hough MD [facility] AMB   2023  2:40 PM Christian Griffiths MD [facility] AMB

## 2023-07-17 ENCOUNTER — TELEPHONE (OUTPATIENT)
Dept: INTERNAL MEDICINE CLINIC | Facility: CLINIC | Age: 83
End: 2023-07-17

## 2023-07-17 ENCOUNTER — CARE COORDINATION (OUTPATIENT)
Dept: CARE COORDINATION | Facility: CLINIC | Age: 83
End: 2023-07-17

## 2023-07-17 NOTE — TELEPHONE ENCOUNTER
From Hien/: She spoke with patient and she is having RLE swelling and drainage.  She states she gained 22lbs the last few weeks

## 2023-07-17 NOTE — CARE COORDINATION
Ambulatory Care Coordination Note  2023    Patient Current Location:  [address] [place] [address]     ACM contacted the patient by telephone. Verified name and  with patient as identifiers. Provided introduction to self, and explanation of the ACM role. Challenges to be reviewed by the provider   Additional needs identified to be addressed with provider: Yes  Contacted pcp regarding BLE swelling and weight gain. RLE draining as well and skin tear seems infected. Method of communication with provider: staff message. ACM: Irene Vazquez RN    Ccm outreached to patient. Patient states she is having RLE edema and drainage. Ccm notified pcp to call patient and of symptoms. Pcp office to call patient to schedule appointment. Ccm offered RPM program. Patient states she will enroll at this time but will stop if she has to pay anything. Patient states no other questions or concerns. Ccm discussed that I would outreach in 2 weeks. Patient agreeable. Offered patient enrollment in the Remote Patient Monitoring (RPM) program for in-home monitoring: Yes, patient enrolled:     Remote Patient Monitoring Enrollment Note      Date/Time: 2023 9:15 AM    Offered patient enrollment in the [facility] Remote Patient Monitoring (RPM) program for in home monitoring for COPD. Patient accepted RPM services. Patient will be monitoring the following daily:  activity level, blood pressure reading, blood pressure heart rate, pulse ox reading, pulse ox heart rate, survey response, and weight    ACM reviewed the information below with patient:    Emergency Contact (name and contact number): Sj Whipple [phone]    [x] A member from the care coordination team will reach out to notify the patient once the RPM kit is ordered. [x] Once the kit is delivered, the [facility] team will contact the patient after UPS deliver to assist with set up.             [x] Determined BP cuff size: regular (9.05\"-15.74\")      [] negative - No discharge, No redness

## 2023-07-18 ENCOUNTER — OFFICE VISIT (OUTPATIENT)
Dept: INTERNAL MEDICINE CLINIC | Facility: CLINIC | Age: 83
End: 2023-07-18
Payer: MEDICARE

## 2023-07-18 ENCOUNTER — CARE COORDINATION (OUTPATIENT)
Dept: CARE COORDINATION | Facility: CLINIC | Age: 83
End: 2023-07-18

## 2023-07-18 VITALS
OXYGEN SATURATION: 99 % | TEMPERATURE: 96.8 F | WEIGHT: 148.8 LBS | HEART RATE: 67 BPM | BODY MASS INDEX: 25.4 KG/M2 | DIASTOLIC BLOOD PRESSURE: 80 MMHG | SYSTOLIC BLOOD PRESSURE: 132 MMHG | HEIGHT: 64 IN

## 2023-07-18 DIAGNOSIS — L03.115 CELLULITIS OF RIGHT LEG: Primary | ICD-10-CM

## 2023-07-18 DIAGNOSIS — J44.1 CHRONIC OBSTRUCTIVE PULMONARY DISEASE WITH ACUTE EXACERBATION (HCC): Primary | ICD-10-CM

## 2023-07-18 DIAGNOSIS — S80.822A BLISTER OF LEFT LOWER EXTREMITY, INITIAL ENCOUNTER: ICD-10-CM

## 2023-07-18 DIAGNOSIS — I87.8 CHRONIC VENOUS STASIS: ICD-10-CM

## 2023-07-18 PROCEDURE — 1090F PRES/ABSN URINE INCON ASSESS: CPT | Performed by: NURSE PRACTITIONER

## 2023-07-18 PROCEDURE — G8399 PT W/DXA RESULTS DOCUMENT: HCPCS | Performed by: NURSE PRACTITIONER

## 2023-07-18 PROCEDURE — 4004F PT TOBACCO SCREEN RCVD TLK: CPT | Performed by: NURSE PRACTITIONER

## 2023-07-18 PROCEDURE — 99213 OFFICE O/P EST LOW 20 MIN: CPT | Performed by: NURSE PRACTITIONER

## 2023-07-18 PROCEDURE — 1123F ACP DISCUSS/DSCN MKR DOCD: CPT | Performed by: NURSE PRACTITIONER

## 2023-07-18 PROCEDURE — 3079F DIAST BP 80-89 MM HG: CPT | Performed by: NURSE PRACTITIONER

## 2023-07-18 PROCEDURE — 3075F SYST BP GE 130 - 139MM HG: CPT | Performed by: NURSE PRACTITIONER

## 2023-07-18 PROCEDURE — G8417 CALC BMI ABV UP PARAM F/U: HCPCS | Performed by: NURSE PRACTITIONER

## 2023-07-18 PROCEDURE — G8427 DOCREV CUR MEDS BY ELIG CLIN: HCPCS | Performed by: NURSE PRACTITIONER

## 2023-07-18 RX ORDER — SULFAMETHOXAZOLE AND TRIMETHOPRIM 800; 160 MG/1; MG/1
1 TABLET ORAL 2 TIMES DAILY
Qty: 20 TABLET | Refills: 0 | Status: SHIPPED | OUTPATIENT
Start: 2023-07-18 | End: 2023-07-18 | Stop reason: SDUPTHER

## 2023-07-18 RX ORDER — SULFAMETHOXAZOLE AND TRIMETHOPRIM 800; 160 MG/1; MG/1
1 TABLET ORAL 2 TIMES DAILY
Qty: 20 TABLET | Refills: 0 | Status: SHIPPED | OUTPATIENT
Start: 2023-07-18 | End: 2023-07-28

## 2023-07-18 NOTE — PROGRESS NOTES
Sea Guan (: 1940) presents today c/o    No chief complaint on file. Patient Active Problem List   Diagnosis    Palpitations    Esophageal reflux    Murmur    IBS (irritable bowel syndrome)    Insomnia, unspecified    Gastritis    ASCVD (arteriosclerotic cardiovascular disease)    Chronic left shoulder pain    Leg cramps    Spinal stenosis of lumbar region with neurogenic claudication    Rotator cuff tear, left    Varicose veins of both legs with edema    Fatigue    Essential hypertension    COPD (chronic obstructive pulmonary disease) (HCC)    Vitamin D deficiency    Osteoporosis, post-menopausal    Chronic bilateral low back pain with bilateral sciatica    Chronic chest wall pain    Nephrolithiasis    Mixed hyperlipidemia    Polymyalgia rheumatica (HCC)    Difficulty swallowing    Hypothyroidism    Fluid retention in legs    Weakness of right leg    Skin lesion of left arm    Skin lesion of right upper extremity    Skin atrophy    Atrophia cutis senilis    Sun-damaged skin    History of skin cancer    Cigarette smoker    Long-term corticosteroid use        Reviewed and updated this visit by provider:         Immunizations:  Immunization status: {immuniz status:629681::\"up to date and documented\"}. Review of Systems - {ros master:151473}    There were no vitals taken for this visit. Physical Examination: {female adult master:829090}    Assessment/Plan:  No diagnosis found. No orders of the defined types were placed in this encounter. No orders of the defined types were placed in this encounter. No follow-ups on file.     {Time Documentation:[id number]}    Laura Cortés, CARLINE - CNP

## 2023-07-18 NOTE — PROGRESS NOTES
Remote Patient Monitoring Treatment Plan    Received request from ACM/CTN Sree Bird RN  to order remote patient monitoring for in home monitoring of COPD and order completed. Patient will be monitoring   --blood pressure   --pulse ox   --weight Please set alert for ONLY weight gain of 5# in 7 days. Pt has no documented hx of HF.    --survey questions. Patient will engage in Remote Patient Monitoring each day to develop the skills necessary for self management. RPM Care Team Responsibilities:   Alerts will be reviewed daily and addressed within 2-4 hours during operational hours (Monday -Friday 9 am-4 pm)  Alert response and intervention documented in patient medical record  Alert response escalated to PCP per protocol and documented in patient medical record  Patient monitored over approximately  days  Discharge from program based on self-management readiness    See care coordination encounters for additional details.

## 2023-07-18 NOTE — CARE COORDINATION
Remote Patient Kit Ordering Note      Date/Time:  7/18/2023 10:25 AM      [x] [facility] confirmed patient shipping address  [x] Patient will receive package over the next 1-3 business days. Someone 21 years or older must be present to sign for UPS delivery. [x] HRS will contact patient within 24 hours, an [address] [place] [address] will call the patient directly: If the patient does not answer, HRS will follow up with the clinical team notifying them about the unsuccessful attempt to contact the patient. HRS will make three call attempts to the patient. Provide patient with [facility] Virtual install number is: [phone]. [x] ACM will contact patient once equipment is active to welcome them to the program.                                                         [x] Hours of RPM monitoring - Monday-Friday [id number]; encourage patient to get vitals entered by [facility] each day to have the alert addressed same day. [x][facility] mailed RPM Patient flyer to patient. All questions answered at this time. ACM made aware the RPM kit has bACMeen ordered. [facility].

## 2023-07-18 NOTE — PROGRESS NOTES
Bernadette Humphries (: 1940) presents today with complaints of wound her to left leg. She has been having swelling, numbness and tingling to her legs and feet. She went to the urgent care on 2023 for a similar spot on her right leg. She reports a culture was obtained, which \"grew out staph\" and she was given Bactrim which she took for 10 days. This did improve the symptoms however she developed a skin tear laterally from where she removed a bandaid from her skin, which is now red/weeping.     Chief Complaint   Patient presents with    Leg Swelling    Skin Problem     Wound on right leg       Patient Active Problem List   Diagnosis    Palpitations    Esophageal reflux    Murmur    IBS (irritable bowel syndrome)    Insomnia, unspecified    Gastritis    ASCVD (arteriosclerotic cardiovascular disease)    Chronic left shoulder pain    Leg cramps    Spinal stenosis of lumbar region with neurogenic claudication    Rotator cuff tear, left    Varicose veins of both legs with edema    Fatigue    Essential hypertension    COPD (chronic obstructive pulmonary disease) (HCC)    Vitamin D deficiency    Osteoporosis, post-menopausal    Chronic bilateral low back pain with bilateral sciatica    Chronic chest wall pain    Nephrolithiasis    Mixed hyperlipidemia    Polymyalgia rheumatica (HCC)    Difficulty swallowing    Hypothyroidism    Fluid retention in legs    Weakness of right leg    Skin lesion of left arm    Skin lesion of right upper extremity    Skin atrophy    Atrophia cutis senilis    Sun-damaged skin    History of skin cancer    Cigarette smoker    Long-term corticosteroid use     Review of Systems - Negative except as stated in HPI   History obtained from chart review and the patient  General ROS: negative for - fatigue  Psychological ROS: negative for - depression  Respiratory ROS: negative for - shortness of breath  Cardiovascular ROS: negative for - chest pain  Musculoskeletal ROS: positive for -

## 2023-07-24 ENCOUNTER — CARE COORDINATION (OUTPATIENT)
Dept: CARE COORDINATION | Facility: CLINIC | Age: 83
End: 2023-07-24

## 2023-07-24 NOTE — CARE COORDINATION
Remote Alert Monitoring Note  Rpm alert to be reviewed by the provider   red alert   pulse ox reading (89%SpO2)   Additional needs to be addressed by N/A: No                    Date/Time:  2023 9:40 AM  Patient Current Location: Home: [facility] [address]  LPN contacted patient by telephone regarding red alert received for pulse ox reading (89%SpO2). Verified patients name and  as identifiers. Background: Pt enrolled in RPM r/t COPD  Refer to 911 immediately if:  Patient unresponsive or unable to provide history  Change in cognition or sudden confusion  Patient unable to respond in complete sentences  Intense chest pain/tightness  Any concern for any clinical emergency  Red Alert: Provider response time of 1 hr required for any red alert requiring intervention  Yellow Alert: Provider response time of 3hr required for any escalated yellow alert    O2 Triage  Are you having any Chest Pain? no   Are you having any Shortness of Breath? no   Swelling in your hands or feet? no   Are you having any other health concerns or issues? no     Clinical Interventions: Reviewed and followed up on alerts and treatments-Discussed Pulse ox reading of 89%SpO2 with pt. Pt speaking in full sentences, denies CP, SOB, and swelling. No acute distress / pt is asymptomatic. Pt is currently wearing nail polish; pt educated on Pulse Ox instructions and recommendation to remove nail polish. Pt v/u and wishes to keep nail polish on at this time. Pt agreeable to recheck oxygen level at this time but unable to obtain reading. Pt again denied acute distress and v/u of when and how to use Duoneb Tx, when to notify provider, and when to seek medical care. Escalated alert to RN-FYI update provided. Plan/Follow Up: Will continue to review, monitor and address alerts with follow up based on severity of symptoms and risk factors.

## 2023-07-26 ENCOUNTER — CARE COORDINATION (OUTPATIENT)
Dept: CARE COORDINATION | Facility: CLINIC | Age: 83
End: 2023-07-26

## 2023-07-26 NOTE — CARE COORDINATION
Remote Patient Monitoring Welcome Note  Date/Time:  2023 11:36 AM  Patient Current [facility] [address]  Verified patients name and  as identifiers. Completed and confirmed the following:   Emergency Contact: Georgia May [phone]  [x] Patient received all RPM equipment (tablet, scale, blood pressure device and cuff, and pulse oximeter)  Cuff Size: regular (9.05\"-15.74\")    Weight Scale:  regular (<330lbs)                    [x] Instructed patient keep box for use when returning equipment                                                          [x] Reviewed Patient Welcome Letter with patient                         [x] Reviewed expectations for patient and care team  Monitoring hours M-F 9-4pm  Completing monitoring by 12pm on  so that alerts can be responded to in the same day  Patient weighs self at same time every day (or after urinating and waking up)  Take blood pressure 1-2 hrs after medications   RPM team may have different phone area code (including [facility], [place], [address] or [address])                              [x] Instructed patient to keep scale on flat surface                                                         [x] Instructed patient to keep tablet plugged in at all times                         [x] Instructed how to contact IT support ([phone])  [x] Provided Remote Patient Monitoring care  information Ronak Moreira [phone]  All questions answered at this time.

## 2023-07-31 ENCOUNTER — CARE COORDINATION (OUTPATIENT)
Dept: CARE COORDINATION | Facility: CLINIC | Age: 83
End: 2023-07-31

## 2023-07-31 NOTE — CARE COORDINATION
Ambulatory Care Coordination Note  2023    Patient Current Location:  [facility]     ACM contacted the patient by telephone. Verified name and  with patient as identifiers. Provided introduction to self, and explanation of the ACM role. Challenges to be reviewed by the provider   Additional needs identified to be addressed with provider: No  none               Method of communication with provider: none. ACM: Vidhi Connelly RN    Ccm outreached to patient. Patient states she is doing well. Patient states O2 levels have been WNL. Patient states no sob at this time. Patient states she has appointment today with cardiology. Patient states no questions or concerns. Ccm discussed that I would follow up next week. Patient agreeable. Offered patient enrollment in the Remote Patient Monitoring (RPM) program for in-home monitoring: Yes, patient already enrolled. Lab Results       None                 Goals Addressed                   This Visit's Progress     Conditions and Symptoms   On track     I will schedule office visits, as directed by my provider. I will notify my provider of any symptoms that indicate a worsening of my condition.     Barriers: none  Plan for overcoming my barriers: N/A  Confidence: 9/10  Anticipated Goal Completion Date: 22                Future Appointments   Date Time Provider [address]   2023  3:45 PM Jose Pinto MD [facility]   2023  2:40 PM Kathryn Wray MD [facility]

## 2023-08-07 ENCOUNTER — CARE COORDINATION (OUTPATIENT)
Dept: CARE COORDINATION | Facility: CLINIC | Age: 83
End: 2023-08-07

## 2023-08-07 NOTE — CARE COORDINATION
Ambulatory Care Coordination Note  2023    Patient Current Location:  [facility]     ACM contacted the patient by telephone. Verified name and  with patient as identifiers. Provided introduction to self, and explanation of the ACM role. Challenges to be reviewed by the provider   Additional needs identified to be addressed with provider: No  none               Method of communication with provider: none. ACM: Roland Umana RN      Ccm outreached to patient. Patient states she is doing well at this time. Patient states she has upcoming appointment today. Patient states overall feeling well. Patient states no questions or concerns. Ccm discussed that I would outreach next week. Patient agreeable. Offered patient enrollment in the Remote Patient Monitoring (RPM) program for in-home monitoring: Yes, patient already enrolled. Lab Results       None                 Goals Addressed                   This Visit's Progress     Conditions and Symptoms   On track     I will schedule office visits, as directed by my provider. I will notify my provider of any symptoms that indicate a worsening of my condition.     Barriers: none  Plan for overcoming my barriers: N/A  Confidence: 9/10  Anticipated Goal Completion Date: 22                Future Appointments   Date Time Provider [address]   2023  2:40 PM Harleen Gallardo MD [facility] AM[facility]   2023  4:30 PM Newton Morfin MD [facility]

## 2023-08-14 ENCOUNTER — CARE COORDINATION (OUTPATIENT)
Dept: CARE COORDINATION | Facility: CLINIC | Age: 83
End: 2023-08-14

## 2023-08-14 NOTE — CARE COORDINATION
Ambulatory Care Coordination Note  2023    Patient Current Location:  [place]     ACM contacted the patient by telephone. Verified name and  with patient as identifiers. Provided introduction to self, and explanation of the ACM role. Challenges to be reviewed by the provider   Additional needs identified to be addressed with provider: No  none               Method of communication with provider: none. ACM: Roberto Aragon RN    Ccm outreached to patient. Patient states she is doing well at this time. Patient states she has several upcoming appointments. Patient states no questions or concerns. Ccm discussed that I would outreach next week. Patient agreeable. Offered patient enrollment in the Remote Patient Monitoring (RPM) program for in-home monitoring: Yes, patient already enrolled. Lab Results       None                 Goals Addressed                   This Visit's Progress     Conditions and Symptoms   On track     I will schedule office visits, as directed by my provider. I will notify my provider of any symptoms that indicate a worsening of my condition.     Barriers: none  Plan for overcoming my barriers: N/A  Confidence: /10  Anticipated Goal Completion Date: 22                Future Appointments   Date Time Provider [address]   2023  2:40 PM Nay Taylor MD [facility]   2023  4:30 PM Elena Carrizales MD [facility]

## 2023-08-21 ENCOUNTER — CARE COORDINATION (OUTPATIENT)
Dept: CARE COORDINATION | Facility: CLINIC | Age: 83
End: 2023-08-21

## 2023-08-21 NOTE — CARE COORDINATION
Ambulatory Care Management Note        Date/Time:  8/21/2023 8:13 AM    Ccm outreached to patient. No answer at this time, message left. Awaiting response. If no response, ccm will outreach next week.

## 2023-08-28 ENCOUNTER — CARE COORDINATION (OUTPATIENT)
Dept: CARE COORDINATION | Facility: CLINIC | Age: 83
End: 2023-08-28

## 2023-08-28 NOTE — CARE COORDINATION
Ambulatory Care Coordination Note  2023    Patient Current Location:  [facility]     ACM contacted the patient by telephone. Verified name and  with patient as identifiers. Provided introduction to self, and explanation of the ACM role. Challenges to be reviewed by the provider   Additional needs identified to be addressed with provider: No  none               Method of communication with provider: none. ACM: Regina Waggoner RN    Ccm outreached to patient. Patient states she is doing well at this time. Patient states all vial signs have remained stable. Patient states no questions or concerns. Ccm discussed that I would outreach in 2 weeks. Patient agreeable. Offered patient enrollment in the Remote Patient Monitoring (RPM) program for in-home monitoring: Yes, patient already enrolled. Lab Results       None                 Goals Addressed                   This Visit's Progress     Conditions and Symptoms   On track     I will schedule office visits, as directed by my provider. I will notify my provider of any symptoms that indicate a worsening of my condition.     Barriers: none  Plan for overcoming my barriers: N/A  Confidence: 9/10  Anticipated Goal Completion Date: 22                Future Appointments   Date Time Provider [address]   2023  2:40 PM Olga Aparicio MD [facility]   2023  4:30 PM Melita Owen MD [facility] left supraclavicular nerve block

## 2023-09-06 ENCOUNTER — OFFICE VISIT (OUTPATIENT)
Dept: INTERNAL MEDICINE CLINIC | Facility: CLINIC | Age: 83
End: 2023-09-06
Payer: MEDICARE

## 2023-09-06 VITALS
SYSTOLIC BLOOD PRESSURE: 142 MMHG | HEIGHT: 64 IN | BODY MASS INDEX: 25.2 KG/M2 | DIASTOLIC BLOOD PRESSURE: 70 MMHG | WEIGHT: 147.6 LBS

## 2023-09-06 DIAGNOSIS — J42 CHRONIC BRONCHITIS, UNSPECIFIED CHRONIC BRONCHITIS TYPE (HCC): Primary | ICD-10-CM

## 2023-09-06 DIAGNOSIS — R60.0 FLUID RETENTION IN LEGS: ICD-10-CM

## 2023-09-06 DIAGNOSIS — E03.9 ACQUIRED HYPOTHYROIDISM: ICD-10-CM

## 2023-09-06 DIAGNOSIS — I25.10 ASCVD (ARTERIOSCLEROTIC CARDIOVASCULAR DISEASE): ICD-10-CM

## 2023-09-06 DIAGNOSIS — J42 CHRONIC BRONCHITIS, UNSPECIFIED CHRONIC BRONCHITIS TYPE (HCC): ICD-10-CM

## 2023-09-06 DIAGNOSIS — Z85.828 HISTORY OF SKIN CANCER: ICD-10-CM

## 2023-09-06 DIAGNOSIS — T46.6X5A ADVERSE EFFECT OF STATIN: ICD-10-CM

## 2023-09-06 DIAGNOSIS — K21.9 GASTROESOPHAGEAL REFLUX DISEASE WITHOUT ESOPHAGITIS: ICD-10-CM

## 2023-09-06 LAB
BASOPHILS # BLD: 0.1 K/UL (ref 0–0.2)
BASOPHILS NFR BLD: 1 % (ref 0–2)
DIFFERENTIAL METHOD BLD: ABNORMAL
EOSINOPHIL # BLD: 0.1 K/UL (ref 0–0.8)
EOSINOPHIL NFR BLD: 0 % (ref 0.5–7.8)
ERYTHROCYTE [DISTWIDTH] IN BLOOD BY AUTOMATED COUNT: 15 % (ref 11.9–14.6)
HCT VFR BLD AUTO: 41.7 % (ref 35.8–46.3)
HGB BLD-MCNC: 12.9 G/DL (ref 11.7–15.4)
IMM GRANULOCYTES # BLD AUTO: 0.2 K/UL (ref 0–0.5)
IMM GRANULOCYTES NFR BLD AUTO: 2 % (ref 0–5)
LYMPHOCYTES # BLD: 3 K/UL (ref 0.5–4.6)
LYMPHOCYTES NFR BLD: 24 % (ref 13–44)
MCH RBC QN AUTO: 30.3 PG (ref 26.1–32.9)
MCHC RBC AUTO-ENTMCNC: 30.9 G/DL (ref 31.4–35)
MCV RBC AUTO: 97.9 FL (ref 82–102)
MONOCYTES # BLD: 0.9 K/UL (ref 0.1–1.3)
MONOCYTES NFR BLD: 7 % (ref 4–12)
NEUTS SEG # BLD: 8.1 K/UL (ref 1.7–8.2)
NEUTS SEG NFR BLD: 66 % (ref 43–78)
NRBC # BLD: 0 K/UL (ref 0–0.2)
PLATELET # BLD AUTO: 332 K/UL (ref 150–450)
PMV BLD AUTO: 9.9 FL (ref 9.4–12.3)
RBC # BLD AUTO: 4.26 M/UL (ref 4.05–5.2)
WBC # BLD AUTO: 12.3 K/UL (ref 4.3–11.1)

## 2023-09-06 PROCEDURE — G8399 PT W/DXA RESULTS DOCUMENT: HCPCS | Performed by: INTERNAL MEDICINE

## 2023-09-06 PROCEDURE — 3077F SYST BP >= 140 MM HG: CPT | Performed by: INTERNAL MEDICINE

## 2023-09-06 PROCEDURE — 3023F SPIROM DOC REV: CPT | Performed by: INTERNAL MEDICINE

## 2023-09-06 PROCEDURE — 99214 OFFICE O/P EST MOD 30 MIN: CPT | Performed by: INTERNAL MEDICINE

## 2023-09-06 PROCEDURE — 4004F PT TOBACCO SCREEN RCVD TLK: CPT | Performed by: INTERNAL MEDICINE

## 2023-09-06 PROCEDURE — 1090F PRES/ABSN URINE INCON ASSESS: CPT | Performed by: INTERNAL MEDICINE

## 2023-09-06 PROCEDURE — G0008 ADMIN INFLUENZA VIRUS VAC: HCPCS | Performed by: INTERNAL MEDICINE

## 2023-09-06 PROCEDURE — G8417 CALC BMI ABV UP PARAM F/U: HCPCS | Performed by: INTERNAL MEDICINE

## 2023-09-06 PROCEDURE — 3078F DIAST BP <80 MM HG: CPT | Performed by: INTERNAL MEDICINE

## 2023-09-06 PROCEDURE — 90694 VACC AIIV4 NO PRSRV 0.5ML IM: CPT | Performed by: INTERNAL MEDICINE

## 2023-09-06 PROCEDURE — 1123F ACP DISCUSS/DSCN MKR DOCD: CPT | Performed by: INTERNAL MEDICINE

## 2023-09-06 PROCEDURE — G8427 DOCREV CUR MEDS BY ELIG CLIN: HCPCS | Performed by: INTERNAL MEDICINE

## 2023-09-06 RX ORDER — BUMETANIDE 2 MG/1
2 TABLET ORAL DAILY
Qty: 30 TABLET | Refills: 3 | Status: SHIPPED | OUTPATIENT
Start: 2023-09-06

## 2023-09-06 ASSESSMENT — ENCOUNTER SYMPTOMS
WHEEZING: 1
SHORTNESS OF BREATH: 1

## 2023-09-07 LAB
ALBUMIN SERPL-MCNC: 3.8 G/DL (ref 3.2–4.6)
ALBUMIN/GLOB SERPL: 1.2 (ref 0.4–1.6)
ALP SERPL-CCNC: 35 U/L (ref 50–136)
ALT SERPL-CCNC: 31 U/L (ref 12–65)
ANION GAP SERPL CALC-SCNC: 5 MMOL/L (ref 2–11)
AST SERPL-CCNC: 17 U/L (ref 15–37)
BILIRUB SERPL-MCNC: 0.4 MG/DL (ref 0.2–1.1)
BUN SERPL-MCNC: 18 MG/DL (ref 8–23)
CALCIUM SERPL-MCNC: 9.6 MG/DL (ref 8.3–10.4)
CHLORIDE SERPL-SCNC: 107 MMOL/L (ref 101–110)
CO2 SERPL-SCNC: 29 MMOL/L (ref 21–32)
CREAT SERPL-MCNC: 0.8 MG/DL (ref 0.6–1)
GLOBULIN SER CALC-MCNC: 3.2 G/DL (ref 2.8–4.5)
GLUCOSE SERPL-MCNC: 75 MG/DL (ref 65–100)
POTASSIUM SERPL-SCNC: 3.7 MMOL/L (ref 3.5–5.1)
PROT SERPL-MCNC: 7 G/DL (ref 6.3–8.2)
SODIUM SERPL-SCNC: 141 MMOL/L (ref 133–143)
TSH, 3RD GENERATION: 1.17 UIU/ML (ref 0.36–3.74)

## 2023-09-08 RX ORDER — IPRATROPIUM BROMIDE AND ALBUTEROL SULFATE 2.5; .5 MG/3ML; MG/3ML
3 SOLUTION RESPIRATORY (INHALATION) EVERY 6 HOURS PRN
Qty: 360 ML | Refills: 3 | Status: SHIPPED | OUTPATIENT
Start: 2023-09-08

## 2023-09-08 RX ORDER — ESTRADIOL 0.5 MG/1
0.5 TABLET ORAL DAILY
Qty: 90 TABLET | Refills: 2 | Status: SHIPPED | OUTPATIENT
Start: 2023-09-08

## 2023-09-08 RX ORDER — LOSARTAN POTASSIUM 50 MG/1
50 TABLET ORAL DAILY
Qty: 90 TABLET | Refills: 2 | Status: SHIPPED | OUTPATIENT
Start: 2023-09-08

## 2023-09-08 RX ORDER — LEVOTHYROXINE SODIUM 0.15 MG/1
150 TABLET ORAL DAILY
Qty: 90 TABLET | Refills: 2 | Status: SHIPPED | OUTPATIENT
Start: 2023-09-08

## 2023-09-08 RX ORDER — FAMOTIDINE 20 MG/1
20 TABLET, FILM COATED ORAL NIGHTLY
Qty: 90 TABLET | Refills: 2 | Status: SHIPPED | OUTPATIENT
Start: 2023-09-08

## 2023-09-08 RX ORDER — PREDNISONE 5 MG/1
5 TABLET ORAL 2 TIMES DAILY
Qty: 180 TABLET | Refills: 2 | Status: SHIPPED | OUTPATIENT
Start: 2023-09-08

## 2023-09-08 RX ORDER — OMEPRAZOLE 40 MG/1
CAPSULE, DELAYED RELEASE ORAL
Qty: 90 CAPSULE | Refills: 2 | Status: SHIPPED | OUTPATIENT
Start: 2023-09-08

## 2023-09-11 ENCOUNTER — CARE COORDINATION (OUTPATIENT)
Dept: CARE COORDINATION | Facility: CLINIC | Age: 83
End: 2023-09-11

## 2023-09-11 NOTE — CARE COORDINATION
Ambulatory Care Coordination Note  2023    Patient Current Location:  [place]     ACM contacted the patient by telephone. Verified name and  with patient as identifiers. Provided introduction to self, and explanation of the ACM role. Challenges to be reviewed by the provider   Additional needs identified to be addressed with provider: No  none               Method of communication with provider: none. ACM: Lelo Ruiz RN    Ccm outreached to patient. Patient states she had appointment with pcp last week. Pt states pcp changed medication and that has helped with swelling greatly. Patient states overall feeling much better. Pt states no questions or concerns. Ccm discussed that I would outreach next week. Pt agreeable. Offered patient enrollment in the Remote Patient Monitoring (RPM) program for in-home monitoring: NA. Lab Results       None                 Goals Addressed                   This Visit's Progress     Conditions and Symptoms   On track     I will schedule office visits, as directed by my provider. I will notify my provider of any symptoms that indicate a worsening of my condition.     Barriers: none  Plan for overcoming my barriers: N/A  Confidence: 9/10  Anticipated Goal Completion Date: 22                Future Appointments   Date Time Provider [address]   2023  4:30 PM MD MARIAH [facility]ettsDG [facility] AM[facility]   3/6/2024  2:40 PM Anand Pimentel MD [facility]

## 2023-09-13 ENCOUNTER — CARE COORDINATION (OUTPATIENT)
Dept: CARE COORDINATION | Facility: CLINIC | Age: 83
End: 2023-09-13

## 2023-09-13 NOTE — CARE COORDINATION
Remote Alert Monitoring Note  Rpm alert to be reviewed by the provider   yellow alert   blood pressure heart rate (112)   Additional needs to be addressed by N/A: No                    Date/Time:  2023 2:38 PM  Patient Current Location: Home: [facility] Giraldo  LPN contacted patient by telephone. Verified patients name and  as identifiers. Background: Pt enrolled in RPM r/t COPD  Refer to 911 immediately if:  Patient unresponsive or unable to provide history  Change in cognition or sudden confusion  Patient unable to respond in complete sentences  Intense chest pain/tightness  Any concern for any clinical emergency  Red Alert: Provider response time of 1 hr required for any red alert requiring intervention  Yellow Alert: Provider response time of 3hr required for any escalated yellow alert    HR Triage  Are you having any Chest Pain? no   Are you having any Shortness of Breath? no   Are you having any dizziness? no   Are you feeling more fatigued or tired than normal? no   Are you having any other health concerns or issues? no      Clinical Interventions: Reviewed and followed up on alerts and treatments-Dicussed BP HR recheck of 112 with pt. Pt continues to deny acute distress / is asymptomatic and reports medication compliance. Pt again stated that she believes elevated HR is due to feeling stressed about moving / selling home (pt moving in with her son). 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 Pt v/u of when to notify provider of changes / concerns and when to seek emergent medical care. Escalated alert to RN-Update provided for review    Plan/Follow Up: Will continue to review, monitor and address alerts with follow up based on severity of symptoms and risk factors.

## 2023-09-13 NOTE — CARE COORDINATION
Remote Alert Monitoring Note  Rpm alert to be reviewed by the provider   yellow alert   blood pressure heart rate (111)   Additional needs to be addressed by N/A: No                    Date/Time:  2023 11:17 AM  Patient Current Location: [facility]  LPN contacted patient by telephone. Verified patients name and  as identifiers. Background: Pt enrolled in RPM r/t COPD  Refer to 911 immediately if:  Patient unresponsive or unable to provide history  Change in cognition or sudden confusion  Patient unable to respond in complete sentences  Intense chest pain/tightness  Any concern for any clinical emergency  Red Alert: Provider response time of 1 hr required for any red alert requiring intervention  Yellow Alert: Provider response time of 3hr required for any escalated yellow alert    HR Triage  Are you having any Chest Pain? no   Are you having any Shortness of Breath? no   Are you having any dizziness? no   Are you feeling more fatigued or tired than normal? no   Are you having any other health concerns or issues? no      Clinical Interventions: Reviewed and followed up on alerts and treatments-Pt denies acute distress / is asymptomatic. Pulse ox HR of 110 noted in HRS (green alert). Pt believes elevated HR is due to feeling stressed about moving / selling home (pt moving in with her son). Pt agreeable to recheck BP HR when she returns home. Pt v/u of when to notify provider of changes / concerns and when to seek emergent medical care. Plan/Follow Up: Will continue to review, monitor and address alerts with follow up based on severity of symptoms and risk factors.

## 2023-09-15 ENCOUNTER — TELEPHONE (OUTPATIENT)
Dept: INTERNAL MEDICINE CLINIC | Facility: CLINIC | Age: 83
End: 2023-09-15

## 2023-09-15 RX ORDER — FUROSEMIDE 40 MG/1
40 TABLET ORAL DAILY
Qty: 90 TABLET | Refills: 2 | Status: SHIPPED | OUTPATIENT
Start: 2023-09-15

## 2023-09-15 NOTE — TELEPHONE ENCOUNTER
Pt called and was recently prescribed Bumetanide 2 mg and did stop the Lasix 40 mg. The Bumetanide is making her BP and heart rate  elevated. HR is over 100 and pt is not feeling well. Requesting to go back on the Lasix. Per Dr Dong Davidson, \"New rx for Lasix 40 mg daily sent to [facility]. \" Pt notified.

## 2023-09-18 ENCOUNTER — OFFICE VISIT (OUTPATIENT)
Age: 83
End: 2023-09-18
Payer: MEDICARE

## 2023-09-18 ENCOUNTER — CARE COORDINATION (OUTPATIENT)
Dept: CARE COORDINATION | Facility: CLINIC | Age: 83
End: 2023-09-18

## 2023-09-18 VITALS
BODY MASS INDEX: 24.99 KG/M2 | HEIGHT: 64 IN | WEIGHT: 146.38 LBS | SYSTOLIC BLOOD PRESSURE: 158 MMHG | HEART RATE: 84 BPM | DIASTOLIC BLOOD PRESSURE: 88 MMHG

## 2023-09-18 DIAGNOSIS — R00.2 PALPITATIONS: ICD-10-CM

## 2023-09-18 DIAGNOSIS — I25.10 ASCVD (ARTERIOSCLEROTIC CARDIOVASCULAR DISEASE): Primary | ICD-10-CM

## 2023-09-18 DIAGNOSIS — I10 ESSENTIAL HYPERTENSION: ICD-10-CM

## 2023-09-18 DIAGNOSIS — E78.2 MIXED HYPERLIPIDEMIA: ICD-10-CM

## 2023-09-18 PROCEDURE — G8417 CALC BMI ABV UP PARAM F/U: HCPCS | Performed by: INTERNAL MEDICINE

## 2023-09-18 PROCEDURE — 4004F PT TOBACCO SCREEN RCVD TLK: CPT | Performed by: INTERNAL MEDICINE

## 2023-09-18 PROCEDURE — 1090F PRES/ABSN URINE INCON ASSESS: CPT | Performed by: INTERNAL MEDICINE

## 2023-09-18 PROCEDURE — G8399 PT W/DXA RESULTS DOCUMENT: HCPCS | Performed by: INTERNAL MEDICINE

## 2023-09-18 PROCEDURE — 3077F SYST BP >= 140 MM HG: CPT | Performed by: INTERNAL MEDICINE

## 2023-09-18 PROCEDURE — 1123F ACP DISCUSS/DSCN MKR DOCD: CPT | Performed by: INTERNAL MEDICINE

## 2023-09-18 PROCEDURE — 99214 OFFICE O/P EST MOD 30 MIN: CPT | Performed by: INTERNAL MEDICINE

## 2023-09-18 PROCEDURE — 3078F DIAST BP <80 MM HG: CPT | Performed by: INTERNAL MEDICINE

## 2023-09-18 PROCEDURE — G8428 CUR MEDS NOT DOCUMENT: HCPCS | Performed by: INTERNAL MEDICINE

## 2023-09-18 RX ORDER — LOSARTAN POTASSIUM 50 MG/1
50 TABLET ORAL 2 TIMES DAILY
Qty: 180 TABLET | Refills: 2 | Status: SHIPPED | OUTPATIENT
Start: 2023-09-18

## 2023-09-22 VITALS
DIASTOLIC BLOOD PRESSURE: 74 MMHG | BODY MASS INDEX: 24.65 KG/M2 | SYSTOLIC BLOOD PRESSURE: 139 MMHG | HEART RATE: 61 BPM | WEIGHT: 143.6 LBS | OXYGEN SATURATION: 92 %

## 2023-09-25 ENCOUNTER — CARE COORDINATION (OUTPATIENT)
Dept: CARE COORDINATION | Facility: CLINIC | Age: 83
End: 2023-09-25

## 2023-09-25 NOTE — CARE COORDINATION
Ambulatory Care Coordination Note  2023    Patient Current Location:  [facility]     ACM contacted the patient by telephone. Verified name and  with patient as identifiers. Provided introduction to self, and explanation of the ACM role. Challenges to be reviewed by the provider   Additional needs identified to be addressed with provider: No  none               Method of communication with provider: none. ACM: Augustin Vila RN    Ccm outreached to patient. Patient states she is on her way to a . Patient states overall she is feeling well. Patient states no questions or concerns. Ccm discussed that I would outreach next week. Patient agreeable. Offered patient enrollment in the Remote Patient Monitoring (RPM) program for in-home monitoring: NA. Lab Results       None                 Goals Addressed                   This Visit's Progress     Conditions and Symptoms   On track     I will schedule office visits, as directed by my provider. I will notify my provider of any symptoms that indicate a worsening of my condition.     Barriers: none  Plan for overcoming my barriers: N/A  Confidence: 9/10  Anticipated Goal Completion Date: 22                Future Appointments   Date Time Provider [address]   2024  1:00 PM Katiuska Aparicio MD [facility]   3/6/2024  2:40 PM Heron Marion MD [facility] AM[facility]

## 2023-09-27 ENCOUNTER — CARE COORDINATION (OUTPATIENT)
Dept: CARE COORDINATION | Facility: CLINIC | Age: 83
End: 2023-09-27

## 2023-09-27 ENCOUNTER — HOSPITAL ENCOUNTER (EMERGENCY)
Age: 83
Discharge: HOME | End: 2023-09-27
Attending: EMERGENCY MEDICINE
Payer: MEDICARE

## 2023-09-27 ENCOUNTER — APPOINTMENT (OUTPATIENT)
Dept: GENERAL RADIOLOGY | Age: 83
End: 2023-09-27
Payer: MEDICARE

## 2023-09-27 ENCOUNTER — TELEPHONE (OUTPATIENT)
Facility: CLINIC | Age: 83
End: 2023-09-27

## 2023-09-27 VITALS
BODY MASS INDEX: 23.73 KG/M2 | RESPIRATION RATE: 20 BRPM | SYSTOLIC BLOOD PRESSURE: 142 MMHG | WEIGHT: 139 LBS | OXYGEN SATURATION: 95 % | HEART RATE: 80 BPM | TEMPERATURE: 98.4 F | HEIGHT: 64 IN | DIASTOLIC BLOOD PRESSURE: 59 MMHG

## 2023-09-27 DIAGNOSIS — G89.29 CHRONIC BILATERAL LOW BACK PAIN WITHOUT SCIATICA: Primary | ICD-10-CM

## 2023-09-27 DIAGNOSIS — M79.89 LEG SWELLING: ICD-10-CM

## 2023-09-27 DIAGNOSIS — M54.50 CHRONIC BILATERAL LOW BACK PAIN WITHOUT SCIATICA: Primary | ICD-10-CM

## 2023-09-27 LAB
ALBUMIN SERPL-MCNC: 3.6 G/DL (ref 3.2–4.6)
ALBUMIN/GLOB SERPL: 1 (ref 0.4–1.6)
ALP SERPL-CCNC: 47 U/L (ref 50–136)
ALT SERPL-CCNC: 22 U/L (ref 12–65)
ANION GAP SERPL CALC-SCNC: 6 MMOL/L (ref 2–11)
AST SERPL-CCNC: 11 U/L (ref 15–37)
BASOPHILS # BLD: 0 K/UL (ref 0–0.2)
BASOPHILS NFR BLD: 0 % (ref 0–2)
BILIRUB SERPL-MCNC: 0.4 MG/DL (ref 0.2–1.1)
BUN SERPL-MCNC: 21 MG/DL (ref 8–23)
CALCIUM SERPL-MCNC: 9.8 MG/DL (ref 8.3–10.4)
CHLORIDE SERPL-SCNC: 104 MMOL/L (ref 101–110)
CO2 SERPL-SCNC: 29 MMOL/L (ref 21–32)
CREAT SERPL-MCNC: 0.71 MG/DL (ref 0.6–1)
DIFFERENTIAL METHOD BLD: ABNORMAL
EOSINOPHIL # BLD: 0 K/UL (ref 0–0.8)
EOSINOPHIL NFR BLD: 0 % (ref 0.5–7.8)
ERYTHROCYTE [DISTWIDTH] IN BLOOD BY AUTOMATED COUNT: 14.6 % (ref 11.9–14.6)
GLOBULIN SER CALC-MCNC: 3.5 G/DL (ref 2.8–4.5)
GLUCOSE SERPL-MCNC: 81 MG/DL (ref 65–100)
HCT VFR BLD AUTO: 38.9 % (ref 35.8–46.3)
HGB BLD-MCNC: 12.5 G/DL (ref 11.7–15.4)
IMM GRANULOCYTES # BLD AUTO: 0.1 K/UL (ref 0–0.5)
IMM GRANULOCYTES NFR BLD AUTO: 1 % (ref 0–5)
LIPASE SERPL-CCNC: 107 U/L (ref 73–393)
LYMPHOCYTES # BLD: 2.1 K/UL (ref 0.5–4.6)
LYMPHOCYTES NFR BLD: 18 % (ref 13–44)
MCH RBC QN AUTO: 30.7 PG (ref 26.1–32.9)
MCHC RBC AUTO-ENTMCNC: 32.1 G/DL (ref 31.4–35)
MCV RBC AUTO: 95.6 FL (ref 82–102)
MONOCYTES # BLD: 0.7 K/UL (ref 0.1–1.3)
MONOCYTES NFR BLD: 6 % (ref 4–12)
NEUTS SEG # BLD: 8.7 K/UL (ref 1.7–8.2)
NEUTS SEG NFR BLD: 74 % (ref 43–78)
NRBC # BLD: 0 K/UL (ref 0–0.2)
NT PRO BNP: 133 PG/ML
PLATELET # BLD AUTO: 315 K/UL (ref 150–450)
PMV BLD AUTO: 9.2 FL (ref 9.4–12.3)
POTASSIUM SERPL-SCNC: 3.8 MMOL/L (ref 3.5–5.1)
PROT SERPL-MCNC: 7.1 G/DL (ref 6.3–8.2)
RBC # BLD AUTO: 4.07 M/UL (ref 4.05–5.2)
SODIUM SERPL-SCNC: 139 MMOL/L (ref 133–143)
WBC # BLD AUTO: 11.7 K/UL (ref 4.3–11.1)

## 2023-09-27 PROCEDURE — 85025 COMPLETE CBC W/AUTO DIFF WBC: CPT

## 2023-09-27 PROCEDURE — 72100 X-RAY EXAM L-S SPINE 2/3 VWS: CPT

## 2023-09-27 PROCEDURE — 71045 X-RAY EXAM CHEST 1 VIEW: CPT

## 2023-09-27 PROCEDURE — 83690 ASSAY OF LIPASE: CPT

## 2023-09-27 PROCEDURE — 80053 COMPREHEN METABOLIC PANEL: CPT

## 2023-09-27 PROCEDURE — 99284 EMERGENCY DEPT VISIT MOD MDM: CPT

## 2023-09-27 PROCEDURE — 83880 ASSAY OF NATRIURETIC PEPTIDE: CPT

## 2023-09-27 RX ORDER — KETOROLAC TROMETHAMINE 10 MG/1
10 TABLET, FILM COATED ORAL EVERY 6 HOURS PRN
Qty: 8 TABLET | Refills: 0 | Status: SHIPPED | OUTPATIENT
Start: 2023-09-27

## 2023-09-27 ASSESSMENT — ENCOUNTER SYMPTOMS
DIARRHEA: 0
COUGH: 0
BOWEL INCONTINENCE: 0
ABDOMINAL PAIN: 0
SHORTNESS OF BREATH: 0
SORE THROAT: 0
VOMITING: 0
BACK PAIN: 1
COLOR CHANGE: 0
NAUSEA: 0
CONSTIPATION: 0
ABDOMINAL SWELLING: 0
RHINORRHEA: 0

## 2023-09-27 ASSESSMENT — PAIN - FUNCTIONAL ASSESSMENT: PAIN_FUNCTIONAL_ASSESSMENT: 0-10

## 2023-09-27 ASSESSMENT — LIFESTYLE VARIABLES
HOW OFTEN DO YOU HAVE A DRINK CONTAINING ALCOHOL: NEVER
HOW MANY STANDARD DRINKS CONTAINING ALCOHOL DO YOU HAVE ON A TYPICAL DAY: PATIENT DOES NOT DRINK

## 2023-09-27 NOTE — ED PROVIDER NOTES
Emergency Department Provider Note       PCP: Rodrigo Reaves MD   Age: 79 y.o. Sex: female     DISPOSITION Decision To Discharge 09/27/2023 03:57:32 PM       ICD-10-CM    1. Chronic bilateral low back pain without sciatica  M54.50     G89.29       2. Leg swelling  M79.89           Medical Decision Making     Complexity of Problems Addressed:  1 or more acute illnesses that pose a threat to life or bodily function. Data Reviewed and Analyzed:   I independently ordered and reviewed each unique test.  I reviewed external records: provider visit note from outside specialist.       I independently ordered and interpreted the   I interpreted the X-rays no fracture on L-spine. No infiltrate. .    Discussion of management or test interpretation. Patient with some chronic lower back pain. No recent injury. No acute on L-spine x-ray. Degenerative changes present. Also having some fluctuating weight and bilateral lower extremity edema. On Lasix. BNP is not elevated. No volume overload on chest x-ray. Will discharge with PCP and cardiology follow-up. Risk of Complications and/or Morbidity of Patient Management:  Patient was discharged risks and benefits of hospitalization were considered. Shared medical decision making was utilized in creating the patients health plan today. Is this patient to be included in the SEP-1 core measure due to severe sepsis or septic shock? No Exclusion criteria - the patient is NOT to be included for SEP-1 Core Measure due to: Infection is not suspected      History      Patient has chronic back pain getting worse over the past couple weeks. No new injury or fall. She also weighs herself daily. Takes Lasix different amounts at different times of the day. This is not per her cardiologist order. She had some fluctuating weight over the past 2 days and was told to come to the ER for evaluation for this.   Denies any saddle anesthesia or change in bowel or 82.0 - 102.0 FL    MCH 30.7 26.1 - 32.9 PG    MCHC 32.1 31.4 - 35.0 g/dL    RDW 14.6 11.9 - 14.6 %    Platelets 504 511 - 964 K/uL    MPV 9.2 (L) 9.4 - 12.3 FL    nRBC 0.00 0.0 - 0.2 K/uL    Differential Type AUTOMATED      Neutrophils % 74 43 - 78 %    Lymphocytes % 18 13 - 44 %    Monocytes % 6 4.0 - 12.0 %    Eosinophils % 0 (L) 0.5 - 7.8 %    Basophils % 0 0.0 - 2.0 %    Immature Granulocytes 1 0.0 - 5.0 %    Neutrophils Absolute 8.7 (H) 1.7 - 8.2 K/UL    Lymphocytes Absolute 2.1 0.5 - 4.6 K/UL    Monocytes Absolute 0.7 0.1 - 1.3 K/UL    Eosinophils Absolute 0.0 0.0 - 0.8 K/UL    Basophils Absolute 0.0 0.0 - 0.2 K/UL    Absolute Immature Granulocyte 0.1 0.0 - 0.5 K/UL   Comprehensive Metabolic Panel   Result Value Ref Range    Sodium 139 133 - 143 mmol/L    Potassium 3.8 3.5 - 5.1 mmol/L    Chloride 104 101 - 110 mmol/L    CO2 29 21 - 32 mmol/L    Anion Gap 6 2 - 11 mmol/L    Glucose 81 65 - 100 mg/dL    BUN 21 8 - 23 MG/DL    Creatinine 0.71 0.6 - 1.0 MG/DL    Est, Glom Filt Rate >60 >60 ml/min/1.73m2    Calcium 9.8 8.3 - 10.4 MG/DL    Total Bilirubin 0.4 0.2 - 1.1 MG/DL    ALT 22 12 - 65 U/L    AST 11 (L) 15 - 37 U/L    Alk Phosphatase 47 (L) 50 - 136 U/L    Total Protein 7.1 6.3 - 8.2 g/dL    Albumin 3.6 3.2 - 4.6 g/dL    Globulin 3.5 2.8 - 4.5 g/dL    Albumin/Globulin Ratio 1.0 0.4 - 1.6     Lipase   Result Value Ref Range    Lipase 107 73 - 393 U/L   Brain Natriuretic Peptide   Result Value Ref Range    NT Pro- <450 PG/ML        XR CHEST PORTABLE    (Results Pending)   XR LUMBAR SPINE (2-3 VIEWS)    (Results Pending)                     Voice dictation software was used during the making of this note. This software is not perfect and grammatical and other typographical errors may be present. This note has not been completely proofread for errors.      Felipe Rowan III, MD  09/27/23 [id number]

## 2023-09-27 NOTE — TELEPHONE ENCOUNTER
09/27/23 11:23 AM     REMOTE PATIENT MONITORING HIGH ALERT RESPONSE         ASSESSMENT AND PLAN   Weight gain with abd distention  --reports taking 60 mg of Lasix yesterday and today weight is up 8#; states has abd distention again today  --is she dealing with cor pulmonale? does she need another echo to R/O left heart failure?  --will ask PCP for another F/U appt to further evaluate since she prescribes the diuretic therapy  Chest tightness  --with this concern coupled with overnight weight gain, advised pt to be evaluated in ED      CHIEF COMPLAINT    Responding to a high RPM alert for weight gain of 8# in 1 day. Yesterday's weight was 139.4. HPI    Aevry Moser is a 79 y.o. female who presents with weight gain as above. Pt states she has been advised that she can take extra 20 mg of Lasix as needed for swelling but no more than twice weekly. States she took 60 mg yesterday in the very early am and had good urinary results from this. She then weighed around 9A with weight of 139.4. States the abd swelling she experienced diminished with Lasix dose. Reports this is back today and also has swelling in both legs (L>R) as is chronic for her. Sounds slightly dyspneic on phone but has hx of COPD as well. When questioned about chest discomfort, she confirms she has a 'little bit\" of this also today and attributes it to abd pushing up under her ribs. When asked if her PCP has ever seen this abd distention, she states she is not sure and per the last OV note on 9/6, there is no mention of this. Advised pt that I am concerned she has had this recur so soon after her extra Lasix and coupled with the CP, I am recommending she be seen in the ED for evaluation. Pt is also grieving the recent loss of her daughter and is in the midst of a move to her son's home who lives 25 miles away.     REVIEW OF SYSTEMS    CONSTITUTIONAL: fatigue--same as yesterday and Denies fever and chills, RESPIRATORY: she isn't sure about [place]

## 2023-09-27 NOTE — CARE COORDINATION
Remote Alert Monitoring Note  Rpm alert to be reviewed by the provider   red alert   weight (8.0# increase in last day)   Additional needs to be addressed by PCP and Cardiologist: -Pt denies CP and SOB. Pt c/o edema to BLE and ABD. Pt has orders for Lasix 40 MG take 1 tab po daily, Lasix 20 MG take 1 tab po nightly, and Bumex 2 MG take 1 tab po daily. Pt states she no longer takes Bumex due to side effects. Pt has taken Lasix 40 MG this AM. 10.0# weight decrease from 23-23 and 0.2# decrease per 7 day look back noted in HRS. Current weight 147.4#. Pt believes weight increase is due to \"sitting all day Monday\" after recent loss of her daughter and preparing to move \"next Thursday\". Pt v/u of when to notify provider of changes / concerns and when to seek emergent medical care. Please advise. Date/Time:  2023 9:32 AM  Patient Current Location: Home: [address] [place] [address] contacted patient by telephone. Verified patients name and  as identifiers. Background:  Pt enrolled in RPM r/t COPD  Refer to 911 immediately if:  Patient unresponsive or unable to provide history  Change in cognition or sudden confusion  Patient unable to respond in complete sentences  Intense chest pain/tightness  Any concern for any clinical emergency  Red Alert: Provider response time of 1 hr required for any red alert requiring intervention  Yellow Alert: Provider response time of 3hr required for any escalated yellow alert        Weight Triage  Are you weighing any different than you did yesterday? (time of day, clothes and shoes on or off, etc)? no   Do you have any shortness of breath? no   Do you have any swelling in your hands of feet? Yes, BLE    Are you having any other health concerns or issues? no      Clinical Interventions: Reviewed and followed up on alerts and treatments-Pt denies CP and SOB. Pt c/o edema to BLE and ABD.  Pt has orders for Lasix 40 MG take 1 tab po

## 2023-09-27 NOTE — ED TRIAGE NOTES
Pt c/o back pain worsening x2 weeks. PT states she lost 10 pounds yesterday and gained back 8 today.

## 2023-09-28 ENCOUNTER — CARE COORDINATION (OUTPATIENT)
Dept: CARE COORDINATION | Facility: CLINIC | Age: 83
End: 2023-09-28

## 2023-09-28 ENCOUNTER — TELEPHONE (OUTPATIENT)
Age: 83
End: 2023-09-28

## 2023-09-28 DIAGNOSIS — I10 ESSENTIAL (PRIMARY) HYPERTENSION: Primary | ICD-10-CM

## 2023-09-28 DIAGNOSIS — I25.10 ASCVD (ARTERIOSCLEROTIC CARDIOVASCULAR DISEASE): ICD-10-CM

## 2023-09-28 NOTE — CARE COORDINATION
Remote Patient Monitoring Note      Date/Time:  9/28/2023 12:09 PM  Patient Current Location: [place]  LPN attempted to contact patient by telephone regarding red alert received for weight increase (8.8# since 9/26/23). Background: Pt enrolled in RPM r/t COPD  Clinical Interventions:  Left HIPAA compliant message requesting a return call. Plan/Follow Up: Will continue to review, monitor and address alerts with follow up based on severity of symptoms and risk factors.

## 2023-09-28 NOTE — CARE COORDINATION
Remote Patient Monitoring Note      Date/Time:  2023 1:00 PM  Patient Current Location: [facility]  LPN 2nd attempt to contact patient by telephone regarding red alert received for weight increase (8.8# since 23). Left another HIPAA compliant message requesting a return call. LPN contacted patients Emergency contact, Ema Gracia, per RPM protocol. Verified patients name and  as identifiers. Per patients son, Vicente Ramos, he has not spoken with pt today but he is aware of patients recent ED visit and need for pt to F/U with Cardio. Ema Gracia is agreeable to attempt to contact pt, request she return LPNs call, and encourage her to F/U with her Cardiologist. LPN contact information provided. Background: Pt enrolled in RPM r/t COPD  Clinical Interventions:  FYI update provided to Cardiologist, PCP, and ACM. Plan/Follow Up: Will continue to review, monitor and address alerts with follow up based on severity of symptoms and risk factors.

## 2023-09-28 NOTE — CARE COORDINATION
Ambulatory Care Coordination Note  2023    Patient Current Location:  [facility]     ACM contacted the patient by telephone. Verified name and  with patient as identifiers. Provided introduction to self, and explanation of the ACM role. Challenges to be reviewed by the provider   Additional needs identified to be addressed with provider: No  none               Method of communication with provider: none. ACM: Lorenzo Murillo RN    Ccm was contacted by Johanna Soto NP to follow up with patient for weight gain. Patient did go to ER yesterday. Reviewed with patient discharge summary. Patient verbalized understanding. Patient states she still has some swelling to BLE. Discussed with patient to monitor weight and heart rate. Patient verbalized understanding. Discussed with patient to elevated legs as much as possible. Patient took lasix as directed this morning. No sob noted. Pt states no questions or concerns. Ccm discussed that I would outreach next week. Patient agreeable. Offered patient enrollment in the Remote Patient Monitoring (RPM) program for in-home monitoring: Yes, patient already enrolled.     Lab Results       None                 Goals Addressed    None         Future Appointments   Date Time Provider [address] [place] [address]   2024  1:00 PM Nelida Rudd MD [facility] AMB   3/6/2024  2:40 PM Mirian Jenkins MD [facility] AMB

## 2023-09-28 NOTE — TELEPHONE ENCOUNTER
Per Dr. Katie Weir, order echo and follow up with him soon.      See note from yesterday 9/27/23 by CARLINE Grajeda NP.

## 2023-09-29 ENCOUNTER — CARE COORDINATION (OUTPATIENT)
Dept: CARE COORDINATION | Facility: CLINIC | Age: 83
End: 2023-09-29

## 2023-09-29 NOTE — CARE COORDINATION
Remote Alert Monitoring Note  Rpm alert to be reviewed by the provider   red alert   weight (7.8# increase since 23)   Additional needs to be addressed by PCP and Cardiologist: FYI Pt speaking in full sentences, denies CP, SOB, and new or worsening edema at this time. 1.0# decrease in last day noted in HRS. Current weight is 147.2#. Pt reports taking Lasix 40 MG 1 tab po yesterday and today and stated, \"in my abdomen, I really don't feel like I have any fluid today. I don't feel that pressure\". Pt reporters continued edema to BLE. Pt scheduled for echo on 10/9/23 and Cardiology office visit on 23. Pt v/u of when to notify provider of changes / concerns and when to seek emergent medical care. Date/Time:  2023 11:02 AM  Patient Current Location: Home: [facility] [employer] [facility] [place]  LPN contacted patient by telephone. Verified patients name and  as identifiers. Background: Pt enrolled in RPM r/t COPD  Refer to 911 immediately if:  Patient unresponsive or unable to provide history  Change in cognition or sudden confusion  Patient unable to respond in complete sentences  Intense chest pain/tightness  Any concern for any clinical emergency  Red Alert: Provider response time of 1 hr required for any red alert requiring intervention  Yellow Alert: Provider response time of 3hr required for any escalated yellow alert    Weight Scale Triage  Was your weight obtained upon rising/waking today? no   Was your weight obtained after voiding and/or use of the bathroom today? yes   Did you weigh yourself in the same amount of clothing today, compared to how you typically do? yes   Was the scale bumped or moved prior to today's weight? no   Is your scale on a flat/hard surface? yes   Did you obtain your weight with shoes on? no   If yes, is this something you normally do during your daily weights? NA   Were you standing up straight on the scale today?  yes   Were you leaning on

## 2023-10-02 ENCOUNTER — CARE COORDINATION (OUTPATIENT)
Dept: CARE COORDINATION | Facility: CLINIC | Age: 83
End: 2023-10-02

## 2023-10-02 NOTE — CARE COORDINATION
alert to RN-FYI update provided  Escalated alert to Specialist-FYI update provided    Plan/Follow Up: Will continue to review, monitor and address alerts with follow up based on severity of symptoms and risk factors.

## 2023-10-02 NOTE — CARE COORDINATION
Ambulatory Care Coordination Note  10/2/2023    Patient Current Location:  [facility] Vo     ACM contacted the patient by telephone. Verified name and  with patient as identifiers. Provided introduction to self, and explanation of the ACM role. Challenges to be reviewed by the provider   Additional needs identified to be addressed with provider: No  none               Method of communication with provider: none. ACM: Augustin Vila RN    Ccm outreached to patient. Patient states she continues to have back pain. Patient states she has tried several things but pain is consistent. Pt states md is aware of back pain. Discussed with patient to try ice on back and see if that helps. Patient states it helps when she sits straight up as well. Discussed with patient that if pain persists to notify pcp. Pt verbalized understanding. Patient states no other questions or concerns. Ccm discussed that I would outreach next week. Patient agreeable. Offered patient enrollment in the Remote Patient Monitoring (RPM) program for in-home monitoring: Yes, patient already enrolled. Lab Results       None                 Goals Addressed                   This Visit's Progress     Conditions and Symptoms   On track     I will schedule office visits, as directed by my provider. I will notify my provider of any symptoms that indicate a worsening of my condition.     Barriers: none  Plan for overcoming my barriers: N/A  Confidence: 9/10  Anticipated Goal Completion Date: 22                Future Appointments   Date Time Provider [address] [place] [address]   10/9/2023  2:30 PM [facility] E[facility]   2023  4:00 PM MD CHAZ Javed [facility] AMB   2024  1:00 PM Katiuska Aparicio MD [facility] AMB   3/6/2024  2:40 PM Heron Marion MD [facility] AMB

## 2023-10-09 ENCOUNTER — CARE COORDINATION (OUTPATIENT)
Dept: CARE COORDINATION | Facility: CLINIC | Age: 83
End: 2023-10-09

## 2023-10-09 NOTE — CARE COORDINATION
Ambulatory Care Coordination Note  10/9/2023    Patient Current Location:  [facility]     ACM contacted the patient by telephone. Verified name and  with patient as identifiers. Provided introduction to self, and explanation of the ACM role. Challenges to be reviewed by the provider   Additional needs identified to be addressed with provider: No  none               Method of communication with provider: none. ACM: Violetta Brumfield RN    Ccm outreached to patient. Patient states she still has lower back pain. Patient states she left a message with pcp on Friday and made her aware. Patient states pcp office stated they will contact her today regarding pain. Patient states she has a cardiology appointment today as well. Patient states no questions or concerns. Ccm discussed that I would outreach next week. Patient agreeable. Offered patient enrollment in the Remote Patient Monitoring (RPM) program for in-home monitoring: NA. Lab Results       None                 Goals Addressed                   This Visit's Progress     Conditions and Symptoms   On track     I will schedule office visits, as directed by my provider. I will notify my provider of any symptoms that indicate a worsening of my condition.     Barriers: none  Plan for overcoming my barriers: N/A  Confidence: 9/10  Anticipated Goal Completion Date: 22                Future Appointments   Date Time Provider [address]   10/9/2023  2:30 PM [facility] ECHO 35 CHAZ [facility] AMB   2023  4:00 PM MD CHAZ Bruce [facility] AMB   2024  1:00 PM MD CHAZ Bruce [facility] AMB   3/6/2024  2:40 PM Lien Ventura MD [facility] AMB

## 2023-10-10 RX ORDER — METAXALONE 800 MG/1
TABLET ORAL
Qty: 10 TABLET | Refills: 0 | Status: SHIPPED | OUTPATIENT
Start: 2023-10-10

## 2023-10-16 ENCOUNTER — CARE COORDINATION (OUTPATIENT)
Dept: CARE COORDINATION | Facility: CLINIC | Age: 83
End: 2023-10-16

## 2023-10-16 NOTE — CARE COORDINATION
Ambulatory Care Coordination Note  10/16/2023    Patient Current Location:  [facility]     ACM contacted the patient by telephone. Verified name and  with patient as identifiers. Provided introduction to self, and explanation of the ACM role. Challenges to be reviewed by the provider   Additional needs identified to be addressed with provider: Yes  Left message with Dr. Rodney bower office per patient request               Method of communication with provider: staff message. ACM: Laly Powell RN    Ccm outreached to patient. Patient states she has continued back pain. Patient would like for ccm to contact Dr. Lul Harris to see if we can get appointment for pain. Patient states she has been using walker to help with ambulation. Ccm left message with otho office. Patient states no other questions or concerns. Ccm discussed that I would outreach next week. Patient agreeable. Offered patient enrollment in the Remote Patient Monitoring (RPM) program for in-home monitoring: NA. Lab Results       None                 Goals Addressed                   This Visit's Progress     Conditions and Symptoms   On track     I will schedule office visits, as directed by my provider. I will notify my provider of any symptoms that indicate a worsening of my condition.     Barriers: none  Plan for overcoming my barriers: N/A  Confidence: 9/10  Anticipated Goal Completion Date: 22                Future Appointments   Date Time Provider [address]   2023  4:00 PM MD CHAZ Silvestre [facility] AMB   2024  1:00 PM MD CHAZ Silvestre AMB   3/6/2024  2:40 PM Lashawn Rosa MD [facility] AMB

## 2023-10-18 ENCOUNTER — OFFICE VISIT (OUTPATIENT)
Dept: ORTHOPEDIC SURGERY | Age: 83
End: 2023-10-18
Payer: MEDICARE

## 2023-10-18 DIAGNOSIS — M48.061 FORAMINAL STENOSIS OF LUMBAR REGION: ICD-10-CM

## 2023-10-18 DIAGNOSIS — M47.816 FACET ARTHROPATHY, LUMBAR: ICD-10-CM

## 2023-10-18 DIAGNOSIS — M51.36 DDD (DEGENERATIVE DISC DISEASE), LUMBAR: Primary | ICD-10-CM

## 2023-10-18 PROCEDURE — G8399 PT W/DXA RESULTS DOCUMENT: HCPCS | Performed by: PHYSICIAN ASSISTANT

## 2023-10-18 PROCEDURE — 4004F PT TOBACCO SCREEN RCVD TLK: CPT | Performed by: PHYSICIAN ASSISTANT

## 2023-10-18 PROCEDURE — 1090F PRES/ABSN URINE INCON ASSESS: CPT | Performed by: PHYSICIAN ASSISTANT

## 2023-10-18 PROCEDURE — G8420 CALC BMI NORM PARAMETERS: HCPCS | Performed by: PHYSICIAN ASSISTANT

## 2023-10-18 PROCEDURE — G8427 DOCREV CUR MEDS BY ELIG CLIN: HCPCS | Performed by: PHYSICIAN ASSISTANT

## 2023-10-18 PROCEDURE — G8484 FLU IMMUNIZE NO ADMIN: HCPCS | Performed by: PHYSICIAN ASSISTANT

## 2023-10-18 PROCEDURE — 1123F ACP DISCUSS/DSCN MKR DOCD: CPT | Performed by: PHYSICIAN ASSISTANT

## 2023-10-18 PROCEDURE — 99214 OFFICE O/P EST MOD 30 MIN: CPT | Performed by: PHYSICIAN ASSISTANT

## 2023-10-18 NOTE — PROGRESS NOTES
Name: Justin Bell  YOB: 1940  Gender: female  MRN: [id number]    CC: Back Pain (Low back pain )       HPI: This is a 79y.o. year old female who on May 10, 2022 had L4 S1 laminectomy with Dr. Vita Reyna. She reports she had immediate pain relief of her radicular symptoms after surgery. Unfortunately several weeks after surgery she fell out of her car she did go to emergency department at that time I do not have any record of this no fractures were noted. And then another time this past year she had another fall. She has not followed up with Dr. Vita Reyna. She reports pain across her lower back usually starts in the right buttock and radiates across the back into the left hip. She has bilateral lower extremity swelling. .  Pain is worse with turning over in bed and standing and walking. She is on long-term prednisone for polymyalgia rheumatica. She has history of COPD. She denies numbness or tingling in the legs but she certainly has difficulty with ambulation and walking secondary to pain. Denies groin pain. 10/18/2023     3:01 PM   AMB PAIN ASSESSMENT   Location of Pain Back    Severity of Pain 7   Quality of Pain Aching   Duration of Pain Persistent   Frequency of Pain Constant   Date Pain First Started 11/9/2022   Aggravating Factors Other (Comment)    Limiting Behavior Some   Relieving Factors Other (Comment)    Result of Injury Yes    Work-Related Injury No   Are there other pain locations you wish to document? No       Significant value              ROS/Meds/PSH/PMH/FH/SH: I personally reviewed the patient's collected intake data.   Below are the pertinents:    Allergies   Allergen Reactions    Macadamia Nut Oil Shortness Of Breath     Specifically black walnuts per an allergy test.    Nitrofurantoin Other (See Comments)     delirium    Lactose Diarrhea    Levofloxacin Diarrhea    Simvastatin Other (See Comments)    Codeine Nausea And Vomiting    Meperidine Nausea And

## 2023-10-23 ENCOUNTER — CARE COORDINATION (OUTPATIENT)
Dept: CARE COORDINATION | Facility: CLINIC | Age: 83
End: 2023-10-23

## 2023-10-23 NOTE — CARE COORDINATION
Ambulatory Care Management Note        Date/Time:  10/23/2023 8:10 AM    Ccm outreached to patient. No answer at this time, message left. Awaiting response. If no response, ccm will outreach next week.

## 2023-10-25 ENCOUNTER — CARE COORDINATION (OUTPATIENT)
Dept: CARE COORDINATION | Facility: CLINIC | Age: 83
End: 2023-10-25

## 2023-10-25 NOTE — CARE COORDINATION
Remote Alert Monitoring Note  Rpm alert to be reviewed by the provider   red alert   pulse ox reading (87%)   Additional needs to be addressed by N/A: No                    Date/Time:  10/25/2023 9:53 AM  Patient Current Location: Home:  [facility] [address]  LPN attempted to contact patient by telephone. Pt returned call. Verified patients name and  as identifiers. Background: Pt enrolled in RPM r/t COPD. Refer to 911 immediately if:  Patient unresponsive or unable to provide history  Change in cognition or sudden confusion  Patient unable to respond in complete sentences  Intense chest pain/tightness  Any concern for any clinical emergency  Red Alert: Provider response time of 1 hr required for any red alert requiring intervention  Yellow Alert: Provider response time of 3hr required for any escalated yellow alert    O2 Triage  Are you having any Chest Pain? no   Are you having any Shortness of Breath? no   Swelling in your hands or feet? no     Are you having any other health concerns or issues? no      Clinical Interventions: Reviewed and followed up on alerts and treatments-Pt denies CP, SOB, and new or worsening edema. Pt reports she was unable to get a \"good reading\" due to hands feeling cold and states, \"I'm fine. Everything's fine\". Pt declined pulse ox recheck and stated, \"I'm fine. I don't need to. I already put everything up\". Pt v/u of when to notify provider(s) of changes / concerns and when to seek emergent medical care. Escalated alert to RN-FYI update provided  Plan/Follow Up: Will continue to review, monitor and address alerts with follow up based on severity of symptoms and risk factors.

## 2023-10-25 NOTE — CARE COORDINATION
Remote Patient Monitoring Note      Date/Time:  10/25/2023 9:48 AM  Patient Current Location: [facility]  LPN attempted to contact patient by telephone regarding red alert received for pulse ox reading (87%). Background: Pt enrolled in RPM r/t COPD. Clinical Interventions:  Left HIPAA compliant message requesting a return call. Plan/Follow Up: Will continue to review, monitor and address alerts with follow up based on severity of symptoms and risk factors.

## 2023-10-26 ENCOUNTER — CARE COORDINATION (OUTPATIENT)
Dept: CARE COORDINATION | Facility: CLINIC | Age: 83
End: 2023-10-26

## 2023-10-26 NOTE — CARE COORDINATION
Remote Alert Monitoring Note  Rpm alert to be reviewed by the provider   red alert   pulse ox reading (91%)   Additional needs to be addressed by N/A: No                    Date/Time:  10/26/2023 9:44 AM  Patient Current Location: Home:  [facility]  [place] [address]  LPN contacted patient by telephone. Verified patients name and  as identifiers. Background: Pt enrolled in RPM r/t COPD  Refer to 911 immediately if:  Patient unresponsive or unable to provide history  Change in cognition or sudden confusion  Patient unable to respond in complete sentences  Intense chest pain/tightness  Any concern for any clinical emergency  Red Alert: Provider response time of 1 hr required for any red alert requiring intervention  Yellow Alert: Provider response time of 3hr required for any escalated yellow alert    O2 Triage  Are you having any Chest Pain? no   Are you having any Shortness of Breath? no   Swelling in your hands or feet? no     Are you having any other health concerns or issues? no      Clinical Interventions: Reviewed and followed up on alerts and treatments-Pt denies CP, SOB/dyspnea, and new or worsening edema. Pt asymptomatic and stated, \"I feel fine\". Pt declined pulse ox recheck and states \"Sometimes it is low because I can't keep my hands warm. It's been like that for years\". Pt educated on RPM adherence and v/u. Pt plans to replace weight scale batteries and complete daily metrics at a later time. Pt v/u of when to notify provider(s) of changes / concerns and when to seek emergent medical care. Escalated alert to RN-FYI update provided  Plan/Follow Up: Will continue to review, monitor and address alerts with follow up based on severity of symptoms and risk factors.

## 2023-10-30 ENCOUNTER — CARE COORDINATION (OUTPATIENT)
Dept: CARE COORDINATION | Facility: CLINIC | Age: 83
End: 2023-10-30

## 2023-10-30 NOTE — CARE COORDINATION
Ambulatory Care Coordination Note  10/30/2023    Patient Current Location:  [facility]     ACM contacted the patient by telephone. Verified name and  with patient as identifiers. Provided introduction to self, and explanation of the ACM role. Challenges to be reviewed by the provider   Additional needs identified to be addressed with provider: No  none               Method of communication with provider: none. ACM: Keke Wang RN    Ccm outreached to patient. Patient states she has MRI scheduled tomorrow of her back. Patient states no sob at this time. Patient states she is taking her breathing tx as ordered. Patient states no questions or concerns. Ccm discussed that I would outreach next week. Patient agreeable. Offered patient enrollment in the Remote Patient Monitoring (RPM) program for in-home monitoring: NA. Lab Results       None                 Goals Addressed                   This Visit's Progress     Conditions and Symptoms   On track     I will schedule office visits, as directed by my provider. I will notify my provider of any symptoms that indicate a worsening of my condition.     Barriers: none  Plan for overcoming my barriers: N/A  Confidence: 9/10  Anticipated Goal Completion Date: 22                Future Appointments   Date Time Provider [address]   10/31/2023 11:00 AM [facility]   2023 11:00 AM Piedad Parsons, SAHRA KIRKPATRICK [facility] AMB   2023  4:00 PM MD CHAZ Figueroa [facility] AMB   2024  1:00 PM MD CHAZ Figueroa G[facility] AMB   3/6/2024  2:40 PM MD PHILIP Vila G[facility] AMB

## 2023-10-31 ENCOUNTER — HOSPITAL ENCOUNTER (OUTPATIENT)
Dept: MRI IMAGING | Age: 83
Discharge: HOME | End: 2023-11-03

## 2023-10-31 DIAGNOSIS — M51.36 DDD (DEGENERATIVE DISC DISEASE), LUMBAR: ICD-10-CM

## 2023-10-31 DIAGNOSIS — M48.061 FORAMINAL STENOSIS OF LUMBAR REGION: ICD-10-CM

## 2023-10-31 DIAGNOSIS — M47.816 FACET ARTHROPATHY, LUMBAR: ICD-10-CM

## 2023-11-02 ENCOUNTER — OFFICE VISIT (OUTPATIENT)
Dept: ORTHOPEDIC SURGERY | Age: 83
End: 2023-11-02
Payer: MEDICARE

## 2023-11-02 DIAGNOSIS — S32.020G CLOSED COMPRESSION FRACTURE OF L2 LUMBAR VERTEBRA WITH DELAYED HEALING, SUBSEQUENT ENCOUNTER: Primary | ICD-10-CM

## 2023-11-02 DIAGNOSIS — M51.36 DDD (DEGENERATIVE DISC DISEASE), LUMBAR: ICD-10-CM

## 2023-11-02 DIAGNOSIS — M48.061 FORAMINAL STENOSIS OF LUMBAR REGION: ICD-10-CM

## 2023-11-02 DIAGNOSIS — M71.38 CYST OF LUMBAR FACET JOINT: ICD-10-CM

## 2023-11-02 DIAGNOSIS — M81.0 OSTEOPOROSIS, POST-MENOPAUSAL: ICD-10-CM

## 2023-11-02 DIAGNOSIS — M48.062 LUMBAR STENOSIS WITH NEUROGENIC CLAUDICATION: ICD-10-CM

## 2023-11-02 PROCEDURE — 99215 OFFICE O/P EST HI 40 MIN: CPT | Performed by: PHYSICIAN ASSISTANT

## 2023-11-02 PROCEDURE — 1123F ACP DISCUSS/DSCN MKR DOCD: CPT | Performed by: PHYSICIAN ASSISTANT

## 2023-11-02 PROCEDURE — 1090F PRES/ABSN URINE INCON ASSESS: CPT | Performed by: PHYSICIAN ASSISTANT

## 2023-11-02 PROCEDURE — G8484 FLU IMMUNIZE NO ADMIN: HCPCS | Performed by: PHYSICIAN ASSISTANT

## 2023-11-02 PROCEDURE — 4004F PT TOBACCO SCREEN RCVD TLK: CPT | Performed by: PHYSICIAN ASSISTANT

## 2023-11-02 PROCEDURE — G8417 CALC BMI ABV UP PARAM F/U: HCPCS | Performed by: PHYSICIAN ASSISTANT

## 2023-11-02 PROCEDURE — G8399 PT W/DXA RESULTS DOCUMENT: HCPCS | Performed by: PHYSICIAN ASSISTANT

## 2023-11-02 PROCEDURE — G8427 DOCREV CUR MEDS BY ELIG CLIN: HCPCS | Performed by: PHYSICIAN ASSISTANT

## 2023-11-02 NOTE — PROGRESS NOTES
PATIENT WAS REFERRED FOR L2  KYPHOPLASTY. KAMLESH PAIN EVAL AND TREAT.
developed, well nourished Patient is appropriately conversant  MSK:  Examination of the lumbar spine reveals no sagittal or coronal imbalance   There is moderate tenderness to palpation along the spinous processes and paraspinal musculature. The patient ambulates with a antalgic gait. ROM of bilateral hip(s) reveals no irritability. NEURO:  Cranial nerves grossly intact. No motor deficits. Straight leg testing is negative bilateral  Sensory testing reveals intact sensation to light touch and in the distribution of the L3-S1 dermatomes bilaterally  Ankle jerk is negative for clonus    Reflexes   Right Left   Quadriceps (L4) 2 2   Achilles (S1) 2 2     Strength testing in the lower extremity reveals the following based on the 5 point grading scale:     HF (L2) H Ab (L5) KE (L3/4) ADF (L4) EHL (L5) A Ev (S1) APF (S1)   Right 5 4 5 5 4 5 5   Left 5 4 5 5 4 5 5     PSYCH:  Alert and oriented X 3. Appropriate affect. Intact judgment and insight. Radiographic Studies:     Independent interpretation of AP, lateral and spot views of the lumbar spine:  9/27/23    AP of the lumbar spine reveals multilevel degenerative changes. Sagittal view of the lumbar spine shows multilevel degenerative changes with loss of disc height. There is a slight anterior listhesis L4-5. Very degenerative disc at L4-5. Multilevel facet arthropathy. X-ray impression is advanced degenerative changes lumbar spine with subtle anterior listhesis L4-5. MRI Result (most recent): MRI LUMBAR SPINE W WO CONTRAST 10/31/2023    Narrative  MRI LUMBAR SPINE WITHOUT AND WITH INTRAVENOUS CONTRAST. INDICATION: Low back pain and bilateral extremity pain, left more than right. Injury one year ago, progressive pain. COMPARISON: February 2022    TECHNIQUE:  Axial and sagittal, T1 and T2 and sagittal STIR images. Following 13  cc intravenous ProHance, fat-saturated axial and sagittal T1 images obtained.     FINDINGS:  Spinal alignment:

## 2023-11-03 ENCOUNTER — HOSPITAL ENCOUNTER (EMERGENCY)
Age: 83
Discharge: HOME | End: 2023-11-03
Attending: EMERGENCY MEDICINE
Payer: MEDICARE

## 2023-11-03 ENCOUNTER — APPOINTMENT (OUTPATIENT)
Dept: CT IMAGING | Age: 83
End: 2023-11-03
Payer: MEDICARE

## 2023-11-03 ENCOUNTER — APPOINTMENT (OUTPATIENT)
Dept: GENERAL RADIOLOGY | Age: 83
End: 2023-11-03
Payer: MEDICARE

## 2023-11-03 VITALS
HEART RATE: 94 BPM | OXYGEN SATURATION: 98 % | DIASTOLIC BLOOD PRESSURE: 56 MMHG | TEMPERATURE: 97.6 F | HEIGHT: 64 IN | SYSTOLIC BLOOD PRESSURE: 144 MMHG | WEIGHT: 145 LBS | BODY MASS INDEX: 24.75 KG/M2 | RESPIRATION RATE: 18 BRPM

## 2023-11-03 DIAGNOSIS — R07.81 RIB PAIN: Primary | ICD-10-CM
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
## 2023-11-03 PROCEDURE — 99284 EMERGENCY DEPT VISIT MOD MDM: CPT

## 2023-11-03 PROCEDURE — 72125 CT NECK SPINE W/O DYE: CPT

## 2023-11-03 PROCEDURE — 6370000000 HC RX 637 (ALT 250 FOR IP): Performed by: EMERGENCY MEDICINE

## 2023-11-03 PROCEDURE — 70450 CT HEAD/BRAIN W/O DYE: CPT

## 2023-11-03 PROCEDURE — 71101 X-RAY EXAM UNILAT RIBS/CHEST: CPT

## 2023-11-03 RX ORDER — ONDANSETRON 4 MG/1
4 TABLET, ORALLY DISINTEGRATING ORAL
Status: DISCONTINUED | OUTPATIENT
Start: 2023-11-03 | End: 2023-11-04 | Stop reason: HOSPADM

## 2023-11-03 RX ORDER — HYDROCODONE BITARTRATE AND ACETAMINOPHEN 5; 325 MG/1; MG/1
1 TABLET ORAL
Status: COMPLETED | OUTPATIENT
Start: 2023-11-03 | End: 2023-11-03

## 2023-11-03 RX ORDER — ONDANSETRON 4 MG/1
4 TABLET, FILM COATED ORAL 3 TIMES DAILY PRN
Qty: 15 TABLET | Refills: 0 | Status: SHIPPED | OUTPATIENT
Start: 2023-11-03

## 2023-11-03 RX ORDER — HYDROCODONE BITARTRATE AND ACETAMINOPHEN 5; 325 MG/1; MG/1
1 TABLET ORAL EVERY 6 HOURS PRN
Qty: 15 TABLET | Refills: 0 | Status: SHIPPED | OUTPATIENT
Start: 2023-11-03 | End: 2023-11-08

## 2023-11-03 RX ADMIN — HYDROCODONE BITARTRATE AND ACETAMINOPHEN 1 TABLET: 5; 325 TABLET ORAL at 21:10

## 2023-11-03 ASSESSMENT — PAIN SCALES - GENERAL: PAINLEVEL_OUTOF10: 8

## 2023-11-03 NOTE — ED TRIAGE NOTES
PT to triage via wheelchair with c/o fall earlier this evening after tripping over her dogs and hitting head and ribs. PT reports hitting back of head on left side and left rib cage. States intense pain in ribs with breaths and a knot on her head.      Denies any LOC or vision changes but states she feels \"foggy\"    PT takes Aspirin 81mg    HX of fracture in back none

## 2023-11-04 NOTE — ED PROVIDER NOTES
Transportation (Non-Medical): No   Physical Activity: Inactive (4/3/2023)    Exercise Vital Sign     Days of Exercise per Week: 0 days     Minutes of Exercise per Session: 0 min   Housing Stability: Unknown (3/29/2023)    Housing Stability Vital Sign     Unstable Housing in the Last Year: No        Previous Medications    ASPIRIN 81 MG EC TABLET    Take 1 tablet by mouth    BUMETANIDE (BUMEX) 2 MG TABLET    Take 1 tablet by mouth daily    CALCIUM CARB-CHOLECALCIFEROL (CALCIUM 1000 + D PO)    Take by mouth    ESTRADIOL (ESTRACE) 0.5 MG TABLET    Take 1 tablet by mouth daily    FAMOTIDINE (PEPCID) 20 MG TABLET    Take 1 tablet by mouth nightly    FUROSEMIDE (LASIX) 40 MG TABLET    Take 1 tablet by mouth daily    IPRATROPIUM 0.5 MG-ALBUTEROL 2.5 MG (DUONEB) 0.5-2.5 (3) MG/3ML SOLN NEBULIZER SOLUTION    Inhale 3 mLs into the lungs every 6 hours as needed for Shortness of Breath    KETOROLAC (TORADOL) 10 MG TABLET    Take 1 tablet by mouth every 6 hours as needed for Pain    LEVOTHYROXINE (SYNTHROID) 150 MCG TABLET    Take 1 tablet by mouth Daily TAKE 1 TABLET BY MOUTH DAILY BEFORE BREAKFAST    LOSARTAN (COZAAR) 50 MG TABLET    Take 1 tablet by mouth in the morning and at bedtime    MAGNESIUM PO    Take by mouth    METAXALONE (SKELAXIN) 800 MG TABLET    Take 1/2 to 1 tablet every 8 hours when needed. NITROGLYCERIN (NITROSTAT) 0.4 MG SL TABLET    Place 1 tablet under the tongue    OMEPRAZOLE (PRILOSEC) 40 MG DELAYED RELEASE CAPSULE    TAKE ONE CAPSULE BY MOUTH ONE TIME DAILY    PREDNISONE (DELTASONE) 5 MG TABLET    Take 1 tablet by mouth 2 times daily    SILVER SULFADIAZINE (SILVADENE) 1 % CREAM    Apply topically daily.     VITAMIN D 25 MCG (1000 UT) CAPS    Take 1 capsule by mouth every evening        Results for orders placed or performed during the hospital encounter of 11/03/23   XR RIBS LEFT INCLUDE CHEST (MIN 3 VIEWS)    Narrative    Examination: XR RIBS LEFT INCLUDE CHEST (MIN 3 VIEWS)    Indication: Pain after of   the visualized paranasal sinuses and mastoid air cells appear clear. CT CERVICAL SPINE FINDINGS:    Vertebral column:    2 mm anterolisthesis at C2-3 and 4 mm anterolisthesis at C3-4 and C4-5. Cervical   lordotic reversal. Moderate disc height loss at C5-6. 1 to 2 mm anterolisthesis   at C6-7. Vertebral body heights are preserved. No acute cervical spine fracture is   identified. Degenerative narrowing of the atlantodental interval. Craniocervical   junction and C1-2 relationship otherwise appear normal.    Disc levels:    C2-3: Right facet hypertrophy and mild right neural foraminal narrowing. C3-4: Disc uncovering and disc bulge. Probably mild canal narrowing. Facet and   uncinate hypertrophy. Moderate bilateral neural foraminal narrowing. C4-5: Disc uncovering and disc bulge. Mild canal narrowing. Facet hypertrophy. No significant neural foraminal narrowing. C5-6: Partially calcified disc bulge and endplate osteophytes. Mild canal   narrowing. Facet and uncinate hypertrophy. At least moderate bilateral neural   foraminal narrowing. C6-7: No significant canal or foraminal narrowing. C7-T1: No significant canal or foraminal narrowing. Soft tissues:    Prevertebral soft tissues are normal in thickness allowing for retropharyngeal   course of the carotid arteries. No abnormal fluid collection or hematoma   identified in the paraspinal soft tissues. Impression    CT HEAD IMPRESSION:    1. No acute intracranial abnormality is identified. 2. Mild to moderate generalized brain volume loss and mild small vessel ischemic   changes. Atherosclerosis. CT CERVICAL SPINE IMPRESSION:    1. No acute cervical spine fracture is identified. 2. Degenerative changes as detailed above.                 Torie Russ M.D.   11/3/2023 9:27:00 PM   CT CERVICAL SPINE WO CONTRAST    Narrative    EXAMINATION: CT HEAD WITHOUT CONTRAST AND CT CERVICAL SPINE WITHOUT CONTRAST (2   exams)    DATE:

## 2023-11-06 ENCOUNTER — CARE COORDINATION (OUTPATIENT)
Dept: CARE COORDINATION | Facility: CLINIC | Age: 83
End: 2023-11-06

## 2023-11-06 NOTE — CARE COORDINATION
Ambulatory Care Coordination Note  2023    Patient Current Location:  [facility]     ACM contacted the patient by telephone. Verified name and  with patient as identifiers. Provided introduction to self, and explanation of the ACM role. Challenges to be reviewed by the provider   Additional needs identified to be addressed with provider: No  none               Method of communication with provider: none. ACM: Augustin Vila RN    Ccm outreached to patient. Patient states she went to er on Friday d/t fall from her dog running into her. Reviewed with patient discharge summary. Patient verbalized understanding. Reviewed with patient safety precautions to prevent falls. Patient verbalized understanding. Patient states pain has improved today and she is ambulating better. Patient states she has upcoming appointment with Dr. Noel Childs. Patient states no questions or concerns. Ccm discussed that I would outreach next week. Patient agreeable. Offered patient enrollment in the Remote Patient Monitoring (RPM) program for in-home monitoring: Yes, patient already enrolled. Lab Results       None                 Goals Addressed                   This Visit's Progress     Conditions and Symptoms   On track     I will schedule office visits, as directed by my provider. I will notify my provider of any symptoms that indicate a worsening of my condition.     Barriers: none  Plan for overcoming my barriers: N/A  Confidence: 9/10  Anticipated Goal Completion Date: 22                Future Appointments   Date Time Provider [address] [place] [address]   2023  4:00 PM Katiuska Aparicio MD [facility] AM[facility]   2024  1:00 PM Katiuska Aparicio MD [facility] AM[facility]   3/6/2024  2:40 PM Nicole Willingham MD [facility] AMB

## 2023-11-08 ENCOUNTER — OFFICE VISIT (OUTPATIENT)
Age: 83
End: 2023-11-08
Payer: MEDICARE

## 2023-11-08 VITALS
SYSTOLIC BLOOD PRESSURE: 116 MMHG | HEIGHT: 64 IN | WEIGHT: 141 LBS | DIASTOLIC BLOOD PRESSURE: 52 MMHG | BODY MASS INDEX: 24.07 KG/M2 | HEART RATE: 78 BPM

## 2023-11-08 DIAGNOSIS — R00.2 PALPITATIONS: ICD-10-CM

## 2023-11-08 DIAGNOSIS — E78.2 MIXED HYPERLIPIDEMIA: ICD-10-CM

## 2023-11-08 DIAGNOSIS — I25.10 ASCVD (ARTERIOSCLEROTIC CARDIOVASCULAR DISEASE): Primary | ICD-10-CM

## 2023-11-08 DIAGNOSIS — I10 ESSENTIAL HYPERTENSION: ICD-10-CM

## 2023-11-08 PROCEDURE — 1123F ACP DISCUSS/DSCN MKR DOCD: CPT | Performed by: INTERNAL MEDICINE

## 2023-11-08 PROCEDURE — G8484 FLU IMMUNIZE NO ADMIN: HCPCS | Performed by: INTERNAL MEDICINE

## 2023-11-08 PROCEDURE — G8399 PT W/DXA RESULTS DOCUMENT: HCPCS | Performed by: INTERNAL MEDICINE

## 2023-11-08 PROCEDURE — 1090F PRES/ABSN URINE INCON ASSESS: CPT | Performed by: INTERNAL MEDICINE

## 2023-11-08 PROCEDURE — G8427 DOCREV CUR MEDS BY ELIG CLIN: HCPCS | Performed by: INTERNAL MEDICINE

## 2023-11-08 PROCEDURE — 3078F DIAST BP <80 MM HG: CPT | Performed by: INTERNAL MEDICINE

## 2023-11-08 PROCEDURE — 4004F PT TOBACCO SCREEN RCVD TLK: CPT | Performed by: INTERNAL MEDICINE

## 2023-11-08 PROCEDURE — G8420 CALC BMI NORM PARAMETERS: HCPCS | Performed by: INTERNAL MEDICINE

## 2023-11-08 PROCEDURE — 99214 OFFICE O/P EST MOD 30 MIN: CPT | Performed by: INTERNAL MEDICINE

## 2023-11-08 PROCEDURE — 3074F SYST BP LT 130 MM HG: CPT | Performed by: INTERNAL MEDICINE

## 2023-11-08 NOTE — PROGRESS NOTES
[facility] CARDIOLOGY  [address] [address]  PHONE: [phone]      23    NAME:  Bernadette Humphries  : 1940  MRN: [id number]       SUBJECTIVE:   Bernadette Humphries is a 79 y.o. female seen for a follow up visit regarding the following:     Chief Complaint   Patient presents with    Coronary Artery Disease    Results     Echo          HPI:    No cp or johansen. No orthopnea or pnd. No palpitations or syncope. Past Medical History, Past Surgical History, Family history, Social History, and Medications were all reviewed with the patient today and updated as necessary. Current Outpatient Medications   Medication Sig Dispense Refill    HYDROcodone-acetaminophen (NORCO) 5-325 MG per tablet Take 1 tablet by mouth every 6 hours as needed for Pain for up to 5 days. Intended supply: 3 days. Take lowest dose possible to manage pain Max Daily Amount: 4 tablets 15 tablet 0    ondansetron (ZOFRAN) 4 MG tablet Take 1 tablet by mouth 3 times daily as needed for Nausea or Vomiting 15 tablet 0    losartan (COZAAR) 50 MG tablet Take 1 tablet by mouth in the morning and at bedtime 180 tablet 2    furosemide (LASIX) 40 MG tablet Take 1 tablet by mouth daily 90 tablet 2    estradiol (ESTRACE) 0.5 MG tablet Take 1 tablet by mouth daily 90 tablet 2    levothyroxine (SYNTHROID) 150 MCG tablet Take 1 tablet by mouth Daily TAKE 1 TABLET BY MOUTH DAILY BEFORE BREAKFAST 90 tablet 2    predniSONE (DELTASONE) 5 MG tablet Take 1 tablet by mouth 2 times daily 180 tablet 2    ipratropium 0.5 mg-albuterol 2.5 mg (DUONEB) 0.5-2.5 (3) MG/3ML SOLN nebulizer solution Inhale 3 mLs into the lungs every 6 hours as needed for Shortness of Breath 360 mL 3    famotidine (PEPCID) 20 MG tablet Take 1 tablet by mouth nightly 90 tablet 2    MAGNESIUM PO Take by mouth      Calcium Carb-Cholecalciferol (CALCIUM 1000 + D PO) Take by mouth      silver sulfADIAZINE (SILVADENE) 1 % cream Apply topically daily.  [address]

## 2023-11-10 ENCOUNTER — CARE COORDINATION (OUTPATIENT)
Dept: CARE COORDINATION | Facility: CLINIC | Age: 83
End: 2023-11-10

## 2023-11-10 NOTE — CARE COORDINATION
Remote Alert Monitoring Note  Rpm alert to be reviewed by the provider   red alert   pulse ox reading (91%)   Additional needs to be addressed by N/A: No                    Date/Time:  11/10/2023 9:46 AM  Patient Current Location: [facility] contacted patient by telephone. Verified patients name and  as identifiers. Background: Pt enrolled in RPM r/t COPD. Refer to 911 immediately if:  Patient unresponsive or unable to provide history  Change in cognition or sudden confusion  Patient unable to respond in complete sentences  Intense chest pain/tightness  Any concern for any clinical emergency  Red Alert: Provider response time of 1 hr required for any red alert requiring intervention  Yellow Alert: Provider response time of 3hr required for any escalated yellow alert    O2 Triage  Are you having any Chest Pain? no   Are you having any Shortness of Breath? no   Swelling in your hands or feet? no     Are you having any other health concerns or issues? no      Clinical Interventions: Reviewed and followed up on alerts and treatments-Pt denies CP, SOB/dyspnea, and new or worsening edema. Pt asymptomatic and stated, \"I feel fine sweetie\". Pt declined pulse ox recheck and stated, \"No. I already put it back\". Pt verbalizes understanding of when to notify provider(s) of changes / concerns and when to seek emergent medical care. Escalated alert to RN-FYI update provided  Plan/Follow Up: Will continue to review, monitor and address alerts with follow up based on severity of symptoms and risk factors.

## 2023-11-13 ENCOUNTER — CARE COORDINATION (OUTPATIENT)
Dept: CARE COORDINATION | Facility: CLINIC | Age: 83
End: 2023-11-13

## 2023-11-13 NOTE — CARE COORDINATION
Ambulatory Care Coordination Note  2023    Patient Current Location:  [facility]     ACM contacted the patient by telephone. Verified name and  with patient as identifiers. Provided introduction to self, and explanation of the ACM role. Challenges to be reviewed by the provider   Additional needs identified to be addressed with provider: No  none               Method of communication with provider: none. ACM: Lelo Ruiz RN    Ccm outreached to patient. Patient states she is doing well at this time. Patient states she still has some back to back area. Patient states she is awaiting call from Dr. [facility] office. Patient states vitals have been stable. Patient states no questions or concerns. Ccm discussed that I would outreach next week. Patient agreeable. Offered patient enrollment in the Remote Patient Monitoring (RPM) program for in-home monitoring: Yes, patient already enrolled. Lab Results       None                 Goals Addressed                   This Visit's Progress     Conditions and Symptoms   On track     I will schedule office visits, as directed by my provider. I will notify my provider of any symptoms that indicate a worsening of my condition.     Barriers: none  Plan for overcoming my barriers: N/A  Confidence: 9/10  Anticipated Goal Completion Date: 22                Future Appointments   Date Time Provider [address]   2024  1:00 PM MD CHAZ Ricketts [facility] AMB   3/6/2024  2:40 PM MD PHILIP Hutchison [facility] AMB   2024  4:15 PM MD CHAZ Ricketts [facility] AMB

## 2023-11-20 ENCOUNTER — CARE COORDINATION (OUTPATIENT)
Dept: CARE COORDINATION | Facility: CLINIC | Age: 83
End: 2023-11-20

## 2023-11-20 NOTE — CARE COORDINATION
Ambulatory Care Management Note        Date/Time:  11/20/2023 8:17 AM    Ccm outreached to patient. No answer at this time,  message left. Awaiting response. If no response, ccm will outreach next week.

## 2023-11-21 ENCOUNTER — CARE COORDINATION (OUTPATIENT)
Dept: CARE COORDINATION | Age: 83
End: 2023-11-21

## 2023-11-21 NOTE — CARE COORDINATION
Remote Alert Monitoring Note  Rpm alert to be reviewed by the provider   red alert   weight (increase 3 ls x 1 day, 5 lbs x 7 days)   Additional needs to be addressed by PCP: FYI sent to PCP                    Date/Time:  2023 9:12 AM  Patient Current Location: Home:  [facility] [address]  LPN contacted patient by telephone. Verified patients name and  as identifiers. Background: COPD  Refer to 911 immediately if:  Patient unresponsive or unable to provide history  Change in cognition or sudden confusion  Patient unable to respond in complete sentences  Intense chest pain/tightness  Any concern for any clinical emergency  Red Alert: Provider response time of 1 hr required for any red alert requiring intervention  Yellow Alert: Provider response time of 3hr required for any escalated yellow alert    Weight Scale Triage  Was your weight obtained upon rising/waking today? yes   Was your weight obtained after voiding and/or use of the bathroom today? yes   Did you weigh yourself in the same amount of clothing today, compared to how you typically do? yes   Was the scale bumped or moved prior to today's weight? no   Is your scale on a flat/hard surface? yes   Did you obtain your weight with shoes on? no   If yes, is this something you normally do during your daily weights? NA   Were you standing up straight on the scale today? yes   Were you leaning on anything while obtaining your weight today? no      Clinical Interventions: Reviewed and followed up on alerts and treatments-Pt reports BLE edema, denies CP, cough, n/v, fatigue, weakness. Pt compliant with medications. Taking Lasix 40 mg daily, Bumex 2 mg daily. Pt stated that she took an extra 20 mg of Lasix today. States good output. Pt states that she has compression hose, but is unable to wear because she cannot apply herself. Suggested using ace bandages for compression; easier to apply. Pt v/u.  Noted in HRS that Pt's weight range usually runs

## 2023-11-22 ENCOUNTER — CARE COORDINATION (OUTPATIENT)
Dept: CARE COORDINATION | Facility: CLINIC | Age: 83
End: 2023-11-22

## 2023-11-22 NOTE — CARE COORDINATION
Remote Patient Monitoring Note      Date/Time:  11/22/2023 10:37 AM  Patient Current Location: [place]  LPN attempted to contact patient by telephone regarding red alert received for weight increase (7.0# since 11/20/23). Left HIPAA compliant message requesting a return call. Background: Pt enrolled in RPM r/t COPD. Plan/Follow Up: Will continue to review, monitor and address alerts with follow up based on severity of symptoms and risk factors.
today? no      Clinical Interventions: Reviewed and followed up on alerts and treatments-Pt denies CP, SOB, and new/worsening edema. Pt reports continued edema to BLE and continued compliance with Lasix 40 mg daily. Pt has an order for Bumex 2 mg daily and states she is unable to take due to side effects. 6.0# weight decrease from 11/19/23 - 11/20/23 and 1.0# increase per 7 day look back noted in HRS. Patients current recorded weight is 148. 8# and within patients typical range. Pt verbalizes understanding of when to notify provider(s) of changes / concerns and when to seek emergent medical care. Escalated alert to RN-FYI update provided  Escalated alert to PCP-FYI update provided   Plan/Follow Up: Will continue to review, monitor and address alerts with follow up based on severity of symptoms and risk factors.

## 2023-11-24 ENCOUNTER — TELEPHONE (OUTPATIENT)
Facility: CLINIC | Age: 83
End: 2023-11-24

## 2023-11-24 ENCOUNTER — CARE COORDINATION (OUTPATIENT)
Dept: CARE COORDINATION | Age: 83
End: 2023-11-24

## 2023-11-24 NOTE — TELEPHONE ENCOUNTER
Take by mouth      Calcium Carb-Cholecalciferol (CALCIUM 1000 + D PO) Take by mouth      silver sulfADIAZINE (SILVADENE) 1 % cream Apply topically daily.  400 g 0    aspirin 81 MG EC tablet Take 1 tablet by mouth      vitamin D 25 MCG (1000 UT) CAPS Take 1 capsule by mouth every evening      nitroGLYCERIN (NITROSTAT) 0.4 MG SL tablet Place 1 tablet under the tongue (Patient not taking: Reported on 11/8/2023)         ALLERGIES    Allergies   Allergen Reactions    Macadamia Nut Oil Shortness Of Breath     Specifically black walnuts per an allergy test.    Nitrofurantoin Other (See Comments)     delirium    Lactose Diarrhea    Levofloxacin Diarrhea    Simvastatin Other (See Comments)    Codeine Nausea And Vomiting    Meperidine Nausea And Vomiting       RECENT LABS  Lab Results   Component Value Date     09/27/2023    K 3.8 09/27/2023     09/27/2023    CO2 29 09/27/2023    BUN 21 09/27/2023    CREATININE 0.71 09/27/2023    GLUCOSE 81 09/27/2023    CALCIUM 9.8 09/27/2023    PROT 7.1 09/27/2023    LABALBU 3.6 09/27/2023    BILITOT 0.4 09/27/2023    ALKPHOS 47 (L) 09/27/2023    AST 11 (L) 09/27/2023    ALT 22 09/27/2023    LABGLOM >60 09/27/2023    GFRAA >60 09/13/2022    AGRATIO 1.1 (L) 02/05/2022    GLOB 3.5 09/27/2023     Component Ref Range & Units 9/27/23 1415   NT Pro-BNP <450 PG/        Lars Jarvis, DNP, FNP-C, Remote Patient Monitoring NP, (Ph) [phone]

## 2023-11-24 NOTE — CARE COORDINATION
Remote Alert Monitoring Note  Rpm alert to be reviewed by the provider   red alert   weight (increase of 5 lbs x 7 days)   Additional needs to be addressed by PCP:  Pt is enrolled in Remote Patient Monitoring r/t COPD. Spoke with pt who denies any SOB/dyspnea, but does have increasing edema to BLE with Lt > Rt. She also verbalizes that she is having orange/yellow drainage from LLE and feels she needs antibiotics. She verbalizes taking Lasix 40 mg daily and twice a week she takes 60 mg. Took 60 mg today. States she was supposed to switch to Bumex per PCP, but medication caused her to diurese to much over two days and per her cardiologist, she was instructed to resume lasix. She recently moved in with her son and would like antibiotic to be called to [facility] in [place]. RPM metrics added for review. Date/Time:  2023 10:10 AM  Patient Current Location: [facility]  LPN contacted patient by telephone. Verified patients name and  as identifiers. Background: Pt enrolled in RPM r/t COPD. Refer to 911 immediately if:  Patient unresponsive or unable to provide history  Change in cognition or sudden confusion  Patient unable to respond in complete sentences  Intense chest pain/tightness  Any concern for any clinical emergency  Red Alert: Provider response time of 1 hr required for any red alert requiring intervention  Yellow Alert: Provider response time of 3hr required for any escalated yellow alert  Weight Scale Triage  Was your weight obtained upon rising/waking today? no   Was your weight obtained after voiding and/or use of the bathroom today? yes   Did you weigh yourself in the same amount of clothing today, compared to how you typically do? yes   Was the scale bumped or moved prior to today's weight? no   Is your scale on a flat/hard surface? yes   Did you obtain your weight with shoes on? no   If yes, is this something you normally do during your daily weights?  NA   Were you standing up

## 2023-11-24 NOTE — CARE COORDINATION
11/24/23 11:39 AM Patient is currently enrolled in Remote Patient Monitoring for COPD. No response from PCP x 1 hour. Encounter routed to NPFaith per RPM protocol. Plan/Follow Up: Will continue to review, monitor and address alerts with follow up based on severity of symptoms and risk factors.

## 2023-11-27 ENCOUNTER — CARE COORDINATION (OUTPATIENT)
Dept: CARE COORDINATION | Facility: CLINIC | Age: 83
End: 2023-11-27

## 2023-11-27 NOTE — CARE COORDINATION
Remote Alert Monitoring Note  Rpm alert to be reviewed by the provider   red alert   pulse ox reading (87%) and weight (7.0# increase in last 7 days)   Additional needs to be addressed by PCP: JAX Pt enrolled in RPM r/t COPD. Pt denies CP and SOB. Pt c/o new edema to hands, continued edema to BLE, congestion, productive cough with yellow tinged sputum, and yellow tinged nasal drainage. Pt denies leg drainage at this time. ACM aware and PCP notified. See ACM note from today, 23. Pt awaiting return call to schedule appointment with PCP. Pt stated, \"I'll go to urgent care if they can't see me\". Pt agreeable to recheck pulse ox. Updated reading 92%. 6.0# decrease from 23 - 23 noted in HRS. Suspected false alert for weight gain. Patients current weight is 148. 8# and within patients typical range. Pt reports continued compliance with scheduled lasix, mupirocin cream, and PRN Duoneb nebulizer. Pt verbalizes understanding of when to notify provider(s) of changes / concerns and when to seek emergent medical care. Date/Time:  2023 9:34 AM  Patient Current Location: Home:  [place] [address]  LPN contacted patient by telephone. Verified patients name and  as identifiers. Background: Pt enrolled in RPM r/t COPD. Refer to 911 immediately if:  Patient unresponsive or unable to provide history  Change in cognition or sudden confusion  Patient unable to respond in complete sentences  Intense chest pain/tightness  Any concern for any clinical emergency  Red Alert: Provider response time of 1 hr required for any red alert requiring intervention  Yellow Alert: Provider response time of 3hr required for any escalated yellow alert    O2 Triage  Are you having any Chest Pain? no   Are you having any Shortness of Breath? no   Swelling in your hands or feet? yes     Are you having any other health concerns or issues?  yes      Weight Scale Triage  Was your weight obtained

## 2023-11-27 NOTE — CARE COORDINATION
Ambulatory Care Coordination Note  2023    Patient Current Location:  [place]     ACM contacted the patient by telephone. Verified name and  with patient as identifiers. Provided introduction to self, and explanation of the ACM role. Challenges to be reviewed by the provider   Additional needs identified to be addressed with provider: Yes  Left message with pcp office to notify of sinus infection symptoms and hoarseness               Method of communication with provider: staff message. ACM: Queen David RN    Ccm outreached to patient. Patient states she feels she has a sinus infection. Patient does sound very hoarse. Ccm left message with pcp office to notify of symptoms. Ccm also encouraged patient to call office as well. Patient agreeable. Patient states no other questions or concerns. Encouraged patient to drink fluids and stay hydrated as well. Ccm discussed that I would outreach next week. Patient agreeable. Offered patient enrollment in the Remote Patient Monitoring (RPM) program for in-home monitoring: Yes, patient already enrolled. Lab Results       None                 Goals Addressed                   This Visit's Progress     Conditions and Symptoms   On track     I will schedule office visits, as directed by my provider. I will notify my provider of any symptoms that indicate a worsening of my condition.     Barriers: none  Plan for overcoming my barriers: N/A  Confidence: /10  Anticipated Goal Completion Date: 22                Future Appointments   Date Time Provider [address]   2024  1:00 PM MD CHAZ Mariano [facility] AMB   3/6/2024  2:40 PM MD PHILIP Noyola AMB   2024  4:15 PM MD CHAZ Mariano [facility] AMB

## 2023-11-28 ENCOUNTER — OFFICE VISIT (OUTPATIENT)
Dept: INTERNAL MEDICINE CLINIC | Facility: CLINIC | Age: 83
End: 2023-11-28
Payer: MEDICARE

## 2023-11-28 VITALS
SYSTOLIC BLOOD PRESSURE: 148 MMHG | HEIGHT: 64 IN | HEART RATE: 102 BPM | OXYGEN SATURATION: 99 % | WEIGHT: 145.4 LBS | DIASTOLIC BLOOD PRESSURE: 82 MMHG | BODY MASS INDEX: 24.82 KG/M2

## 2023-11-28 DIAGNOSIS — I83.893 VARICOSE VEINS OF BOTH LEGS WITH EDEMA: ICD-10-CM

## 2023-11-28 DIAGNOSIS — Z79.52 LONG-TERM CORTICOSTEROID USE: ICD-10-CM

## 2023-11-28 DIAGNOSIS — R60.0 FLUID RETENTION IN LEGS: ICD-10-CM

## 2023-11-28 DIAGNOSIS — I10 ESSENTIAL HYPERTENSION: ICD-10-CM

## 2023-11-28 DIAGNOSIS — Z91.81 AT HIGH RISK FOR INJURY RELATED TO FALL: ICD-10-CM

## 2023-11-28 DIAGNOSIS — J42 CHRONIC BRONCHITIS, UNSPECIFIED CHRONIC BRONCHITIS TYPE (HCC): Primary | ICD-10-CM

## 2023-11-28 DIAGNOSIS — E03.9 ACQUIRED HYPOTHYROIDISM: ICD-10-CM

## 2023-11-28 DIAGNOSIS — L97.902 VENOUS ULCER WITH FAT LAYER EXPOSED (HCC): ICD-10-CM

## 2023-11-28 DIAGNOSIS — I83.009 VENOUS ULCER WITH FAT LAYER EXPOSED (HCC): ICD-10-CM

## 2023-11-28 PROCEDURE — G8484 FLU IMMUNIZE NO ADMIN: HCPCS | Performed by: INTERNAL MEDICINE

## 2023-11-28 PROCEDURE — G8399 PT W/DXA RESULTS DOCUMENT: HCPCS | Performed by: INTERNAL MEDICINE

## 2023-11-28 PROCEDURE — 1123F ACP DISCUSS/DSCN MKR DOCD: CPT | Performed by: INTERNAL MEDICINE

## 2023-11-28 PROCEDURE — 4004F PT TOBACCO SCREEN RCVD TLK: CPT | Performed by: INTERNAL MEDICINE

## 2023-11-28 PROCEDURE — G8420 CALC BMI NORM PARAMETERS: HCPCS | Performed by: INTERNAL MEDICINE

## 2023-11-28 PROCEDURE — 1090F PRES/ABSN URINE INCON ASSESS: CPT | Performed by: INTERNAL MEDICINE

## 2023-11-28 PROCEDURE — 99214 OFFICE O/P EST MOD 30 MIN: CPT | Performed by: INTERNAL MEDICINE

## 2023-11-28 PROCEDURE — 96372 THER/PROPH/DIAG INJ SC/IM: CPT | Performed by: INTERNAL MEDICINE

## 2023-11-28 PROCEDURE — 3078F DIAST BP <80 MM HG: CPT | Performed by: INTERNAL MEDICINE

## 2023-11-28 PROCEDURE — G8427 DOCREV CUR MEDS BY ELIG CLIN: HCPCS | Performed by: INTERNAL MEDICINE

## 2023-11-28 PROCEDURE — 3074F SYST BP LT 130 MM HG: CPT | Performed by: INTERNAL MEDICINE

## 2023-11-28 PROCEDURE — 3023F SPIROM DOC REV: CPT | Performed by: INTERNAL MEDICINE

## 2023-11-28 RX ORDER — CEFTRIAXONE 500 MG/1
500 INJECTION, POWDER, FOR SOLUTION INTRAMUSCULAR; INTRAVENOUS EVERY 24 HOURS
Status: SHIPPED | OUTPATIENT
Start: 2023-11-28

## 2023-11-28 RX ORDER — METHYLPREDNISOLONE SODIUM SUCCINATE 40 MG/ML
40 INJECTION, POWDER, LYOPHILIZED, FOR SOLUTION INTRAMUSCULAR; INTRAVENOUS DAILY
Status: SHIPPED | OUTPATIENT
Start: 2023-11-28

## 2023-11-28 RX ORDER — CEFUROXIME AXETIL 250 MG/1
250 TABLET ORAL 2 TIMES DAILY
Qty: 30 TABLET | Refills: 0 | Status: SHIPPED | OUTPATIENT
Start: 2023-11-28 | End: 2023-12-13

## 2023-11-28 RX ADMIN — METHYLPREDNISOLONE SODIUM SUCCINATE 40 MG: 40 INJECTION, POWDER, LYOPHILIZED, FOR SOLUTION INTRAMUSCULAR; INTRAVENOUS at 17:38

## 2023-11-28 RX ADMIN — CEFTRIAXONE 500 MG: 500 INJECTION, POWDER, FOR SOLUTION INTRAMUSCULAR; INTRAVENOUS at 17:37

## 2023-11-28 ASSESSMENT — ENCOUNTER SYMPTOMS
BACK PAIN: 1
COUGH: 1
WHEEZING: 1
SHORTNESS OF BREATH: 1

## 2023-11-28 NOTE — PROGRESS NOTES
Outpatient Medications   Medication Sig Dispense Refill    cefUROXime (CEFTIN) 250 MG tablet Take 1 tablet by mouth 2 times daily for 15 days 30 tablet 0    ondansetron (ZOFRAN) 4 MG tablet Take 1 tablet by mouth 3 times daily as needed for Nausea or Vomiting 15 tablet 0    losartan (COZAAR) 50 MG tablet Take 1 tablet by mouth in the morning and at bedtime 180 tablet 2    furosemide (LASIX) 40 MG tablet Take 1 tablet by mouth daily 90 tablet 2    estradiol (ESTRACE) 0.5 MG tablet Take 1 tablet by mouth daily 90 tablet 2    levothyroxine (SYNTHROID) 150 MCG tablet Take 1 tablet by mouth Daily TAKE 1 TABLET BY MOUTH DAILY BEFORE BREAKFAST 90 tablet 2    predniSONE (DELTASONE) 5 MG tablet Take 1 tablet by mouth 2 times daily 180 tablet 2    ipratropium 0.5 mg-albuterol 2.5 mg (DUONEB) 0.5-2.5 (3) MG/3ML SOLN nebulizer solution Inhale 3 mLs into the lungs every 6 hours as needed for Shortness of Breath 360 mL 3    famotidine (PEPCID) 20 MG tablet Take 1 tablet by mouth nightly 90 tablet 2    MAGNESIUM PO Take by mouth      Calcium Carb-Cholecalciferol (CALCIUM 1000 + D PO) Take by mouth      silver sulfADIAZINE (SILVADENE) 1 % cream Apply topically daily. 400 g 0    aspirin 81 MG EC tablet Take 1 tablet by mouth      vitamin D 25 MCG (1000 UT) CAPS Take 1 capsule by mouth every evening      nitroGLYCERIN (NITROSTAT) 0.4 MG SL tablet Place 1 tablet under the tongue       Current Facility-Administered Medications   Medication Dose Route Frequency Provider Last Rate Last Admin    methylPREDNISolone sodium succ (SOLU-MEDROL) injection 40 mg  40 mg IntraMUSCular Daily Jesus Mcintosh MD        cefTRIAXone (ROCEPHIN) injection 500 mg  500 mg IntraMUSCular Q24H Jesus Mcintosh MD           Review of Systems:  Review of Systems   Constitutional:  Negative for unexpected weight change. Respiratory:  Positive for cough, shortness of breath and wheezing. Cardiovascular:  Positive for leg swelling.

## 2023-11-30 ENCOUNTER — CARE COORDINATION (OUTPATIENT)
Dept: CARE COORDINATION | Facility: CLINIC | Age: 83
End: 2023-11-30

## 2023-11-30 NOTE — CARE COORDINATION
Remote Patient Monitoring Note      Date/Time:  11/30/2023 11:36 AM  Patient Current Location: [facility]  LPN attempted to contact patient by telephone regarding red alert received for weight increase (5 lbs in 7 days). 11.4# increase in last day, 10.4# decrease from 11/28/23-11/29/23, and 0# increase per 7 day look back noted in HRS. Unable to reach pt. Left HIPAA compliant message requesting a return call. Background: Pt enrolled in RPM r/t COPD. Plan/Follow Up: Will continue to review, monitor and address alerts with follow up based on severity of symptoms and risk factors.

## 2023-12-01 ENCOUNTER — CARE COORDINATION (OUTPATIENT)
Dept: CARE COORDINATION | Facility: CLINIC | Age: 83
End: 2023-12-01

## 2023-12-04 ENCOUNTER — CARE COORDINATION (OUTPATIENT)
Dept: CARE COORDINATION | Facility: CLINIC | Age: 83
End: 2023-12-04

## 2023-12-04 ENCOUNTER — CARE COORDINATION (OUTPATIENT)
Facility: CLINIC | Age: 83
End: 2023-12-04

## 2023-12-04 DIAGNOSIS — J42 CHRONIC BRONCHITIS, UNSPECIFIED CHRONIC BRONCHITIS TYPE (HCC): Primary | ICD-10-CM

## 2023-12-04 NOTE — CARE COORDINATION
Ambulatory Care Coordination Note  2023    Patient Current Location:  [facility]     ACM contacted the patient by telephone. Verified name and  with patient as identifiers. Provided introduction to self, and explanation of the ACM role. Challenges to be reviewed by the provider   Additional needs identified to be addressed with provider: No  none               Method of communication with provider: none. ACM: Ashutosh Christensen RN    Ccm outreached to patient. Patient states she is doing well at this time. Patient states she is ready to d/c RPM. Orders placed. Patient states no questions or concerns. Ccm discussed that I would outreach next week. Patient agreeable. Offered patient enrollment in the Remote Patient Monitoring (RPM) program for in-home monitoring: Yes, patient already enrolled. Lab Results       None                 Goals Addressed                   This Visit's Progress     Conditions and Symptoms   On track     I will schedule office visits, as directed by my provider. I will notify my provider of any symptoms that indicate a worsening of my condition.     Barriers: none  Plan for overcoming my barriers: N/A  Confidence: 9/10  Anticipated Goal Completion Date: 22                Future Appointments   Date Time Provider [address]  [address]   2024  1:00 PM MD CHAZ Toribio [facility] AMB   3/6/2024  2:40 PM Isaias Foy MD [facility] AMB   2024  4:15 PM MD CHAZ Toribio [facility] AMB

## 2023-12-04 NOTE — CARE COORDINATION
CCSS placed call to patient to arrange RPM kit  through [facility]. Reviewed with patient how to pack equipment in original packing. Verified patient's availability to schedule UPS  time. UPS  time requested. Anticipated  date range 2-3 business days.

## 2023-12-05 NOTE — PROGRESS NOTES
Remote Patient Order Discontinued    Received request from Ruby Fernandez RN  to discontinue order for remote patient monitoring of COPD and order completed.

## 2023-12-06 RX ORDER — CEFUROXIME AXETIL 250 MG/1
250 TABLET ORAL 2 TIMES DAILY
Qty: 30 TABLET | Refills: 0 | OUTPATIENT
Start: 2023-12-06 | End: 2023-12-21

## 2023-12-06 RX ORDER — LEVOTHYROXINE SODIUM 0.15 MG/1
150 TABLET ORAL
Qty: 90 TABLET | Refills: 2 | Status: SHIPPED | OUTPATIENT
Start: 2023-12-06

## 2023-12-11 ENCOUNTER — CARE COORDINATION (OUTPATIENT)
Dept: CARE COORDINATION | Facility: CLINIC | Age: 83
End: 2023-12-11

## 2023-12-11 NOTE — CARE COORDINATION
Ambulatory Care Coordination Note  2023    Patient Current Location:  [facility]     ACM contacted the patient by telephone. Verified name and  with patient as identifiers. Provided introduction to self, and explanation of the ACM role. Challenges to be reviewed by the provider   Additional needs identified to be addressed with provider: No  none               Method of communication with provider: none. ACM: Lizet Neal RN    Ccm outreached to patient. Patient states she has been on abt and breathing tx therapy. Patient sounds wheezy on the phone currently. Ccm discussed with patient to call pcp today. Ccm also left message with pcp regarding this. Patient states no other questions or concerns. Ccm discussed that I would outreach next week. Patient agreeable. Offered patient enrollment in the Remote Patient Monitoring (RPM) program for in-home monitoring: Yes, but did not enroll at this time. Lab Results       None                 Goals Addressed                   This Visit's Progress     Conditions and Symptoms   On track     I will schedule office visits, as directed by my provider. I will notify my provider of any symptoms that indicate a worsening of my condition.     Barriers: none  Plan for overcoming my barriers: N/A  Confidence: /10  Anticipated Goal Completion Date: 22                Future Appointments   Date Time Provider [address]   2024  1:00 PM MD CHAZ Nuñez [facility] AMB   3/6/2024  2:40 PM Kelli Weber MD [facility] AMB   2024  4:15 PM MD CHAZ Nuñez [facility] AMB

## 2024-01-08 ENCOUNTER — CARE COORDINATION (OUTPATIENT)
Dept: CARE COORDINATION | Facility: CLINIC | Age: 84
End: 2024-01-08

## 2024-01-08 NOTE — CARE COORDINATION
Ambulatory Care Coordination Note  2024    Patient Current Location:  [place]     ACM contacted the patient by telephone. Verified name and  with patient as identifiers. Provided introduction to self, and explanation of the ACM role.     Challenges to be reviewed by the provider   Additional needs identified to be addressed with provider: No  none               Method of communication with provider: none.    ACM: Hien Moncada RN    Ccm outreached to patient. Patient states she has been doing well. Patient states she was seen by pain management. Patient states no questions or concerns. Ccm discussed that I would outreach next week. Patent agreeable.     Offered patient enrollment in the Remote Patient Monitoring (RPM) program for in-home monitoring: Patient declined.    Lab Results       None                 Goals Addressed                   This Visit's Progress     Conditions and Symptoms   On track     I will schedule office visits, as directed by my provider.  I will notify my provider of any symptoms that indicate a worsening of my condition.    Barriers: none  Plan for overcoming my barriers: N/A  Confidence: 9/10  Anticipated Goal Completion Date: 22                Future Appointments   Date Time Provider Department Center   2024  1:00 PM Abhishek Cunningham MD [facility] AMB   3/6/2024  2:40 PM Loraine Farnsworth MD [facility] AMB   2024  4:15 PM Abhishek Cunningham MD [facility] AMB

## 2024-01-15 ENCOUNTER — CARE COORDINATION (OUTPATIENT)
Dept: CARE COORDINATION | Facility: CLINIC | Age: 84
End: 2024-01-15

## 2024-01-15 NOTE — CARE COORDINATION
Ambulatory Care Coordination Note  1/15/2024    Patient Current Location:  [place]     ACM contacted the patient by telephone. Verified name and  with patient as identifiers. Provided introduction to self, and explanation of the ACM role.     Challenges to be reviewed by the provider   Additional needs identified to be addressed with provider: No  none               Method of communication with provider: none.    ACM: Hien Moncada RN    Ccm outreached to patient. Patient states she continues to improve. Patient states she has several upcoming appointments this week. Patient states no sob. Patient states swelling to legs has decreased. Patient states no questions or concerns. Ccm discussed that I would outreach next week. Patient agreeable.     Offered patient enrollment in the Remote Patient Monitoring (RPM) program for in-home monitoring: Yes, but did not enroll at this time.    Lab Results       None                 Goals Addressed                   This Visit's Progress     Conditions and Symptoms   On track     I will schedule office visits, as directed by my provider.  I will notify my provider of any symptoms that indicate a worsening of my condition.    Barriers: none  Plan for overcoming my barriers: N/A  Confidence: 9/10  Anticipated Goal Completion Date: 22                Future Appointments   Date Time Provider Department Center   3/6/2024  2:40 PM Loraine Farnsworth MD [facility] AMB   2024  4:15 PM Abhishek Cunningham MD [facility]

## 2024-01-22 ENCOUNTER — CARE COORDINATION (OUTPATIENT)
Dept: CARE COORDINATION | Facility: CLINIC | Age: 84
End: 2024-01-22

## 2024-01-22 NOTE — CARE COORDINATION
Ambulatory Care Coordination Note  2024    Patient Current Location:  [place]     ACM contacted the patient by telephone. Verified name and  with patient as identifiers. Provided introduction to self, and explanation of the ACM role.     Challenges to be reviewed by the provider   Additional needs identified to be addressed with provider: No  none               Method of communication with provider: none.    ACM: Hien Moncada RN    Ccm outreached to patient. Patient states she is feeling much better. Patient states she has appointments this week. Patient states no sob. Patient states no questions or concerns. Ccm discussed that I would outreach next week. Patient agreeable.     Offered patient enrollment in the Remote Patient Monitoring (RPM) program for in-home monitoring: Patient declined.    Lab Results       None                 Goals Addressed                   This Visit's Progress     Conditions and Symptoms   On track     I will schedule office visits, as directed by my provider.  I will notify my provider of any symptoms that indicate a worsening of my condition.    Barriers: none  Plan for overcoming my barriers: N/A  Confidence: 9/10  Anticipated Goal Completion Date: 22                Future Appointments   Date Time Provider Department Center   3/6/2024  2:40 PM Loraine Farnsworth MD [facility] AMB   2024  4:15 PM Abhishek Cunningham MD [facility] AMB

## 2024-01-29 ENCOUNTER — CARE COORDINATION (OUTPATIENT)
Dept: CARE COORDINATION | Facility: CLINIC | Age: 84
End: 2024-01-29

## 2024-01-29 NOTE — CARE COORDINATION
Ambulatory Care Coordination Note  2024    Patient Current Location:  [place]     ACM contacted the patient by telephone. Verified name and  with patient as identifiers. Provided introduction to self, and explanation of the ACM role.     Challenges to be reviewed by the provider   Additional needs identified to be addressed with provider: No  none               Method of communication with provider: none.    ACM: Hien Moncada RN    Ccm outreached to patient. Patient states she got cast removed from leg and wound is healed. Patient states she is going to wear nohemy hose as well. Patient states no questions or concerns. Ccm discussed that I would outreach next week. Patient agreeable.     Offered patient enrollment in the Remote Patient Monitoring (RPM) program for in-home monitoring: Patient declined.    Lab Results       None                 Goals Addressed                   This Visit's Progress     Conditions and Symptoms   On track     I will schedule office visits, as directed by my provider.  I will notify my provider of any symptoms that indicate a worsening of my condition.    Barriers: none  Plan for overcoming my barriers: N/A  Confidence: 9/10  Anticipated Goal Completion Date: 22                Future Appointments   Date Time Provider Department Center   3/6/2024  2:40 PM Loraine Farnsworth MD [facility] AMB   2024  4:15 PM Abhishek Cunningham MD [facility] AMB

## 2024-02-05 ENCOUNTER — CARE COORDINATION (OUTPATIENT)
Dept: CARE COORDINATION | Facility: CLINIC | Age: 84
End: 2024-02-05

## 2024-02-05 NOTE — CARE COORDINATION
Ambulatory Care Management Note        Date/Time:  2/5/2024 8:21 AM    Ccm outreached to patient. No answer at this time, message left. Awaiting response. If no response, ccm will outreach next week.

## 2024-02-12 ENCOUNTER — CARE COORDINATION (OUTPATIENT)
Dept: CARE COORDINATION | Facility: CLINIC | Age: 84
End: 2024-02-12

## 2024-02-12 ENCOUNTER — OFFICE VISIT (OUTPATIENT)
Dept: INTERNAL MEDICINE CLINIC | Facility: CLINIC | Age: 84
End: 2024-02-12
Payer: MEDICARE

## 2024-02-12 VITALS
HEIGHT: 64 IN | BODY MASS INDEX: 24.82 KG/M2 | SYSTOLIC BLOOD PRESSURE: 128 MMHG | WEIGHT: 145.4 LBS | DIASTOLIC BLOOD PRESSURE: 60 MMHG

## 2024-02-12 DIAGNOSIS — I10 ESSENTIAL HYPERTENSION: ICD-10-CM

## 2024-02-12 DIAGNOSIS — M35.3 POLYMYALGIA RHEUMATICA (HCC): ICD-10-CM

## 2024-02-12 DIAGNOSIS — N81.10 BLADDER PROLAPSE, FEMALE, ACQUIRED: ICD-10-CM

## 2024-02-12 DIAGNOSIS — N18.32 CHRONIC KIDNEY DISEASE, STAGE 3B (HCC): ICD-10-CM

## 2024-02-12 DIAGNOSIS — R26.89 POOR BALANCE: ICD-10-CM

## 2024-02-12 DIAGNOSIS — R13.10 DYSPHAGIA, UNSPECIFIED TYPE: ICD-10-CM

## 2024-02-12 DIAGNOSIS — F17.210 CIGARETTE SMOKER: ICD-10-CM

## 2024-02-12 DIAGNOSIS — M48.062 SPINAL STENOSIS OF LUMBAR REGION WITH NEUROGENIC CLAUDICATION: ICD-10-CM

## 2024-02-12 DIAGNOSIS — E55.9 VITAMIN D DEFICIENCY: ICD-10-CM

## 2024-02-12 DIAGNOSIS — R06.02 SOB (SHORTNESS OF BREATH): ICD-10-CM

## 2024-02-12 DIAGNOSIS — K21.9 GASTROESOPHAGEAL REFLUX DISEASE WITHOUT ESOPHAGITIS: ICD-10-CM

## 2024-02-12 DIAGNOSIS — J42 CHRONIC BRONCHITIS, UNSPECIFIED CHRONIC BRONCHITIS TYPE (HCC): Primary | ICD-10-CM

## 2024-02-12 DIAGNOSIS — E03.9 ACQUIRED HYPOTHYROIDISM: ICD-10-CM

## 2024-02-12 DIAGNOSIS — R29.898 WEAKNESS OF RIGHT LEG: ICD-10-CM

## 2024-02-12 PROBLEM — L97.902 VENOUS ULCER WITH FAT LAYER EXPOSED (HCC): Status: RESOLVED | Noted: 2023-11-28 | Resolved: 2024-02-12

## 2024-02-12 PROBLEM — I83.009 VENOUS ULCER WITH FAT LAYER EXPOSED (HCC): Status: RESOLVED | Noted: 2023-11-28 | Resolved: 2024-02-12

## 2024-02-12 LAB
25(OH)D3 SERPL-MCNC: 33.1 NG/ML (ref 30–100)
ALBUMIN SERPL-MCNC: 3.9 G/DL (ref 3.2–4.6)
ALBUMIN/GLOB SERPL: 1.4 (ref 0.4–1.6)
ALP SERPL-CCNC: 68 U/L (ref 50–136)
ALT SERPL-CCNC: 20 U/L (ref 12–65)
ANION GAP SERPL CALC-SCNC: 4 MMOL/L (ref 2–11)
AST SERPL-CCNC: 11 U/L (ref 15–37)
BASOPHILS # BLD: 0.1 K/UL (ref 0–0.2)
BASOPHILS NFR BLD: 1 % (ref 0–2)
BILIRUB SERPL-MCNC: 0.5 MG/DL (ref 0.2–1.1)
BUN SERPL-MCNC: 24 MG/DL (ref 8–23)
CALCIUM SERPL-MCNC: 9.9 MG/DL (ref 8.3–10.4)
CHLORIDE SERPL-SCNC: 108 MMOL/L (ref 103–113)
CO2 SERPL-SCNC: 30 MMOL/L (ref 21–32)
CREAT SERPL-MCNC: 0.8 MG/DL (ref 0.6–1)
DIFFERENTIAL METHOD BLD: ABNORMAL
EOSINOPHIL # BLD: 0 K/UL (ref 0–0.8)
EOSINOPHIL NFR BLD: 0 % (ref 0.5–7.8)
ERYTHROCYTE [DISTWIDTH] IN BLOOD BY AUTOMATED COUNT: 14.4 % (ref 11.9–14.6)
GLOBULIN SER CALC-MCNC: 2.7 G/DL (ref 2.8–4.5)
GLUCOSE SERPL-MCNC: 137 MG/DL (ref 65–100)
HCT VFR BLD AUTO: 38 % (ref 35.8–46.3)
HGB BLD-MCNC: 11.8 G/DL (ref 11.7–15.4)
IMM GRANULOCYTES # BLD AUTO: 0.2 K/UL (ref 0–0.5)
IMM GRANULOCYTES NFR BLD AUTO: 1 % (ref 0–5)
LYMPHOCYTES # BLD: 1.5 K/UL (ref 0.5–4.6)
LYMPHOCYTES NFR BLD: 9 % (ref 13–44)
MCH RBC QN AUTO: 30.2 PG (ref 26.1–32.9)
MCHC RBC AUTO-ENTMCNC: 31.1 G/DL (ref 31.4–35)
MCV RBC AUTO: 97.2 FL (ref 82–102)
MONOCYTES # BLD: 1 K/UL (ref 0.1–1.3)
MONOCYTES NFR BLD: 6 % (ref 4–12)
NEUTS SEG # BLD: 14 K/UL (ref 1.7–8.2)
NEUTS SEG NFR BLD: 83 % (ref 43–78)
NRBC # BLD: 0 K/UL (ref 0–0.2)
PLATELET # BLD AUTO: 354 K/UL (ref 150–450)
PMV BLD AUTO: 9.5 FL (ref 9.4–12.3)
POTASSIUM SERPL-SCNC: 4.4 MMOL/L (ref 3.5–5.1)
PROT SERPL-MCNC: 6.6 G/DL (ref 6.3–8.2)
RBC # BLD AUTO: 3.91 M/UL (ref 4.05–5.2)
SODIUM SERPL-SCNC: 142 MMOL/L (ref 136–146)
TSH, 3RD GENERATION: 1.03 UIU/ML (ref 0.36–3.74)
VIT B12 SERPL-MCNC: 256 PG/ML (ref 193–986)
WBC # BLD AUTO: 16.8 K/UL (ref 4.3–11.1)

## 2024-02-12 PROCEDURE — 3078F DIAST BP <80 MM HG: CPT | Performed by: INTERNAL MEDICINE

## 2024-02-12 PROCEDURE — 99214 OFFICE O/P EST MOD 30 MIN: CPT | Performed by: INTERNAL MEDICINE

## 2024-02-12 PROCEDURE — 1090F PRES/ABSN URINE INCON ASSESS: CPT | Performed by: INTERNAL MEDICINE

## 2024-02-12 PROCEDURE — 1123F ACP DISCUSS/DSCN MKR DOCD: CPT | Performed by: INTERNAL MEDICINE

## 2024-02-12 PROCEDURE — G8427 DOCREV CUR MEDS BY ELIG CLIN: HCPCS | Performed by: INTERNAL MEDICINE

## 2024-02-12 PROCEDURE — 3023F SPIROM DOC REV: CPT | Performed by: INTERNAL MEDICINE

## 2024-02-12 PROCEDURE — G8399 PT W/DXA RESULTS DOCUMENT: HCPCS | Performed by: INTERNAL MEDICINE

## 2024-02-12 PROCEDURE — 4004F PT TOBACCO SCREEN RCVD TLK: CPT | Performed by: INTERNAL MEDICINE

## 2024-02-12 PROCEDURE — 3074F SYST BP LT 130 MM HG: CPT | Performed by: INTERNAL MEDICINE

## 2024-02-12 PROCEDURE — G8484 FLU IMMUNIZE NO ADMIN: HCPCS | Performed by: INTERNAL MEDICINE

## 2024-02-12 PROCEDURE — G8420 CALC BMI NORM PARAMETERS: HCPCS | Performed by: INTERNAL MEDICINE

## 2024-02-12 RX ORDER — OMEPRAZOLE 40 MG/1
40 CAPSULE, DELAYED RELEASE ORAL DAILY
COMMUNITY
Start: 2024-01-04

## 2024-02-12 RX ORDER — AMOXICILLIN 875 MG/1
875 TABLET, COATED ORAL 2 TIMES DAILY
Qty: 20 TABLET | Refills: 0 | Status: SHIPPED | OUTPATIENT
Start: 2024-02-12 | End: 2024-02-22

## 2024-02-12 RX ORDER — GABAPENTIN 100 MG/1
100 CAPSULE ORAL NIGHTLY
Qty: 90 CAPSULE | Refills: 1 | Status: SHIPPED | OUTPATIENT
Start: 2024-02-12 | End: 2024-02-12 | Stop reason: SDUPTHER

## 2024-02-12 RX ORDER — AMOXICILLIN 875 MG/1
875 TABLET, COATED ORAL 2 TIMES DAILY
Qty: 20 TABLET | Refills: 0 | Status: SHIPPED | OUTPATIENT
Start: 2024-02-12 | End: 2024-02-12 | Stop reason: SDUPTHER

## 2024-02-12 RX ORDER — GABAPENTIN 100 MG/1
100 CAPSULE ORAL NIGHTLY
Qty: 90 CAPSULE | Refills: 1 | Status: SHIPPED | OUTPATIENT
Start: 2024-02-12 | End: 2024-08-10

## 2024-02-12 NOTE — CARE COORDINATION
Ambulatory Care Management Note        Date/Time:  2/12/2024 8:25 AM    Ccm outreached to patient. No answer at this time, message left. Awaiting response. If no response, ccm will outreach next week.

## 2024-02-12 NOTE — PROGRESS NOTES
HPI: Melania Dave (: 1940)    States she is no longer driving has she has weakness in both legs right greater than left and foot drop on the right  Her hands are turning blue   She gets choked and is losing her breath  (Still smoking)  She is having numbness and tingling in her hands and feet  Has been seeing Dr Chong - Podiatry and wearing compression hose with zippers so able to get on and off  She is also having issues with her prolapsed bladder but declines a pessary for tx or surgery  So wearing a pad    She states she is having issues with sinusitis and bronchitis as well      Problem List:  Patient Active Problem List   Diagnosis   • Palpitations   • Esophageal reflux   • Murmur   • IBS (irritable bowel syndrome)   • Insomnia, unspecified   • Gastritis   • ASCVD (arteriosclerotic cardiovascular disease)   • Chronic left shoulder pain   • Leg cramps   • Spinal stenosis of lumbar region with neurogenic claudication   • Rotator cuff tear, left   • Varicose veins of both legs with edema   • Fatigue   • Chronic kidney disease, stage 3b ([facility])   • Essential hypertension   • COPD (chronic obstructive pulmonary disease) ([facility])   • Vitamin D deficiency   • Osteoporosis, post-menopausal   • Chronic bilateral low back pain with bilateral sciatica   • Chronic chest wall pain   • Nephrolithiasis   • Mixed hyperlipidemia   • Polymyalgia rheumatica ([facility])   • Difficulty swallowing   • Hypothyroidism   • Fluid retention in legs   • Weakness of right leg   • Skin lesion of left arm   • Skin lesion of right upper extremity   • Skin atrophy   • Atrophia cutis senilis   • Sun-damaged skin   • History of skin cancer   • Cigarette smoker   • Long-term corticosteroid use   • Adverse effect of statin   • At high risk for injury related to fall   • Bladder prolapse, female, acquired       History:  Past Medical History:   Diagnosis Date   • Adverse effect of anesthesia     hypotension with anesthesia   • Back pain

## 2024-02-26 ENCOUNTER — CARE COORDINATION (OUTPATIENT)
Dept: CARE COORDINATION | Facility: CLINIC | Age: 84
End: 2024-02-26

## 2024-02-26 NOTE — CARE COORDINATION
Ambulatory Care Coordination Note  2024    Patient Current Location:  [place]     ACM contacted the patient by telephone. Verified name and  with patient as identifiers. Provided introduction to self, and explanation of the ACM role.     Challenges to be reviewed by the provider   Additional needs identified to be addressed with provider: No  none               Method of communication with provider: none.    ACM: Hien Moncada, RN    Ccm outreached to patient. Patient states she just finished abt for sinusitis. Patient states she is feeling much better. Patient was seen by pcp last week. Patient states she is doing well. No questions or concerns voiced. Ccm discussed that I would outreach next week. Patient agreeable.     Offered patient enrollment in the Remote Patient Monitoring (RPM) program for in-home monitoring: Patient declined.    Lab Results       None                 Goals Addressed    None         Future Appointments   Date Time Provider Department Center   2024  4:15 PM Abhishek Cunningham MD [facility] AMB   6/10/2024 12:00 PM Loraine Farnsworth MD [facility] AMB

## 2024-03-04 ENCOUNTER — CARE COORDINATION (OUTPATIENT)
Dept: CARE COORDINATION | Facility: CLINIC | Age: 84
End: 2024-03-04

## 2024-03-04 NOTE — CARE COORDINATION
Ambulatory Care Coordination Note  3/4/2024    Patient Current Location:  [place]     ACM contacted the patient by telephone. Verified name and  with patient as identifiers. Provided introduction to self, and explanation of the ACM role.     Challenges to be reviewed by the provider   Additional needs identified to be addressed with provider: No  none               Method of communication with provider: none.    ACM: Hien Moncada RN    Ccm outreached to patient. Patient states she is doing well at this time. Patient states no questions or concerns. Ccm discussed that I would outreach next week. Patient agreeable.     Offered patient enrollment in the Remote Patient Monitoring (RPM) program for in-home monitoring: Patient declined.    Lab Results       None                 Goals Addressed                   This Visit's Progress     Conditions and Symptoms   On track     I will schedule office visits, as directed by my provider.  I will notify my provider of any symptoms that indicate a worsening of my condition.    Barriers: none  Plan for overcoming my barriers: N/A  Confidence: 9/10  Anticipated Goal Completion Date: 22                Future Appointments   Date Time Provider Department Center   2024  4:15 PM Abhishek Cunningham MD [facility] AMB   6/10/2024 12:00 PM Loraine Farnsworth MD [facility] AMB

## 2024-03-08 RX ORDER — CEFUROXIME AXETIL 250 MG/1
250 TABLET ORAL 2 TIMES DAILY
Qty: 30 TABLET | Refills: 0 | OUTPATIENT
Start: 2024-03-08 | End: 2024-03-23

## 2024-03-08 RX ORDER — FAMOTIDINE 20 MG/1
20 TABLET, FILM COATED ORAL
Qty: 90 TABLET | Refills: 2 | Status: SHIPPED | OUTPATIENT
Start: 2024-03-08

## 2024-03-08 RX ORDER — PREDNISONE 5 MG/1
5 TABLET ORAL 2 TIMES DAILY
Qty: 180 TABLET | Refills: 2 | Status: SHIPPED | OUTPATIENT
Start: 2024-03-08

## 2024-03-11 ENCOUNTER — CARE COORDINATION (OUTPATIENT)
Dept: CARE COORDINATION | Facility: CLINIC | Age: 84
End: 2024-03-11

## 2024-03-11 NOTE — CARE COORDINATION
Ambulatory Care Management Note        Date/Time:  3/11/2024 8:15 AM    Ccm outreached to patient. No answer at this time, message left. Awaiting response. If no response, ccm will outreach next week.

## 2024-03-18 ENCOUNTER — CARE COORDINATION (OUTPATIENT)
Dept: CARE COORDINATION | Facility: CLINIC | Age: 84
End: 2024-03-18

## 2024-03-18 NOTE — CARE COORDINATION
Ambulatory Care Coordination Note  3/18/2024    Patient Current Location:  [place]     ACM contacted the patient by telephone. Verified name and  with patient as identifiers. Provided introduction to self, and explanation of the ACM role.     Challenges to be reviewed by the provider   Additional needs identified to be addressed with provider: No  none               Method of communication with provider: none.    ACM: Hien Moncada RN    Ccm outreached to patient. Patient states she has an ulcer to right leg. Patient states she has call out to podiatrist who has done previous ulcers for her before. Patient states overall doing well. Patient states no questions or concerns. Ccm discussed that I would outreach next week. Patient agreeable.     Offered patient enrollment in the Remote Patient Monitoring (RPM) program for in-home monitoring: Patient declined.    Lab Results       None                 Goals Addressed                   This Visit's Progress     Conditions and Symptoms   On track     I will schedule office visits, as directed by my provider.  I will notify my provider of any symptoms that indicate a worsening of my condition.    Barriers: none  Plan for overcoming my barriers: N/A  Confidence: 9/10  Anticipated Goal Completion Date: 22                Future Appointments   Date Time Provider Department Center   2024  4:15 PM Abhishek Cunningham MD [facility] AMB   6/10/2024 12:00 PM Loraine Farnsworth MD [facility] AMB

## 2024-03-25 ENCOUNTER — CARE COORDINATION (OUTPATIENT)
Dept: CARE COORDINATION | Facility: CLINIC | Age: 84
End: 2024-03-25

## 2024-03-25 NOTE — CARE COORDINATION
Ambulatory Care Coordination Note  3/25/2024    Patient Current Location:  [place]     ACM contacted the patient by telephone. Verified name and  with patient as identifiers. Provided introduction to self, and explanation of the ACM role.     Challenges to be reviewed by the provider   Additional needs identified to be addressed with provider: No  none               Method of communication with provider: none.    ACM: Hien Moncada, RN    Ccm outreached to pt. Pt states wound to leg still has some drainage. Pt states she has upcoming appt. Pt discussed with pt to keep area dry and intact. Pt verbalized understanding. Pt states no questions or concerns. Ccm discussed that I would outreach to pt next week. Pt agreeable.    Offered patient enrollment in the Remote Patient Monitoring (RPM) program for in-home monitoring: Patient declined.    Lab Results       None                 Goals Addressed                   This Visit's Progress     Conditions and Symptoms   On track     I will schedule office visits, as directed by my provider.  I will notify my provider of any symptoms that indicate a worsening of my condition.    Barriers: none  Plan for overcoming my barriers: N/A  Confidence: 9/10  Anticipated Goal Completion Date: 22                Future Appointments   Date Time Provider Department Center   2024  4:15 PM Abhishek Cunningham MD [facility] AMB   6/10/2024 12:00 PM Loraine Farnsworth MD [facility] AMB

## 2024-03-29 RX ORDER — CEFUROXIME AXETIL 250 MG/1
250 TABLET ORAL 2 TIMES DAILY
Qty: 30 TABLET | Refills: 0 | OUTPATIENT
Start: 2024-03-29 | End: 2024-04-13

## 2024-03-31 ENCOUNTER — HOSPITAL ENCOUNTER (EMERGENCY)
Age: 84
Discharge: HOME | End: 2024-03-31
Attending: EMERGENCY MEDICINE
Payer: MEDICARE

## 2024-03-31 ENCOUNTER — APPOINTMENT (OUTPATIENT)
Dept: GENERAL RADIOLOGY | Age: 84
End: 2024-03-31
Payer: MEDICARE

## 2024-03-31 VITALS
HEART RATE: 96 BPM | TEMPERATURE: 97.5 F | SYSTOLIC BLOOD PRESSURE: 119 MMHG | OXYGEN SATURATION: 80 % | WEIGHT: 138 LBS | BODY MASS INDEX: 23.56 KG/M2 | RESPIRATION RATE: 16 BRPM | HEIGHT: 64 IN | DIASTOLIC BLOOD PRESSURE: 48 MMHG

## 2024-03-31 DIAGNOSIS — L03.114 CELLULITIS OF LEFT HAND: Primary | ICD-10-CM

## 2024-03-31 LAB
ALBUMIN SERPL-MCNC: 3.2 G/DL (ref 3.2–4.6)
ALBUMIN/GLOB SERPL: 0.9 (ref 0.4–1.6)
ALP SERPL-CCNC: 54 U/L (ref 50–130)
ALT SERPL-CCNC: 21 U/L (ref 12–65)
ANION GAP SERPL CALC-SCNC: 8 MMOL/L (ref 2–11)
AST SERPL-CCNC: 20 U/L (ref 15–37)
BASOPHILS # BLD: 0.1 K/UL (ref 0–0.2)
BASOPHILS NFR BLD: 0 % (ref 0–2)
BILIRUB SERPL-MCNC: 0.5 MG/DL (ref 0.2–1.1)
BUN SERPL-MCNC: 23 MG/DL (ref 8–23)
CALCIUM SERPL-MCNC: 9.8 MG/DL (ref 8.3–10.4)
CHLORIDE SERPL-SCNC: 104 MMOL/L (ref 103–113)
CO2 SERPL-SCNC: 27 MMOL/L (ref 21–32)
CREAT SERPL-MCNC: 1.33 MG/DL (ref 0.6–1)
CRP SERPL-MCNC: 20.7 MG/DL (ref 0–0.9)
DIFFERENTIAL METHOD BLD: ABNORMAL
EOSINOPHIL # BLD: 0 K/UL (ref 0–0.8)
EOSINOPHIL NFR BLD: 0 % (ref 0.5–7.8)
ERYTHROCYTE [DISTWIDTH] IN BLOOD BY AUTOMATED COUNT: 14.6 % (ref 11.9–14.6)
GLOBULIN SER CALC-MCNC: 3.5 G/DL (ref 2.8–4.5)
GLUCOSE SERPL-MCNC: 128 MG/DL (ref 65–100)
HCT VFR BLD AUTO: 35.3 % (ref 35.8–46.3)
HGB BLD-MCNC: 11.2 G/DL (ref 11.7–15.4)
IMM GRANULOCYTES # BLD AUTO: 0.2 K/UL (ref 0–0.5)
IMM GRANULOCYTES NFR BLD AUTO: 1 % (ref 0–5)
LYMPHOCYTES # BLD: 2.6 K/UL (ref 0.5–4.6)
LYMPHOCYTES NFR BLD: 14 % (ref 13–44)
MAGNESIUM SERPL-MCNC: 2.4 MG/DL (ref 1.8–2.4)
MCH RBC QN AUTO: 29.5 PG (ref 26.1–32.9)
MCHC RBC AUTO-ENTMCNC: 31.7 G/DL (ref 31.4–35)
MCV RBC AUTO: 92.9 FL (ref 82–102)
MONOCYTES # BLD: 0.9 K/UL (ref 0.1–1.3)
MONOCYTES NFR BLD: 5 % (ref 4–12)
NEUTS SEG # BLD: 14.1 K/UL (ref 1.7–8.2)
NEUTS SEG NFR BLD: 79 % (ref 43–78)
NRBC # BLD: 0 K/UL (ref 0–0.2)
PLATELET # BLD AUTO: 299 K/UL (ref 150–450)
PMV BLD AUTO: 9.4 FL (ref 9.4–12.3)
POTASSIUM SERPL-SCNC: 4.1 MMOL/L (ref 3.5–5.1)
PROT SERPL-MCNC: 6.7 G/DL (ref 6.3–8.2)
RBC # BLD AUTO: 3.8 M/UL (ref 4.05–5.2)
SODIUM SERPL-SCNC: 139 MMOL/L (ref 136–146)
TROPONIN I SERPL HS-MCNC: 12.5 PG/ML (ref 0–14)
TROPONIN I SERPL HS-MCNC: 13 PG/ML (ref 0–14)
URATE SERPL-MCNC: 7.7 MG/DL (ref 2.6–6)
WBC # BLD AUTO: 17.8 K/UL (ref 4.3–11.1)

## 2024-03-31 PROCEDURE — 93005 ELECTROCARDIOGRAM TRACING: CPT | Performed by: EMERGENCY MEDICINE

## 2024-03-31 PROCEDURE — 84550 ASSAY OF BLOOD/URIC ACID: CPT

## 2024-03-31 PROCEDURE — 94762 N-INVAS EAR/PLS OXIMTRY CONT: CPT

## 2024-03-31 PROCEDURE — 84484 ASSAY OF TROPONIN QUANT: CPT

## 2024-03-31 PROCEDURE — 99285 EMERGENCY DEPT VISIT HI MDM: CPT

## 2024-03-31 PROCEDURE — 80053 COMPREHEN METABOLIC PANEL: CPT

## 2024-03-31 PROCEDURE — 71046 X-RAY EXAM CHEST 2 VIEWS: CPT

## 2024-03-31 PROCEDURE — 6360000002 HC RX W HCPCS: Performed by: EMERGENCY MEDICINE

## 2024-03-31 PROCEDURE — 86140 C-REACTIVE PROTEIN: CPT

## 2024-03-31 PROCEDURE — 87040 BLOOD CULTURE FOR BACTERIA: CPT

## 2024-03-31 PROCEDURE — 83735 ASSAY OF MAGNESIUM: CPT

## 2024-03-31 PROCEDURE — 85025 COMPLETE CBC W/AUTO DIFF WBC: CPT

## 2024-03-31 PROCEDURE — 96365 THER/PROPH/DIAG IV INF INIT: CPT

## 2024-03-31 RX ORDER — CLINDAMYCIN PHOSPHATE 900 MG/50ML
900 INJECTION, SOLUTION INTRAVENOUS
Status: COMPLETED | OUTPATIENT
Start: 2024-03-31 | End: 2024-03-31

## 2024-03-31 RX ORDER — CLINDAMYCIN HYDROCHLORIDE 300 MG/1
300 CAPSULE ORAL 2 TIMES DAILY
Qty: 14 CAPSULE | Refills: 0 | Status: SHIPPED | OUTPATIENT
Start: 2024-03-31 | End: 2024-04-07

## 2024-03-31 RX ADMIN — CLINDAMYCIN PHOSPHATE 900 MG: 900 INJECTION, SOLUTION INTRAVENOUS at 21:29

## 2024-04-01 ENCOUNTER — CARE COORDINATION (OUTPATIENT)
Dept: CARE COORDINATION | Facility: CLINIC | Age: 84
End: 2024-04-01

## 2024-04-01 LAB
EKG ATRIAL RATE: 86 BPM
EKG DIAGNOSIS: NORMAL
EKG P AXIS: 83 DEGREES
EKG P-R INTERVAL: 162 MS
EKG Q-T INTERVAL: 336 MS
EKG QRS DURATION: 70 MS
EKG QTC CALCULATION (BAZETT): 402 MS
EKG R AXIS: -63 DEGREES
EKG T AXIS: 64 DEGREES
EKG VENTRICULAR RATE: 86 BPM

## 2024-04-01 PROCEDURE — 93010 ELECTROCARDIOGRAM REPORT: CPT | Performed by: INTERNAL MEDICINE

## 2024-04-01 NOTE — ED NOTES
Patient mobility status  with no difficulty. Provider aware     I have reviewed discharge instructions with the patient.  The patient verbalized understanding.    Patient left ED via Discharge Method: ambulatory to Home with Child.    Opportunity for questions and clarification provided.     Patient given 1 scripts.

## 2024-04-01 NOTE — CARE COORDINATION
Ambulatory Care Coordination Note  2024    Patient Current Location:  [place]    ACM contacted the patient by telephone. Verified name and  with patient as identifiers. Provided introduction to self, and explanation of the ACM role.     ACM: Hien Moncada RN    Challenges to be reviewed by the provider   Additional needs identified to be addressed with provider: Yes  Notified of er admission               Method of communication with provider: staff message.    See assessment below. Patient is known to ccm. Ccm outreached to patient. Patient agreeable to ccm services. Reviewed with patient discharge summary. Patient verbalized understanding. Patient is taking abt therapy as directed. Discussed with patient importance of eating and drinking. Patient verbalized understanding. Ccm notified pcp of er admission. Discussed with patient to keep hand dry and notify pcp or ccm if symptoms worsen. Patient verbalized understanding. Patient states no other questions or concerns. [facility] discussed that I would outreach next week. Patient agreeable.     Offered patient enrollment in the Remote Patient Monitoring (RPM) program for in-home monitoring: Patient declined.    Ambulatory Care Coordination Assessment    Care Coordination Protocol  Referral from Primary Care Provider: No  Week 1 - Initial Assessment     Do you have all of your prescriptions and are they filled?: Yes  Barriers to medication adherence: None  Are you able to afford your medications?: No  How often do you have trouble taking your medications the way you have been told to take them?: I always take them as prescribed.     Do you have Home O2 Therapy?: No      Ability to seek help/take action for Emergent Urgent situations i.e. fire, crime, inclement weather or health crisis.: Independent  Ability to ambulate to restroom: Independent  Ability handle personal hygeine needs (bathing/dressing/grooming): Independent  Ability to manage Medications:

## 2024-04-01 NOTE — ED PROVIDER NOTES
but under a minute.  The pain generally resolves on its own.  She denies any fever or chills.  Review of systems otherwise negative.    The history is provided by the patient and medical records.       ROS     Review of Systems   Constitutional:  Negative for chills and fever.   Skin:  Positive for color change and wound.   All other systems reviewed and are negative.       Physical Exam     Vitals signs and nursing note reviewed:  Vitals:    03/31/24 1945 03/31/24 2002   BP: 123/64    Pulse: 96    Resp: 16    Temp: 97.5 °F (36.4 °C)    TempSrc: Oral    SpO2: 99%    Weight: 62.6 kg (138 lb) 62.6 kg (138 lb)   Height: 1.626 m (5' 4\") 1.626 m (5' 4\")      Physical Exam  Vitals and nursing note reviewed.   Constitutional:       General: She is not in acute distress.     Appearance: Normal appearance. She is normal weight. She is not ill-appearing or toxic-appearing.   HENT:      Head: Normocephalic and atraumatic.      Mouth/Throat:      Mouth: Mucous membranes are moist.      Pharynx: Oropharynx is clear. No oropharyngeal exudate or posterior oropharyngeal erythema.   Eyes:      Extraocular Movements: Extraocular movements intact.      Conjunctiva/sclera: Conjunctivae normal.      Pupils: Pupils are equal, round, and reactive to light.   Cardiovascular:      Rate and Rhythm: Normal rate and regular rhythm.   Pulmonary:      Effort: Pulmonary effort is normal.      Breath sounds: Normal breath sounds.   Abdominal:      General: There is no distension.      Palpations: Abdomen is soft.      Tenderness: There is no abdominal tenderness. There is no right CVA tenderness, left CVA tenderness or guarding.   Musculoskeletal:         General: Swelling and tenderness present.      Cervical back: Normal range of motion and neck supple.      Comments: Examination of the left hand demonstrates swelling, edema and redness with a bulla on the dorsum aspect of the hand.  Minimal warmth is noted.  The redness extends towards the

## 2024-04-05 LAB
BACTERIA SPEC CULT: NORMAL
BACTERIA SPEC CULT: NORMAL
SERVICE CMNT-IMP: NORMAL
SERVICE CMNT-IMP: NORMAL

## 2024-04-08 ENCOUNTER — CARE COORDINATION (OUTPATIENT)
Dept: CARE COORDINATION | Facility: CLINIC | Age: 84
End: 2024-04-08

## 2024-04-08 NOTE — CARE COORDINATION
Ambulatory Care Coordination Note  2024    Patient Current Location:  [place]     ACM contacted the patient by telephone. Verified name and  with patient as identifiers. Provided introduction to self, and explanation of the ACM role.     Challenges to be reviewed by the provider   Additional needs identified to be addressed with provider: No  none               Method of communication with provider: none.    ACM: Hien Moncada RN    Ccm outreached to patient. Patient states she was not feeling good last weekend. Patient states she is feeling better today. Eating and drinking well. Patient states she went to er last weekend. Patient states she was giving IV abt. Patent states she was told she has gout in her left hand. Ccm left message with pcp regarding er admission. Patient states she also has a blister to left hand and was given abt for possible infection. Discussed with patient if she runs a fever to call pcp right away. Patient states no questions or concerns. Ccm discussed that I would outreach next week. Patient agreeable.     Offered patient enrollment in the Remote Patient Monitoring (RPM) program for in-home monitoring: Patient declined.    Lab Results       None            Care Coordination Interventions    Referral from Primary Care Provider: No  Suggested Interventions and Community Resources          Goals Addressed                   This Visit's Progress     Conditions and Symptoms   On track     I will schedule office visits, as directed by my provider.  I will notify my provider of any symptoms that indicate a worsening of my condition.    Barriers: none  Plan for overcoming my barriers: N/A  Confidence: 9/10  Anticipated Goal Completion Date: 24                Future Appointments   Date Time Provider Department Center   2024  4:15 PM Abhishek Cunningham MD [facility]   6/10/2024 12:00 PM Loraine Farsnworth MD [facility]

## 2024-04-15 ENCOUNTER — CARE COORDINATION (OUTPATIENT)
Dept: CARE COORDINATION | Facility: CLINIC | Age: 84
End: 2024-04-15

## 2024-04-15 NOTE — CARE COORDINATION
Ambulatory Care Coordination Note  4/15/2024    Patient Current Location:  [place]     ACM contacted the patient by telephone. Verified name and  with patient as identifiers. Provided introduction to self, and explanation of the ACM role.     Challenges to be reviewed by the provider   Additional needs identified to be addressed with provider: No  none               Method of communication with provider: none.    ACM: Hien Moncada RN    Ccm outreached to pt. Pt states she is doing well. Pt states she has several upcoming appointments. Pt states no questions or concerns. Ccm discussed that I would outreach next week. Pt agreeable.    Offered patient enrollment in the Remote Patient Monitoring (RPM) program for in-home monitoring: Patient declined.    Lab Results       None            Care Coordination Interventions    Referral from Primary Care Provider: No  Suggested Interventions and Community Resources          Goals Addressed                   This Visit's Progress     Conditions and Symptoms   On track     I will schedule office visits, as directed by my provider.  I will notify my provider of any symptoms that indicate a worsening of my condition.    Barriers: none  Plan for overcoming my barriers: N/A  Confidence: 9/10  Anticipated Goal Completion Date: 24                Future Appointments   Date Time Provider Department Center   2024  4:15 PM Abihshek Cunningham MD [facility]   6/10/2024 12:00 PM Loraine Farnsworth MD [facility]

## 2024-04-29 ENCOUNTER — CARE COORDINATION (OUTPATIENT)
Dept: CARE COORDINATION | Facility: CLINIC | Age: 84
End: 2024-04-29

## 2024-04-29 NOTE — CARE COORDINATION
Ambulatory Care Coordination Note  2024    Patient Current Location:  [place]     ACM contacted the patient by telephone. Verified name and  with patient as identifiers. Provided introduction to self, and explanation of the ACM role.     Challenges to be reviewed by the provider   Additional needs identified to be addressed with provider: No  none               Method of communication with provider: none.    ACM: Hien Moncada RN    Ccm outreached to patient. Patient states she is doing well. Patient states she has upcoming appointment to get her wound checked. Patient states no s/s infection noted. Patient states no questions or concerns. Ccm discussed that I would outreach next week. Patient agreeable.     Offered patient enrollment in the Remote Patient Monitoring (RPM) program for in-home monitoring: Yes, but did not enroll at this time: declined to enroll in the program becausealready did program .    Lab Results       None            Care Coordination Interventions    Referral from Primary Care Provider: No  Suggested Interventions and Community Resources          Goals Addressed                   This Visit's Progress     Conditions and Symptoms   On track     I will schedule office visits, as directed by my provider.  I will notify my provider of any symptoms that indicate a worsening of my condition.    Barriers: none  Plan for overcoming my barriers: N/A  Confidence: 9/10  Anticipated Goal Completion Date: 24                Future Appointments   Date Time Provider Department Center   2024  4:15 PM Abhishek Cunningham MD [facility]   6/10/2024 12:00 PM Loraine Farnsworth MD [facility]        ---

## 2024-05-06 ENCOUNTER — CARE COORDINATION (OUTPATIENT)
Dept: CARE COORDINATION | Facility: CLINIC | Age: 84
End: 2024-05-06

## 2024-05-06 NOTE — CARE COORDINATION
Ambulatory Care Management Note        Date/Time:  5/6/2024 8:37 AM    Ccm outreached to patient. No answer at this time, message left. Awaiting response. If no response, ccm will outreach next week.

## 2024-05-07 RX ORDER — LOSARTAN POTASSIUM 50 MG/1
TABLET ORAL
Qty: 180 TABLET | Refills: 2 | Status: SHIPPED | OUTPATIENT
Start: 2024-05-07

## 2024-05-07 NOTE — TELEPHONE ENCOUNTER
Last OV and medication reviewed.     \  Requested Prescriptions     Pending Prescriptions Disp Refills    losartan (COZAAR) 50 MG tablet [Pharmacy Med Name: LOSARTAN POTASSIUM 50 MG TAB] 180 tablet 2     Sig: TAKE 1 TABLET BY MOUTH EVERY MORNING AND TAKE 1 TABLET AT BEDTIME

## 2024-05-10 RX ORDER — FUROSEMIDE 40 MG/1
40 TABLET ORAL DAILY
Qty: 90 TABLET | Refills: 2 | Status: SHIPPED | OUTPATIENT
Start: 2024-05-10

## 2024-05-10 RX ORDER — ESTRADIOL 0.5 MG/1
0.5 TABLET ORAL DAILY
Qty: 90 TABLET | Refills: 2 | Status: SHIPPED | OUTPATIENT
Start: 2024-05-10

## 2024-05-10 RX ORDER — IPRATROPIUM BROMIDE AND ALBUTEROL SULFATE 2.5; .5 MG/3ML; MG/3ML
3 SOLUTION RESPIRATORY (INHALATION) EVERY 6 HOURS PRN
Qty: 360 ML | Refills: 3 | Status: SHIPPED | OUTPATIENT
Start: 2024-05-10

## 2024-05-13 ENCOUNTER — CARE COORDINATION (OUTPATIENT)
Dept: CARE COORDINATION | Facility: CLINIC | Age: 84
End: 2024-05-13

## 2024-05-13 ENCOUNTER — OFFICE VISIT (OUTPATIENT)
Age: 84
End: 2024-05-13
Payer: MEDICARE

## 2024-05-13 VITALS
WEIGHT: 139 LBS | BODY MASS INDEX: 23.73 KG/M2 | SYSTOLIC BLOOD PRESSURE: 142 MMHG | HEART RATE: 96 BPM | HEIGHT: 64 IN | DIASTOLIC BLOOD PRESSURE: 70 MMHG

## 2024-05-13 DIAGNOSIS — I10 ESSENTIAL HYPERTENSION: ICD-10-CM

## 2024-05-13 DIAGNOSIS — R00.2 PALPITATIONS: ICD-10-CM

## 2024-05-13 DIAGNOSIS — E78.2 MIXED HYPERLIPIDEMIA: ICD-10-CM

## 2024-05-13 DIAGNOSIS — I25.10 ASCVD (ARTERIOSCLEROTIC CARDIOVASCULAR DISEASE): Primary | ICD-10-CM

## 2024-05-13 PROCEDURE — 1123F ACP DISCUSS/DSCN MKR DOCD: CPT | Performed by: INTERNAL MEDICINE

## 2024-05-13 PROCEDURE — G8428 CUR MEDS NOT DOCUMENT: HCPCS | Performed by: INTERNAL MEDICINE

## 2024-05-13 PROCEDURE — 3077F SYST BP >= 140 MM HG: CPT | Performed by: INTERNAL MEDICINE

## 2024-05-13 PROCEDURE — G8420 CALC BMI NORM PARAMETERS: HCPCS | Performed by: INTERNAL MEDICINE

## 2024-05-13 PROCEDURE — 3078F DIAST BP <80 MM HG: CPT | Performed by: INTERNAL MEDICINE

## 2024-05-13 PROCEDURE — 4004F PT TOBACCO SCREEN RCVD TLK: CPT | Performed by: INTERNAL MEDICINE

## 2024-05-13 PROCEDURE — 1090F PRES/ABSN URINE INCON ASSESS: CPT | Performed by: INTERNAL MEDICINE

## 2024-05-13 PROCEDURE — 99214 OFFICE O/P EST MOD 30 MIN: CPT | Performed by: INTERNAL MEDICINE

## 2024-05-13 PROCEDURE — G8399 PT W/DXA RESULTS DOCUMENT: HCPCS | Performed by: INTERNAL MEDICINE

## 2024-05-13 NOTE — CARE COORDINATION
Ambulatory Care Coordination Note  2024    Patient Current Location:  [place]     ACM contacted the patient by telephone. Verified name and  with patient as identifiers. Provided introduction to self, and explanation of the ACM role.     Challenges to be reviewed by the provider   Additional needs identified to be addressed with provider: No  none               Method of communication with provider: none.    ACM: Hien Moncada, RN    Ccm outreached to patient. Patient states she has 2 MD appointments this week. Patient states overall doing well. Eating and drinking well. Patient states no questions or concerns. Ccm discussed that I would outreach next week. Patient agreeable.     Offered patient enrollment in the Remote Patient Monitoring (RPM) program for in-home monitoring: Yes, but did not enroll at this time: declined .    Lab Results       None            Care Coordination Interventions    Referral from Primary Care Provider: No  Suggested Interventions and Community Resources          Goals Addressed                   This Visit's Progress     Conditions and Symptoms   On track     I will schedule office visits, as directed by my provider.  I will notify my provider of any symptoms that indicate a worsening of my condition.    Barriers: none  Plan for overcoming my barriers: N/A  Confidence: 9/10  Anticipated Goal Completion Date: 24                Future Appointments   Date Time Provider Department Center   2024  4:15 PM Abhishek Cunningham MD [facility]   6/10/2024 12:00 PM Loraine Farnsworth MD [facility]

## 2024-05-13 NOTE — PROGRESS NOTES
[facility] CARDIOLOGY  [address] [place] [address], [address]  [place] [address]  PHONE: [phone]      24    NAME:  Melania Dave  : 1940  MRN: [id number]       SUBJECTIVE:   Melania Dave is a 83 y.o. female seen for a follow up visit regarding the following:     Chief Complaint   Patient presents with    Coronary Artery Disease         HPI:    No cp or johansen. No orthopnea or pnd. No palpitations or syncope.      Past Medical History, Past Surgical History, Family history, Social History, and Medications were all reviewed with the patient today and updated as necessary.     Current Outpatient Medications   Medication Sig Dispense Refill    furosemide (LASIX) 40 MG tablet Take 1 tablet by mouth daily 90 tablet 2    ipratropium 0.5 mg-albuterol 2.5 mg (DUONEB) 0.5-2.5 (3) MG/3ML SOLN nebulizer solution Inhale 3 mLs into the lungs every 6 hours as needed for Shortness of Breath 360 mL 3    estradiol (ESTRACE) 0.5 MG tablet Take 1 tablet by mouth daily 90 tablet 2    losartan (COZAAR) 50 MG tablet TAKE 1 TABLET BY MOUTH EVERY MORNING AND TAKE 1 TABLET AT BEDTIME 180 tablet 2    famotidine (PEPCID) 20 MG tablet TAKE 1 TABLET BY MOUTH EVERYDAY AT BEDTIME 90 tablet 2    predniSONE (DELTASONE) 5 MG tablet TAKE 1 TABLET BY MOUTH TWICE A  tablet 2    omeprazole (PRILOSEC) 40 MG delayed release capsule Take 1 capsule by mouth daily      gabapentin (NEURONTIN) 100 MG capsule Take 1 capsule by mouth nightly for 180 days. Intended supply: 90 days 90 capsule 1    levothyroxine (SYNTHROID) 150 MCG tablet TAKE 1 TABLET BY MOUTH EVERY DAY BEFORE BREAKFAST 90 tablet 2    ondansetron (ZOFRAN) 4 MG tablet Take 1 tablet by mouth 3 times daily as needed for Nausea or Vomiting 15 tablet 0    MAGNESIUM PO Take by mouth      Calcium Carb-Cholecalciferol (CALCIUM 1000 + D PO) Take by mouth      silver sulfADIAZINE (SILVADENE) 1 % cream Apply topically daily. 400 g 0    aspirin 81 MG EC tablet Take 1 tablet by

## 2024-05-15 NOTE — CARE COORDINATION
Ambulatory Care Coordination Note  2023    Patient Current Location:  [facility]     ACM contacted the patient by telephone. Verified name and  with patient as identifiers. Provided introduction to self, and explanation of the ACM role. Challenges to be reviewed by the provider   Additional needs identified to be addressed with provider: No  none               Method of communication with provider: none. ACM: Carla Mcdaniels, RN    Ccm outreached to patient. Patient states she has some congestion today but overall doing well. Patient states last week she was started on new diuretic and it caused her heart rate to increase. Patient states she went to pcp last week and they discontinued medication and restarted on lasix. Patient states heart rate is stable at this time. Patient states she see cardiologist today. Patient states no questions or concerns. Ccm discussed that I would outreach next week. Patient agreeable. Offered patient enrollment in the Remote Patient Monitoring (RPM) program for in-home monitoring: Yes, patient already enrolled. Lab Results       None                 Goals Addressed                   This Visit's Progress     Conditions and Symptoms   On track     I will schedule office visits, as directed by my provider. I will notify my provider of any symptoms that indicate a worsening of my condition.     Barriers: none  Plan for overcoming my barriers: N/A  Confidence: 9/10  Anticipated Goal Completion Date: 22                Future Appointments   Date Time Provider [address]   2023  4:30 PM MD MARIAH ElizabethDG [facility]   3/6/2024  2:40 PM Misael Rolon MD [facility] 69

## 2024-06-03 ENCOUNTER — CARE COORDINATION (OUTPATIENT)
Dept: CARE COORDINATION | Facility: CLINIC | Age: 84
End: 2024-06-03

## 2024-06-03 NOTE — CARE COORDINATION
Ambulatory Care Coordination Note  6/3/2024    Patient Current Location:  [place]     ACM contacted the patient by telephone. Verified name and  with patient as identifiers. Provided introduction to self, and explanation of the ACM role.     Challenges to be reviewed by the provider   Additional needs identified to be addressed with provider: No  none               Method of communication with provider: none.    ACM: Hien Moncada RN     Patient states she is doing well. Patient states her leg continues heal well. Patient states no sob. Patient states no questions or concerns. [facility] discussed that I would outreach next week. Patient agreeable.patient has upcoming appointment with pcp.     Offered patient enrollment in the Remote Patient Monitoring (RPM) program for in-home monitoring: Yes, but did not enroll at this time: declined .    Lab Results       None            Care Coordination Interventions    Referral from Primary Care Provider: No  Suggested Interventions and Community Resources          Goals Addressed                   This Visit's Progress     Conditions and Symptoms   On track     I will schedule office visits, as directed by my provider.  I will notify my provider of any symptoms that indicate a worsening of my condition.    Barriers: none  Plan for overcoming my barriers: N/A  Confidence: 9/10  Anticipated Goal Completion Date: 24                Future Appointments   Date Time Provider Department Center   6/10/2024 12:00 PM Loraine Farnsworth MD [facility]   2024  3:40 PM Alvaro Parisi MD PM [facility] AMB   2024 11:30 AM Abhishek Cunningham MD [facility]

## 2024-06-10 ENCOUNTER — CARE COORDINATION (OUTPATIENT)
Dept: CARE COORDINATION | Facility: CLINIC | Age: 84
End: 2024-06-10

## 2024-06-10 ENCOUNTER — OFFICE VISIT (OUTPATIENT)
Dept: INTERNAL MEDICINE CLINIC | Facility: CLINIC | Age: 84
End: 2024-06-10
Payer: MEDICARE

## 2024-06-10 VITALS
DIASTOLIC BLOOD PRESSURE: 60 MMHG | BODY MASS INDEX: 23.56 KG/M2 | SYSTOLIC BLOOD PRESSURE: 124 MMHG | HEIGHT: 64 IN | WEIGHT: 138 LBS

## 2024-06-10 DIAGNOSIS — J42 CHRONIC BRONCHITIS, UNSPECIFIED CHRONIC BRONCHITIS TYPE (HCC): ICD-10-CM

## 2024-06-10 DIAGNOSIS — R35.0 URINARY FREQUENCY: ICD-10-CM

## 2024-06-10 DIAGNOSIS — I10 ESSENTIAL HYPERTENSION: Primary | ICD-10-CM

## 2024-06-10 DIAGNOSIS — M1A.09X0 CHRONIC GOUT OF MULTIPLE SITES, UNSPECIFIED CAUSE: ICD-10-CM

## 2024-06-10 DIAGNOSIS — I25.10 ASCVD (ARTERIOSCLEROTIC CARDIOVASCULAR DISEASE): ICD-10-CM

## 2024-06-10 DIAGNOSIS — E03.9 ACQUIRED HYPOTHYROIDISM: ICD-10-CM

## 2024-06-10 DIAGNOSIS — E79.0 HYPERURICEMIA: ICD-10-CM

## 2024-06-10 LAB
BILIRUBIN, URINE, POC: NEGATIVE
BLOOD URINE, POC: NEGATIVE
GLUCOSE URINE, POC: NEGATIVE
KETONES, URINE, POC: NEGATIVE
LEUKOCYTE ESTERASE, URINE, POC: ABNORMAL
NITRITE, URINE, POC: NEGATIVE
PH, URINE, POC: 7 (ref 4.6–8)
PROTEIN,URINE, POC: NEGATIVE
SPECIFIC GRAVITY, URINE, POC: 1.02 (ref 1–1.03)
URINALYSIS CLARITY, POC: CLEAR
URINALYSIS COLOR, POC: YELLOW
UROBILINOGEN, POC: ABNORMAL

## 2024-06-10 PROCEDURE — G8420 CALC BMI NORM PARAMETERS: HCPCS | Performed by: INTERNAL MEDICINE

## 2024-06-10 PROCEDURE — 3078F DIAST BP <80 MM HG: CPT | Performed by: INTERNAL MEDICINE

## 2024-06-10 PROCEDURE — 99214 OFFICE O/P EST MOD 30 MIN: CPT | Performed by: INTERNAL MEDICINE

## 2024-06-10 PROCEDURE — 4004F PT TOBACCO SCREEN RCVD TLK: CPT | Performed by: INTERNAL MEDICINE

## 2024-06-10 PROCEDURE — G8427 DOCREV CUR MEDS BY ELIG CLIN: HCPCS | Performed by: INTERNAL MEDICINE

## 2024-06-10 PROCEDURE — 3074F SYST BP LT 130 MM HG: CPT | Performed by: INTERNAL MEDICINE

## 2024-06-10 PROCEDURE — 1090F PRES/ABSN URINE INCON ASSESS: CPT | Performed by: INTERNAL MEDICINE

## 2024-06-10 PROCEDURE — G8399 PT W/DXA RESULTS DOCUMENT: HCPCS | Performed by: INTERNAL MEDICINE

## 2024-06-10 PROCEDURE — 1123F ACP DISCUSS/DSCN MKR DOCD: CPT | Performed by: INTERNAL MEDICINE

## 2024-06-10 PROCEDURE — 81003 URINALYSIS AUTO W/O SCOPE: CPT | Performed by: INTERNAL MEDICINE

## 2024-06-10 PROCEDURE — 3023F SPIROM DOC REV: CPT | Performed by: INTERNAL MEDICINE

## 2024-06-10 RX ORDER — ALLOPURINOL 100 MG/1
100 TABLET ORAL DAILY
Qty: 90 TABLET | Refills: 1 | Status: SHIPPED | OUTPATIENT
Start: 2024-06-10

## 2024-06-10 RX ORDER — ALLOPURINOL 100 MG/1
100 TABLET ORAL DAILY
Qty: 90 TABLET | Refills: 1 | Status: SHIPPED | OUTPATIENT
Start: 2024-06-10 | End: 2024-06-10 | Stop reason: SDUPTHER

## 2024-06-10 SDOH — ECONOMIC STABILITY: FOOD INSECURITY: WITHIN THE PAST 12 MONTHS, YOU WORRIED THAT YOUR FOOD WOULD RUN OUT BEFORE YOU GOT MONEY TO BUY MORE.: NEVER TRUE

## 2024-06-10 SDOH — ECONOMIC STABILITY: FOOD INSECURITY: WITHIN THE PAST 12 MONTHS, THE FOOD YOU BOUGHT JUST DIDN'T LAST AND YOU DIDN'T HAVE MONEY TO GET MORE.: NEVER TRUE

## 2024-06-10 SDOH — ECONOMIC STABILITY: INCOME INSECURITY: HOW HARD IS IT FOR YOU TO PAY FOR THE VERY BASICS LIKE FOOD, HOUSING, MEDICAL CARE, AND HEATING?: NOT HARD AT ALL

## 2024-06-10 ASSESSMENT — ENCOUNTER SYMPTOMS
COUGH: 1
WHEEZING: 1
SHORTNESS OF BREATH: 1

## 2024-06-10 NOTE — CARE COORDINATION
Ambulatory Care Coordination Note  6/10/2024    Patient Current Location:  [place]     ACM contacted the patient by telephone. Verified name and  with patient as identifiers. Provided introduction to self, and explanation of the ACM role.     Challenges to be reviewed by the provider   Additional needs identified to be addressed with provider: No  none               Method of communication with provider: none.    ACM: Hien Moncada RN    Ccm outreached to patient. Patient states she has been doing well. Patient states she has appointment with pcp today. Patient states she is eating and drinking well. Patient states no questions or concerns. Ccm discussed that I would outreach next week. Patient agreeable.     Offered patient enrollment in the Remote Patient Monitoring (RPM) program for in-home monitoring: Yes, but did not enroll at this time: declined .    Lab Results       None            Care Coordination Interventions    Referral from Primary Care Provider: No  Suggested Interventions and Community Resources          Goals Addressed                   This Visit's Progress     Conditions and Symptoms   On track     I will schedule office visits, as directed by my provider.  I will notify my provider of any symptoms that indicate a worsening of my condition.    Barriers: none  Plan for overcoming my barriers: N/A  Confidence: 9/10  Anticipated Goal Completion Date: 24                Future Appointments   Date Time Provider Department Center   6/10/2024 12:00 PM Loraine Farnsworth MD [facility]   2024  3:40 PM Alvaro Parisi MD PM [facility]   2024 11:30 AM Abhishek Cunningham MD [facility]

## 2024-06-10 NOTE — PROGRESS NOTES
SOLN nebulizer solution Inhale 3 mLs into the lungs every 6 hours as needed for Shortness of Breath 360 mL 3    estradiol (ESTRACE) 0.5 MG tablet Take 1 tablet by mouth daily 90 tablet 2    losartan (COZAAR) 50 MG tablet TAKE 1 TABLET BY MOUTH EVERY MORNING AND TAKE 1 TABLET AT BEDTIME 180 tablet 2    predniSONE (DELTASONE) 5 MG tablet TAKE 1 TABLET BY MOUTH TWICE A  tablet 2    omeprazole (PRILOSEC) 40 MG delayed release capsule Take 1 capsule by mouth daily      gabapentin (NEURONTIN) 100 MG capsule Take 1 capsule by mouth nightly for 180 days. Intended supply: 90 days 90 capsule 1    levothyroxine (SYNTHROID) 150 MCG tablet TAKE 1 TABLET BY MOUTH EVERY DAY BEFORE BREAKFAST 90 tablet 2    ondansetron (ZOFRAN) 4 MG tablet Take 1 tablet by mouth 3 times daily as needed for Nausea or Vomiting 15 tablet 0    MAGNESIUM PO Take by mouth      Calcium Carb-Cholecalciferol (CALCIUM 1000 + D PO) Take by mouth      silver sulfADIAZINE (SILVADENE) 1 % cream Apply topically daily. 400 g 0    aspirin 81 MG EC tablet Take 1 tablet by mouth      vitamin D 25 MCG (1000 UT) CAPS Take 1 capsule by mouth every evening      nitroGLYCERIN (NITROSTAT) 0.4 MG SL tablet Place 1 tablet under the tongue       No current facility-administered medications for this visit.       Review of Systems:  Review of Systems   Constitutional:  Negative for unexpected weight change.   Respiratory:  Positive for cough, shortness of breath and wheezing.    Cardiovascular:  Positive for leg swelling.   Genitourinary:  Positive for frequency.   Musculoskeletal:  Positive for arthralgias.   All other systems reviewed and are negative.      Vitals:  /60   Ht 1.626 m (5' 4\")   Wt 62.6 kg (138 lb)   BMI 23.69 kg/m²     Physical Exam:  Physical Exam  Vitals reviewed.   Constitutional:       Appearance: Normal appearance.   HENT:      Head: Normocephalic and atraumatic.   Eyes:      Extraocular Movements: Extraocular movements intact.      Pupils:

## 2024-06-12 ENCOUNTER — HOSPITAL ENCOUNTER (EMERGENCY)
Age: 84
Discharge: HOME | End: 2024-06-12
Attending: GENERAL PRACTICE
Payer: MEDICARE

## 2024-06-12 VITALS
SYSTOLIC BLOOD PRESSURE: 147 MMHG | BODY MASS INDEX: 25.4 KG/M2 | RESPIRATION RATE: 16 BRPM | OXYGEN SATURATION: 98 % | DIASTOLIC BLOOD PRESSURE: 45 MMHG | WEIGHT: 138 LBS | HEIGHT: 62 IN | HEART RATE: 60 BPM | TEMPERATURE: 97 F

## 2024-06-12 DIAGNOSIS — S91.312A LACERATION OF LEFT FOOT, INITIAL ENCOUNTER: Primary | ICD-10-CM

## 2024-06-12 PROCEDURE — 2500000003 HC RX 250 WO HCPCS: Performed by: GENERAL PRACTICE

## 2024-06-12 PROCEDURE — 12002 RPR S/N/AX/GEN/TRNK2.6-7.5CM: CPT

## 2024-06-12 PROCEDURE — 99282 EMERGENCY DEPT VISIT SF MDM: CPT

## 2024-06-12 RX ORDER — LIDOCAINE HYDROCHLORIDE AND EPINEPHRINE 10; 10 MG/ML; UG/ML
20 INJECTION, SOLUTION INFILTRATION; PERINEURAL ONCE
Status: COMPLETED | OUTPATIENT
Start: 2024-06-12 | End: 2024-06-12

## 2024-06-12 RX ORDER — CEFUROXIME AXETIL 250 MG/1
250 TABLET ORAL 2 TIMES DAILY
Qty: 30 TABLET | Refills: 0 | OUTPATIENT
Start: 2024-06-12 | End: 2024-06-27

## 2024-06-12 RX ADMIN — LIDOCAINE HYDROCHLORIDE,EPINEPHRINE BITARTRATE 20 ML: 10; .01 INJECTION, SOLUTION INFILTRATION; PERINEURAL at 04:32

## 2024-06-12 ASSESSMENT — PAIN DESCRIPTION - LOCATION: LOCATION: FOOT

## 2024-06-12 ASSESSMENT — PAIN SCALES - GENERAL: PAINLEVEL_OUTOF10: 0

## 2024-06-12 NOTE — ED NOTES
Patient mobility status  with difficulty, uses a cane. Provider aware     I have reviewed discharge instructions with the caregiver.  The caregiver verbalized understanding.    Patient left ED via Discharge Method: wheelchair to Home with  son .    Opportunity for questions and clarification provided.     Patient given 0 scripts.         
[employer]ake

## 2024-06-12 NOTE — ED PROVIDER NOTES
Emergency Department Provider Note       PCP: Loraine Farnsworth MD   Age: 83 y.o.   Sex: female     DISPOSITION Decision To Discharge 06/12/2024 04:23:55 AM       ICD-10-CM    1. Laceration of left foot, initial encounter  S91.312A           Medical Decision Making     Patient presents with laceration of left foot.  Wound was cleaned and wound was closed without complication.  Patient instructed to keep the stitches in for 10 to 14 days.  Return precautions are given.     1 acute, uncomplicated illness or injury.  Shared medical decision making was utilized in creating the patients health plan today.    I independently ordered and reviewed each unique test.  I reviewed external records: provider visit note from outside specialist.  I reviewed external records: previous lab results from outside ED.  I reviewed external records: previous imaging study including radiologist interpretation.             Exclusion criteria - the patient is NOT to be included for SEP-1 Core Measure due to: Infection is not suspected       History     Patient presents with laceration of the dorsum of the left foot.  Patient was walking to go to the bathroom and hit the top of her foot on the bottom part of a rocking chair.  Patient sustained a laceration there.  No active bleeding.  She only takes a baby aspirin daily.        ROS     Review of Systems   All other systems reviewed and are negative.       Physical Exam     Vitals signs and nursing note reviewed:  Vitals:    06/12/24 0315   BP: (!) 147/45   Pulse: 60   Resp: 16   Temp: 97 °F (36.1 °C)   SpO2: 98%   Weight: 62.6 kg (138 lb)   Height: 1.575 m (5' 2\")      Physical Exam  Constitutional:       General: She is not in acute distress.  HENT:      Head: Normocephalic and atraumatic.      Nose: Nose normal.      Mouth/Throat:      Mouth: Mucous membranes are moist.   Eyes:      Extraocular Movements: Extraocular movements intact.      Pupils: Pupils are equal, round, and

## 2024-06-12 NOTE — ED TRIAGE NOTES
Pt with laceration to left foot. Pt states she got up to use the bathroom and hit it on the rocking chair.    Beckie Bill RN

## 2024-06-13 LAB
BACTERIA SPEC CULT: ABNORMAL
BACTERIA SPEC CULT: ABNORMAL
SERVICE CMNT-IMP: ABNORMAL

## 2024-06-14 RX ORDER — CEPHALEXIN 500 MG/1
500 CAPSULE ORAL 2 TIMES DAILY
Qty: 14 CAPSULE | Refills: 0 | Status: SHIPPED | OUTPATIENT
Start: 2024-06-14

## 2024-06-17 ENCOUNTER — CARE COORDINATION (OUTPATIENT)
Dept: CARE COORDINATION | Facility: CLINIC | Age: 84
End: 2024-06-17

## 2024-06-17 NOTE — CARE COORDINATION
Ambulatory Care Coordination Note     2024 9:27 AM     Patient Current Location:  [place]     This patient was received as a referral from Ambulatory Care Manager .    ACM contacted the patient by telephone. Verified name and  with patient as identifiers. Provided introduction to self, and explanation of the ACM role.   Patient accepted care management services at this time.          ACM: Hien Moncada RN     Challenges to be reviewed by the provider   Additional needs identified to be addressed with provider Yes  Notified pcp of er admission               Method of communication with provider: staff message.    Care Summary Note: ccm outreached to patient. Patient known to ccm. Reviewed with patient discharge summary. Patient verbalized understanding. Pt states laceration to foot remains dry and intact. Patient is taking medication as directed. Patient reports no drainage or swelling. Patient states she has follow up Friday. Patient states no questions or concerns. [facility] discussed that I would outreach next week. Patient agreeable.     Offered patient enrollment in the Remote Patient Monitoring (RPM) program for in-home monitoring: Yes, but did not enroll at this time: declined .     Assessments Completed:   Ambulatory Care Coordination Assessment    Care Coordination Protocol  Referral from Primary Care Provider: No  Week 1 - Initial Assessment     Do you have all of your prescriptions and are they filled?: Yes  Barriers to medication adherence: None  Are you able to afford your medications?: No  How often do you have trouble taking your medications the way you have been told to take them?: I always take them as prescribed.     Do you have Home O2 Therapy?: No      Ability to seek help/take action for Emergent Urgent situations i.e. fire, crime, inclement weather or health crisis.: Independent  Ability to ambulate to restroom: Independent  Ability handle personal hygeine needs

## 2024-06-20 ENCOUNTER — HOSPITAL ENCOUNTER (INPATIENT)
Age: 84
LOS: 6 days | Discharge: HOME | DRG: 872 | End: 2024-06-26
Attending: HOSPITALIST | Admitting: STUDENT IN AN ORGANIZED HEALTH CARE EDUCATION/TRAINING PROGRAM
Payer: MEDICARE

## 2024-06-20 ENCOUNTER — APPOINTMENT (OUTPATIENT)
Dept: GENERAL RADIOLOGY | Age: 84
DRG: 872 | End: 2024-06-20
Payer: MEDICARE

## 2024-06-20 ENCOUNTER — HOSPITAL ENCOUNTER (EMERGENCY)
Age: 84
Discharge: ANOTHER HOSPITAL | DRG: 872 | End: 2024-06-20
Attending: EMERGENCY MEDICINE
Payer: MEDICARE

## 2024-06-20 VITALS
BODY MASS INDEX: 25.21 KG/M2 | OXYGEN SATURATION: 95 % | TEMPERATURE: 101.2 F | WEIGHT: 137 LBS | SYSTOLIC BLOOD PRESSURE: 88 MMHG | HEIGHT: 62 IN | DIASTOLIC BLOOD PRESSURE: 39 MMHG | RESPIRATION RATE: 15 BRPM | HEART RATE: 90 BPM

## 2024-06-20 DIAGNOSIS — T14.8XXD DELAYED WOUND HEALING: ICD-10-CM

## 2024-06-20 DIAGNOSIS — L03.116 CELLULITIS OF LEFT LOWER EXTREMITY: Primary | ICD-10-CM

## 2024-06-20 DIAGNOSIS — L03.116 CELLULITIS OF LEFT LEG: Primary | ICD-10-CM

## 2024-06-20 DIAGNOSIS — A41.9 SEPSIS, DUE TO UNSPECIFIED ORGANISM, UNSPECIFIED WHETHER ACUTE ORGAN DYSFUNCTION PRESENT (HCC): ICD-10-CM

## 2024-06-20 LAB
ALBUMIN SERPL-MCNC: 4.1 G/DL (ref 3.2–4.6)
ALBUMIN/GLOB SERPL: 1.6 (ref 0.4–1.6)
ALP SERPL-CCNC: 77 U/L (ref 45–117)
ALT SERPL-CCNC: 16 U/L (ref 13–61)
ANION GAP SERPL CALC-SCNC: 15 MMOL/L (ref 2–11)
AST SERPL-CCNC: 19 U/L (ref 15–37)
BASOPHILS # BLD: 0.1 K/UL (ref 0–0.2)
BASOPHILS NFR BLD: 0 % (ref 0–2)
BILIRUB SERPL-MCNC: 0.6 MG/DL (ref 0.2–1.1)
BUN SERPL-MCNC: 20 MG/DL (ref 8–23)
CALCIUM SERPL-MCNC: 10.1 MG/DL (ref 8.3–10.4)
CHLORIDE SERPL-SCNC: 98 MMOL/L (ref 98–107)
CK SERPL-CCNC: 38 U/L (ref 21–215)
CO2 SERPL-SCNC: 24 MMOL/L (ref 21–32)
CREAT SERPL-MCNC: 0.7 MG/DL (ref 0.6–1)
CRP SERPL-MCNC: 6 MG/DL (ref 0–0.9)
DIFFERENTIAL METHOD BLD: ABNORMAL
EKG ATRIAL RATE: 83 BPM
EKG DIAGNOSIS: NORMAL
EKG P AXIS: 83 DEGREES
EKG P-R INTERVAL: 145 MS
EKG Q-T INTERVAL: 364 MS
EKG QRS DURATION: 94 MS
EKG QTC CALCULATION (BAZETT): 426 MS
EKG R AXIS: -60 DEGREES
EKG T AXIS: 81 DEGREES
EKG VENTRICULAR RATE: 82 BPM
EOSINOPHIL # BLD: 0 K/UL (ref 0–0.8)
EOSINOPHIL NFR BLD: 0 % (ref 0.5–7.8)
ERYTHROCYTE [DISTWIDTH] IN BLOOD BY AUTOMATED COUNT: 15.3 % (ref 11.9–14.6)
ERYTHROCYTE [SEDIMENTATION RATE] IN BLOOD: 18 MM/HR (ref 0–30)
GLOBULIN SER CALC-MCNC: 2.5 G/DL (ref 2.8–4.5)
GLUCOSE SERPL-MCNC: 97 MG/DL (ref 65–100)
HCT VFR BLD AUTO: 38.5 % (ref 35.8–46.3)
HGB BLD-MCNC: 12.3 G/DL (ref 11.7–15.4)
IMM GRANULOCYTES # BLD AUTO: 0.2 K/UL (ref 0–0.5)
IMM GRANULOCYTES NFR BLD AUTO: 1 % (ref 0–5)
LACTATE SERPL-SCNC: 2 MMOL/L (ref 0.5–2)
LACTATE SERPL-SCNC: 2.2 MMOL/L (ref 0.5–2)
LACTATE SERPL-SCNC: 2.2 MMOL/L (ref 0.5–2)
LYMPHOCYTES # BLD: 2.6 K/UL (ref 0.5–4.6)
LYMPHOCYTES NFR BLD: 14 % (ref 13–44)
MCH RBC QN AUTO: 29.7 PG (ref 26.1–32.9)
MCHC RBC AUTO-ENTMCNC: 31.9 G/DL (ref 31.4–35)
MCV RBC AUTO: 93 FL (ref 82–102)
MONOCYTES # BLD: 0.8 K/UL (ref 0.1–1.3)
MONOCYTES NFR BLD: 4 % (ref 4–12)
NEUTS SEG # BLD: 15.2 K/UL (ref 1.7–8.2)
NEUTS SEG NFR BLD: 81 % (ref 43–78)
NRBC # BLD: 0 K/UL (ref 0–0.2)
PLATELET # BLD AUTO: 334 K/UL (ref 150–450)
PMV BLD AUTO: 9.2 FL (ref 9.4–12.3)
POTASSIUM SERPL-SCNC: 4.6 MMOL/L (ref 3.5–5.1)
PROCALCITONIN SERPL-MCNC: 0.07 NG/ML (ref 0–0.49)
PROT SERPL-MCNC: 6.6 G/DL (ref 6.4–8.2)
RBC # BLD AUTO: 4.14 M/UL (ref 4.05–5.2)
SODIUM SERPL-SCNC: 137 MMOL/L (ref 133–143)
WBC # BLD AUTO: 18.8 K/UL (ref 4.3–11.1)

## 2024-06-20 PROCEDURE — 86140 C-REACTIVE PROTEIN: CPT

## 2024-06-20 PROCEDURE — 83605 ASSAY OF LACTIC ACID: CPT

## 2024-06-20 PROCEDURE — 87040 BLOOD CULTURE FOR BACTERIA: CPT

## 2024-06-20 PROCEDURE — 36415 COLL VENOUS BLD VENIPUNCTURE: CPT

## 2024-06-20 PROCEDURE — 87070 CULTURE OTHR SPECIMN AEROBIC: CPT

## 2024-06-20 PROCEDURE — 73630 X-RAY EXAM OF FOOT: CPT

## 2024-06-20 PROCEDURE — 6370000000 HC RX 637 (ALT 250 FOR IP): Performed by: EMERGENCY MEDICINE

## 2024-06-20 PROCEDURE — 93005 ELECTROCARDIOGRAM TRACING: CPT | Performed by: EMERGENCY MEDICINE

## 2024-06-20 PROCEDURE — 2580000003 HC RX 258: Performed by: EMERGENCY MEDICINE

## 2024-06-20 PROCEDURE — 2700000000 HC OXYGEN THERAPY PER DAY

## 2024-06-20 PROCEDURE — 85652 RBC SED RATE AUTOMATED: CPT

## 2024-06-20 PROCEDURE — 87077 CULTURE AEROBIC IDENTIFY: CPT

## 2024-06-20 PROCEDURE — 96375 TX/PRO/DX INJ NEW DRUG ADDON: CPT

## 2024-06-20 PROCEDURE — 73590 X-RAY EXAM OF LOWER LEG: CPT

## 2024-06-20 PROCEDURE — 82550 ASSAY OF CK (CPK): CPT

## 2024-06-20 PROCEDURE — 96365 THER/PROPH/DIAG IV INF INIT: CPT

## 2024-06-20 PROCEDURE — 6360000002 HC RX W HCPCS: Performed by: EMERGENCY MEDICINE

## 2024-06-20 PROCEDURE — 1100000003 HC PRIVATE W/ TELEMETRY

## 2024-06-20 PROCEDURE — 87186 SC STD MICRODIL/AGAR DIL: CPT

## 2024-06-20 PROCEDURE — 6370000000 HC RX 637 (ALT 250 FOR IP): Performed by: STUDENT IN AN ORGANIZED HEALTH CARE EDUCATION/TRAINING PROGRAM

## 2024-06-20 PROCEDURE — 85025 COMPLETE CBC W/AUTO DIFF WBC: CPT

## 2024-06-20 PROCEDURE — 80053 COMPREHEN METABOLIC PANEL: CPT

## 2024-06-20 PROCEDURE — 87205 SMEAR GRAM STAIN: CPT

## 2024-06-20 PROCEDURE — 93010 ELECTROCARDIOGRAM REPORT: CPT | Performed by: INTERNAL MEDICINE

## 2024-06-20 PROCEDURE — 2580000003 HC RX 258: Performed by: STUDENT IN AN ORGANIZED HEALTH CARE EDUCATION/TRAINING PROGRAM

## 2024-06-20 PROCEDURE — 99285 EMERGENCY DEPT VISIT HI MDM: CPT

## 2024-06-20 PROCEDURE — 84145 PROCALCITONIN (PCT): CPT

## 2024-06-20 RX ORDER — PANTOPRAZOLE SODIUM 40 MG/1
40 TABLET, DELAYED RELEASE ORAL
Status: DISCONTINUED | OUTPATIENT
Start: 2024-06-21 | End: 2024-06-26 | Stop reason: HOSPADM

## 2024-06-20 RX ORDER — MIDODRINE HYDROCHLORIDE 5 MG/1
5 TABLET ORAL
Status: DISCONTINUED | OUTPATIENT
Start: 2024-06-21 | End: 2024-06-20

## 2024-06-20 RX ORDER — GABAPENTIN 100 MG/1
100 CAPSULE ORAL NIGHTLY
Status: DISCONTINUED | OUTPATIENT
Start: 2024-06-20 | End: 2024-06-26 | Stop reason: HOSPADM

## 2024-06-20 RX ORDER — POTASSIUM CHLORIDE 20 MEQ/1
40 TABLET, EXTENDED RELEASE ORAL PRN
Status: DISCONTINUED | OUTPATIENT
Start: 2024-06-20 | End: 2024-06-26

## 2024-06-20 RX ORDER — ACETAMINOPHEN 325 MG/1
650 TABLET ORAL EVERY 6 HOURS PRN
Status: DISCONTINUED | OUTPATIENT
Start: 2024-06-20 | End: 2024-06-26 | Stop reason: HOSPADM

## 2024-06-20 RX ORDER — POTASSIUM CHLORIDE 7.45 MG/ML
10 INJECTION INTRAVENOUS PRN
Status: DISCONTINUED | OUTPATIENT
Start: 2024-06-20 | End: 2024-06-26

## 2024-06-20 RX ORDER — LEVOTHYROXINE SODIUM 0.15 MG/1
150 TABLET ORAL
Status: DISCONTINUED | OUTPATIENT
Start: 2024-06-21 | End: 2024-06-26 | Stop reason: HOSPADM

## 2024-06-20 RX ORDER — SODIUM CHLORIDE 0.9 % (FLUSH) 0.9 %
5-40 SYRINGE (ML) INJECTION PRN
Status: DISCONTINUED | OUTPATIENT
Start: 2024-06-20 | End: 2024-06-26 | Stop reason: HOSPADM

## 2024-06-20 RX ORDER — ACETAMINOPHEN 650 MG/1
650 SUPPOSITORY RECTAL EVERY 6 HOURS PRN
Status: DISCONTINUED | OUTPATIENT
Start: 2024-06-20 | End: 2024-06-26 | Stop reason: HOSPADM

## 2024-06-20 RX ORDER — POLYETHYLENE GLYCOL 3350 17 G/17G
17 POWDER, FOR SOLUTION ORAL DAILY PRN
Status: DISCONTINUED | OUTPATIENT
Start: 2024-06-20 | End: 2024-06-26 | Stop reason: HOSPADM

## 2024-06-20 RX ORDER — SODIUM CHLORIDE 9 MG/ML
INJECTION, SOLUTION INTRAVENOUS ONCE
Status: COMPLETED | OUTPATIENT
Start: 2024-06-20 | End: 2024-06-21

## 2024-06-20 RX ORDER — MIDODRINE HYDROCHLORIDE 5 MG/1
5 TABLET ORAL ONCE
Status: COMPLETED | OUTPATIENT
Start: 2024-06-20 | End: 2024-06-20

## 2024-06-20 RX ORDER — 0.9 % SODIUM CHLORIDE 0.9 %
1000 INTRAVENOUS SOLUTION INTRAVENOUS ONCE
Status: COMPLETED | OUTPATIENT
Start: 2024-06-20 | End: 2024-06-20

## 2024-06-20 RX ORDER — ALLOPURINOL 100 MG/1
100 TABLET ORAL DAILY
Status: DISCONTINUED | OUTPATIENT
Start: 2024-06-21 | End: 2024-06-26 | Stop reason: HOSPADM

## 2024-06-20 RX ORDER — ONDANSETRON 2 MG/ML
4 INJECTION INTRAMUSCULAR; INTRAVENOUS
Status: COMPLETED | OUTPATIENT
Start: 2024-06-20 | End: 2024-06-20

## 2024-06-20 RX ORDER — MAGNESIUM SULFATE IN WATER 40 MG/ML
2000 INJECTION, SOLUTION INTRAVENOUS PRN
Status: DISCONTINUED | OUTPATIENT
Start: 2024-06-20 | End: 2024-06-26

## 2024-06-20 RX ORDER — MORPHINE SULFATE 4 MG/ML
4 INJECTION, SOLUTION INTRAMUSCULAR; INTRAVENOUS
Status: COMPLETED | OUTPATIENT
Start: 2024-06-20 | End: 2024-06-20

## 2024-06-20 RX ORDER — LOSARTAN POTASSIUM 50 MG/1
50 TABLET ORAL DAILY
Status: DISCONTINUED | OUTPATIENT
Start: 2024-06-21 | End: 2024-06-26 | Stop reason: HOSPADM

## 2024-06-20 RX ORDER — ONDANSETRON 2 MG/ML
4 INJECTION INTRAMUSCULAR; INTRAVENOUS EVERY 6 HOURS PRN
Status: DISCONTINUED | OUTPATIENT
Start: 2024-06-20 | End: 2024-06-26 | Stop reason: HOSPADM

## 2024-06-20 RX ORDER — 0.9 % SODIUM CHLORIDE 0.9 %
1000 INTRAVENOUS SOLUTION INTRAVENOUS ONCE
Status: DISCONTINUED | OUTPATIENT
Start: 2024-06-20 | End: 2024-06-20 | Stop reason: HOSPADM

## 2024-06-20 RX ORDER — SODIUM CHLORIDE 0.9 % (FLUSH) 0.9 %
5-40 SYRINGE (ML) INJECTION EVERY 12 HOURS SCHEDULED
Status: DISCONTINUED | OUTPATIENT
Start: 2024-06-20 | End: 2024-06-26 | Stop reason: HOSPADM

## 2024-06-20 RX ORDER — ENOXAPARIN SODIUM 100 MG/ML
40 INJECTION SUBCUTANEOUS DAILY
Status: DISCONTINUED | OUTPATIENT
Start: 2024-06-21 | End: 2024-06-26 | Stop reason: HOSPADM

## 2024-06-20 RX ORDER — ONDANSETRON 4 MG/1
4 TABLET, ORALLY DISINTEGRATING ORAL EVERY 8 HOURS PRN
Status: DISCONTINUED | OUTPATIENT
Start: 2024-06-20 | End: 2024-06-26 | Stop reason: HOSPADM

## 2024-06-20 RX ORDER — SODIUM CHLORIDE 9 MG/ML
INJECTION, SOLUTION INTRAVENOUS PRN
Status: DISCONTINUED | OUTPATIENT
Start: 2024-06-20 | End: 2024-06-26 | Stop reason: HOSPADM

## 2024-06-20 RX ORDER — IBUPROFEN 800 MG/1
800 TABLET ORAL
Status: COMPLETED | OUTPATIENT
Start: 2024-06-20 | End: 2024-06-20

## 2024-06-20 RX ADMIN — PIPERACILLIN AND TAZOBACTAM 4500 MG: 4; .5 INJECTION, POWDER, LYOPHILIZED, FOR SOLUTION INTRAVENOUS at 18:47

## 2024-06-20 RX ADMIN — SODIUM CHLORIDE: 9 INJECTION, SOLUTION INTRAVENOUS at 23:00

## 2024-06-20 RX ADMIN — SODIUM CHLORIDE 1000 ML: 9 INJECTION, SOLUTION INTRAVENOUS at 19:26

## 2024-06-20 RX ADMIN — MORPHINE SULFATE 4 MG: 4 INJECTION INTRAVENOUS at 19:43

## 2024-06-20 RX ADMIN — ONDANSETRON 4 MG: 2 INJECTION INTRAMUSCULAR; INTRAVENOUS at 19:43

## 2024-06-20 RX ADMIN — MIDODRINE HYDROCHLORIDE 5 MG: 5 TABLET ORAL at 23:23

## 2024-06-20 RX ADMIN — IBUPROFEN 800 MG: 800 TABLET, FILM COATED ORAL at 19:16

## 2024-06-20 RX ADMIN — SODIUM CHLORIDE, PRESERVATIVE FREE 5 ML: 5 INJECTION INTRAVENOUS at 23:01

## 2024-06-20 RX ADMIN — VANCOMYCIN HYDROCHLORIDE 750 MG: 750 INJECTION, POWDER, LYOPHILIZED, FOR SOLUTION INTRAVENOUS at 19:47

## 2024-06-20 ASSESSMENT — PAIN SCALES - GENERAL
PAINLEVEL_OUTOF10: 0
PAINLEVEL_OUTOF10: 8
PAINLEVEL_OUTOF10: 9

## 2024-06-20 ASSESSMENT — ENCOUNTER SYMPTOMS
SHORTNESS OF BREATH: 0
COUGH: 0
NAUSEA: 0
VOMITING: 0
COLOR CHANGE: 1
ABDOMINAL PAIN: 0
DIARRHEA: 0

## 2024-06-20 ASSESSMENT — PAIN - FUNCTIONAL ASSESSMENT: PAIN_FUNCTIONAL_ASSESSMENT: 0-10

## 2024-06-20 NOTE — ED NOTES
Zosyn stopped and IV pulled d/t infiltration. Warm compress applied. Pharmacy called and informed to hold warm compress for 1 hour, if still painful/blanched, apply topical nitro.

## 2024-06-20 NOTE — ED PROVIDER NOTES
Emergency Department Provider Note       PCP: Loraine Farnsworth MD   Age: 84 y.o.   Sex: female     DISPOSITION Decision To Transfer 06/20/2024 07:38:25 PM       ICD-10-CM    1. Cellulitis of left leg  L03.116       2. Sepsis, due to unspecified organism, unspecified whether acute organ dysfunction present ([facility])  A41.9           Medical Decision Making     84-year-old female with history of COPD, hypothyroidism, polymyalgia rheumatica, hyperlipidemia, CKD, varicose veins of bilateral lower extremities, IBS, GERD presents with complaint of erythema, pain, & swelling localized to left lower extremity following injury on 6/12 when 6 sutures were placed.  Patient noted to be febrile on arrival as well as tachypnea.  WBC 18.8.  Initial lactic acid 2.2.  CRP elevated at 6.0.  Sepsis protocol orders placed.  Orders placed for 1 L normal saline IV fluid bolus as well as vancomycin, Zosyn.  6 sutures were removed given concern for infection.  No tense muscle compartments noted.  Significant pain on exam.  Orthopedic surgery (VERNELL Sanchez) contacted over concern for possible nec fasciitis. Ortho to follow. Hospitalist consulted for admission. Dr. Pearce to admit.       ED Course as of 06/20/24 2014   Thu Jun 20, 2024   1835 WBC(!): 18.8 [DF]   1910 Lactic Acid, Sepsis(!): 2.2 [DF]   1910 CRP(!): 6.0 [DF]   1931 Procalcitonin: 0.07 [DF]   1936 X-ray L tib/fib FINDINGS: The tibia and fibula are intact. There is no significant arthritis of  the hip or ankle joints. There is a large plantar spur. There is diffuse  subcutaneous edema of the calf.     IMPRESSION: No bony abnormality. Diffuse subcutaneous edema of the calf.   [DF]   1939 X-ray L foot FINDINGS: There is no evidence of fracture or other acute bony abnormality in  the foot. Calcaneal spurs.     IMPRESSION: Negative left foot      [DF]      ED Course User Index  [DF] Fausto Akbar Jr., MD     1 or more acute illnesses that pose a threat to life or bodily

## 2024-06-20 NOTE — ED TRIAGE NOTES
Pt to ED via wheelchair with son for left lower leg pain and swelling. Pt was seen in ED a week ago for sutures to foot after a fall and woke up this morning to redness and pain with body aches and cold chills. Reports completing abx. Pt febrile in triage. Pt states sutures were supposed to come out tomorrow.

## 2024-06-21 ENCOUNTER — APPOINTMENT (OUTPATIENT)
Dept: ULTRASOUND IMAGING | Age: 84
DRG: 872 | End: 2024-06-21
Attending: HOSPITALIST
Payer: MEDICARE

## 2024-06-21 LAB
ALBUMIN SERPL-MCNC: 2.6 G/DL (ref 3.2–4.6)
ALBUMIN/GLOB SERPL: 1.3 (ref 1–1.9)
ALP SERPL-CCNC: 86 U/L (ref 35–104)
ALT SERPL-CCNC: 88 U/L (ref 12–65)
ANION GAP SERPL CALC-SCNC: 8 MMOL/L (ref 9–18)
AST SERPL-CCNC: 149 U/L (ref 15–37)
BASOPHILS # BLD: 0.1 K/UL (ref 0–0.2)
BASOPHILS NFR BLD: 0 % (ref 0–2)
BILIRUB SERPL-MCNC: 1.1 MG/DL (ref 0–1.2)
BUN SERPL-MCNC: 23 MG/DL (ref 8–23)
CALCIUM SERPL-MCNC: 8.4 MG/DL (ref 8.8–10.2)
CHLORIDE SERPL-SCNC: 105 MMOL/L (ref 98–107)
CO2 SERPL-SCNC: 24 MMOL/L (ref 20–28)
CREAT SERPL-MCNC: 0.98 MG/DL (ref 0.6–1.1)
DIFFERENTIAL METHOD BLD: ABNORMAL
EOSINOPHIL # BLD: 0 K/UL (ref 0–0.8)
EOSINOPHIL NFR BLD: 0 % (ref 0.5–7.8)
ERYTHROCYTE [DISTWIDTH] IN BLOOD BY AUTOMATED COUNT: 15.9 % (ref 11.9–14.6)
GLOBULIN SER CALC-MCNC: 2.1 G/DL (ref 2.3–3.5)
GLUCOSE SERPL-MCNC: 88 MG/DL (ref 70–99)
HCT VFR BLD AUTO: 31.5 % (ref 35.8–46.3)
HGB BLD-MCNC: 9.7 G/DL (ref 11.7–15.4)
IMM GRANULOCYTES # BLD AUTO: 0.1 K/UL (ref 0–0.5)
IMM GRANULOCYTES NFR BLD AUTO: 1 % (ref 0–5)
LYMPHOCYTES # BLD: 1.7 K/UL (ref 0.5–4.6)
LYMPHOCYTES NFR BLD: 13 % (ref 13–44)
MCH RBC QN AUTO: 29.5 PG (ref 26.1–32.9)
MCHC RBC AUTO-ENTMCNC: 30.8 G/DL (ref 31.4–35)
MCV RBC AUTO: 95.7 FL (ref 82–102)
MONOCYTES # BLD: 0.4 K/UL (ref 0.1–1.3)
MONOCYTES NFR BLD: 3 % (ref 4–12)
NEUTS SEG # BLD: 10.8 K/UL (ref 1.7–8.2)
NEUTS SEG NFR BLD: 83 % (ref 43–78)
NRBC # BLD: 0 K/UL (ref 0–0.2)
PLATELET # BLD AUTO: 255 K/UL (ref 150–450)
PMV BLD AUTO: 9.2 FL (ref 9.4–12.3)
POTASSIUM SERPL-SCNC: 4.3 MMOL/L (ref 3.5–5.1)
PROT SERPL-MCNC: 4.7 G/DL (ref 6.3–8.2)
RBC # BLD AUTO: 3.29 M/UL (ref 4.05–5.2)
SODIUM SERPL-SCNC: 136 MMOL/L (ref 136–145)
VAS LEFT ARM BP: 144 MMHG
VAS LEFT TBI: 0.32
VAS LEFT TOE PRESSURE: 46 MMHG
VAS RIGHT ABI: 1.15
VAS RIGHT DORSALIS PEDIS BP: 152 MMHG
VAS RIGHT PTA BP: 165 MMHG
VAS RIGHT TBI: 0.38
VAS RIGHT TOE PRESSURE: 55 MMHG
WBC # BLD AUTO: 13 K/UL (ref 4.3–11.1)

## 2024-06-21 PROCEDURE — 93925 LOWER EXTREMITY STUDY: CPT | Performed by: RADIOLOGY

## 2024-06-21 PROCEDURE — 97535 SELF CARE MNGMENT TRAINING: CPT

## 2024-06-21 PROCEDURE — 2580000003 HC RX 258: Performed by: STUDENT IN AN ORGANIZED HEALTH CARE EDUCATION/TRAINING PROGRAM

## 2024-06-21 PROCEDURE — 76937 US GUIDE VASCULAR ACCESS: CPT

## 2024-06-21 PROCEDURE — 85025 COMPLETE CBC W/AUTO DIFF WBC: CPT

## 2024-06-21 PROCEDURE — 6360000002 HC RX W HCPCS: Performed by: STUDENT IN AN ORGANIZED HEALTH CARE EDUCATION/TRAINING PROGRAM

## 2024-06-21 PROCEDURE — 6370000000 HC RX 637 (ALT 250 FOR IP): Performed by: STUDENT IN AN ORGANIZED HEALTH CARE EDUCATION/TRAINING PROGRAM

## 2024-06-21 PROCEDURE — 1100000003 HC PRIVATE W/ TELEMETRY

## 2024-06-21 PROCEDURE — 97116 GAIT TRAINING THERAPY: CPT

## 2024-06-21 PROCEDURE — 2580000003 HC RX 258: Performed by: INTERNAL MEDICINE

## 2024-06-21 PROCEDURE — 97161 PT EVAL LOW COMPLEX 20 MIN: CPT

## 2024-06-21 PROCEDURE — 99221 1ST HOSP IP/OBS SF/LOW 40: CPT | Performed by: PHYSICIAN ASSISTANT

## 2024-06-21 PROCEDURE — 97165 OT EVAL LOW COMPLEX 30 MIN: CPT

## 2024-06-21 PROCEDURE — 36415 COLL VENOUS BLD VENIPUNCTURE: CPT

## 2024-06-21 PROCEDURE — 80053 COMPREHEN METABOLIC PANEL: CPT

## 2024-06-21 PROCEDURE — 93925 LOWER EXTREMITY STUDY: CPT

## 2024-06-21 PROCEDURE — 6360000002 HC RX W HCPCS: Performed by: INTERNAL MEDICINE

## 2024-06-21 PROCEDURE — 97112 NEUROMUSCULAR REEDUCATION: CPT

## 2024-06-21 RX ADMIN — GABAPENTIN 100 MG: 100 CAPSULE ORAL at 21:50

## 2024-06-21 RX ADMIN — PIPERACILLIN AND TAZOBACTAM 3375 MG: 3; .375 INJECTION, POWDER, LYOPHILIZED, FOR SOLUTION INTRAVENOUS at 10:45

## 2024-06-21 RX ADMIN — LEVOTHYROXINE SODIUM 150 MCG: 0.15 TABLET ORAL at 06:11

## 2024-06-21 RX ADMIN — ACETAMINOPHEN 650 MG: 325 TABLET ORAL at 16:56

## 2024-06-21 RX ADMIN — ENOXAPARIN SODIUM 40 MG: 100 INJECTION SUBCUTANEOUS at 09:59

## 2024-06-21 RX ADMIN — ACETAMINOPHEN 650 MG: 325 TABLET ORAL at 23:40

## 2024-06-21 RX ADMIN — ALLOPURINOL 100 MG: 100 TABLET ORAL at 09:59

## 2024-06-21 RX ADMIN — PIPERACILLIN AND TAZOBACTAM 3375 MG: 3; .375 INJECTION, POWDER, LYOPHILIZED, FOR SOLUTION INTRAVENOUS at 00:57

## 2024-06-21 RX ADMIN — PIPERACILLIN AND TAZOBACTAM 3375 MG: 3; .375 INJECTION, POWDER, LYOPHILIZED, FOR SOLUTION INTRAVENOUS at 16:55

## 2024-06-21 RX ADMIN — SODIUM CHLORIDE, PRESERVATIVE FREE 10 ML: 5 INJECTION INTRAVENOUS at 21:50

## 2024-06-21 RX ADMIN — ONDANSETRON 4 MG: 2 INJECTION INTRAMUSCULAR; INTRAVENOUS at 13:55

## 2024-06-21 RX ADMIN — VANCOMYCIN HYDROCHLORIDE 1000 MG: 1 INJECTION, POWDER, LYOPHILIZED, FOR SOLUTION INTRAVENOUS at 10:53

## 2024-06-21 RX ADMIN — PANTOPRAZOLE SODIUM 40 MG: 40 TABLET, DELAYED RELEASE ORAL at 06:11

## 2024-06-21 ASSESSMENT — PAIN DESCRIPTION - PAIN TYPE: TYPE: ACUTE PAIN

## 2024-06-21 ASSESSMENT — PAIN DESCRIPTION - ORIENTATION
ORIENTATION: LEFT
ORIENTATION: LEFT

## 2024-06-21 ASSESSMENT — PAIN DESCRIPTION - ONSET: ONSET: GRADUAL

## 2024-06-21 ASSESSMENT — PAIN SCALES - GENERAL
PAINLEVEL_OUTOF10: 0
PAINLEVEL_OUTOF10: 6
PAINLEVEL_OUTOF10: 5
PAINLEVEL_OUTOF10: 0

## 2024-06-21 ASSESSMENT — PAIN - FUNCTIONAL ASSESSMENT: PAIN_FUNCTIONAL_ASSESSMENT: PREVENTS OR INTERFERES SOME ACTIVE ACTIVITIES AND ADLS

## 2024-06-21 ASSESSMENT — PAIN DESCRIPTION - FREQUENCY: FREQUENCY: INTERMITTENT

## 2024-06-21 ASSESSMENT — PAIN DESCRIPTION - LOCATION
LOCATION: LEG
LOCATION: LEG

## 2024-06-21 ASSESSMENT — PAIN DESCRIPTION - DESCRIPTORS: DESCRIPTORS: ACHING

## 2024-06-21 NOTE — CARE COORDINATION
06/21/24 0858   Service Assessment   Patient Orientation Alert and Oriented   Cognition Alert   History Provided By Patient   Primary Caregiver Self   Accompanied By/Relationship son at bedside   Support Systems Children   Patient's Healthcare Decision Maker is: Named in Scanned ACP Document   PCP Verified by CM Yes   Last Visit to PCP Within last 3 months   Prior Functional Level Assistance with the following:;Mobility   Current Functional Level Assistance with the following:;Mobility   Can patient return to prior living arrangement Yes   Ability to make needs known: Good   Family able to assist with home care needs: Yes   Would you like for me to discuss the discharge plan with any other family members/significant others, and if so, who? Yes   Financial Resources Medicare   Community Resources None   Social/Functional History   Lives With Spouse   Type of Home House   ADL Assistance Independent   Homemaking Assistance Independent   Homemaking Responsibilities No   Ambulation Assistance Needs assistance   Transfer Assistance Independent   Active  No   Occupation Retired   Discharge Planning   Type of Residence Skilled Nursing Facility   Living Arrangements Spouse/Significant Other   Current Services Prior To Admission Skilled Nursing Facility   Potential Assistance Needed Durable Medical Equipment   DME Ordered? Walker   Potential Assistance Purchasing Medications No   Type of Home Care Services None   Patient expects to be discharged to: House     Patient lives with her son (at bedside) and her daughter in law. She uses a walker when she's outside the home. Family drives patient to appointments. No history of HH or rehab. Confirmed patient has a pcp. CM following.

## 2024-06-21 NOTE — THERAPY EVALUATION
ACUTE OCCUPATIONAL THERAPY GOALS:   (Developed with and agreed upon by patient and/or caregiver.)  1. Patient will complete lower body bathing and dressing with MODIFIED INDEPENDENCE and adaptive equipment as needed.     2. Patient will complete toilet transfers and toileting with MODIFIED INDEPENDENCE.  3. Patient will complete grooming ADL tasks at standing level with MODIFIED INDEPENDENCE.  4. Patient will tolerate 25 minutes of OT treatment with 1-2 rest breaks to increase activity tolerance for ADLs.   5. Patient will complete functional transfers with MODIFIED INDEPENDENCE and adaptive equipment as needed.   6. Patient will tolerate 10 minutes BUE exercises to increase strength for safe, functional transfers.     Timeframe: 7 visits      OCCUPATIONAL THERAPY Initial Assessment, Daily Note, and PM       OT Visit Days: 1  Acknowledge Orders  Time  OT Charge Capture  Rehab Caseload Tracker      Melania Dave is a 84 y.o. female   PRIMARY DIAGNOSIS: Cellulitis of left lower extremity  Cellulitis of left lower extremity [L03.116]       Reason for Referral: Generalized Muscle Weakness (M62.81)  Other lack of cordination (R27.8)  Difficulty in walking, Not elsewhere classified (R26.2)  Other abnormalities of gait and mobility (R26.89)  Inpatient: Payor: MEDICARE / Plan: MEDICARE PART A AND B / Product Type: *No Product type* /     ASSESSMENT:     REHAB RECOMMENDATIONS:   Recommendation to date pending progress:  Setting:  Home Health Therapy    Equipment:    Rolling Walker     ASSESSMENT:  Ms. Dave is a 84 y.o. female who presents to the hospital with pain, erythema, and swelling to L LE. Admitted with L LE cellulitis. PMHx of hypothyroidism, COPD, polymyalgia rheumatica, hyperlipidemia, CKD.    Today, pt is received supine in bed, agreeable to participate in the session with encouragement. Per patient report, she lives with her son and daughter in law in a single-level home with ramped entrance. She is

## 2024-06-21 NOTE — H&P
Hospitalist History and Physical   Admit Date:  2024 10:21 PM   Name:  Melania Dave   Age:  84 y.o.  Sex:  female  :  1940   MRN:  [id number]   Room:  [facility]    Presenting/Chief Complaint: No chief complaint on file.     Reason(s) for Admission: Cellulitis of left lower extremity [L03.116]     History of Present Illness:   Melania Dave is a 84 y.o. female with medical history of hypothyroidism, COPD, polymyalgia rheumatica, hyperlipidemia, CKD who presented with complaints of pain, erythema and swelling to left lower extremity.  Patient had an injury on  to her foot from hitting her foot on a chair.  Patient received 6 sutures.  Sutures were planned to be removed tomorrow .  Patient presented with fever of 102.5, WBC of 18.8, lactic acid 2.2, CRP 6.0.  Patient started on sepsis protocol.  X-ray of left tib-fib with diffuse subcu edema.      Assessment & Plan:   Sepsis due to unspecified organism  - Fever 102.5, WBC 18.8, lactic acid 2.2, evidence of cellulitis to left leg  - Vancomycin  - Zosyn  - Repeat lactic acid  - Blood culture pending  -IV fluid    Cellulitis of left lower extremity  -Antibiotics as above  - Wound culture pending    Hypothyroidism  - Continue home Synthroid    Hyperlipidemia  - Continue home statin    History of gout  - Continue home allopurinol    Hypotension  - Hold home antihypertensives  - IV fluid  - Midodrine 5 mg x 1    PT/OT evals ordered?  Therapy evals ordered  Diet: ADULT DIET; Regular; 3 carb choices (45 gm/meal); Low Fat/Low Chol/High Fiber/2 gm Na; No Added Salt (3-4 gm)  VTE prophylaxis: Lovenox  Code status: Full Code      Non-peripheral Lines and Tubes (if present):             Hospital Problems:  Principal Problem:    Cellulitis of left lower extremity  Resolved Problems:    * No resolved hospital problems. *        Objective:   Patient Vitals for the past 24 hrs:   Temp Pulse Resp BP SpO2   24 2230 -- -- -- (!) 89/48 --

## 2024-06-21 NOTE — ED NOTES
TRANSFER - OUT REPORT:    Verbal report given to Loraine REEVES on Melania Dave  being transferred to [facility] for routine progression of patient care       Report consisted of patient's Situation, Background, Assessment and   Recommendations(SBAR).     Information from the following report(s) Nurse Handoff Report was reviewed with the receiving nurse.    Lines:   Peripheral IV 06/20/24 Left;Posterior Hand (Active)   Site Assessment Clean, dry & intact 06/20/24 1926   Line Status Brisk blood return;Flushed;Normal saline locked;Infusing 06/20/24 1926   Line Care Connections checked and tightened 06/20/24 1926   Phlebitis Assessment No symptoms 06/20/24 1926   Infiltration Assessment 0 06/20/24 1926   Alcohol Cap Used No 06/20/24 1926   Dressing Status New dressing applied;Clean, dry & intact 06/20/24 1926   Dressing Type Transparent 06/20/24 1926   Dressing Intervention New 06/20/24 1926        Opportunity for questions and clarification was provided.      Patient transported with:  Monitor   IVF Bolus  Vanco 750 ml in 250NS  3L NC

## 2024-06-21 NOTE — CONSULTS
Appropriate   Psych: Normal Affect, Normal Mood    HEENT: Normal Cephalic/Atraumatic, PERRL   Lungs: Respirations even and unlabored, Breath Sounds were clear, no respiratory distress   Heart: Regular Rate and Rhythm   Vascular: Distal pulses intact, good capillary refill   Skin: 3+ pitting edema left lower extremity there is venous insufficiency of both lower extremities.  Patient does have some redness of the anterior aspect of her lower leg this is outlined in marker.  There seems to be no redness of the foot and the wound appears to be healing with minimal erythema and no significant foot swelling.  Musculoskeletal: Normal range of motion left ankle.  Normal range of motion left knee  lymphatic: No lympahdenopathy, No distal edema   Neuro: No gross deficits   Abdomen: Soft, Non tender, No distension      VITALS: Patient Vitals for the past 8 hrs:   BP Temp Temp src Pulse Resp SpO2 Weight   24 0857 (!) 113/43 97.7 °F (36.5 °C) -- 58 16 100 % --   24 0600 -- -- -- -- -- -- 64.8 kg (142 lb 13.7 oz)   24 0450 (!) 115/53 -- -- 76 -- 100 % --   24 0406 (!) 116/42 98.5 °F (36.9 °C) Oral 56 14 100 % --    , Temp (24hrs), Av.2 °F (37.9 °C), Min:97.7 °F (36.5 °C), Max:102.5 °F (39.2 °C)           Diagnosis   Patient Active Problem List   Diagnosis    Palpitations    Esophageal reflux    Murmur    IBS (irritable bowel syndrome)    Insomnia, unspecified    Gastritis    ASCVD (arteriosclerotic cardiovascular disease)    Chronic left shoulder pain    Leg cramps    Spinal stenosis of lumbar region with neurogenic claudication    Rotator cuff tear, left    Varicose veins of both legs with edema    Fatigue    Chronic kidney disease, stage 3b ([facility])    Essential hypertension    COPD (chronic obstructive pulmonary disease) ([facility])    Vitamin D deficiency    Osteoporosis, post-menopausal    Chronic bilateral low back pain with bilateral sciatica    Chronic chest wall pain    Nephrolithiasis    Mixed

## 2024-06-21 NOTE — ED NOTES
Spoke with Beckie, House Supervisor, pt accepted to [facility] med/surg by Dr. Pearce. She will call back with bed assigment.

## 2024-06-22 PROCEDURE — 6370000000 HC RX 637 (ALT 250 FOR IP)

## 2024-06-22 PROCEDURE — 2580000003 HC RX 258: Performed by: STUDENT IN AN ORGANIZED HEALTH CARE EDUCATION/TRAINING PROGRAM

## 2024-06-22 PROCEDURE — 2580000003 HC RX 258: Performed by: INTERNAL MEDICINE

## 2024-06-22 PROCEDURE — 6370000000 HC RX 637 (ALT 250 FOR IP): Performed by: STUDENT IN AN ORGANIZED HEALTH CARE EDUCATION/TRAINING PROGRAM

## 2024-06-22 PROCEDURE — 6360000002 HC RX W HCPCS: Performed by: STUDENT IN AN ORGANIZED HEALTH CARE EDUCATION/TRAINING PROGRAM

## 2024-06-22 PROCEDURE — 1100000003 HC PRIVATE W/ TELEMETRY

## 2024-06-22 PROCEDURE — 6360000002 HC RX W HCPCS: Performed by: INTERNAL MEDICINE

## 2024-06-22 RX ORDER — BISACODYL 10 MG
10 SUPPOSITORY, RECTAL RECTAL DAILY PRN
Status: DISCONTINUED | OUTPATIENT
Start: 2024-06-22 | End: 2024-06-26 | Stop reason: HOSPADM

## 2024-06-22 RX ADMIN — PIPERACILLIN AND TAZOBACTAM 3375 MG: 3; .375 INJECTION, POWDER, LYOPHILIZED, FOR SOLUTION INTRAVENOUS at 10:09

## 2024-06-22 RX ADMIN — ALLOPURINOL 100 MG: 100 TABLET ORAL at 09:41

## 2024-06-22 RX ADMIN — ONDANSETRON 4 MG: 4 TABLET, ORALLY DISINTEGRATING ORAL at 12:48

## 2024-06-22 RX ADMIN — GABAPENTIN 100 MG: 100 CAPSULE ORAL at 21:47

## 2024-06-22 RX ADMIN — PANTOPRAZOLE SODIUM 40 MG: 40 TABLET, DELAYED RELEASE ORAL at 06:08

## 2024-06-22 RX ADMIN — ACETAMINOPHEN 650 MG: 325 TABLET ORAL at 20:33

## 2024-06-22 RX ADMIN — SODIUM CHLORIDE, PRESERVATIVE FREE 10 ML: 5 INJECTION INTRAVENOUS at 10:14

## 2024-06-22 RX ADMIN — POLYETHYLENE GLYCOL 3350 17 G: 17 POWDER, FOR SOLUTION ORAL at 09:43

## 2024-06-22 RX ADMIN — ENOXAPARIN SODIUM 40 MG: 100 INJECTION SUBCUTANEOUS at 09:41

## 2024-06-22 RX ADMIN — PIPERACILLIN AND TAZOBACTAM 3375 MG: 3; .375 INJECTION, POWDER, LYOPHILIZED, FOR SOLUTION INTRAVENOUS at 17:25

## 2024-06-22 RX ADMIN — BISACODYL 10 MG: 10 SUPPOSITORY RECTAL at 22:17

## 2024-06-22 RX ADMIN — PIPERACILLIN AND TAZOBACTAM 3375 MG: 3; .375 INJECTION, POWDER, LYOPHILIZED, FOR SOLUTION INTRAVENOUS at 01:02

## 2024-06-22 RX ADMIN — ACETAMINOPHEN 650 MG: 325 TABLET ORAL at 09:39

## 2024-06-22 RX ADMIN — SODIUM CHLORIDE, PRESERVATIVE FREE 10 ML: 5 INJECTION INTRAVENOUS at 21:47

## 2024-06-22 RX ADMIN — VANCOMYCIN HYDROCHLORIDE 1000 MG: 1 INJECTION, POWDER, LYOPHILIZED, FOR SOLUTION INTRAVENOUS at 10:11

## 2024-06-22 RX ADMIN — LEVOTHYROXINE SODIUM 150 MCG: 0.15 TABLET ORAL at 06:08

## 2024-06-22 ASSESSMENT — PAIN SCALES - GENERAL
PAINLEVEL_OUTOF10: 0
PAINLEVEL_OUTOF10: 0
PAINLEVEL_OUTOF10: 2
PAINLEVEL_OUTOF10: 8
PAINLEVEL_OUTOF10: 8
PAINLEVEL_OUTOF10: 2
PAINLEVEL_OUTOF10: 0

## 2024-06-22 ASSESSMENT — PAIN SCALES - WONG BAKER: WONGBAKER_NUMERICALRESPONSE: HURTS A LITTLE BIT

## 2024-06-22 ASSESSMENT — PAIN DESCRIPTION - DESCRIPTORS
DESCRIPTORS: PRESSURE

## 2024-06-22 ASSESSMENT — PAIN DESCRIPTION - LOCATION
LOCATION: HEAD

## 2024-06-23 LAB
BACTERIA SPEC CULT: ABNORMAL
BACTERIA SPEC CULT: NORMAL
BACTERIA SPEC CULT: NORMAL
GRAM STN SPEC: ABNORMAL
SERVICE CMNT-IMP: ABNORMAL
SERVICE CMNT-IMP: NORMAL
SERVICE CMNT-IMP: NORMAL
VANCOMYCIN SERPL-MCNC: 7.8 UG/ML

## 2024-06-23 PROCEDURE — 6360000002 HC RX W HCPCS: Performed by: STUDENT IN AN ORGANIZED HEALTH CARE EDUCATION/TRAINING PROGRAM

## 2024-06-23 PROCEDURE — 2580000003 HC RX 258: Performed by: INTERNAL MEDICINE

## 2024-06-23 PROCEDURE — 6360000002 HC RX W HCPCS: Performed by: INTERNAL MEDICINE

## 2024-06-23 PROCEDURE — 80202 ASSAY OF VANCOMYCIN: CPT

## 2024-06-23 PROCEDURE — 6370000000 HC RX 637 (ALT 250 FOR IP): Performed by: STUDENT IN AN ORGANIZED HEALTH CARE EDUCATION/TRAINING PROGRAM

## 2024-06-23 PROCEDURE — 1100000003 HC PRIVATE W/ TELEMETRY

## 2024-06-23 PROCEDURE — 2580000003 HC RX 258: Performed by: STUDENT IN AN ORGANIZED HEALTH CARE EDUCATION/TRAINING PROGRAM

## 2024-06-23 PROCEDURE — 36415 COLL VENOUS BLD VENIPUNCTURE: CPT

## 2024-06-23 RX ADMIN — PANTOPRAZOLE SODIUM 40 MG: 40 TABLET, DELAYED RELEASE ORAL at 06:08

## 2024-06-23 RX ADMIN — PIPERACILLIN AND TAZOBACTAM 3375 MG: 3; .375 INJECTION, POWDER, LYOPHILIZED, FOR SOLUTION INTRAVENOUS at 02:31

## 2024-06-23 RX ADMIN — SODIUM CHLORIDE, PRESERVATIVE FREE 10 ML: 5 INJECTION INTRAVENOUS at 20:52

## 2024-06-23 RX ADMIN — ALLOPURINOL 100 MG: 100 TABLET ORAL at 11:08

## 2024-06-23 RX ADMIN — SODIUM CHLORIDE, PRESERVATIVE FREE 10 ML: 5 INJECTION INTRAVENOUS at 11:13

## 2024-06-23 RX ADMIN — CEFTRIAXONE 1000 MG: 1 INJECTION, POWDER, FOR SOLUTION INTRAMUSCULAR; INTRAVENOUS at 11:21

## 2024-06-23 RX ADMIN — ONDANSETRON 4 MG: 2 INJECTION INTRAMUSCULAR; INTRAVENOUS at 11:33

## 2024-06-23 RX ADMIN — VANCOMYCIN HYDROCHLORIDE 1000 MG: 1 INJECTION, POWDER, LYOPHILIZED, FOR SOLUTION INTRAVENOUS at 11:10

## 2024-06-23 RX ADMIN — LEVOTHYROXINE SODIUM 150 MCG: 0.15 TABLET ORAL at 06:08

## 2024-06-23 RX ADMIN — ENOXAPARIN SODIUM 40 MG: 100 INJECTION SUBCUTANEOUS at 11:13

## 2024-06-23 RX ADMIN — GABAPENTIN 100 MG: 100 CAPSULE ORAL at 20:49

## 2024-06-23 ASSESSMENT — PAIN SCALES - GENERAL
PAINLEVEL_OUTOF10: 0

## 2024-06-23 NOTE — PLAN OF CARE
Problem: Discharge Planning  Goal: Discharge to home or other facility with appropriate resources  Outcome: Progressing  Flowsheets (Taken 6/23/2024 1223)  Discharge to home or other facility with appropriate resources:   Identify barriers to discharge with patient and caregiver   Arrange for needed discharge resources and transportation as appropriate   Identify discharge learning needs (meds, wound care, etc)   Refer to discharge planning if patient needs post-hospital services based on physician order or complex needs related to functional status, cognitive ability or social support system     Problem: Skin/Tissue Integrity  Goal: Absence of new skin breakdown  Description: 1.  Monitor for areas of redness and/or skin breakdown  2.  Assess vascular access sites hourly  3.  Every 4-6 hours minimum:  Change oxygen saturation probe site  4.  Every 4-6 hours:  If on nasal continuous positive airway pressure, respiratory therapy assess nares and determine need for appliance change or resting period.  Outcome: Progressing     Problem: Safety - Adult  Goal: Free from fall injury  Outcome: Progressing     Problem: ABCDS Injury Assessment  Goal: Absence of physical injury  Outcome: Progressing     Problem: Pain  Goal: Verbalizes/displays adequate comfort level or baseline comfort level  Outcome: Progressing

## 2024-06-24 ENCOUNTER — CARE COORDINATION (OUTPATIENT)
Dept: CARE COORDINATION | Facility: CLINIC | Age: 84
End: 2024-06-24

## 2024-06-24 PROCEDURE — 2580000003 HC RX 258: Performed by: INTERNAL MEDICINE

## 2024-06-24 PROCEDURE — 1100000003 HC PRIVATE W/ TELEMETRY

## 2024-06-24 PROCEDURE — 6370000000 HC RX 637 (ALT 250 FOR IP): Performed by: STUDENT IN AN ORGANIZED HEALTH CARE EDUCATION/TRAINING PROGRAM

## 2024-06-24 PROCEDURE — 6360000002 HC RX W HCPCS: Performed by: INTERNAL MEDICINE

## 2024-06-24 PROCEDURE — 97535 SELF CARE MNGMENT TRAINING: CPT

## 2024-06-24 PROCEDURE — 97112 NEUROMUSCULAR REEDUCATION: CPT

## 2024-06-24 PROCEDURE — 6360000002 HC RX W HCPCS: Performed by: STUDENT IN AN ORGANIZED HEALTH CARE EDUCATION/TRAINING PROGRAM

## 2024-06-24 PROCEDURE — 97530 THERAPEUTIC ACTIVITIES: CPT

## 2024-06-24 PROCEDURE — 2580000003 HC RX 258: Performed by: STUDENT IN AN ORGANIZED HEALTH CARE EDUCATION/TRAINING PROGRAM

## 2024-06-24 RX ADMIN — ALLOPURINOL 100 MG: 100 TABLET ORAL at 09:12

## 2024-06-24 RX ADMIN — PANTOPRAZOLE SODIUM 40 MG: 40 TABLET, DELAYED RELEASE ORAL at 06:13

## 2024-06-24 RX ADMIN — LEVOTHYROXINE SODIUM 150 MCG: 0.15 TABLET ORAL at 06:13

## 2024-06-24 RX ADMIN — VANCOMYCIN HYDROCHLORIDE 1000 MG: 1 INJECTION, POWDER, LYOPHILIZED, FOR SOLUTION INTRAVENOUS at 09:36

## 2024-06-24 RX ADMIN — ACETAMINOPHEN 650 MG: 325 TABLET ORAL at 23:53

## 2024-06-24 RX ADMIN — GABAPENTIN 100 MG: 100 CAPSULE ORAL at 20:42

## 2024-06-24 RX ADMIN — ENOXAPARIN SODIUM 40 MG: 100 INJECTION SUBCUTANEOUS at 09:12

## 2024-06-24 RX ADMIN — SODIUM CHLORIDE, PRESERVATIVE FREE 10 ML: 5 INJECTION INTRAVENOUS at 09:12

## 2024-06-24 RX ADMIN — SODIUM CHLORIDE, PRESERVATIVE FREE 10 ML: 5 INJECTION INTRAVENOUS at 20:42

## 2024-06-24 RX ADMIN — ONDANSETRON 4 MG: 4 TABLET, ORALLY DISINTEGRATING ORAL at 12:21

## 2024-06-24 RX ADMIN — CEFTRIAXONE 1000 MG: 1 INJECTION, POWDER, FOR SOLUTION INTRAMUSCULAR; INTRAVENOUS at 12:22

## 2024-06-25 ENCOUNTER — APPOINTMENT (OUTPATIENT)
Dept: GENERAL RADIOLOGY | Age: 84
DRG: 872 | End: 2024-06-25
Attending: HOSPITALIST
Payer: MEDICARE

## 2024-06-25 LAB
BACTERIA SPEC CULT: ABNORMAL
CREAT SERPL-MCNC: 0.52 MG/DL (ref 0.6–1.1)
GRAM STN SPEC: ABNORMAL
SERVICE CMNT-IMP: ABNORMAL

## 2024-06-25 PROCEDURE — 6360000002 HC RX W HCPCS: Performed by: INTERNAL MEDICINE

## 2024-06-25 PROCEDURE — 2580000003 HC RX 258: Performed by: INTERNAL MEDICINE

## 2024-06-25 PROCEDURE — 6360000002 HC RX W HCPCS: Performed by: STUDENT IN AN ORGANIZED HEALTH CARE EDUCATION/TRAINING PROGRAM

## 2024-06-25 PROCEDURE — 1100000003 HC PRIVATE W/ TELEMETRY

## 2024-06-25 PROCEDURE — 6370000000 HC RX 637 (ALT 250 FOR IP): Performed by: STUDENT IN AN ORGANIZED HEALTH CARE EDUCATION/TRAINING PROGRAM

## 2024-06-25 PROCEDURE — 71045 X-RAY EXAM CHEST 1 VIEW: CPT

## 2024-06-25 PROCEDURE — 82565 ASSAY OF CREATININE: CPT

## 2024-06-25 PROCEDURE — 2580000003 HC RX 258: Performed by: STUDENT IN AN ORGANIZED HEALTH CARE EDUCATION/TRAINING PROGRAM

## 2024-06-25 PROCEDURE — 36415 COLL VENOUS BLD VENIPUNCTURE: CPT

## 2024-06-25 PROCEDURE — 6370000000 HC RX 637 (ALT 250 FOR IP): Performed by: INTERNAL MEDICINE

## 2024-06-25 RX ORDER — NICOTINE 21 MG/24HR
1 PATCH, TRANSDERMAL 24 HOURS TRANSDERMAL DAILY
Status: DISCONTINUED | OUTPATIENT
Start: 2024-06-25 | End: 2024-06-26 | Stop reason: HOSPADM

## 2024-06-25 RX ADMIN — ONDANSETRON 4 MG: 2 INJECTION INTRAMUSCULAR; INTRAVENOUS at 07:06

## 2024-06-25 RX ADMIN — GABAPENTIN 100 MG: 100 CAPSULE ORAL at 21:38

## 2024-06-25 RX ADMIN — ACETAMINOPHEN 650 MG: 325 TABLET ORAL at 21:38

## 2024-06-25 RX ADMIN — ENOXAPARIN SODIUM 40 MG: 100 INJECTION SUBCUTANEOUS at 08:32

## 2024-06-25 RX ADMIN — ACETAMINOPHEN 650 MG: 325 TABLET ORAL at 08:31

## 2024-06-25 RX ADMIN — LEVOTHYROXINE SODIUM 150 MCG: 0.15 TABLET ORAL at 05:45

## 2024-06-25 RX ADMIN — ONDANSETRON 4 MG: 2 INJECTION INTRAMUSCULAR; INTRAVENOUS at 13:24

## 2024-06-25 RX ADMIN — VANCOMYCIN HYDROCHLORIDE 1000 MG: 1 INJECTION, POWDER, LYOPHILIZED, FOR SOLUTION INTRAVENOUS at 10:36

## 2024-06-25 RX ADMIN — CEFTRIAXONE 1000 MG: 1 INJECTION, POWDER, FOR SOLUTION INTRAMUSCULAR; INTRAVENOUS at 11:44

## 2024-06-25 RX ADMIN — SODIUM CHLORIDE, PRESERVATIVE FREE 10 ML: 5 INJECTION INTRAVENOUS at 21:39

## 2024-06-25 RX ADMIN — ALLOPURINOL 100 MG: 100 TABLET ORAL at 08:32

## 2024-06-25 RX ADMIN — SODIUM CHLORIDE, PRESERVATIVE FREE 5 ML: 5 INJECTION INTRAVENOUS at 08:35

## 2024-06-25 RX ADMIN — PANTOPRAZOLE SODIUM 40 MG: 40 TABLET, DELAYED RELEASE ORAL at 05:45

## 2024-06-25 RX ADMIN — ACETAMINOPHEN 650 MG: 325 TABLET ORAL at 15:37

## 2024-06-25 ASSESSMENT — PAIN SCALES - WONG BAKER: WONGBAKER_NUMERICALRESPONSE: NO HURT

## 2024-06-25 ASSESSMENT — PAIN DESCRIPTION - PAIN TYPE: TYPE: ACUTE PAIN

## 2024-06-25 ASSESSMENT — PAIN DESCRIPTION - LOCATION: LOCATION: GENERALIZED

## 2024-06-25 ASSESSMENT — PAIN DESCRIPTION - DESCRIPTORS: DESCRIPTORS: SORE

## 2024-06-25 ASSESSMENT — PAIN DESCRIPTION - FREQUENCY: FREQUENCY: INTERMITTENT

## 2024-06-25 ASSESSMENT — PAIN SCALES - GENERAL
PAINLEVEL_OUTOF10: 0
PAINLEVEL_OUTOF10: 0

## 2024-06-25 ASSESSMENT — PAIN DESCRIPTION - ONSET: ONSET: GRADUAL

## 2024-06-25 ASSESSMENT — PAIN DESCRIPTION - ORIENTATION: ORIENTATION: INNER

## 2024-06-25 ASSESSMENT — PAIN - FUNCTIONAL ASSESSMENT: PAIN_FUNCTIONAL_ASSESSMENT: PREVENTS OR INTERFERES SOME ACTIVE ACTIVITIES AND ADLS

## 2024-06-26 VITALS
SYSTOLIC BLOOD PRESSURE: 139 MMHG | TEMPERATURE: 98.1 F | WEIGHT: 133.6 LBS | HEIGHT: 62 IN | OXYGEN SATURATION: 93 % | BODY MASS INDEX: 24.59 KG/M2 | DIASTOLIC BLOOD PRESSURE: 60 MMHG | HEART RATE: 84 BPM | RESPIRATION RATE: 18 BRPM

## 2024-06-26 LAB
ALBUMIN SERPL-MCNC: 2.2 G/DL (ref 3.2–4.6)
ALBUMIN/GLOB SERPL: 0.7 (ref 1–1.9)
ALP SERPL-CCNC: 59 U/L (ref 35–104)
ALT SERPL-CCNC: 15 U/L (ref 12–65)
ANION GAP SERPL CALC-SCNC: 14 MMOL/L (ref 9–18)
AST SERPL-CCNC: 24 U/L (ref 15–37)
BACTERIA SPEC CULT: NORMAL
BACTERIA SPEC CULT: NORMAL
BILIRUB SERPL-MCNC: <0.2 MG/DL (ref 0–1.2)
BUN SERPL-MCNC: 5 MG/DL (ref 8–23)
CALCIUM SERPL-MCNC: 8.9 MG/DL (ref 8.8–10.2)
CHLORIDE SERPL-SCNC: 106 MMOL/L (ref 98–107)
CO2 SERPL-SCNC: 17 MMOL/L (ref 20–28)
CREAT SERPL-MCNC: 0.53 MG/DL (ref 0.6–1.1)
ERYTHROCYTE [DISTWIDTH] IN BLOOD BY AUTOMATED COUNT: 15.6 % (ref 11.9–14.6)
GLOBULIN SER CALC-MCNC: 3.3 G/DL (ref 2.3–3.5)
GLUCOSE SERPL-MCNC: 85 MG/DL (ref 70–99)
HCT VFR BLD AUTO: 32.6 % (ref 35.8–46.3)
HGB BLD-MCNC: 10.2 G/DL (ref 11.7–15.4)
MAGNESIUM SERPL-MCNC: 1.4 MG/DL (ref 1.8–2.4)
MCH RBC QN AUTO: 29.4 PG (ref 26.1–32.9)
MCHC RBC AUTO-ENTMCNC: 31.3 G/DL (ref 31.4–35)
MCV RBC AUTO: 93.9 FL (ref 82–102)
NRBC # BLD: 0 K/UL (ref 0–0.2)
PLATELET # BLD AUTO: 301 K/UL (ref 150–450)
PMV BLD AUTO: 8.9 FL (ref 9.4–12.3)
POTASSIUM SERPL-SCNC: 3.4 MMOL/L (ref 3.5–5.1)
PROT SERPL-MCNC: 5.5 G/DL (ref 6.3–8.2)
RBC # BLD AUTO: 3.47 M/UL (ref 4.05–5.2)
SERVICE CMNT-IMP: NORMAL
SERVICE CMNT-IMP: NORMAL
SODIUM SERPL-SCNC: 137 MMOL/L (ref 136–145)
WBC # BLD AUTO: 9.8 K/UL (ref 4.3–11.1)

## 2024-06-26 PROCEDURE — 83735 ASSAY OF MAGNESIUM: CPT

## 2024-06-26 PROCEDURE — 36415 COLL VENOUS BLD VENIPUNCTURE: CPT

## 2024-06-26 PROCEDURE — 2580000003 HC RX 258: Performed by: INTERNAL MEDICINE

## 2024-06-26 PROCEDURE — 6370000000 HC RX 637 (ALT 250 FOR IP): Performed by: STUDENT IN AN ORGANIZED HEALTH CARE EDUCATION/TRAINING PROGRAM

## 2024-06-26 PROCEDURE — 6370000000 HC RX 637 (ALT 250 FOR IP): Performed by: INTERNAL MEDICINE

## 2024-06-26 PROCEDURE — 6360000002 HC RX W HCPCS: Performed by: STUDENT IN AN ORGANIZED HEALTH CARE EDUCATION/TRAINING PROGRAM

## 2024-06-26 PROCEDURE — 6370000000 HC RX 637 (ALT 250 FOR IP): Performed by: NURSE PRACTITIONER

## 2024-06-26 PROCEDURE — 85027 COMPLETE CBC AUTOMATED: CPT

## 2024-06-26 PROCEDURE — 2580000003 HC RX 258: Performed by: STUDENT IN AN ORGANIZED HEALTH CARE EDUCATION/TRAINING PROGRAM

## 2024-06-26 PROCEDURE — 6360000002 HC RX W HCPCS: Performed by: INTERNAL MEDICINE

## 2024-06-26 PROCEDURE — 80053 COMPREHEN METABOLIC PANEL: CPT

## 2024-06-26 PROCEDURE — 6360000002 HC RX W HCPCS: Performed by: NURSE PRACTITIONER

## 2024-06-26 RX ORDER — MAGNESIUM SULFATE IN WATER 40 MG/ML
2000 INJECTION, SOLUTION INTRAVENOUS ONCE
Status: COMPLETED | OUTPATIENT
Start: 2024-06-26 | End: 2024-06-26

## 2024-06-26 RX ORDER — TRAMADOL HYDROCHLORIDE 50 MG/1
50 TABLET ORAL EVERY 6 HOURS PRN
Status: DISCONTINUED | OUTPATIENT
Start: 2024-06-26 | End: 2024-06-26 | Stop reason: HOSPADM

## 2024-06-26 RX ORDER — DOXYCYCLINE HYCLATE 100 MG
100 TABLET ORAL 2 TIMES DAILY
Qty: 2 TABLET | Refills: 0 | Status: SHIPPED | OUTPATIENT
Start: 2024-06-27 | End: 2024-06-28

## 2024-06-26 RX ORDER — LOSARTAN POTASSIUM 25 MG/1
25 TABLET ORAL DAILY
Qty: 30 TABLET | Refills: 0 | Status: SHIPPED | OUTPATIENT
Start: 2024-06-26 | End: 2024-07-26

## 2024-06-26 RX ORDER — CEFADROXIL 500 MG/1
500 CAPSULE ORAL 2 TIMES DAILY
Qty: 2 CAPSULE | Refills: 0 | Status: SHIPPED | OUTPATIENT
Start: 2024-06-27 | End: 2024-06-28

## 2024-06-26 RX ORDER — MAGNESIUM OXIDE 400 MG/1
400 TABLET ORAL DAILY
Qty: 5 TABLET | Refills: 0 | Status: SHIPPED | OUTPATIENT
Start: 2024-06-27 | End: 2024-06-26 | Stop reason: HOSPADM

## 2024-06-26 RX ORDER — POTASSIUM CHLORIDE 20 MEQ/1
20 TABLET, EXTENDED RELEASE ORAL ONCE
Status: COMPLETED | OUTPATIENT
Start: 2024-06-26 | End: 2024-06-26

## 2024-06-26 RX ADMIN — MAGNESIUM SULFATE HEPTAHYDRATE 2000 MG: 40 INJECTION, SOLUTION INTRAVENOUS at 12:36

## 2024-06-26 RX ADMIN — PANTOPRAZOLE SODIUM 40 MG: 40 TABLET, DELAYED RELEASE ORAL at 06:00

## 2024-06-26 RX ADMIN — LEVOTHYROXINE SODIUM 150 MCG: 0.15 TABLET ORAL at 06:00

## 2024-06-26 RX ADMIN — ALLOPURINOL 100 MG: 100 TABLET ORAL at 08:54

## 2024-06-26 RX ADMIN — ENOXAPARIN SODIUM 40 MG: 100 INJECTION SUBCUTANEOUS at 08:56

## 2024-06-26 RX ADMIN — ONDANSETRON 4 MG: 2 INJECTION INTRAMUSCULAR; INTRAVENOUS at 01:40

## 2024-06-26 RX ADMIN — ACETAMINOPHEN 650 MG: 325 TABLET ORAL at 12:39

## 2024-06-26 RX ADMIN — SODIUM CHLORIDE, PRESERVATIVE FREE 5 ML: 5 INJECTION INTRAVENOUS at 09:05

## 2024-06-26 RX ADMIN — CEFTRIAXONE 1000 MG: 1 INJECTION, POWDER, FOR SOLUTION INTRAMUSCULAR; INTRAVENOUS at 12:28

## 2024-06-26 RX ADMIN — VANCOMYCIN HYDROCHLORIDE 1250 MG: 10 INJECTION, POWDER, LYOPHILIZED, FOR SOLUTION INTRAVENOUS at 06:05

## 2024-06-26 RX ADMIN — TRAMADOL HYDROCHLORIDE 50 MG: 50 TABLET ORAL at 02:04

## 2024-06-26 RX ADMIN — POTASSIUM CHLORIDE 20 MEQ: 1500 TABLET, EXTENDED RELEASE ORAL at 12:28

## 2024-06-26 ASSESSMENT — PAIN SCALES - WONG BAKER: WONGBAKER_NUMERICALRESPONSE: NO HURT

## 2024-06-26 ASSESSMENT — PAIN SCALES - GENERAL
PAINLEVEL_OUTOF10: 0
PAINLEVEL_OUTOF10: 0
PAINLEVEL_OUTOF10: 6

## 2024-06-26 ASSESSMENT — PAIN DESCRIPTION - LOCATION: LOCATION: HIP;LEG

## 2024-06-26 ASSESSMENT — PAIN DESCRIPTION - DESCRIPTORS: DESCRIPTORS: ACHING

## 2024-06-26 ASSESSMENT — PAIN DESCRIPTION - ORIENTATION: ORIENTATION: RIGHT;LEFT

## 2024-06-26 NOTE — DISCHARGE INSTRUCTIONS
Cellulitis: Care Instructions  Your Care Instructions     Cellulitis is a skin infection caused by bacteria, most often strep or staph. It often occurs after a break in the skin from a scrape, cut, bite, or puncture, or after a rash.  Cellulitis may be treated without doing tests to find out what caused it. But your doctor may do tests, if needed, to look for a specific bacteria, like methicillin-resistant Staphylococcus aureus (MRSA).  The doctor has checked you carefully, but problems can develop later. If you notice any problems or new symptoms, get medical treatment right away.  Follow-up care is a key part of your treatment and safety. Be sure to make and go to all appointments, and call your doctor if you are having problems. It's also a good idea to know your test results and keep a list of the medicines you take.  How can you care for yourself at home?  Take your antibiotics as directed. Do not stop taking them just because you feel better. You need to take the full course of antibiotics.  Prop up the infected area on pillows to reduce pain and swelling. Try to keep the area above the level of your heart as often as you can.  If your doctor told you how to care for your wound, follow your doctor's instructions. If you did not get instructions, follow this general advice:  Wash the wound with clean water 2 times a day. Don't use hydrogen peroxide or alcohol, which can slow healing.  You may cover the wound with a thin layer of petroleum jelly, such as Vaseline, and a nonstick bandage.  Apply more petroleum jelly and replace the bandage as needed.  Be safe with medicines. Take pain medicines exactly as directed.  If the doctor gave you a prescription medicine for pain, take it as prescribed.  If you are not taking a prescription pain medicine, ask your doctor if you can take an over-the-counter medicine.  To prevent cellulitis in the future  Try to prevent cuts, scrapes, or other injuries to your skin.

## 2024-06-26 NOTE — PROGRESS NOTES
Hospitalist Progress Note   Admit Date:  2024 10:21 PM   Name:  Melania Dave   Age:  84 y.o.  Sex:  female  :  1940   MRN:  [id number]   Room:  [facility]    Presenting/Chief Complaint: pain, redness and swelling of left leg    Reason(s) for Admission: Cellulitis of left lower extremity [L03.116]     Hospital Course:   Melania Dave is a 84 y.o. female with medical history of   Hypothyroidism  COPD  Polymyalgia rheumatica  Hyperlipidemia  CKD     who presented with pain, redness, and swelling of the left leg.     Patient had injury on the left foot on 2024.  She hit her left foot on a chair.  She came to emergency room and receive 6 sutures.     On the day of admission, she presented with fever 102.5 Fahrenheit, with leukocytosis, and mild lactic acidosis.     Patient was started on sepsis protocol.  X-ray of the foot was negative and x-ray of tibia fibular on the left shows some soft tissue swelling.     Subjective & 24hr Events:     24   Patient is afebrile since admission.   On room air now.   Son is at bedside.   No respiratory distress.   No chest pain.   No shortness of breath.      24   Patient is resting in bed.   No fever. No shaking. No chills.   No respiratory distress.   No chest pain.     24   Patient is afebrile.   Wound culture grew S aureus and pan-sensitive Serratia marcescens.   Redness is less.     24   Patient is eating well.   Afebrile.   Redness and swelling on the left leg is less.      Assessment & Plan:     Principal Problem:    Cellulitis of left lower extremity    Sepsis (HCC)  Plan:   Patient is on Vancomycin and Zosyn.   Serratia is pan-sensitive.   I will change Zosyn to Ceftriaxone.   Clinically patient is improving. Redness and swelling is less.   So far, blood culture is negative.   Wound culture is as above.   Monitor closely.   Wound dressing is opened as per orthopedic service on 2024.   Wound condition is improving. 
       Hospitalist Progress Note   Admit Date:  2024 10:21 PM   Name:  Melania Dave   Age:  84 y.o.  Sex:  female  :  1940   MRN:  [id number]   Room:  [facility]    Presenting/Chief Complaint: pain, redness and swelling of left leg    Reason(s) for Admission: Cellulitis of left lower extremity [L03.116]     Hospital Course:   Melania Dave is a 84 y.o. female with medical history of   Hypothyroidism  COPD  Polymyalgia rheumatica  Hyperlipidemia  CKD     who presented with pain, redness, and swelling of the left leg.     Patient had injury on the left foot on 2024.  She hit her left foot on a chair.  She came to emergency room and receive 6 sutures.     On the day of admission, she presented with fever 102.5 Fahrenheit, with leukocytosis, and mild lactic acidosis.     Patient was started on sepsis protocol.  X-ray of the foot was negative and x-ray of tibia fibular on the left shows some soft tissue swelling.     Subjective & 24hr Events:     24   Patient is afebrile since admission.   On room air now.   Son is at bedside.   No respiratory distress.   No chest pain.   No shortness of breath.      24   Patient is resting in bed.   No fever. No shaking. No chills.   No respiratory distress.   No chest pain.     Assessment & Plan:     Principal Problem:    Cellulitis of left lower extremity    Sepsis (HCC)  Plan:   Patient is on Vancomycin and Zosyn.   I will continue this.   Clinically patient is improving. Redness and swelling is less.   So far, blood culture is negative.   Monitor closely.   Wound dressing is opened as per orthopedic service on 2024.     Active Problems:    Chronic kidney disease, stage 3b (HCC)  Plan:   Renal function is slightly worse from baseline.   Monitor closely.   Monitor renal function and intake and output.   Avoid nephrotoxic agents.    Losartan is on hold.       Essential hypertension  Plan:   BP was on the low side and Midodrine was started. 
       Hospitalist Progress Note   Admit Date:  2024 10:21 PM   Name:  Melania Dave   Age:  84 y.o.  Sex:  female  :  1940   MRN:  [id number]   Room:  [facility]    Presenting/Chief Complaint: pain, redness and swelling of left leg    Reason(s) for Admission: Cellulitis of left lower extremity [L03.116]     Hospital Course:   Melania Dave is a 84 y.o. female with medical history of   Hypothyroidism  COPD  Polymyalgia rheumatica  Hyperlipidemia  CKD     who presented with pain, redness, and swelling of the left leg.     Patient had injury on the left foot on 2024.  She hit her left foot on a chair.  She came to emergency room and receive 6 sutures.     On the day of admission, she presented with fever 102.5 Fahrenheit, with leukocytosis, and mild lactic acidosis.     Patient was started on sepsis protocol.  X-ray of the foot was negative and x-ray of tibia fibular on the left shows some soft tissue swelling.     Subjective & 24hr Events:     24   Patient is afebrile since admission.   On room air now.   Son is at bedside.   No respiratory distress.   No chest pain.   No shortness of breath.      24   Patient is resting in bed.   No fever. No shaking. No chills.   No respiratory distress.   No chest pain.     24   Patient is afebrile.   Wound culture grew S aureus and pan-sensitive Serratia marcescens.   Redness is less.     24   Patient is eating well.   Afebrile.   Redness and swelling on the left leg is less.      24   Patient vomited this morning.   Patient denies having aspiration.   No loss of consciousness  No fever. No shaking. No chills.   Left leg is feeling better. No pain. Less redness. Less swelling.     Assessment & Plan:     Principal Problem:    Cellulitis of left lower extremity    Sepsis (HCC)  Plan:   Patient has been on Vancomycin and Zosyn which was switched to Ceftriaxone.   Serratia is steve-sensitive.   Clinically patient is improving. 
       Hospitalist Progress Note   Admit Date:  2024 10:21 PM   Name:  Melania Dave   Age:  84 y.o.  Sex:  female  :  1940   MRN:  [id number]   Room:  [facility]    Presenting/Chief Complaint: pain, redness and swelling of left leg    Reason(s) for Admission: Cellulitis of left lower extremity [L03.116]     Hospital Course:   Melania Dave is a 84 y.o. female with medical history of   Hypothyroidism  COPD  Polymyalgia rheumatica  Hyperlipidemia  CKD     who presented with pain, redness, and swelling of the left leg.     Patient had injury on the left foot on 2024.  She hit her left foot on a chair.  She came to emergency room and receive 6 sutures.     On the day of admission, she presented with fever 102.5 Fahrenheit, with leukocytosis, and mild lactic acidosis.     Patient was started on sepsis protocol.  X-ray of the foot was negative and x-ray of tibia fibular on the left shows some soft tissue swelling.     Subjective & 24hr Events:     24   Patient is afebrile since admission.   On room air now.   Son is at bedside.   No respiratory distress.   No chest pain.   No shortness of breath.        Assessment & Plan:     Principal Problem:    Cellulitis of left lower extremity    Sepsis (HCC)  Plan:   Patient is on Vancomycin and Zosyn.   I will continue this.   Clinically patient is improving.   So far, blood culture is negative.   Monitor closely.     Active Problems:    Chronic kidney disease, stage 3b (HCC)  Plan:   Renal function is slightly worse from baseline.   Monitor closely.   Monitor renal function and intake and output.   Avoid nephrotoxic agents.    Losartan is on hold.       Essential hypertension  Plan:   BP was on the low side and Midodrine was started.   BP is improving now.   Will monitor the need to continue Midodrine.       COPD (chronic obstructive pulmonary disease) ([facility])  Plan:   Patient is on room air now.   No signs of COPD exacerbation.   Monitor 
  TRANSFER - IN REPORT:    Verbal report received from Luciana REEVES on Melania Dave  being received from [facility]  for routine progression of patient care      Report consisted of patient’s Situation, Background, Assessment and   Recommendations(SBAR).     Information from the following report(s) Nurse Handoff Report, Recent Results, and Event Log was reviewed with the receiving nurse.    Opportunity for questions and clarification was provided.      Assessment completed upon patient’s arrival to unit and care assume        
 responded to a  to provided spiritual care and emotional support for the pt. An initial visit was made to the patient. The  overviewed the chart prior to the visit and upon arrival introduce himself to the family. Tthe patient is from a Gnosticism brad background, Christianity. Te patient son, and two friend were present at the time of the visit. They shared about their love and appreciation for the pt. The CH offered words of encouragement, and prayed for the pt and his family. Ch provided spiritual care and emotional support through pastoral presence, non-anxious presence, active listening, meaningful conversation, Bible reference, and prayer. CH is available as needed.  
..4 Eyes Skin Assessment     NAME:  Melania Dave  YOB: 1940  MEDICAL RECORD NUMBER:  [id number]    The patient is being assessed for  Admission    I agree that at least one RN has performed a thorough Head to Toe Skin Assessment on the patient. ALL assessment sites listed below have been assessed.      Areas assessed by both nurses:    Head, Face, Ears, Shoulders, Back, Chest, Arms, Elbows, Hands, Sacrum. Buttock, Coccyx, Ischium, and Legs. Feet and Heels        Does the Patient have a Wound? No noted wound(s)       Ryan Prevention initiated by RN: Yes  Wound Care Orders initiated by RN: No    Pressure Injury (Stage 3,4, Unstageable, DTI, NWPT, and Complex wounds) if present, place Wound referral order by RN under : No    New Ostomies, if present place, Ostomy referral order under : No     Nurse 1 eSignature: Electronically signed by Mirella Connor RN on 6/21/24 at 1:53 AM EDT    **SHARE this note so that the co-signing nurse can place an eSignature**    Nurse 2 eSignature: Electronically signed by Radha Shirley RN on 6/21/24 at 2:05 AM EDT   
ACUTE PHYSICAL THERAPY GOALS:   (Developed with and agreed upon by patient and/or caregiver.)    LTG:  (1.)Ms. Dave will move from supine to sit and sit to supine , scoot up and down, and roll side to side in bed with INDEPENDENT within 7 treatment day(s).    (2.)Ms. Dave will transfer from bed to chair and chair to bed with MODIFIED INDEPENDENCE using the least restrictive device within 7 treatment day(s).    (3.)Ms. Dave will ambulate with MODIFIED INDEPENDENCE for 300 feet with the least restrictive device within 7 treatment day(s).  (4.)Ms. Dave will tolerate at least 23 min of dynamic standing activity to assist standing ADLs with the least restrictive device within 7 treatment days.       ________________________________________________________________________________________________      PHYSICAL THERAPY Initial Assessment, Daily Note, and PM  (Link to Caseload Tracking: PT Visit Days : 1  Acknowledge Orders  Time In/Out  PT Charge Capture  Rehab Caseload Tracker    Melania Dave is a 84 y.o. female   PRIMARY DIAGNOSIS: Cellulitis of left lower extremity  Cellulitis of left lower extremity [L03.116]       Reason for Referral: Generalized Muscle Weakness (M62.81)  Other lack of cordination (R27.8)  Difficulty in walking, Not elsewhere classified (R26.2)  Other abnormalities of gait and mobility (R26.89)  Inpatient: Payor: MEDICARE / Plan: MEDICARE PART A AND B / Product Type: *No Product type* /     ASSESSMENT:     REHAB RECOMMENDATIONS:   Recommendation to date pending progress:  Setting:  Home Health Therapy    Equipment:    Rolling Walker     ASSESSMENT:  Ms. Dave presents in supine, agreeable to session with gentle verbal encouragement. L LE is noted to red and swollen.       Upon entering, Pnt is agreeable to PT treatment.  she reports 6/10 pain in her L LE at rest. Pnt performed supine > sit with cga-min Ax2, sitting EOB with good sitting balance control. Sit > stand with cga-min Ax2  
ACUTE PHYSICAL THERAPY GOALS:   (Developed with and agreed upon by patient and/or caregiver.)  LTG:  (1.)Ms. Dave will move from supine to sit and sit to supine , scoot up and down, and roll side to side in bed with INDEPENDENT within 7 treatment day(s).    (2.)Ms. Dave will transfer from bed to chair and chair to bed with MODIFIED INDEPENDENCE using the least restrictive device within 7 treatment day(s).    (3.)Ms. Dave will ambulate with MODIFIED INDEPENDENCE for 300 feet with the least restrictive device within 7 treatment day(s).  (4.)Ms. aDve will tolerate at least 23 min of dynamic standing activity to assist standing ADLs with the least restrictive device within 7 treatment days.     PHYSICAL THERAPY: Daily Note PM   (Link to Caseload Tracking: PT Visit Days : 2  Time In/Out PT Charge Capture  Rehab Caseload Tracker  Orders    Melania Dave is a 84 y.o. female   PRIMARY DIAGNOSIS: Cellulitis of left lower extremity  Cellulitis of left lower extremity [L03.116]       Inpatient: Payor: MEDICARE / Plan: MEDICARE PART A AND B / Product Type: *No Product type* /     ASSESSMENT:     REHAB RECOMMENDATIONS:   Recommendation to date pending progress:  Setting:  Home Health Therapy    Equipment:    Rolling Walker     ASSESSMENT:  Ms. Dave presents in supine, agreeable to session, multiple family members present.      Upon entering, Pnt is agreeable to PT treatment.  she reports no pain in her R foot at rest. Pnt performed supine > sit with cga-min Ax2, sitting EOB with good sitting balance control. Sit > stand with cga-min Ax2 using RW. She stands at sink unsupported w/ tactile balance cues from PT and ADL cues from OT.  Gait 2 x 8, 2x 15 ft with cga-min Ax2, RW, and chair follow, cues for step length.  Gait is noted to be slowed and shuffled. Stand > sit with cga-min Ax2, followed by positioning for comfort. At end of session pt up in chair with all needs within reach, alarm activated for safety, 
Charge nurse attempted IV x3  for antibiotic needs and unsuccessful. Awaiting IV team.   
Discharge instructions given. A&O x 4. Verbalized understanding.    
Discharged to home without s/sx distress. No needs at present. Accompanied off floor by staff.    
Less nausea. No emesis.   
Magnesium 2 gm IV started per MD order.   
Magnesium IV bolus completed and to discharge per MD order.   
More family members at bedside and asking questions. Hospitalist and orthopedic service spoke with each son today at bedside. Family is re-updated on plan of care with ultrasound of leg completed today, antibiotic therapy for cellulitis diagnosis from MD, and orthopedic service evaluation. No change in left leg assessment and margins for swelling with redness are marked with no extended or new areas noted. Family still request for MD to be notified of their concerns about left leg. MD updated.  
PT and OT attempting to work with patient and nausea complaints. Zofran 4 mg slow IV given for nausea. No emesis.   
Patient resting in bed, alert and oriented, cooperative with care. Healing wound noted on top of left foot. Patient denies pain or distress, safety measures in place, call light within reach.  
Patient temperature within normal range.  
Patient vomited yellow substance, asked patient if she wanted something for nausea. Explained to patient that she had PRN Zofran. Patient refused medication, stated that she was fine and did not want anything. Cleaned patient with the assist of staff.     Patient currently resting,care plan ongoing.   
Respirations even and unlabored. No s/sx distress. No needs at present.    
Respirations even and unlabored. No s/sx distress. No needs at present.    
Shift report received from previous RN. No needs voiced by patient. At bedside, with patient appears to be resting quietly. Respirations even and unlabored. No signs of distress noted.    
Shift report received from previous RN. Pt in bed. Respirations even and unlabored on room air. No signs of distress, pt denies any pain. Pt instructed to utilize call light if any assistance was needed. Pt verbalized understanding. No further needs stated by pt.     
Spoke with MD concerning IV site and hardstick. New orders noted.   
Staff heard patient choking and coughing. Zofran 4 mg slow IV given for nausea. Large yellow emesis covering patient gown and linen while patient lying flat.  
Temperature 98.1. Updated PA and to continue with discharge.  
Temperature within normal range.  
Tylenol  mg given for fever.  
Tylenol 650 mg PO given for high temperature.  
Tylenol 650 mg po given for pain.   
Tylenol 650 mg po given for temperature 100.3 and pain.  
Tylenol 650 mg po given for temperature 100.4.  
Tylenol 650 mg po given for temperature 100.9. Updated PA concerning discharge.  
US Guided PIV access-    Ultrasound was used to find the vein which was compressible and without any ultrasound features of an artery or nerve bundle. Skin was cleaned and disinfected prior to IV puncture.  Under real-time ultrasound guidance peripheral access was obtained in the right forearm using 22 G 1.75\" Peripheral IV catheter after 1 attempt(s). Blood return was present and IV flushed without difficulty with no clinical signs of infiltration. IV dressing applied and no immediate complications noted. Patient tolerated the procedure well.    
VANCO DAILY NOTE  [facility]   Pharmacy Pharmacokinetic Monitoring Service - Vancomycin    Consulting Provider: Dr. Proctor   Indication: SSTI  Target Concentration: Goal AUC/CHANTEL 400-600 mg*hr/L  Day of Therapy: 4  Additional Antimicrobials: Pip/Tazo    Pertinent Laboratory Values:   Wt Readings from Last 1 Encounters:   06/23/24 66.8 kg (147 lb 4.3 oz)     Temp Readings from Last 1 Encounters:   06/23/24 98.8 °F (37.1 °C)     Recent Labs     06/20/24  1819 06/20/24  2051 06/20/24  2308 06/21/24  0412   BUN 20  --   --  23   CREATININE 0.70  --   --  0.98   WBC 18.8*  --   --  13.0*   PROCAL 0.07  --   --   --    LACTA  --   --  2.0  --    LACTSEPSIS 2.2* 2.2*  --   --      Estimated Creatinine Clearance: 38 mL/min (based on SCr of 0.98 mg/dL).    Lab Results   Component Value Date/Time    VANCORANDOM 7.8 06/23/2024 04:22 AM       MRSA Nasal Swab: N/A. Non-respiratory infection    Assessment:  Date/Time Dose Concentration AUC   6/23 0422 1000 mg q24h 7.8 445   Note: Serum concentrations collected for AUC dosing may appear elevated if collected in close proximity to the dose administered, this is not necessarily an indication of toxicity    Plan:  Dosing recommendations based on Bayesian software  Continue vancomycin 1000 mg IV every 24 hours  Renal labs as indicated   Vancomycin concentrations will be ordered as clinically appropriate  Pharmacy will continue to monitor patient and adjust therapy as indicated    Thank you for the consult,  Isaac Cardenas, PharmD, BCOP  Clinical Pharmacist  Contact Via [employer]            
VANCO DAILY NOTE  [facility]   Pharmacy Pharmacokinetic Monitoring Service - Vancomycin    Consulting Provider: Dr. Proctor   Indication: SSTI  Target Concentration: Goal AUC/CHANTEL 400-600 mg*hr/L  Day of Therapy: 5  Additional Antimicrobials: Pip/Tazo    Pertinent Laboratory Values:   Wt Readings from Last 1 Encounters:   06/24/24 62.9 kg (138 lb 10.7 oz)     Temp Readings from Last 1 Encounters:   06/24/24 99.3 °F (37.4 °C) (Oral)     No results for input(s): \"BUN\", \"CREATININE\", \"WBC\", \"PROCAL\", \"LACACIDPL\", \"LACTA\", \"LCAD\", \"LACTSEPSIS\" in the last 72 hours.    Invalid input(s): \"PROCAT\", \"PCT\", \"LAC\", \"LACT\", \"LACPOC\"    Estimated Creatinine Clearance: 37 mL/min (based on SCr of 0.98 mg/dL).    Lab Results   Component Value Date/Time    VANCORANDOM 7.8 06/23/2024 04:22 AM       MRSA Nasal Swab: N/A. Non-respiratory infection    Assessment:  Date/Time Dose Concentration AUC   6/23 0422 1000 mg q24h 7.8 445   Note: Serum concentrations collected for AUC dosing may appear elevated if collected in close proximity to the dose administered, this is not necessarily an indication of toxicity    Plan:  Dosing recommendations based on Bayesian software  Continue vancomycin 1000 mg IV every 24 hours  Renal labs as indicated   Vancomycin concentrations will be ordered as clinically appropriate  Pharmacy will continue to monitor patient and adjust therapy as indicated    Thank you for the consult,  Burton Sapp RPH            
VANCO DAILY NOTE  [facility]   Pharmacy Pharmacokinetic Monitoring Service - Vancomycin    Consulting Provider: Dr. Proctor   Indication: SSTI  Target Concentration: Goal AUC/CHANTEL 400-600 mg*hr/L  Day of Therapy: 2  Additional Antimicrobials: Pip/Tazo    Pertinent Laboratory Values:   Wt Readings from Last 1 Encounters:   06/22/24 64.9 kg (143 lb)     Temp Readings from Last 1 Encounters:   06/22/24 98.6 °F (37 °C) (Oral)     Recent Labs     06/20/24  1819 06/20/24  2051 06/20/24  2308 06/21/24  0412   BUN 20  --   --  23   CREATININE 0.70  --   --  0.98   WBC 18.8*  --   --  13.0*   PROCAL 0.07  --   --   --    LACTA  --   --  2.0  --    LACTSEPSIS 2.2* 2.2*  --   --      Estimated Creatinine Clearance: 37 mL/min (based on SCr of 0.98 mg/dL).    No results found for: \"VANCOTROUGH\", \"VANCORANDOM\"    MRSA Nasal Swab: N/A. Non-respiratory infection    Assessment:  Date/Time Dose Concentration AUC         Note: Serum concentrations collected for AUC dosing may appear elevated if collected in close proximity to the dose administered, this is not necessarily an indication of toxicity    Plan:  Dosing recommendations based on Bayesian software  Continue vancomycin 1000 mg IV every 24 hours  Renal labs as indicated   Vancomycin concentrations will be ordered as clinically appropriate  Pharmacy will continue to monitor patient and adjust therapy as indicated    Thank you for the consult,  Isaac Cardenas, PharmD, BCOP  Clinical Pharmacist  Contact Via [employer]          
VANCO DAILY NOTE  [facility]   Pharmacy Pharmacokinetic Monitoring Service - Vancomycin    Consulting Provider: Dr. Proctor   Indication: SSTI  Target Concentration: Goal AUC/CHANTEL 400-600 mg*hr/L  Day of Therapy: 6  Additional Antimicrobials: Pip/Tazo    Pertinent Laboratory Values:   Wt Readings from Last 1 Encounters:   06/25/24 61.3 kg (135 lb 2.3 oz)     Temp Readings from Last 1 Encounters:   06/25/24 100.4 °F (38 °C) (Oral)     Recent Labs     06/25/24  0352   CREATININE 0.52*       Estimated Creatinine Clearance: 68 mL/min (A) (based on SCr of 0.52 mg/dL (L)).    Lab Results   Component Value Date/Time    VANCORANDOM 7.8 06/23/2024 04:22 AM       MRSA Nasal Swab: N/A. Non-respiratory infection    Assessment:  Date/Time Dose Concentration AUC   6/23 0422 1000 mg q24h 7.8 445   Note: Serum concentrations collected for AUC dosing may appear elevated if collected in close proximity to the dose administered, this is not necessarily an indication of toxicity    Plan:  Dosing recommendations based on Bayesian software  Will empirically increase the dose to 1250 mg IV every 24 hours due to change in SCR, anticipated AUC/Tr of 454/10.6  Renal labs as indicated   Vancomycin concentrations will be ordered as clinically appropriate  Pharmacy will continue to monitor patient and adjust therapy as indicated    Thank you for the consult,  Burton Sapp RPH              
VANCO DAILY NOTE  [facility]   Pharmacy Pharmacokinetic Monitoring Service - Vancomycin    Consulting Provider: Dr. Proctor   Indication: SSTI  Target Concentration: Goal AUC/CHANTEL 400-600 mg*hr/L  Day of Therapy: 1  Additional Antimicrobials: Pip/Tazo    Pertinent Laboratory Values:   Wt Readings from Last 1 Encounters:   06/21/24 64.8 kg (142 lb 13.7 oz)     Temp Readings from Last 1 Encounters:   06/21/24 97.7 °F (36.5 °C)     Recent Labs     06/20/24  1819 06/20/24  2051 06/20/24  2308 06/21/24  0412   BUN 20  --   --  23   CREATININE 0.70  --   --  0.98   WBC 18.8*  --   --  13.0*   PROCAL 0.07  --   --   --    LACTA  --   --  2.0  --    LACTSEPSIS 2.2* 2.2*  --   --      Estimated Creatinine Clearance: 37 mL/min (based on SCr of 0.98 mg/dL).    No results found for: \"VANCOTROUGH\", \"VANCORANDOM\"    MRSA Nasal Swab: N/A. Non-respiratory infection    Assessment:  Date/Time Dose Concentration AUC         Note: Serum concentrations collected for AUC dosing may appear elevated if collected in close proximity to the dose administered, this is not necessarily an indication of toxicity    Plan:  Dosing recommendations based on Bayesian software  Will empirically adjust the dose to 1000 mg IV every 24 hours  Anticipated AUC of 516 and trough concentration of 14.6 at steady state  Renal labs as indicated   Vancomycin concentrations will be ordered as clinically appropriate  Pharmacy will continue to monitor patient and adjust therapy as indicated    Thank you for the consult,  Burton Sapp RPH      
VANCO DAILY NOTE  [facility]   Pharmacy Pharmacokinetic Monitoring Service - Vancomycin    Consulting Provider: Dr. Proctor   Indication: SSTI  Target Concentration: Goal AUC/CHANTEL 400-600 mg*hr/L  Day of Therapy: 0  Additional Antimicrobials: Pip/Tazo    Pertinent Laboratory Values:   Wt Readings from Last 1 Encounters:   06/20/24 64.8 kg (142 lb 13.7 oz)     Temp Readings from Last 1 Encounters:   06/21/24 98.7 °F (37.1 °C) (Oral)     Recent Labs     06/20/24  1819 06/20/24  2051 06/20/24  2308   BUN 20  --   --    CREATININE 0.70  --   --    WBC 18.8*  --   --    PROCAL 0.07  --   --    LACTA  --   --  2.0   LACTSEPSIS 2.2* 2.2*  --      Estimated Creatinine Clearance: 52 mL/min (based on SCr of 0.7 mg/dL).    No results found for: \"VANCOTROUGH\", \"VANCORANDOM\"    MRSA Nasal Swab: N/A. Non-respiratory infection    Assessment:  Date/Time Dose Concentration AUC         Note: Serum concentrations collected for AUC dosing may appear elevated if collected in close proximity to the dose administered, this is not necessarily an indication of toxicity    Plan:  Dosing recommendations based on Bayesian software  Start vancomycin 750 mg IV x 2 (total initial bolus of 1500 mg) doses followed by Vanc 1250 mg IV q24h  Anticipated AUC of 521 and trough concentration of 13 at steady state  Renal labs as indicated   Vancomycin concentrations will be ordered as clinically appropriate  Pharmacy will continue to monitor patient and adjust therapy as indicated    Thank you for the consult,  IVETH PORRAS [facility]    
Zofran 4 mg slow IV given for nausea. No emesis.   
worse from baseline.   Monitor closely.   Monitor renal function and intake and output.   Avoid nephrotoxic agents.    Losartan is on hold.       Essential hypertension  Plan:   BP was on the low side and Midodrine was started.   BP is improving now.   Patient is not on Midodrine now.       COPD (chronic obstructive pulmonary disease) ([facility])  Plan:   Patient is on room air now.   No signs of COPD exacerbation.   Monitor closely.       Mixed hyperlipidemia  Plan:   Patient is not on lipid-lowering medication.       Polymyalgia rheumatica (HCC)  Plan:   This adds to complexity of care.        Hypothyroidism  Plan:   Continue with Levothyroxine.      I have discussed the plan of care with patient.      Anticipated Discharge Arrangements:   Home    PT/OT evals ordered?  Therapy evals ordered  Diet:  ADULT DIET; Regular; 3 carb choices (45 gm/meal); Low Fat/Low Chol/High Fiber/2 gm Na; No Added Salt (3-4 gm)  VTE prophylaxis: Lovenox  Code status: DNR      Non-peripheral Lines and Tubes (if present):          Telemetry (if present):  Cardiac/Telemetry Monitor On: No        Hospital Problems:  Principal Problem:    Cellulitis of left lower extremity  Active Problems:    Chronic kidney disease, stage 3b (HCC)    Essential hypertension    COPD (chronic obstructive pulmonary disease) (HCC)    Mixed hyperlipidemia    Polymyalgia rheumatica (HCC)    Hypothyroidism    Sepsis (HCC)  Resolved Problems:    * No resolved hospital problems. *      Objective:   Patient Vitals for the past 24 hrs:   Temp Pulse Resp BP SpO2   06/23/24 0829 98.8 °F (37.1 °C) 74 18 (!) 132/56 92 %   06/23/24 0425 99.1 °F (37.3 °C) 68 18 (!) 147/54 91 %   06/23/24 0019 98.6 °F (37 °C) 64 18 (!) 127/50 96 %   06/22/24 2050 99.6 °F (37.6 °C) -- -- -- --   06/22/24 2017 (!) 100.8 °F (38.2 °C) 73 17 (!) 132/55 91 %   06/22/24 1600 99.1 °F (37.3 °C) -- -- -- --   06/22/24 1515 99.5 °F (37.5 °C) 66 18 (!) 121/50 96 %   06/22/24 1134 98.6 °F (37 °C) 71 16 (!) 
to complete functional transfers/mobility and functional tasks  Neuromuscular Re-education (13 Minutes): Patient participated in neuromuscular re-education including functional reaching, weight shifting, postural training, standing tolerance activity , and sitting balance activity   with minimal assistance, verbal cues, tactile cues, visual cues, education, and adaptive equipment to improve sitting balance, standing balance, proprioception, posture, coordination, static balance, and dynamic balance in order to prepare for functional task, prepare for functional transfer, increase safety awareness, and prepare for self care..   Self Care (14 minutes): Patient participated in upper body dressing, lower body dressing, and grooming ADLs in unsupported sitting and standing with minimal visual, verbal, and tactile cueing to increase independence, decrease assistance required, increase activity tolerance, and increase safety awareness. Patient also participated in bed mobility, functional mobility, functional transfer, and energy conservation training to increase independence, decrease assistance required, increase activity tolerance, and increase safety awareness.     TREATMENT GRID:  N/A    AFTER TREATMENT PRECAUTIONS: Alarm Activated, Bed/Chair Locked, Call light within reach, Chair, Needs within reach, RN notified, and Visitors at bedside    INTERDISCIPLINARY COLLABORATION:  RN/ PCT, PT/ PTA, and OT/ FINLEY    EDUCATION:       TOTAL TREATMENT DURATION AND TIME:  Time In: 1357  Time Out: 1424  Minutes: 27    TRACY CHAKRABORTY, OT

## 2024-06-26 NOTE — DISCHARGE SUMMARY
11/07/2019 09:35 AM    BACTERIA 0 11/07/2019 09:35 AM        Microbiology:  Results       Procedure Component Value Units Date/Time    Culture, Wound [[id number]]  (Abnormal)  (Susceptibility) Collected: 06/20/24 1955    Order Status: Completed Specimen: Foot Updated: 06/25/24 0822     Special Requests NO SPECIAL REQUESTS        Gram Stain FEW WBCS SEEN               MODERATE Gram positive cocci            MANY Gram negative rods        Culture       MODERATE Serratia marcescens                  MODERATE Methicillin Resistant Staphylococcus aureus                  MODERATE MIXED SKIN JOSSIE ISOLATED          Susceptibility        Serratia marcescens      BACTERIAL SUSCEPTIBILITY PANEL CHANTEL      cefepime <=0.12 ug/mL Sensitive      cefTRIAXone <=0.25 ug/mL Sensitive      ciprofloxacin <=0.06 ug/mL Sensitive      gentamicin <=1 ug/mL Sensitive      levofloxacin <=0.12 ug/mL Sensitive      meropenem <=0.25 ug/mL Sensitive      trimethoprim-sulfamethoxazole <=20 ug/mL Sensitive                       Susceptibility        Methicillin-Resistant Staphylococcus aureus      BACTERIAL SUSCEPTIBILITY PANEL CHANTEL      clindamycin >=8 ug/mL Resistant      oxacillin >=4 ug/mL Resistant      rifampin <=0.5 ug/mL Sensitive  [1]       tetracycline <=1 ug/mL Sensitive      trimethoprim-sulfamethoxazole <=10 ug/mL Sensitive      vancomycin 1 ug/mL Sensitive                   [1]  Rifampin is not to be used for mono-therapy.                   Blood Culture 1 [[id number]] Collected: 06/20/24 1830    Order Status: Completed Specimen: Blood Updated: 06/26/24 0643     Special Requests RIGHT ANTECUBITAL        Culture NO GROWTH 5 DAYS       Blood Culture 2 [[id number]] Collected: 06/20/24 1830    Order Status: Completed Specimen: Blood Updated: 06/26/24 0643     Special Requests RIGHT FOREARM        Culture NO GROWTH 5 DAYS       Blood Culture 1 [[id number]]     Order Status: Canceled Specimen: Blood     Blood Culture 2 [[id number]]     Order  X Size Of Lesion In Cm (Optional): 0.8

## 2024-06-26 NOTE — CARE COORDINATION
06/26/24 1513   Services At/After Discharge   Transition of Care Consult (CM Consult) Home Health   Internal Home Health No   Services At/After Discharge Home Health    Resource Information Provided? No   Mode of Transport at Discharge Other (see comment)  (family)   Hospital Transport Time of Discharge 1513   Confirm Follow Up Transport Family   Condition of Participation: Discharge Planning   The Plan for Transition of Care is related to the following treatment goals: patient is discharging with HH   The Patient and/or Patient Representative was provided with a Choice of Provider? Patient   The Patient and/Or Patient Representative agree with the Discharge Plan? Yes   Freedom of Choice list was provided with basic dialogue that supports the patient's individualized plan of care/goals, treatment preferences, and shares the quality data associated with the providers?  Yes     Patient's son is transporting patient home. Ann DOWLING RN/PT/OT has been ordered. Patient was given a walker ordered from [employer] as recommended by PT.

## 2024-07-03 ENCOUNTER — OFFICE VISIT (OUTPATIENT)
Dept: INTERNAL MEDICINE CLINIC | Facility: CLINIC | Age: 84
End: 2024-07-03

## 2024-07-03 VITALS
DIASTOLIC BLOOD PRESSURE: 80 MMHG | SYSTOLIC BLOOD PRESSURE: 112 MMHG | WEIGHT: 134 LBS | BODY MASS INDEX: 24.66 KG/M2 | HEIGHT: 62 IN

## 2024-07-03 DIAGNOSIS — L08.9 LEFT FOOT INFECTION: ICD-10-CM

## 2024-07-03 DIAGNOSIS — Z09 HOSPITAL DISCHARGE FOLLOW-UP: ICD-10-CM

## 2024-07-03 DIAGNOSIS — I10 ESSENTIAL HYPERTENSION: Primary | ICD-10-CM

## 2024-07-03 DIAGNOSIS — L03.116 LEFT LEG CELLULITIS: ICD-10-CM

## 2024-07-03 RX ORDER — CIPROFLOXACIN 250 MG/1
250 TABLET, FILM COATED ORAL 2 TIMES DAILY
Qty: 14 TABLET | Refills: 0 | Status: SHIPPED | OUTPATIENT
Start: 2024-07-03 | End: 2024-07-13

## 2024-07-03 NOTE — PROGRESS NOTES
HPI: Melania Dave (: 1940)    Laceration top of left foot on 24 requiring sutures  Then developed cellulitis of left foot and leg and culture with Serratia and MRSA  Has been on IV abx for a week then discharged with Duricef and Doxycycline    She also had an incidence of hypotension per pt due to Morphine so she wants it listed as an allergy    Her left leg is still hot and swollen and foot as well      Problem List:  Patient Active Problem List   Diagnosis   • Palpitations   • Esophageal reflux   • Murmur   • IBS (irritable bowel syndrome)   • Insomnia, unspecified   • Gastritis   • ASCVD (arteriosclerotic cardiovascular disease)   • Chronic left shoulder pain   • Leg cramps   • Spinal stenosis of lumbar region with neurogenic claudication   • Rotator cuff tear, left   • Varicose veins of both legs with edema   • Fatigue   • Chronic kidney disease, stage 3b ([facility])   • Essential hypertension   • COPD (chronic obstructive pulmonary disease) ([facility])   • Vitamin D deficiency   • Osteoporosis, post-menopausal   • Chronic bilateral low back pain with bilateral sciatica   • Chronic chest wall pain   • Nephrolithiasis   • Mixed hyperlipidemia   • Polymyalgia rheumatica ([facility])   • Difficulty swallowing   • Hypothyroidism   • Fluid retention in legs   • Weakness of right leg   • Skin lesion of left arm   • Skin lesion of right upper extremity   • Skin atrophy   • Atrophia cutis senilis   • Sun-damaged skin   • History of skin cancer   • Cigarette smoker   • Long-term corticosteroid use   • Adverse effect of statin   • At high risk for injury related to fall   • Bladder prolapse, female, acquired   • Cellulitis of left lower extremity   • Sepsis ([facility])       History:  Past Medical History:   Diagnosis Date   • Adverse effect of anesthesia     hypotension with anesthesia   • Back pain     lower back   • CAD (coronary artery disease) 2014    Followed by [facility].   • COPD (chronic obstructive

## 2024-07-03 NOTE — PROGRESS NOTES
Post-Discharge Transitional Care Follow Up      Melania Dave   YOB: 1940    Date of Office Visit:  7/3/2024  Date of Hospital Admission: 6/20/24  Date of Hospital Discharge: 6/26/24  Readmission Risk Score (high >=14%. Medium >=10%):Readmission Risk Score: 17.1      Care management risk score Rising risk (score 2-5) and Complex Care (Scores >=6): No Risk Score On File     Non face to face  following discharge, date last encounter closed (first attempt may have been earlier): *No documented post hospital discharge outreach found in the last 14 days     Call initiated 2 business days of discharge: *No response recorded in the last 14 days     Essential hypertension  Left foot infection  Left leg cellulitis      Medical Decision Making: moderate complexity  No follow-ups on file.           Subjective:   HPI    Inpatient course: Discharge summary reviewed- see chart.    Interval history/Current status: has not improved  Still with wound of foot and cellulitis of left leg    Patient Active Problem List   Diagnosis    Palpitations    Esophageal reflux    Murmur    IBS (irritable bowel syndrome)    Insomnia, unspecified    Gastritis    ASCVD (arteriosclerotic cardiovascular disease)    Chronic left shoulder pain    Leg cramps    Spinal stenosis of lumbar region with neurogenic claudication    Rotator cuff tear, left    Varicose veins of both legs with edema    Fatigue    Chronic kidney disease, stage 3b (HCC)    Essential hypertension    COPD (chronic obstructive pulmonary disease) (HCC)    Vitamin D deficiency    Osteoporosis, post-menopausal    Chronic bilateral low back pain with bilateral sciatica    Chronic chest wall pain    Nephrolithiasis    Mixed hyperlipidemia    Polymyalgia rheumatica (HCC)    Difficulty swallowing    Hypothyroidism    Fluid retention in legs    Weakness of right leg    Skin lesion of left arm    Skin lesion of right upper extremity    Skin atrophy    Atrophia cutis

## 2024-07-05 ENCOUNTER — NURSE ONLY (OUTPATIENT)
Dept: INTERNAL MEDICINE CLINIC | Facility: CLINIC | Age: 84
End: 2024-07-05

## 2024-07-05 VITALS — WEIGHT: 134 LBS | HEIGHT: 62 IN | BODY MASS INDEX: 24.66 KG/M2

## 2024-07-05 DIAGNOSIS — L03.116 LEFT LEG CELLULITIS: ICD-10-CM

## 2024-07-05 DIAGNOSIS — L08.9 LEFT FOOT INFECTION: Primary | ICD-10-CM

## 2024-07-05 DIAGNOSIS — L08.9 LEFT FOOT INFECTION: ICD-10-CM

## 2024-07-05 NOTE — PROGRESS NOTES
Pt came in for a nursing visit for a wound check on left foot. Wound cleansed with H202, silvadene cream, xeroform and telfa gauze applied. Wound wrapped with conforming gauze. Pt tolerated procedure well.

## 2024-07-08 ENCOUNTER — NURSE ONLY (OUTPATIENT)
Dept: INTERNAL MEDICINE CLINIC | Facility: CLINIC | Age: 84
End: 2024-07-08

## 2024-07-08 ENCOUNTER — CARE COORDINATION (OUTPATIENT)
Dept: CARE COORDINATION | Facility: CLINIC | Age: 84
End: 2024-07-08

## 2024-07-08 VITALS — BODY MASS INDEX: 24.66 KG/M2 | HEIGHT: 62 IN | WEIGHT: 134 LBS

## 2024-07-08 DIAGNOSIS — L08.9 LEFT FOOT INFECTION: Primary | ICD-10-CM

## 2024-07-08 LAB
BACTERIA SPEC CULT: ABNORMAL
GRAM STN SPEC: ABNORMAL
GRAM STN SPEC: ABNORMAL
SERVICE CMNT-IMP: ABNORMAL

## 2024-07-08 RX ORDER — CIPROFLOXACIN 250 MG/1
250 TABLET, FILM COATED ORAL 2 TIMES DAILY
Qty: 20 TABLET | Refills: 0 | Status: SHIPPED | OUTPATIENT
Start: 2024-07-08 | End: 2024-07-18

## 2024-07-08 RX ORDER — GABAPENTIN 100 MG/1
100 CAPSULE ORAL NIGHTLY
Qty: 90 CAPSULE | Refills: 2 | Status: SHIPPED | OUTPATIENT
Start: 2024-07-08 | End: 2025-04-04

## 2024-07-08 NOTE — PROGRESS NOTES
Pt came in for a wound check to left foot. Wound cleaned with H202, Silver Gel, silvadene cream, Xeroform gauze and a non stick Telfa applied. Foot wrapped with a cotton gauze and pt tolerated procedure well.

## 2024-07-08 NOTE — CARE COORDINATION
Ambulatory Care Coordination Note     2024 7:59 AM     Patient Current Location:  [place]     This patient was received as a referral from Ambulatory Care Manager .    ACM contacted the patient by telephone. Verified name and  with patient as identifiers. Provided introduction to self, and explanation of the ACM role.   Patient accepted care management services at this time.          ACM: Hien Moncada RN     Challenges to be reviewed by the provider   Additional needs identified to be addressed with provider No  Pcp is aware of discharge from hospital               Method of communication with provider: none.    Care Summary Note: patient is known to ccm. Reviewed with patient discharge summary. Reviewed new medications with patient and patient verbalized understanding. Patient has pcp appointment today. Patient states she is taking abt as directed. Patient is getting dressing changes by pcp. Reviewed with patient importance of eating and drinking well. Patient agreeable. Reviewed s&s of infection to patient and when to seek medical attention. Patient states no questions or concerns. [facility] discussed that I would outreach next week. Patient agreeable.        Assessments Completed:       2024     7:58 AM   Amb Fall Risk Assessment and TUG Test   Do you feel unsteady or are you worried about falling?  no   2 or more falls in past year? no   Fall with injury in past year? no    ,   Hypertension - Encounter Level          , and   General Assessment    Do you have any symptoms that are causing concern?: No          Medications Reviewed:   Completed during this call    Advance Care Planning:   Reviewed and current     Care Planning:   Education Documentation  Lifestyle Changes/Goal Setting, taught by Hien Moncada, RN at 2024  7:58 AM.  Learner: Patient  Readiness: Acceptance  Method: Explanation  Response: Verbalizes Understanding    General medication information, taught by Hien Moncada, RN

## 2024-07-10 ENCOUNTER — OFFICE VISIT (OUTPATIENT)
Dept: INTERNAL MEDICINE CLINIC | Facility: CLINIC | Age: 84
End: 2024-07-10

## 2024-07-10 DIAGNOSIS — G62.9 NEUROPATHY: ICD-10-CM

## 2024-07-10 DIAGNOSIS — S91.302S UNSPECIFIED OPEN WOUND, LEFT FOOT, SEQUELA: Primary | ICD-10-CM

## 2024-07-10 DIAGNOSIS — M79.675 PAIN AROUND TOENAIL, LEFT FOOT: ICD-10-CM

## 2024-07-10 DIAGNOSIS — S99.829S: ICD-10-CM

## 2024-07-10 DIAGNOSIS — G60.3 IDIOPATHIC PROGRESSIVE NEUROPATHY: ICD-10-CM

## 2024-07-10 NOTE — PROGRESS NOTES
(NEURONTIN) 100 MG capsule Take 1 capsule by mouth nightly for 270 days. Intended supply: 90 days 90 capsule 2    ciprofloxacin (CIPRO) 250 MG tablet Take 1 tablet by mouth 2 times daily for 10 days 20 tablet 0    losartan (COZAAR) 25 MG tablet Take 1 tablet by mouth daily (Patient taking differently: Take 2 tablets by mouth in the morning and at bedtime) 30 tablet 0    allopurinol (ZYLOPRIM) 100 MG tablet Take 1 tablet by mouth daily 90 tablet 1    ipratropium 0.5 mg-albuterol 2.5 mg (DUONEB) 0.5-2.5 (3) MG/3ML SOLN nebulizer solution Inhale 3 mLs into the lungs every 6 hours as needed for Shortness of Breath 360 mL 3    estradiol (ESTRACE) 0.5 MG tablet Take 1 tablet by mouth daily 90 tablet 2    predniSONE (DELTASONE) 5 MG tablet TAKE 1 TABLET BY MOUTH TWICE A  tablet 2    omeprazole (PRILOSEC) 40 MG delayed release capsule Take 1 capsule by mouth daily      levothyroxine (SYNTHROID) 150 MCG tablet TAKE 1 TABLET BY MOUTH EVERY DAY BEFORE BREAKFAST 90 tablet 2    ondansetron (ZOFRAN) 4 MG tablet Take 1 tablet by mouth 3 times daily as needed for Nausea or Vomiting 15 tablet 0    MAGNESIUM PO Take by mouth      Calcium Carb-Cholecalciferol (CALCIUM 1000 + D PO) Take by mouth      silver sulfADIAZINE (SILVADENE) 1 % cream Apply topically daily. 400 g 0    aspirin 81 MG EC tablet Take 1 tablet by mouth      vitamin D 25 MCG (1000 UT) CAPS Take 1 capsule by mouth every evening      nitroGLYCERIN (NITROSTAT) 0.4 MG SL tablet Place 1 tablet under the tongue       No current facility-administered medications for this visit.       Review of Systems:  Review of Systems   Skin:  Positive for wound.   All other systems reviewed and are negative.    Vitals:  There were no vitals taken for this visit.    Physical Exam:  Physical Exam  Vitals reviewed.   Constitutional:       Appearance: Normal appearance.   HENT:      Head: Normocephalic and atraumatic.   Musculoskeletal:         General: Normal range of motion.

## 2024-07-12 ENCOUNTER — NURSE ONLY (OUTPATIENT)
Dept: INTERNAL MEDICINE CLINIC | Facility: CLINIC | Age: 84
End: 2024-07-12

## 2024-07-12 DIAGNOSIS — L08.9 LEFT FOOT INFECTION: Primary | ICD-10-CM

## 2024-07-12 NOTE — PROGRESS NOTES
Pt came in for a nursing visit for left foot wound check. Edges are becoming approximate and no redness noted. Wound cleansed with H202. Silver gel, Silvadene cream and non stick Telfa applied. Left foot wrapped with non conforming gauze. Pt tolerated procedure.

## 2024-07-15 ENCOUNTER — CARE COORDINATION (OUTPATIENT)
Dept: CARE COORDINATION | Facility: CLINIC | Age: 84
End: 2024-07-15

## 2024-07-15 ENCOUNTER — NURSE ONLY (OUTPATIENT)
Dept: INTERNAL MEDICINE CLINIC | Facility: CLINIC | Age: 84
End: 2024-07-15
Payer: MEDICARE

## 2024-07-15 DIAGNOSIS — Z51.89 VISIT FOR WOUND CHECK: Primary | ICD-10-CM

## 2024-07-15 PROCEDURE — 99211 OFF/OP EST MAY X REQ PHY/QHP: CPT | Performed by: INTERNAL MEDICINE

## 2024-07-15 NOTE — PROGRESS NOTES
Patient presented in office today for wound check on left foot.  Wound evaluated by Dr. Farnsworth.  Wound was cleaned. Reapplied Silvadene and xeroform. Wrapped wound and she will return on Wed for re-evaluation.

## 2024-07-15 NOTE — CARE COORDINATION
Ambulatory Care Coordination Note     7/15/2024 7:53 AM     Patient Current Location:  [place]     ACM contacted the patient by telephone. Verified name and  with patient as identifiers.         ACM: Hien Moncada RN     Challenges to be reviewed by the provider   Additional needs identified to be addressed with provider No  none               Method of communication with provider: none.    Care Summary Note: ccm outreached to patient. Patient states she is doing well at this time. Patient states she is going to pcp 3x weekly for dressing changes. Patient states wound is healing well. Patient states no questions or concerns. [facility] discussed that I would outreach next week. Patient agreeable.          Assessments Completed:   No changes since last call    Medications Reviewed:   Patient denies any changes with medications and reports taking all medications as prescribed.    Advance Care Planning:   Not reviewed during this call     Care Planning:    Goals Addressed                   This Visit's Progress     Conditions and Symptoms   On track     I will schedule office visits, as directed by my provider.  I will notify my provider of any symptoms that indicate a worsening of my condition.    Barriers: none  Plan for overcoming my barriers: N/A  Confidence: 9/10  Anticipated Goal Completion Date: 24                 PCP/Specialist follow up:   Future Appointments         Provider Specialty Dept Phone    7/15/2024 11:00 AM SIM NURSE ONLY Internal Medicine [phone]    10/15/2024 2:00 PM Loraine Farnsworth MD Internal Medicine [phone]    2024 11:30 AM Abhishek Cunningham MD Cardiology [phone]            Follow Up:   Plan for next ACM outreach in approximately 1 week to complete:  - goal progression  - education .   Patient  is agreeable to this plan.

## 2024-07-16 ENCOUNTER — HOSPITAL ENCOUNTER (EMERGENCY)
Age: 84
Discharge: HOME | End: 2024-07-16
Attending: GENERAL PRACTICE
Payer: MEDICARE

## 2024-07-16 VITALS
BODY MASS INDEX: 22.2 KG/M2 | HEART RATE: 66 BPM | OXYGEN SATURATION: 98 % | DIASTOLIC BLOOD PRESSURE: 66 MMHG | SYSTOLIC BLOOD PRESSURE: 173 MMHG | RESPIRATION RATE: 14 BRPM | WEIGHT: 130 LBS | HEIGHT: 64 IN | TEMPERATURE: 98 F

## 2024-07-16 DIAGNOSIS — T14.8XXA MULTIPLE SKIN TEARS: ICD-10-CM
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
## 2024-07-16 LAB
BASOPHILS # BLD: 0.1 K/UL (ref 0–0.2)
BASOPHILS NFR BLD: 1 % (ref 0–2)
DIFFERENTIAL METHOD BLD: ABNORMAL
EOSINOPHIL # BLD: 0.1 K/UL (ref 0–0.8)
EOSINOPHIL NFR BLD: 1 % (ref 0.5–7.8)
ERYTHROCYTE [DISTWIDTH] IN BLOOD BY AUTOMATED COUNT: 15.9 % (ref 11.9–14.6)
HCT VFR BLD AUTO: 35.2 % (ref 35.8–46.3)
HGB BLD-MCNC: 10.8 G/DL (ref 11.7–15.4)
IMM GRANULOCYTES # BLD AUTO: 0.2 K/UL (ref 0–0.5)
IMM GRANULOCYTES NFR BLD AUTO: 1 % (ref 0–5)
LYMPHOCYTES # BLD: 3.3 K/UL (ref 0.5–4.6)
LYMPHOCYTES NFR BLD: 22 % (ref 13–44)
MCH RBC QN AUTO: 29.5 PG (ref 26.1–32.9)
MCHC RBC AUTO-ENTMCNC: 30.7 G/DL (ref 31.4–35)
MCV RBC AUTO: 96.2 FL (ref 82–102)
MONOCYTES # BLD: 1.3 K/UL (ref 0.1–1.3)
MONOCYTES NFR BLD: 9 % (ref 4–12)
NEUTS SEG # BLD: 10.3 K/UL (ref 1.7–8.2)
NEUTS SEG NFR BLD: 67 % (ref 43–78)
NRBC # BLD: 0 K/UL (ref 0–0.2)
PLATELET # BLD AUTO: 281 K/UL (ref 150–450)
PMV BLD AUTO: 9.7 FL (ref 9.4–12.3)
RBC # BLD AUTO: 3.66 M/UL (ref 4.05–5.2)
WBC # BLD AUTO: 15.3 K/UL (ref 4.3–11.1)

## 2024-07-16 PROCEDURE — 99284 EMERGENCY DEPT VISIT MOD MDM: CPT

## 2024-07-16 PROCEDURE — 85025 COMPLETE CBC W/AUTO DIFF WBC: CPT

## 2024-07-16 PROCEDURE — 6360000002 HC RX W HCPCS: Performed by: GENERAL PRACTICE

## 2024-07-16 PROCEDURE — 96374 THER/PROPH/DIAG INJ IV PUSH: CPT

## 2024-07-16 RX ORDER — HYDROCODONE BITARTRATE AND ACETAMINOPHEN 5; 325 MG/1; MG/1
1 TABLET ORAL EVERY 6 HOURS PRN
Qty: 10 TABLET | Refills: 0 | Status: SHIPPED | OUTPATIENT
Start: 2024-07-16 | End: 2024-07-19

## 2024-07-16 RX ORDER — HYDROMORPHONE HYDROCHLORIDE 1 MG/ML
0.5 INJECTION, SOLUTION INTRAMUSCULAR; INTRAVENOUS; SUBCUTANEOUS
Status: COMPLETED | OUTPATIENT
Start: 2024-07-16 | End: 2024-07-16

## 2024-07-16 RX ADMIN — HYDROMORPHONE HYDROCHLORIDE 0.5 MG: 1 INJECTION, SOLUTION INTRAMUSCULAR; INTRAVENOUS; SUBCUTANEOUS at 16:45

## 2024-07-16 ASSESSMENT — PAIN DESCRIPTION - LOCATION: LOCATION: GENERALIZED

## 2024-07-16 ASSESSMENT — PAIN SCALES - GENERAL
PAINLEVEL_OUTOF10: 10
PAINLEVEL_OUTOF10: 8

## 2024-07-16 NOTE — ED TRIAGE NOTES
Pt states she tripped over her dog in her kitche, pt with laceration/abrasions to bilateral knees and bilateral forearms. Pt denies hitting her head or LOC.    Beckie Bill RN

## 2024-07-16 NOTE — ED PROVIDER NOTES
Emergency Department Provider Note       PCP: Loraine Farnsworth MD   Age: 84 y.o.   Sex: female     DISPOSITION Decision To Discharge 07/16/2024 05:31:48 PM       ICD-10-CM    1. Fall, initial encounter  W19.XXXA HYDROcodone-acetaminophen (NORCO) 5-325 MG per tablet      2. Multiple skin tears  T14.8XXA HYDROcodone-acetaminophen (NORCO) 5-325 MG per tablet          Medical Decision Making     Patient presents with mechanical fall.  There is nothing to suggest intracranial hemorrhage.  Patient not taking blood thinners.  Patient has a large skin tear/degloving to the dorsum of the right forearm.  This is just the epidermal layer.  At this time, patient will be placed with nonstick dressing.  She will follow-up with her primary for continued dressing changes.  Pain medicine will be prescribed.     1 acute complicated illness or injury.  Prescription drug management performed.  Shared medical decision making was utilized in creating the patients health plan today.    I independently ordered and reviewed each unique test.               Exclusion criteria - the patient is NOT to be included for SEP-1 Core Measure due to: Infection is not suspected       History     Patient presents with a mechanical fall.  Patient tripped over her dog.  Landed majority on her arms and legs.  Has abrasions and skin tears on her bilateral knees and proximal part of her bilateral lower legs.  Has large skin tear according to her to the right forearm.  Also some to the left forearm.  Moving all of her extremities okay.  Does not think she hit her head.  No blood thinners.        ROS     Review of Systems   All other systems reviewed and are negative.       Physical Exam     Vitals signs and nursing note reviewed:  Vitals:    07/16/24 1545   BP: 129/62   Pulse: (!) 104   Resp: 16   Temp: 98 °F (36.7 °C)   SpO2: 98%   Weight: 59 kg (130 lb)   Height: 1.626 m (5' 4\")      Physical Exam  Constitutional:       General: She is not in acute

## 2024-07-16 NOTE — ED NOTES
I have reviewed discharge instructions with the patient.  The patient verbalized understanding.    Patient left ED via Discharge Method: ambulatory to Home with son.     Opportunity for questions and clarification provided.     Patient given 1 scripts.

## 2024-07-17 ENCOUNTER — OFFICE VISIT (OUTPATIENT)
Dept: INTERNAL MEDICINE CLINIC | Facility: CLINIC | Age: 84
End: 2024-07-17

## 2024-07-17 DIAGNOSIS — R07.89 LEFT-SIDED CHEST WALL PAIN: ICD-10-CM

## 2024-07-17 DIAGNOSIS — S51.812A ISTAP TYPE 2 SKIN TEAR OF LEFT FOREARM: ICD-10-CM

## 2024-07-17 DIAGNOSIS — S51.811A ISTAP TYPE 3 SKIN TEAR OF RIGHT FOREARM: Primary | ICD-10-CM
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
## 2024-07-17 DIAGNOSIS — S81.819D: ICD-10-CM

## 2024-07-17 DIAGNOSIS — S61.411A ISTAP TYPE 2 SKIN TEAR OF RIGHT HAND: ICD-10-CM

## 2024-07-17 DIAGNOSIS — S41.112D SKIN TEAR OF LEFT UPPER ARM WITHOUT COMPLICATION, SUBSEQUENT ENCOUNTER: ICD-10-CM

## 2024-07-17 RX ORDER — CEPHALEXIN 500 MG/1
500 CAPSULE ORAL 2 TIMES DAILY
Qty: 30 CAPSULE | Refills: 0 | Status: SHIPPED | OUTPATIENT
Start: 2024-07-17 | End: 2024-08-01

## 2024-07-17 NOTE — PROGRESS NOTES
HPI: Melania Dave (: 1940)    Pt tripped in her kitchen over the dog that she did not see that was lying in the floor  Had her cane but the fall caused her to pitch forward hitting the counter top and counters    She bled profusely and was seen in the ER    Now with hematoma of her left upper arm and chest wall and chest wall pain    Lost the skin on her entire anterior forearm and multiple tears on her extremities      Problem List:  Patient Active Problem List   Diagnosis    Palpitations    Esophageal reflux    Murmur    IBS (irritable bowel syndrome)    Insomnia, unspecified    Gastritis    ASCVD (arteriosclerotic cardiovascular disease)    Chronic left shoulder pain    Leg cramps    Spinal stenosis of lumbar region with neurogenic claudication    Rotator cuff tear, left    Varicose veins of both legs with edema    Fatigue    Chronic kidney disease, stage 3b (Formerly [facility])    Essential hypertension    COPD (chronic obstructive pulmonary disease) (Formerly [facility])    Vitamin D deficiency    Osteoporosis, post-menopausal    Chronic bilateral low back pain with bilateral sciatica    Chronic chest wall pain    Nephrolithiasis    Mixed hyperlipidemia    Polymyalgia rheumatica (Formerly [facility])    Difficulty swallowing    Hypothyroidism    Fluid retention in legs    Weakness of right leg    Skin lesion of left arm    Skin lesion of right upper extremity    Skin atrophy    Atrophia cutis senilis    Sun-damaged skin    History of skin cancer    Cigarette smoker    Long-term corticosteroid use    Adverse effect of statin    At high risk for injury related to fall    Bladder prolapse, female, acquired    Cellulitis of left lower extremity    Sepsis (Formerly [facility])       History:  Past Medical History:   Diagnosis Date    Adverse effect of anesthesia     hypotension with anesthesia    Back pain     lower back    CAD (coronary artery disease) 2014    Followed by [facility]    COPD (chronic obstructive pulmonary disease) (Formerly [facility])     managed

## 2024-07-18 ENCOUNTER — HOSPITAL ENCOUNTER (OUTPATIENT)
Dept: WOUND CARE | Age: 84
Discharge: HOME | End: 2024-07-18
Payer: MEDICARE

## 2024-07-18 VITALS
HEART RATE: 83 BPM | SYSTOLIC BLOOD PRESSURE: 128 MMHG | OXYGEN SATURATION: 97 % | DIASTOLIC BLOOD PRESSURE: 52 MMHG | HEIGHT: 64 IN | WEIGHT: 134 LBS | RESPIRATION RATE: 18 BRPM | BODY MASS INDEX: 22.88 KG/M2 | TEMPERATURE: 98.2 F

## 2024-07-18 PROCEDURE — 99213 OFFICE O/P EST LOW 20 MIN: CPT

## 2024-07-18 ASSESSMENT — PAIN SCALES - GENERAL: PAINLEVEL_OUTOF10: 8

## 2024-07-18 NOTE — FLOWSHEET NOTE
Cleansed with saline;Vashe   Dressing/Treatment Xeroform;ABD;Roll gauze;Tape/Soft cloth adhesive tape   Wound Length (cm) 8 cm   Wound Width (cm) 8.5 cm   Wound Depth (cm) 0.1 cm   Wound Surface Area (cm^2) 68 cm^2   Wound Volume (cm^3) 6.8 cm^3   Wound Assessment Pink/red   Drainage Amount Moderate (25-50%)   Drainage Description Serosanguinous   Odor None   Yolanda-wound Assessment Fragile   Margins Defined edges   Wound Thickness Description not for Pressure Injury Partial thickness   Wound 07/18/24 Pretibial Right # 3   Date First Assessed/Time First Assessed: 07/18/24 1538   Present on Original Admission: Yes  Wound Approximate Age at First Assessment (Weeks): 1 weeks  Primary Wound Type: Skin Tear  Location: Pretibial  Wound Location Orientation: Right  Wound Descr...   Wound Image    Wound Etiology Skin Tear   Dressing Status Intact   Wound Cleansed Cleansed with saline;Vashe   Dressing/Treatment ABD;Xeroform;Tape/Soft cloth adhesive tape;Roll gauze   Wound Length (cm) 6.5 cm   Wound Width (cm) 3.2 cm   Wound Depth (cm) 0.1 cm   Wound Surface Area (cm^2) 20.8 cm^2   Wound Volume (cm^3) 2.08 cm^3   Wound Assessment Pink/red   Drainage Amount Moderate (25-50%)   Drainage Description Serosanguinous   Odor None   Yolanda-wound Assessment Fragile   Margins Defined edges   Wound Thickness Description not for Pressure Injury Partial thickness   Wound 07/18/24 Knee Left;Anterior # 4   Date First Assessed/Time First Assessed: 07/18/24 1538   Present on Original Admission: Yes  Wound Approximate Age at First Assessment (Weeks): 1 weeks  Primary Wound Type: Skin Tear  Location: Knee  Wound Location Orientation: Left;Anterior  Wound De...   Wound Image    Wound Etiology Skin Tear   Dressing Status Intact   Wound Cleansed Cleansed with saline;Vashe   Dressing/Treatment ABD;Xeroform;Tape/Soft cloth adhesive tape;Roll gauze   Wound Length (cm) 3 cm   Wound Width (cm) 6.5 cm   Wound Depth (cm) 0.1 cm   Wound Surface Area (cm^2)

## 2024-07-18 NOTE — WOUND CARE
Discharge Instructions for  [facility]  [address] [place] [address]  [address] [place] [address]  Phone [phone]   Fax [phone]      NAME:  Melania Dave  YOB: 1940  MEDICAL RECORD NUMBER:  [id number]  DATE:  7/18/2024    Return Appointment:   1 week with Parag Quezada DO      Instructions: RUE, LUE, RLE, LLE, & left dorsal foot.  Cleanse with normal saline.  Vashe Wound Solution soak placed on wound bed for a minimum of 60 seconds prior to dressing application.   Apply xeroform to all wound beds.  Cover with gauze/ABD.  Secure with roll gauze and netting material.  May place foam adhesive border over xeroform on left foot.  Apply tubigrip size F to left lower extremity.    [facility] ([phone]) (fax# [phone]) to change dressings 3 times a week and prn as needed.  Protein 100 grams daily. Suggest 2 30gram shakes daily.     Should you experience increased redness, swelling, pain, foul odor, size of wound(s), or have a temperature over 101 degrees please contact the Wound Healing Center at [phone] or if after hours contact your primary care physician or go to the hospital emergency department.    PLEASE NOTE: IF YOU ARE UNABLE TO OBTAIN WOUND SUPPLIES, CONTINUE TO USE THE SUPPLIES YOU HAVE AVAILABLE UNTIL YOU ARE ABLE TO REACH US. IT IS MOST IMPORTANT TO KEEP THE WOUND COVERED AT ALL TIMES.    Electronically signed Hunter Wilkes RN on 7/18/2024 at 2:36 PM

## 2024-07-18 NOTE — DISCHARGE INSTRUCTIONS
Instructions: RUE, LUE, RLE, LLE, & left dorsal foot.  Cleanse with normal saline.  Vashe Wound Solution soak placed on wound bed for a minimum of 60 seconds prior to dressing application.   Apply xeroform to all wound beds.  Cover with gauze/ABD.  Secure with roll gauze and netting material.  May place foam adhesive border over xeroform on left foot.  Apply tubigrip to left lower extremity.     [facility] ([phone]) to change dressings 3 times a week and prn as needed.  Protein 100 grams daily. Suggest 2 30gram shakes daily.

## 2024-07-22 ENCOUNTER — CARE COORDINATION (OUTPATIENT)
Dept: CARE COORDINATION | Facility: CLINIC | Age: 84
End: 2024-07-22

## 2024-07-22 NOTE — CARE COORDINATION
Ambulatory Care Coordination Note     2024 7:50 AM     Patient Current Location:  [place]     ACM contacted the patient by telephone. Verified name and  with patient as identifiers.         ACM: Hien Moncada RN     Challenges to be reviewed by the provider   Additional needs identified to be addressed with provider No  none               Method of communication with provider: none.    Care Summary Note: ccm outreached to patient. Patient states she had a bad fall and has multiple skin tears with skin loss on BUE and BLE. Reviewed with patient discharge summary. Patient verbalized understanding. Patient will have HH to come out for dressing changes. Patient is now being seen by wound clinic also. Patient is currently on abt therapy. Pt is tolerating well. Patient was seen by pcp on 24. Patient states she is going to start drinking ensure. Reviewed with patient safety precautions to ensure further falls. Pt verbalized understanding. Patient states no questions or concerns. [facility] discussed that I would outreach in 2 weeks. Patient agreeable.          Assessments Completed:   No changes since last call    Medications Reviewed:   Patient denies any changes with medications and reports taking all medications as prescribed.    Advance Care Planning:   Reviewed and current     Care Planning:   Education Documentation  No documentation found.  Education Comments  No comments found.     ,    Goals Addressed                   This Visit's Progress     Conditions and Symptoms   On track     I will schedule office visits, as directed by my provider.  I will notify my provider of any symptoms that indicate a worsening of my condition.    Barriers: none  Plan for overcoming my barriers: N/A  Confidence: 10  Anticipated Goal Completion Date: 24                 PCP/Specialist follow up:   Future Appointments         Provider Specialty Dept Phone    2024 1:00 PM Asia Pierre RN; Parag Quezada

## 2024-07-29 ENCOUNTER — HOSPITAL ENCOUNTER (OUTPATIENT)
Dept: WOUND CARE | Age: 84
Discharge: HOME | End: 2024-07-29
Payer: MEDICARE

## 2024-07-29 VITALS
BODY MASS INDEX: 22.77 KG/M2 | WEIGHT: 133.4 LBS | SYSTOLIC BLOOD PRESSURE: 134 MMHG | DIASTOLIC BLOOD PRESSURE: 52 MMHG | TEMPERATURE: 98.1 F | RESPIRATION RATE: 18 BRPM | HEART RATE: 57 BPM | HEIGHT: 64 IN

## 2024-07-29 DIAGNOSIS — S41.111A SKIN TEAR OF RIGHT UPPER ARM WITHOUT COMPLICATION, INITIAL ENCOUNTER: Primary | ICD-10-CM

## 2024-07-29 PROCEDURE — 99213 OFFICE O/P EST LOW 20 MIN: CPT

## 2024-07-29 RX ORDER — BETAMETHASONE DIPROPIONATE 0.5 MG/G
CREAM TOPICAL ONCE
OUTPATIENT
Start: 2024-07-29 | End: 2024-07-29

## 2024-07-29 RX ORDER — LIDOCAINE 40 MG/G
CREAM TOPICAL ONCE
OUTPATIENT
Start: 2024-07-29 | End: 2024-07-29

## 2024-07-29 RX ORDER — LIDOCAINE 50 MG/G
OINTMENT TOPICAL ONCE
OUTPATIENT
Start: 2024-07-29 | End: 2024-07-29

## 2024-07-29 RX ORDER — CLOBETASOL PROPIONATE 0.5 MG/G
OINTMENT TOPICAL ONCE
OUTPATIENT
Start: 2024-07-29 | End: 2024-07-29

## 2024-07-29 RX ORDER — LIDOCAINE HYDROCHLORIDE 20 MG/ML
JELLY TOPICAL ONCE
OUTPATIENT
Start: 2024-07-29 | End: 2024-07-29

## 2024-07-29 RX ORDER — BACITRACIN ZINC AND POLYMYXIN B SULFATE 500; 1000 [USP'U]/G; [USP'U]/G
OINTMENT TOPICAL ONCE
OUTPATIENT
Start: 2024-07-29 | End: 2024-07-29

## 2024-07-29 RX ORDER — IBUPROFEN 200 MG
TABLET ORAL ONCE
OUTPATIENT
Start: 2024-07-29 | End: 2024-07-29

## 2024-07-29 RX ORDER — LIDOCAINE HYDROCHLORIDE 40 MG/ML
SOLUTION TOPICAL ONCE
OUTPATIENT
Start: 2024-07-29 | End: 2024-07-29

## 2024-07-29 RX ORDER — SODIUM CHLOR/HYPOCHLOROUS ACID 0.033 %
SOLUTION, IRRIGATION IRRIGATION ONCE
OUTPATIENT
Start: 2024-07-29 | End: 2024-07-29

## 2024-07-29 RX ORDER — GINSENG 100 MG
CAPSULE ORAL ONCE
OUTPATIENT
Start: 2024-07-29 | End: 2024-07-29

## 2024-07-29 RX ORDER — TRIAMCINOLONE ACETONIDE 1 MG/G
OINTMENT TOPICAL ONCE
OUTPATIENT
Start: 2024-07-29 | End: 2024-07-29

## 2024-07-29 RX ORDER — GENTAMICIN SULFATE 1 MG/G
OINTMENT TOPICAL ONCE
OUTPATIENT
Start: 2024-07-29 | End: 2024-07-29

## 2024-07-29 NOTE — WOUND CARE
Discharge Instructions for  [facility]  [address] [place] [address]  [address] [place] [address]  Phone [phone]   Fax [phone]      NAME:  Melania Dave  YOB: 1940  MEDICAL RECORD NUMBER:  [id number]  DATE:  7/29/2024    Return Appointment:   1 week with Parag Quezada DO      Instructions: RUE, LUE, RLE, LLE, & left dorsal foot.  Cleanse with normal saline.  Vashe Wound Solution soak placed on wound bed for a minimum of 60 seconds prior to dressing application.   Apply xeroform to all wound beds.  Cover with gauze/ABD.  Secure with roll gauze and netting material.  May place foam adhesive border over xeroform on left foot.  Apply tubigrip to left lower extremity.  Change dressings daily. Family member to change on days of NON-home health visit days.     [facility] ([phone]) (fax# [phone]) to change dressings 3 times a week and prn as needed.  Protein 100 grams daily. Suggest 2 30gram shakes daily.     Should you experience increased redness, swelling, pain, foul odor, size of wound(s), or have a temperature over 101 degrees please contact the Wound Healing Center at [phone] or if after hours contact your primary care physician or go to the hospital emergency department.    PLEASE NOTE: IF YOU ARE UNABLE TO OBTAIN WOUND SUPPLIES, CONTINUE TO USE THE SUPPLIES YOU HAVE AVAILABLE UNTIL YOU ARE ABLE TO REACH US. IT IS MOST IMPORTANT TO KEEP THE WOUND COVERED AT ALL TIMES.    Electronically signed OLE DELACRUZ RN on 7/29/2024 at 1:47 PM

## 2024-07-29 NOTE — FLOWSHEET NOTE
07/29/24 1317   Wound 07/18/24 Arm Lower;Posterior;Right # 1   Date First Assessed/Time First Assessed: 07/18/24 1536   Present on Original Admission: Yes  Wound Approximate Age at First Assessment (Weeks): 1 weeks  Primary Wound Type: Skin Tear  Location: Arm  Wound Location Orientation: Lower;Posterior;Right  W...   Wound Image    Wound Etiology Skin Tear   Dressing Status Intact   Wound Cleansed Cleansed with saline;Vashe   Dressing/Treatment Xeroform;ABD;Roll gauze;Tape/Soft cloth adhesive tape   Wound Length (cm) 25 cm   Wound Width (cm) 8 cm   Wound Depth (cm) 0.1 cm   Wound Surface Area (cm^2) 200 cm^2   Change in Wound Size % (l*w) 20.56   Wound Volume (cm^3) 20 cm^3   Wound Healing % 21   Wound Assessment Bleeding   Drainage Amount Large (50-75% saturated)   Drainage Description Serosanguinous   Odor None   Yolanda-wound Assessment Fragile   Margins Defined edges   Wound Thickness Description not for Pressure Injury Partial thickness   Wound 07/18/24 Radial Left;Proximal # 2   Date First Assessed/Time First Assessed: 07/18/24 1537   Present on Original Admission: Yes  Wound Approximate Age at First Assessment (Weeks): 1 weeks  Primary Wound Type: Skin Tear  Location: Radial  Wound Location Orientation: Left;Proximal  Wound ...   Wound Image    Wound Etiology Skin Tear   Dressing Status Intact   Wound Cleansed Cleansed with saline;Vashe   Dressing/Treatment Xeroform;ABD;Roll gauze;Tape/Soft cloth adhesive tape   Wound Length (cm) 6.5 cm   Wound Width (cm) 6.5 cm   Wound Depth (cm) 0.1 cm   Wound Surface Area (cm^2) 42.25 cm^2   Change in Wound Size % (l*w) 37.87   Wound Volume (cm^3) 4.225 cm^3   Wound Healing % 38   Wound Assessment Eschar dry;Pink/red   Drainage Amount Moderate (25-50%)   Drainage Description Serosanguinous   Odor None   Yolanda-wound Assessment Fragile   Margins Defined edges   Wound Thickness Description not for Pressure Injury Partial thickness   Wound 07/18/24 Pretibial Right # 3   Date

## 2024-07-31 PROBLEM — S91.302A OPEN WOUND OF LEFT FOOT WITH COMPLICATION: Status: ACTIVE | Noted: 2024-07-31

## 2024-07-31 PROBLEM — S41.112A SKIN TEAR OF UPPER ARM WITHOUT COMPLICATION, LEFT, INITIAL ENCOUNTER: Status: ACTIVE | Noted: 2024-07-31

## 2024-07-31 PROBLEM — S81.002A OPEN KNEE WOUND, LEFT, INITIAL ENCOUNTER: Chronic | Status: ACTIVE | Noted: 2024-07-31

## 2024-07-31 PROBLEM — S41.112A SKIN TEAR OF UPPER ARM WITHOUT COMPLICATION, LEFT, INITIAL ENCOUNTER: Chronic | Status: ACTIVE | Noted: 2024-07-31

## 2024-07-31 PROBLEM — S81.002A OPEN KNEE WOUND, LEFT, INITIAL ENCOUNTER: Status: ACTIVE | Noted: 2024-07-31

## 2024-07-31 PROBLEM — S41.111A SKIN TEAR OF RIGHT UPPER ARM WITHOUT COMPLICATION: Chronic | Status: ACTIVE | Noted: 2024-07-29

## 2024-07-31 PROBLEM — S91.302A OPEN WOUND OF LEFT FOOT WITH COMPLICATION: Chronic | Status: ACTIVE | Noted: 2024-07-31

## 2024-08-05 ENCOUNTER — CARE COORDINATION (OUTPATIENT)
Dept: CARE COORDINATION | Facility: CLINIC | Age: 84
End: 2024-08-05

## 2024-08-05 ENCOUNTER — HOSPITAL ENCOUNTER (OUTPATIENT)
Dept: WOUND CARE | Age: 84
Discharge: HOME | End: 2024-08-05
Payer: MEDICARE

## 2024-08-05 VITALS
WEIGHT: 130.6 LBS | SYSTOLIC BLOOD PRESSURE: 108 MMHG | BODY MASS INDEX: 22.3 KG/M2 | TEMPERATURE: 98.4 F | HEART RATE: 72 BPM | HEIGHT: 64 IN | RESPIRATION RATE: 18 BRPM | DIASTOLIC BLOOD PRESSURE: 62 MMHG

## 2024-08-05 DIAGNOSIS — S81.002A OPEN KNEE WOUND, LEFT, INITIAL ENCOUNTER: ICD-10-CM

## 2024-08-05 DIAGNOSIS — S41.111A SKIN TEAR OF RIGHT UPPER ARM WITHOUT COMPLICATION, INITIAL ENCOUNTER: Primary | ICD-10-CM

## 2024-08-05 DIAGNOSIS — S41.112A SKIN TEAR OF UPPER ARM WITHOUT COMPLICATION, LEFT, INITIAL ENCOUNTER: ICD-10-CM

## 2024-08-05 PROCEDURE — 99213 OFFICE O/P EST LOW 20 MIN: CPT

## 2024-08-05 RX ORDER — LIDOCAINE 40 MG/G
CREAM TOPICAL ONCE
OUTPATIENT
Start: 2024-08-05 | End: 2024-08-05

## 2024-08-05 RX ORDER — LIDOCAINE HYDROCHLORIDE 20 MG/ML
JELLY TOPICAL ONCE
Status: COMPLETED | OUTPATIENT
Start: 2024-08-05 | End: 2024-08-05

## 2024-08-05 RX ORDER — TRIAMCINOLONE ACETONIDE 1 MG/G
OINTMENT TOPICAL ONCE
OUTPATIENT
Start: 2024-08-05 | End: 2024-08-05

## 2024-08-05 RX ORDER — CLOBETASOL PROPIONATE 0.5 MG/G
OINTMENT TOPICAL ONCE
OUTPATIENT
Start: 2024-08-05 | End: 2024-08-05

## 2024-08-05 RX ORDER — IBUPROFEN 200 MG
TABLET ORAL ONCE
OUTPATIENT
Start: 2024-08-05 | End: 2024-08-05

## 2024-08-05 RX ORDER — LIDOCAINE HYDROCHLORIDE 20 MG/ML
JELLY TOPICAL ONCE
OUTPATIENT
Start: 2024-08-05 | End: 2024-08-05

## 2024-08-05 RX ORDER — SODIUM CHLOR/HYPOCHLOROUS ACID 0.033 %
SOLUTION, IRRIGATION IRRIGATION ONCE
OUTPATIENT
Start: 2024-08-05 | End: 2024-08-05

## 2024-08-05 RX ORDER — BACITRACIN ZINC AND POLYMYXIN B SULFATE 500; 1000 [USP'U]/G; [USP'U]/G
OINTMENT TOPICAL ONCE
OUTPATIENT
Start: 2024-08-05 | End: 2024-08-05

## 2024-08-05 RX ORDER — BETAMETHASONE DIPROPIONATE 0.5 MG/G
CREAM TOPICAL ONCE
OUTPATIENT
Start: 2024-08-05 | End: 2024-08-05

## 2024-08-05 RX ORDER — LIDOCAINE 50 MG/G
OINTMENT TOPICAL ONCE
OUTPATIENT
Start: 2024-08-05 | End: 2024-08-05

## 2024-08-05 RX ORDER — GINSENG 100 MG
CAPSULE ORAL ONCE
OUTPATIENT
Start: 2024-08-05 | End: 2024-08-05

## 2024-08-05 RX ORDER — GENTAMICIN SULFATE 1 MG/G
OINTMENT TOPICAL ONCE
OUTPATIENT
Start: 2024-08-05 | End: 2024-08-05

## 2024-08-05 RX ORDER — LIDOCAINE HYDROCHLORIDE 40 MG/ML
SOLUTION TOPICAL ONCE
OUTPATIENT
Start: 2024-08-05 | End: 2024-08-05

## 2024-08-05 RX ADMIN — LIDOCAINE HYDROCHLORIDE: 20 JELLY TOPICAL at 16:14

## 2024-08-05 NOTE — CARE COORDINATION
Ambulatory Care Coordination Note     2024 8:38 AM     Patient Current Location:  [place]     Patient contacted the patient by telephone. Verified name and  with patient as identifiers.         ACM: Hien Moncada RN     Challenges to be reviewed by the provider   Additional needs identified to be addressed with provider No  none               Method of communication with provider: none.    Care Summary Note: ccm outreached to patient. Patient states she is doing well at this time. Patient states she is going to wound center once a week for wound care. Today is her appointment. Patient states overall she is doing better. Home health is also coming out 3times a week for dressing changes. Patient states no questions or concerns. [facility] discussed that I would outreach next week. Patient agreeable.        Assessments Completed:   No changes since last call    Medications Reviewed:   Patient denies any changes with medications and reports taking all medications as prescribed.    Advance Care Planning:   Not reviewed during this call     Care Planning:   Education Documentation  No documentation found.  Education Comments  No comments found.     ,    Goals Addressed                   This Visit's Progress     Conditions and Symptoms   On track     I will schedule office visits, as directed by my provider.  I will notify my provider of any symptoms that indicate a worsening of my condition.    Barriers: none  Plan for overcoming my barriers: N/A  Confidence: 9/10  Anticipated Goal Completion Date: 24                 PCP/Specialist follow up:   Future Appointments         Provider Specialty Dept Phone    2024 3:00 PM Aliya Tillman RN; Parag Quezada DO Wound Ostomy [phone]    10/15/2024 2:00 PM Loraine Farnsworth MD Internal Medicine [phone]    2024 11:30 AM Abhishek Cunningham MD Cardiology [phone]            Follow Up:   Plan for next ACM outreach in approximately 1

## 2024-08-05 NOTE — FLOWSHEET NOTE
08/05/24 1523   Right Leg Edema Point of Measurement   Leg circumference 32 cm   Ankle circumference 23 cm   Foot circumference 21 cm   Compression Therapy Tubular elastic support bandage   Left Leg Edema Point of Measurement   Leg circumference 33 cm   Ankle circumference 24 cm   Foot circumference 22 cm   Compression Therapy Tubular elastic support bandage   Wound 07/18/24 Arm Lower;Posterior;Right # 1   Date First Assessed/Time First Assessed: 07/18/24 1536   Present on Original Admission: Yes  Wound Approximate Age at First Assessment (Weeks): 1 weeks  Primary Wound Type: Skin Tear  Location: Arm  Wound Location Orientation: Lower;Posterior;Right  W...   Wound Image    Wound Etiology Skin Tear   Dressing Status Intact   Wound Cleansed Cleansed with saline;Vashe   Dressing/Treatment Xeroform;ABD;Roll gauze;Tape/Soft cloth adhesive tape   Wound Length (cm) 15 cm   Wound Width (cm) 4.5 cm   Wound Depth (cm) 0.1 cm   Wound Surface Area (cm^2) 67.5 cm^2   Change in Wound Size % (l*w) 73.19   Wound Volume (cm^3) 6.75 cm^3   Wound Healing % 73   Wound Assessment Pink/red   Drainage Amount Moderate (25-50%)   Drainage Description Serosanguinous   Odor None   Yolanda-wound Assessment Fragile   Margins Defined edges   Wound Thickness Description not for Pressure Injury Partial thickness   Wound 07/18/24 Radial Left;Proximal # 2   Date First Assessed/Time First Assessed: 07/18/24 1537   Present on Original Admission: Yes  Wound Approximate Age at First Assessment (Weeks): 1 weeks  Primary Wound Type: Skin Tear  Location: Radial  Wound Location Orientation: Left;Proximal  Wound ...   Wound Image    Wound Etiology Skin Tear   Dressing Status Intact   Wound Cleansed Cleansed with saline;Vashe   Dressing/Treatment Xeroform;ABD;Roll gauze;Tape/Soft cloth adhesive tape   Wound Length (cm) 4 cm   Wound Width (cm) 4 cm   Wound Depth (cm) 0.1 cm   Wound Surface Area (cm^2) 16 cm^2   Change in Wound Size % (l*w) 76.47   Wound Volume

## 2024-08-05 NOTE — WOUND CARE
Discharge Instructions for  [facility]  [address] [place] [address]  [address] [place] [address]  Phone [phone]   Fax [phone]      NAME:  Melania Dave  YOB: 1940  MEDICAL RECORD NUMBER:  [id number]  DATE:  8/5/2024    Return Appointment:   1 week with Parag Quezada DO      Instructions: RUE, LUE, RLE, LLE, & left dorsal foot.  Cleanse with normal saline.  Vashe Wound Solution soak placed on wound bed for a minimum of 60 seconds prior to dressing application.   Apply xeroform to all wound beds.  Cover with gauze/ABD.  Secure with roll gauze and netting material.  May place foam adhesive border over xeroform on left foot.  Apply tubigrip to left lower extremity.  Change dressings daily. Family member to change on days of NON-home health visit days.     [facility] ([phone]) (fax# [phone]) to change dressings 3 times a week and prn as needed.  Protein 100 grams daily. Suggest 2 30gram shakes daily.     Should you experience increased redness, swelling, pain, foul odor, size of wound(s), or have a temperature over 101 degrees please contact the Wound Healing Center at [phone] or if after hours contact your primary care physician or go to the hospital emergency department.    PLEASE NOTE: IF YOU ARE UNABLE TO OBTAIN WOUND SUPPLIES, CONTINUE TO USE THE SUPPLIES YOU HAVE AVAILABLE UNTIL YOU ARE ABLE TO REACH US. IT IS MOST IMPORTANT TO KEEP THE WOUND COVERED AT ALL TIMES.    Electronically signed Rosa Pablo RN on 8/5/2024 at 3:56 PM

## 2024-08-12 ENCOUNTER — CARE COORDINATION (OUTPATIENT)
Dept: CARE COORDINATION | Facility: CLINIC | Age: 84
End: 2024-08-12

## 2024-08-12 NOTE — CARE COORDINATION
Ambulatory Care Coordination Note     2024 8:42 AM     Patient Current Location:  [place]     Patient contacted the patient by telephone. Verified name and  with patient as identifiers.         ACM: Hien Moncada RN     Challenges to be reviewed by the provider   Additional needs identified to be addressed with provider No  none               Method of communication with provider: none.    Care Summary Note: ccm outreached to patient. Patient states she is doing much better. Patient was seen by wound care last week. Wounds are healing well. Patient has appointment tomorrow with wound care. She is doing daily dressings. Patient states no questions or concerns. [facility] discussed that I would outreach next week. Patient agreeable.          Assessments Completed:   No changes since last call    Medications Reviewed:   Patient denies any changes with medications and reports taking all medications as prescribed.    Advance Care Planning:   Not reviewed during this call     Care Planning:    Goals Addressed                   This Visit's Progress     Conditions and Symptoms   On track     I will schedule office visits, as directed by my provider.  I will notify my provider of any symptoms that indicate a worsening of my condition.    Barriers: none  Plan for overcoming my barriers: N/A  Confidence: 9/10  Anticipated Goal Completion Date: 24                 PCP/Specialist follow up:   Future Appointments         Provider Specialty Dept Phone    2024 2:40 PM Hunter Wilkes RN; Parag Quezada,  Wound Ostomy [phone]    10/15/2024 2:00 PM Loraine Farnsworth MD Internal Medicine [phone]    2024 11:30 AM Abhishek Cunningham MD Cardiology [phone]            Follow Up:   Plan for next ACM outreach in approximately 1 week to complete:  - outreach attempt to offer care management services.   Patient  is agreeable to this plan.

## 2024-08-13 ENCOUNTER — HOSPITAL ENCOUNTER (OUTPATIENT)
Dept: WOUND CARE | Age: 84
Discharge: HOME | End: 2024-08-13
Payer: MEDICARE

## 2024-08-13 VITALS
WEIGHT: 134 LBS | DIASTOLIC BLOOD PRESSURE: 58 MMHG | HEIGHT: 64 IN | HEART RATE: 85 BPM | SYSTOLIC BLOOD PRESSURE: 114 MMHG | BODY MASS INDEX: 22.88 KG/M2 | TEMPERATURE: 97.8 F | RESPIRATION RATE: 18 BRPM

## 2024-08-13 DIAGNOSIS — S81.002A OPEN KNEE WOUND, LEFT, INITIAL ENCOUNTER: ICD-10-CM

## 2024-08-13 DIAGNOSIS — S41.111A SKIN TEAR OF RIGHT UPPER ARM WITHOUT COMPLICATION, INITIAL ENCOUNTER: Primary | ICD-10-CM

## 2024-08-13 DIAGNOSIS — S41.112A SKIN TEAR OF UPPER ARM WITHOUT COMPLICATION, LEFT, INITIAL ENCOUNTER: ICD-10-CM

## 2024-08-13 PROCEDURE — 99213 OFFICE O/P EST LOW 20 MIN: CPT

## 2024-08-13 RX ORDER — LIDOCAINE HYDROCHLORIDE 20 MG/ML
JELLY TOPICAL ONCE
Status: COMPLETED | OUTPATIENT
Start: 2024-08-13 | End: 2024-08-13

## 2024-08-13 RX ORDER — GINSENG 100 MG
CAPSULE ORAL ONCE
OUTPATIENT
Start: 2024-08-13 | End: 2024-08-13

## 2024-08-13 RX ORDER — BETAMETHASONE DIPROPIONATE 0.5 MG/G
CREAM TOPICAL ONCE
OUTPATIENT
Start: 2024-08-13 | End: 2024-08-13

## 2024-08-13 RX ORDER — LIDOCAINE HYDROCHLORIDE 20 MG/ML
JELLY TOPICAL ONCE
OUTPATIENT
Start: 2024-08-13 | End: 2024-08-13

## 2024-08-13 RX ORDER — CLOBETASOL PROPIONATE 0.5 MG/G
OINTMENT TOPICAL ONCE
OUTPATIENT
Start: 2024-08-13 | End: 2024-08-13

## 2024-08-13 RX ORDER — TRIAMCINOLONE ACETONIDE 1 MG/G
OINTMENT TOPICAL ONCE
OUTPATIENT
Start: 2024-08-13 | End: 2024-08-13

## 2024-08-13 RX ORDER — SODIUM CHLOR/HYPOCHLOROUS ACID 0.033 %
SOLUTION, IRRIGATION IRRIGATION ONCE
OUTPATIENT
Start: 2024-08-13 | End: 2024-08-13

## 2024-08-13 RX ORDER — IBUPROFEN 200 MG
TABLET ORAL ONCE
OUTPATIENT
Start: 2024-08-13 | End: 2024-08-13

## 2024-08-13 RX ORDER — GENTAMICIN SULFATE 1 MG/G
OINTMENT TOPICAL ONCE
OUTPATIENT
Start: 2024-08-13 | End: 2024-08-13

## 2024-08-13 RX ORDER — LIDOCAINE HYDROCHLORIDE 40 MG/ML
SOLUTION TOPICAL ONCE
OUTPATIENT
Start: 2024-08-13 | End: 2024-08-13

## 2024-08-13 RX ORDER — LIDOCAINE 40 MG/G
CREAM TOPICAL ONCE
OUTPATIENT
Start: 2024-08-13 | End: 2024-08-13

## 2024-08-13 RX ORDER — BACITRACIN ZINC AND POLYMYXIN B SULFATE 500; 1000 [USP'U]/G; [USP'U]/G
OINTMENT TOPICAL ONCE
OUTPATIENT
Start: 2024-08-13 | End: 2024-08-13

## 2024-08-13 RX ORDER — LIDOCAINE 50 MG/G
OINTMENT TOPICAL ONCE
OUTPATIENT
Start: 2024-08-13 | End: 2024-08-13

## 2024-08-13 RX ADMIN — LIDOCAINE HYDROCHLORIDE: 20 JELLY TOPICAL at 14:53

## 2024-08-13 NOTE — WOUND CARE
Discharge Instructions for  [facility]  [address] [place] [address]  [address] [place] [address]  Phone [phone]   Fax [phone]      NAME:  Melania Dave  YOB: 1940  MEDICAL RECORD NUMBER:  [id number]  DATE:  8/13/2024    Return Appointment:   1 week with Parag Quezada DO      Instructions: ELISABETESHAY RLE  Cleanse with normal saline.  Vashe Wound Solution soak placed on wound bed for a minimum of 60 seconds prior to dressing application.     Apply xeroform to all wound beds.  Cover with gauze/ABD.  Secure with roll gauze and netting material.  Apply tubigrip to left lower extremity.  Change dressings daily.     Apply Vaseline to left upper arm, left knee and left foot daily.    Discharge from [facility] ([phone]) (fax# [phone])   Protein 100 grams daily. Suggest (2) 30 gram shakes daily.     Should you experience increased redness, swelling, pain, foul odor, size of wound(s), or have a temperature over 101 degrees please contact the Wound Healing Center at [phone] or if after hours contact your primary care physician or go to the hospital emergency department.    PLEASE NOTE: IF YOU ARE UNABLE TO OBTAIN WOUND SUPPLIES, CONTINUE TO USE THE SUPPLIES YOU HAVE AVAILABLE UNTIL YOU ARE ABLE TO REACH US. IT IS MOST IMPORTANT TO KEEP THE WOUND COVERED AT ALL TIMES.    Electronically signed Rosa Pablo RN on 8/13/2024 at 2:50 PM

## 2024-08-13 NOTE — FLOWSHEET NOTE
(dry)   Odor None   Yolanda-wound Assessment Fragile   Margins Defined edges   Wound Thickness Description not for Pressure Injury Partial thickness   Wound 07/18/24 Brachial Left;Anterior # 5   Date First Assessed/Time First Assessed: 07/18/24 1539   Present on Original Admission: Yes  Wound Approximate Age at First Assessment (Weeks): 1 weeks  Primary Wound Type: Skin Tear  Location: Brachial  Wound Location Orientation: Left;Anterior  Woun...   Wound Image    Wound Etiology Traumatic   Dressing Status Intact   Wound Cleansed Cleansed with saline;Vashe   Dressing/Treatment ABD;Roll gauze;Tape/Soft cloth adhesive tape;Xeroform   Wound Length (cm) 0 cm   Wound Width (cm) 0 cm   Wound Depth (cm) 0 cm   Wound Surface Area (cm^2) 0 cm^2   Change in Wound Size % (l*w) 100   Wound Volume (cm^3) 0 cm^3   Wound Healing % 100   Wound Assessment Pink/red   Drainage Amount None (dry)   Odor None   Yolanda-wound Assessment Fragile   Wound Thickness Description not for Pressure Injury Partial thickness   Wound 07/18/24 Left;Dorsal # 6   Date First Assessed/Time First Assessed: 07/18/24 1540   Present on Original Admission: Yes  Wound Approximate Age at First Assessment (Weeks): 3 weeks  Primary Wound Type: Traumatic  Wound Location Orientation: Left;Dorsal  Wound Description (Comment...   Wound Image    Wound Etiology Traumatic   Dressing Status Intact   Wound Cleansed Cleansed with saline;Vashe   Dressing/Treatment Xeroform;Foam   Wound Length (cm) 0 cm   Wound Width (cm) 0 cm   Wound Depth (cm) 0 cm   Wound Surface Area (cm^2) 0 cm^2   Change in Wound Size % (l*w) 100   Wound Volume (cm^3) 0 cm^3   Wound Healing % 100   Wound Assessment Pink/red   Drainage Amount None (dry)   Odor None   Yolanda-wound Assessment Intact   Margins Defined edges   Wound Thickness Description not for Pressure Injury Full thickness   Pain Assessment   Pain Assessment None - Denies Pain

## 2024-08-19 ENCOUNTER — CARE COORDINATION (OUTPATIENT)
Dept: CARE COORDINATION | Facility: CLINIC | Age: 84
End: 2024-08-19

## 2024-08-19 NOTE — CARE COORDINATION
Ambulatory Care Coordination Note     2024 8:28 AM     Patient Current Location:  [place]     Patient contacted the patient by telephone. Verified name and  with patient as identifiers.         ACM: Hien Moncada RN     Challenges to be reviewed by the provider   Additional needs identified to be addressed with provider No  none               Method of communication with provider: none.    Care Summary Note: ccm outreached to patient. Patient states arms area healing well. Patient states she continues to see wound care. Patient states dressing and dry and intact. Patient states no questions or concerns. [facility] discussed that I would outreach next week. Patient agreeable.        Assessments Completed:   No changes since last call    Medications Reviewed:   Patient denies any changes with medications and reports taking all medications as prescribed.    Advance Care Planning:   Not reviewed during this call     Care Planning:    Goals Addressed                   This Visit's Progress     Conditions and Symptoms   On track     I will schedule office visits, as directed by my provider.  I will notify my provider of any symptoms that indicate a worsening of my condition.    Barriers: none  Plan for overcoming my barriers: N/A  Confidence: 9/10  Anticipated Goal Completion Date: 24                 PCP/Specialist follow up:   Future Appointments         Provider Specialty Dept Phone    2024 2:00 PM Rosa Pablo RN; Parag Quezada,  Wound Ostomy [phone]    10/15/2024 2:00 PM Loraine Farnsworth MD Internal Medicine [phone]    2024 11:30 AM Abhishek Cunningham MD Cardiology [phone]            Follow Up:   Plan for next ACM outreach in approximately 1 week to complete:  - education .   Patient  is agreeable to this plan.

## 2024-08-20 ENCOUNTER — HOSPITAL ENCOUNTER (OUTPATIENT)
Dept: WOUND CARE | Age: 84
Discharge: HOME | End: 2024-08-20
Payer: MEDICARE

## 2024-08-20 VITALS — HEIGHT: 64 IN | BODY MASS INDEX: 22.47 KG/M2 | WEIGHT: 131.6 LBS

## 2024-08-20 DIAGNOSIS — S81.002A OPEN KNEE WOUND, LEFT, INITIAL ENCOUNTER: ICD-10-CM

## 2024-08-20 DIAGNOSIS — S41.111A SKIN TEAR OF RIGHT UPPER ARM WITHOUT COMPLICATION, INITIAL ENCOUNTER: Primary | ICD-10-CM

## 2024-08-20 DIAGNOSIS — S41.112A SKIN TEAR OF UPPER ARM WITHOUT COMPLICATION, LEFT, INITIAL ENCOUNTER: ICD-10-CM

## 2024-08-20 PROCEDURE — 99213 OFFICE O/P EST LOW 20 MIN: CPT

## 2024-08-20 RX ORDER — GENTAMICIN SULFATE 1 MG/G
OINTMENT TOPICAL ONCE
OUTPATIENT
Start: 2024-08-20 | End: 2024-08-20

## 2024-08-20 RX ORDER — LIDOCAINE HYDROCHLORIDE 20 MG/ML
JELLY TOPICAL ONCE
OUTPATIENT
Start: 2024-08-20 | End: 2024-08-20

## 2024-08-20 RX ORDER — BACITRACIN ZINC AND POLYMYXIN B SULFATE 500; 1000 [USP'U]/G; [USP'U]/G
OINTMENT TOPICAL ONCE
OUTPATIENT
Start: 2024-08-20 | End: 2024-08-20

## 2024-08-20 RX ORDER — CLOBETASOL PROPIONATE 0.5 MG/G
OINTMENT TOPICAL ONCE
OUTPATIENT
Start: 2024-08-20 | End: 2024-08-20

## 2024-08-20 RX ORDER — GINSENG 100 MG
CAPSULE ORAL ONCE
OUTPATIENT
Start: 2024-08-20 | End: 2024-08-20

## 2024-08-20 RX ORDER — BETAMETHASONE DIPROPIONATE 0.5 MG/G
CREAM TOPICAL ONCE
OUTPATIENT
Start: 2024-08-20 | End: 2024-08-20

## 2024-08-20 RX ORDER — LIDOCAINE HYDROCHLORIDE 40 MG/ML
SOLUTION TOPICAL ONCE
OUTPATIENT
Start: 2024-08-20 | End: 2024-08-20

## 2024-08-20 RX ORDER — SODIUM CHLOR/HYPOCHLOROUS ACID 0.033 %
SOLUTION, IRRIGATION IRRIGATION ONCE
OUTPATIENT
Start: 2024-08-20 | End: 2024-08-20

## 2024-08-20 RX ORDER — LIDOCAINE 50 MG/G
OINTMENT TOPICAL ONCE
OUTPATIENT
Start: 2024-08-20 | End: 2024-08-20

## 2024-08-20 RX ORDER — TRIAMCINOLONE ACETONIDE 1 MG/G
OINTMENT TOPICAL ONCE
OUTPATIENT
Start: 2024-08-20 | End: 2024-08-20

## 2024-08-20 RX ORDER — LIDOCAINE 40 MG/G
CREAM TOPICAL ONCE
OUTPATIENT
Start: 2024-08-20 | End: 2024-08-20

## 2024-08-20 RX ORDER — IBUPROFEN 200 MG
TABLET ORAL ONCE
OUTPATIENT
Start: 2024-08-20 | End: 2024-08-20

## 2024-08-20 NOTE — FLOWSHEET NOTE
Tear   Wound Cleansed Vashe   Dressing/Treatment Xeroform   Wound Length (cm) 0 cm   Wound Width (cm) 0 cm   Wound Depth (cm) 0 cm   Wound Surface Area (cm^2) 0 cm^2   Change in Wound Size % (l*w) 100   Wound Volume (cm^3) 0 cm^3   Wound Healing % 100   Wound Assessment Epithelialization   Drainage Amount Scant (moist but unmeasurable)   Drainage Description Serosanguinous   Odor None   Yolanda-wound Assessment Fragile   Wound Thickness Description not for Pressure Injury Partial thickness

## 2024-08-20 NOTE — DISCHARGE INSTRUCTIONS
NAME:  Melania Dave  YOB: 1940  MEDICAL RECORD NUMBER:  [id number]  DATE:  8/20/2024     Return Appointment: 2 weeks with Parag Quezada DO        Instructions:   Right forearm  Cleanse with normal saline.  Vashe Wound Solution soak placed on wound bed for a minimum of 60 seconds prior to dressing application.      Apply xeroform   Cover with gauze/ABD.  Secure with roll gauze and netting material.  Change dressings daily.      Apply Vaseline to left upper arm, left knee and left foot daily.     Protein 100 grams daily. Suggest (2) 30 gram shakes daily.

## 2024-08-20 NOTE — WOUND CARE
Discharge Instructions for  [facility]  [address] [place] [address]  [address] [place] [address]  Phone [phone]   Fax [phone]      NAME:  Melania Dave  YOB: 1940  MEDICAL RECORD NUMBER:  [id number]  DATE:  8/20/2024    Return Appointment: 2 weeks with Parag Quezada DO      Instructions:   Right forearm  Cleanse with normal saline.  Vashe Wound Solution soak placed on wound bed for a minimum of 60 seconds prior to dressing application.     Apply xeroform   Cover with gauze/ABD.  Secure with roll gauze and netting material.  Change dressings daily.     Apply Vaseline to left upper arm, left knee and left foot daily.    Protein 100 grams daily. Suggest (2) 30 gram shakes daily.     Should you experience increased redness, swelling, pain, foul odor, size of wound(s), or have a temperature over 101 degrees please contact the [facility] at [phone] or if after hours contact your primary care physician or go to the hospital emergency department.    PLEASE NOTE: IF YOU ARE UNABLE TO OBTAIN WOUND SUPPLIES, CONTINUE TO USE THE SUPPLIES YOU HAVE AVAILABLE UNTIL YOU ARE ABLE TO REACH US. IT IS MOST IMPORTANT TO KEEP THE WOUND COVERED AT ALL TIMES.    Electronically signed Lilly Delgado RN on 8/20/2024 at 2:53 PM

## 2024-08-26 ENCOUNTER — CARE COORDINATION (OUTPATIENT)
Dept: CARE COORDINATION | Facility: CLINIC | Age: 84
End: 2024-08-26

## 2024-08-26 NOTE — CARE COORDINATION
Ambulatory Care Coordination Note     2024 8:18 AM     Patient Current Location:  [place]     ACM contacted the patient by telephone. Verified name and  with patient as identifiers.         ACM: Hien Moncada RN     Challenges to be reviewed by the provider   Additional needs identified to be addressed with provider No  none               Method of communication with provider: none.    Care Summary Note: ccm outreached to patient. Patient states she is doing well at this time. Patient states all wounds are healing well. Patient states she is eating and drinking well. Patient states no questions or concerns. [facility] discussed that I would outreach next week. Patient agreeable.          Assessments Completed:   No changes since last call    Medications Reviewed:   Patient denies any changes with medications and reports taking all medications as prescribed.    Advance Care Planning:   Not reviewed during this call     Care Planning:    Goals Addressed                   This Visit's Progress     Conditions and Symptoms   On track     I will schedule office visits, as directed by my provider.  I will notify my provider of any symptoms that indicate a worsening of my condition.    Barriers: none  Plan for overcoming my barriers: N/A  Confidence: 9/10  Anticipated Goal Completion Date: 24                 PCP/Specialist follow up:   Future Appointments         Provider Specialty Dept Phone    9/3/2024 2:00 PM Lilly Delgado RN; Parag Quezada,  Wound Ostomy [phone]    10/15/2024 2:00 PM Loraine Farnsworth MD Internal Medicine [phone]    2024 11:30 AM Abhishek Cunningham MD Cardiology [phone]            Follow Up:   Plan for next AC outreach in approximately 1 week to complete:  - goal progression.   Patient  is agreeable to this plan.

## 2024-09-03 ENCOUNTER — HOSPITAL ENCOUNTER (OUTPATIENT)
Dept: WOUND CARE | Age: 84
Discharge: HOME | End: 2024-09-03
Attending: FAMILY MEDICINE
Payer: MEDICARE

## 2024-09-03 VITALS
DIASTOLIC BLOOD PRESSURE: 53 MMHG | SYSTOLIC BLOOD PRESSURE: 134 MMHG | RESPIRATION RATE: 12 BRPM | WEIGHT: 133.2 LBS | BODY MASS INDEX: 22.86 KG/M2 | HEART RATE: 59 BPM | TEMPERATURE: 98.2 F | OXYGEN SATURATION: 98 %

## 2024-09-03 DIAGNOSIS — S41.111A SKIN TEAR OF RIGHT UPPER ARM WITHOUT COMPLICATION, INITIAL ENCOUNTER: Primary | ICD-10-CM

## 2024-09-03 DIAGNOSIS — S41.112A SKIN TEAR OF UPPER ARM WITHOUT COMPLICATION, LEFT, INITIAL ENCOUNTER: ICD-10-CM

## 2024-09-03 DIAGNOSIS — S81.002A OPEN KNEE WOUND, LEFT, INITIAL ENCOUNTER: ICD-10-CM

## 2024-09-03 PROCEDURE — 99213 OFFICE O/P EST LOW 20 MIN: CPT

## 2024-09-03 RX ORDER — LIDOCAINE HYDROCHLORIDE 40 MG/ML
SOLUTION TOPICAL ONCE
OUTPATIENT
Start: 2024-09-03 | End: 2024-09-03

## 2024-09-03 RX ORDER — TRIAMCINOLONE ACETONIDE 1 MG/G
OINTMENT TOPICAL ONCE
OUTPATIENT
Start: 2024-09-03 | End: 2024-09-03

## 2024-09-03 RX ORDER — BACITRACIN ZINC AND POLYMYXIN B SULFATE 500; 1000 [USP'U]/G; [USP'U]/G
OINTMENT TOPICAL ONCE
OUTPATIENT
Start: 2024-09-03 | End: 2024-09-03

## 2024-09-03 RX ORDER — GINSENG 100 MG
CAPSULE ORAL ONCE
OUTPATIENT
Start: 2024-09-03 | End: 2024-09-03

## 2024-09-03 RX ORDER — CLOBETASOL PROPIONATE 0.5 MG/G
OINTMENT TOPICAL ONCE
OUTPATIENT
Start: 2024-09-03 | End: 2024-09-03

## 2024-09-03 RX ORDER — LIDOCAINE HYDROCHLORIDE 20 MG/ML
JELLY TOPICAL ONCE
OUTPATIENT
Start: 2024-09-03 | End: 2024-09-03

## 2024-09-03 RX ORDER — LIDOCAINE 50 MG/G
OINTMENT TOPICAL ONCE
OUTPATIENT
Start: 2024-09-03 | End: 2024-09-03

## 2024-09-03 RX ORDER — SODIUM CHLOR/HYPOCHLOROUS ACID 0.033 %
SOLUTION, IRRIGATION IRRIGATION ONCE
OUTPATIENT
Start: 2024-09-03 | End: 2024-09-03

## 2024-09-03 RX ORDER — LIDOCAINE 40 MG/G
CREAM TOPICAL ONCE
OUTPATIENT
Start: 2024-09-03 | End: 2024-09-03

## 2024-09-03 RX ORDER — BETAMETHASONE DIPROPIONATE 0.5 MG/G
CREAM TOPICAL ONCE
OUTPATIENT
Start: 2024-09-03 | End: 2024-09-03

## 2024-09-03 RX ORDER — NEOMYCIN/BACITRACIN/POLYMYXINB 3.5-400-5K
OINTMENT (GRAM) TOPICAL ONCE
OUTPATIENT
Start: 2024-09-03 | End: 2024-09-03

## 2024-09-03 RX ORDER — SILVER SULFADIAZINE 10 MG/G
CREAM TOPICAL ONCE
OUTPATIENT
Start: 2024-09-03 | End: 2024-09-03

## 2024-09-03 RX ORDER — GENTAMICIN SULFATE 1 MG/G
OINTMENT TOPICAL ONCE
OUTPATIENT
Start: 2024-09-03 | End: 2024-09-03

## 2024-09-03 RX ORDER — MUPIROCIN 20 MG/G
OINTMENT TOPICAL ONCE
OUTPATIENT
Start: 2024-09-03 | End: 2024-09-03

## 2024-09-03 NOTE — DISCHARGE INSTRUCTIONS
Right forearm wound resolved!  left upper arm, left knee and left foot wounds remain intact!    All newly healed wounds - fragile.  Apply Vaseline 1 - 2 x daily x1 month.  Vaseline daily thereafter.    Continue adequate protein intake  gms daily, as well as adequate hydration.    Continue safety precautions to avoid trauma to fragile skin on all extremities.

## 2024-09-03 NOTE — FLOWSHEET NOTE
09/03/24 1430   Wound 07/18/24 Arm Lower;Posterior;Right # 1   Date First Assessed/Time First Assessed: 07/18/24 1536   Present on Original Admission: Yes  Wound Approximate Age at First Assessment (Weeks): 1 weeks  Primary Wound Type: Skin Tear  Location: Arm  Wound Location Orientation: Lower;Posterior;Right  W...   Wound Image    Wound Etiology Skin Tear   Dressing Status Clean;Intact   Wound Cleansed Cleansed with saline   Dressing/Treatment Xeroform   Wound Length (cm) 0 cm   Wound Width (cm) 0 cm   Wound Depth (cm) 0 cm   Wound Surface Area (cm^2) 0 cm^2   Change in Wound Size % (l*w) 100   Wound Volume (cm^3) 0 cm^3   Wound Healing % 100   Wound Assessment Pink/red;Epithelialization   Drainage Amount None (dry)   Yolanda-wound Assessment Fragile   Pain Assessment   Pain Assessment None - Denies Pain

## 2024-09-03 NOTE — WOUND CARE
Discharge Instructions for  [facility]  [address] [place] [address]  [address] [place] [address]  Phone [phone]   Fax [phone]      NAME:  Melania Dave  YOB: 1940  MEDICAL RECORD NUMBER:  [id number]  DATE:  9/3/2024    Return Appointment:   discharged with Parag Quezada DO      Instructions:   Right forearm wound resolved!  left upper arm, left knee and left foot wounds remain intact!    All newly healed wounds - fragile.  Apply Vaseline 1 - 2 x daily x1 month.  Vaseline daily thereafter.    Continue adequate protein intake  gms daily, as well as adequate hydration.    Continue safety precautions to avoid trauma to fragile skin on all extremities.    Should you experience increased redness, swelling, pain, foul odor, size of wound(s), or have a temperature over 101 degrees please contact the [facility] Center at [phone] or if after hours contact your primary care physician or go to the hospital emergency department.    PLEASE NOTE: IF YOU ARE UNABLE TO OBTAIN WOUND SUPPLIES, CONTINUE TO USE THE SUPPLIES YOU HAVE AVAILABLE UNTIL YOU ARE ABLE TO REACH US. IT IS MOST IMPORTANT TO KEEP THE WOUND COVERED AT ALL TIMES.    Electronically signed Aliya Tillman RN on 9/3/2024 at 2:33 PM

## 2024-09-09 ENCOUNTER — CARE COORDINATION (OUTPATIENT)
Dept: CARE COORDINATION | Facility: CLINIC | Age: 84
End: 2024-09-09

## 2024-09-23 ENCOUNTER — CARE COORDINATION (OUTPATIENT)
Dept: CARE COORDINATION | Facility: CLINIC | Age: 84
End: 2024-09-23

## 2024-09-24 RX ORDER — OMEPRAZOLE 40 MG/1
CAPSULE, DELAYED RELEASE ORAL DAILY
Qty: 90 CAPSULE | Refills: 2 | Status: SHIPPED | OUTPATIENT
Start: 2024-09-24

## 2024-10-07 ENCOUNTER — CARE COORDINATION (OUTPATIENT)
Dept: CARE COORDINATION | Facility: CLINIC | Age: 84
End: 2024-10-07

## 2024-10-07 NOTE — CARE COORDINATION
Ambulatory Care Coordination Note     10/7/2024 7:38 AM     Patient Current Location:  [place]     ACM contacted the patient by telephone. Verified name and  with patient as identifiers.         ACM: Hien Moncada RN     Challenges to be reviewed by the provider   Additional needs identified to be addressed with provider No  none               Method of communication with provider: none.    Has the patient been seen in the ED since your last call? no    Care Summary Note: ccm outreached to patient. Patient states she is doing well at this time. Patient states she is eating and drinking well. Patient states no questions or concerns.ccm discussed that I would outreach next week. Patient agreeable.          Assessments Completed:   No changes since last call    Medications Reviewed:   Completed during this call    Advance Care Planning:   Not reviewed during this call     Care Planning:   Education Documentation  No documentation found.  Education Comments  No comments found.     ,    Goals Addressed                   This Visit's Progress     Conditions and Symptoms   On track     I will schedule office visits, as directed by my provider.  I will notify my provider of any symptoms that indicate a worsening of my condition.    Barriers: none  Plan for overcoming my barriers: N/A  Confidence: 9/10  Anticipated Goal Completion Date: 24                 PCP/Specialist follow up:   Future Appointments         Provider Specialty Dept Phone    10/15/2024 2:00 PM Loraine Farnsworth MD Internal Medicine [phone]    2024 11:30 AM Abhishek Cunningham MD Cardiology [phone]            Follow Up:   Plan for next ACM outreach in approximately 1 week to complete:  - goal progression  - education .   Patient  is agreeable to this plan.

## 2024-10-14 ENCOUNTER — CARE COORDINATION (OUTPATIENT)
Dept: CARE COORDINATION | Facility: CLINIC | Age: 84
End: 2024-10-14

## 2024-10-14 NOTE — CARE COORDINATION
Ambulatory Care Coordination Note     10/14/2024 10:35 AM     ACM outreach attempt by this ACM today to perform care management follow up . ACM was unable to reach the patient by telephone today; left voice message requesting a return phone call to this ACM.     ACM: Hien Moncada RN         PCP/Specialist follow up:   Future Appointments         Provider Specialty Dept Phone    10/15/2024 2:00 PM Loraine Farnsworth MD Internal Medicine [phone]    11/19/2024 11:30 AM Abhishek Cunningham MD Cardiology [phone]            Follow Up:   Plan for next ACM outreach in approximately 1 week to complete:  - goal progression  - education .

## 2024-10-15 ENCOUNTER — OFFICE VISIT (OUTPATIENT)
Dept: INTERNAL MEDICINE CLINIC | Facility: CLINIC | Age: 84
End: 2024-10-15
Payer: MEDICARE

## 2024-10-15 VITALS
BODY MASS INDEX: 22.02 KG/M2 | SYSTOLIC BLOOD PRESSURE: 124 MMHG | WEIGHT: 129 LBS | HEIGHT: 64 IN | DIASTOLIC BLOOD PRESSURE: 64 MMHG

## 2024-10-15 DIAGNOSIS — E03.9 ACQUIRED HYPOTHYROIDISM: ICD-10-CM

## 2024-10-15 DIAGNOSIS — K21.9 GASTROESOPHAGEAL REFLUX DISEASE WITHOUT ESOPHAGITIS: ICD-10-CM

## 2024-10-15 DIAGNOSIS — I10 ESSENTIAL HYPERTENSION: ICD-10-CM

## 2024-10-15 DIAGNOSIS — R20.2 NUMBNESS AND TINGLING OF LEFT ARM AND LEG: ICD-10-CM

## 2024-10-15 DIAGNOSIS — Z00.00 ENCOUNTER FOR MEDICARE ANNUAL WELLNESS EXAM: Primary | ICD-10-CM

## 2024-10-15 DIAGNOSIS — R20.0 NUMBNESS AND TINGLING OF LEFT ARM AND LEG: ICD-10-CM

## 2024-10-15 DIAGNOSIS — J42 CHRONIC BRONCHITIS, UNSPECIFIED CHRONIC BRONCHITIS TYPE (HCC): ICD-10-CM

## 2024-10-15 PROBLEM — A41.9 SEPSIS (HCC): Status: RESOLVED | Noted: 2024-06-20 | Resolved: 2024-10-15

## 2024-10-15 PROBLEM — L98.9 SKIN LESION OF RIGHT UPPER EXTREMITY: Status: RESOLVED | Noted: 2023-04-18 | Resolved: 2024-10-15

## 2024-10-15 PROBLEM — S91.302A OPEN WOUND OF LEFT FOOT WITH COMPLICATION: Chronic | Status: RESOLVED | Noted: 2024-07-31 | Resolved: 2024-10-15

## 2024-10-15 PROBLEM — S81.002A OPEN KNEE WOUND, LEFT, INITIAL ENCOUNTER: Status: RESOLVED | Noted: 2024-07-31 | Resolved: 2024-10-15

## 2024-10-15 PROBLEM — S41.112A SKIN TEAR OF UPPER ARM WITHOUT COMPLICATION, LEFT, INITIAL ENCOUNTER: Chronic | Status: RESOLVED | Noted: 2024-07-31 | Resolved: 2024-10-15

## 2024-10-15 PROBLEM — L03.116 CELLULITIS OF LEFT LOWER EXTREMITY: Status: RESOLVED | Noted: 2024-06-20 | Resolved: 2024-10-15

## 2024-10-15 PROBLEM — S41.111A SKIN TEAR OF RIGHT UPPER ARM WITHOUT COMPLICATION: Chronic | Status: RESOLVED | Noted: 2024-07-29 | Resolved: 2024-10-15

## 2024-10-15 PROCEDURE — G0439 PPPS, SUBSEQ VISIT: HCPCS | Performed by: INTERNAL MEDICINE

## 2024-10-15 PROCEDURE — 4004F PT TOBACCO SCREEN RCVD TLK: CPT | Performed by: INTERNAL MEDICINE

## 2024-10-15 PROCEDURE — 1090F PRES/ABSN URINE INCON ASSESS: CPT | Performed by: INTERNAL MEDICINE

## 2024-10-15 PROCEDURE — G8427 DOCREV CUR MEDS BY ELIG CLIN: HCPCS | Performed by: INTERNAL MEDICINE

## 2024-10-15 PROCEDURE — G8399 PT W/DXA RESULTS DOCUMENT: HCPCS | Performed by: INTERNAL MEDICINE

## 2024-10-15 PROCEDURE — G8420 CALC BMI NORM PARAMETERS: HCPCS | Performed by: INTERNAL MEDICINE

## 2024-10-15 PROCEDURE — 3023F SPIROM DOC REV: CPT | Performed by: INTERNAL MEDICINE

## 2024-10-15 PROCEDURE — 3078F DIAST BP <80 MM HG: CPT | Performed by: INTERNAL MEDICINE

## 2024-10-15 PROCEDURE — 3074F SYST BP LT 130 MM HG: CPT | Performed by: INTERNAL MEDICINE

## 2024-10-15 PROCEDURE — 1123F ACP DISCUSS/DSCN MKR DOCD: CPT | Performed by: INTERNAL MEDICINE

## 2024-10-15 PROCEDURE — G8482 FLU IMMUNIZE ORDER/ADMIN: HCPCS | Performed by: INTERNAL MEDICINE

## 2024-10-15 PROCEDURE — 99214 OFFICE O/P EST MOD 30 MIN: CPT | Performed by: INTERNAL MEDICINE

## 2024-10-15 RX ORDER — LEVOTHYROXINE SODIUM 150 UG/1
150 TABLET ORAL
Qty: 90 TABLET | Refills: 2 | Status: SHIPPED | OUTPATIENT
Start: 2024-10-15

## 2024-10-15 RX ORDER — PREDNISONE 5 MG/1
5 TABLET ORAL 2 TIMES DAILY
Qty: 180 TABLET | Refills: 2 | Status: SHIPPED | OUTPATIENT
Start: 2024-10-15

## 2024-10-15 ASSESSMENT — PATIENT HEALTH QUESTIONNAIRE - PHQ9
1. LITTLE INTEREST OR PLEASURE IN DOING THINGS: NOT AT ALL
SUM OF ALL RESPONSES TO PHQ9 QUESTIONS 1 & 2: 0
SUM OF ALL RESPONSES TO PHQ QUESTIONS 1-9: 0
2. FEELING DOWN, DEPRESSED OR HOPELESS: NOT AT ALL

## 2024-10-15 ASSESSMENT — ENCOUNTER SYMPTOMS: WHEEZING: 1

## 2024-10-15 NOTE — PROGRESS NOTES
HPI: Melania Dave (: 1940)    Her wounds on her arms have healed    Has a skin cancer on her left forearm that needs removal    UTD on flu vaccine and labs    States now with complete numbness and tingling in her left arm and left leg    Using cane    Needs Handicap placard        Problem List:  Patient Active Problem List   Diagnosis   • Palpitations   • Esophageal reflux   • Murmur   • IBS (irritable bowel syndrome)   • Insomnia, unspecified   • Gastritis   • ASCVD (arteriosclerotic cardiovascular disease)   • Chronic left shoulder pain   • Leg cramps   • Spinal stenosis of lumbar region with neurogenic claudication   • Rotator cuff tear, left   • Varicose veins of both legs with edema   • Chronic kidney disease, stage 3b ([facility])   • Essential hypertension   • COPD (chronic obstructive pulmonary disease) ([facility])   • Vitamin D deficiency   • Osteoporosis, post-menopausal   • Chronic bilateral low back pain with bilateral sciatica   • Chronic chest wall pain   • Nephrolithiasis   • Mixed hyperlipidemia   • Polymyalgia rheumatica ([facility])   • Difficulty swallowing   • Hypothyroidism   • Fluid retention in legs   • Weakness of right leg   • Skin lesion of left arm   • Skin atrophy   • Atrophia cutis senilis   • Sun-damaged skin   • History of skin cancer   • Cigarette smoker   • Long-term corticosteroid use   • Adverse effect of statin   • At high risk for injury related to fall   • Bladder prolapse, female, acquired       History:  Past Medical History:   Diagnosis Date   • Adverse effect of anesthesia     hypotension with anesthesia   • Back pain     lower back   • CAD (coronary artery disease) 2014    Followed by [facility]   • COPD (chronic obstructive pulmonary disease) ([facility])     managed with PRN inhaler and nebulizers   • COVID-19 vaccine series completed     Pfizer vaccine completed   • Difficulty swallowing 2013    pt denies   • GERD (gastroesophageal reflux disease)     managed

## 2024-10-15 NOTE — PROGRESS NOTES
Medicare Annual Wellness Visit    Melania Dave is here for Follow-up (MW)    Assessment & Plan   Encounter for Medicare annual wellness exam  Chronic bronchitis, unspecified chronic bronchitis type (HCC)  Acquired hypothyroidism  Gastroesophageal reflux disease without esophagitis  Essential hypertension  Numbness and tingling of left arm and leg    Recommendations for Preventive Services Due: see orders and patient instructions/AVS.  Recommended screening schedule for the next 5-10 years is provided to the patient in written form: see Patient Instructions/AVS.     No follow-ups on file.     Subjective       Patient's complete Health Risk Assessment and screening values have been reviewed and are found in Flowsheets. The following problems were reviewed today and where indicated follow up appointments were made and/or referrals ordered.    Positive Risk Factor Screenings with Interventions:    Fall Risk:  Do you feel unsteady or are you worried about falling? : (!) yes  2 or more falls in past year?: (!) yes  Fall with injury in past year?: (!) yes       Interventions:    Reviewed medications, home hazards, visual acuity, and co-morbidities that can increase risk for falls                  Vision Screen:  Do you have difficulty driving, watching TV, or doing any of your daily activities because of your eyesight?: No  Have you had an eye exam within the past year?: (!) No    Interventions:   Patient encouraged to make appointment with their eye specialist     ADL's:   Patient reports needing help with:  Select all that apply: (!) Transportation    Interventions:  Has transportation with family /friends     Tobacco Use:    Tobacco Use      Smoking status: Every Day        Packs/day: 1.00        Types: Cigarettes      Smokeless tobacco: Never      Tobacco comments: Quit smoking: would like to quit but don't think that she can.  11/28/17     Interventions:  declines                      Objective   Vitals:

## 2024-10-21 ENCOUNTER — CARE COORDINATION (OUTPATIENT)
Dept: CARE COORDINATION | Facility: CLINIC | Age: 84
End: 2024-10-21

## 2024-10-21 NOTE — CARE COORDINATION
Ambulatory Care Coordination Note     10/21/2024 8:34 AM     Patient Current Location:  [place]     ACM contacted the patient by telephone. Verified name and  with patient as identifiers.         ACM: Hien Moncada RN     Challenges to be reviewed by the provider   Additional needs identified to be addressed with provider No  none               Method of communication with provider: none.    Has the patient been seen in the ED since your last call? no    Care Summary Note: ccm outreached to patient. Patient states she is doing well. Patient states she was seen by pcp last week. Patient states no questions or concerns. Ccm discussed that I would outreach next week. Patient agreeable.        Assessments Completed:   No changes since last call    Medications Reviewed:   Patient denies any changes with medications and reports taking all medications as prescribed.    Advance Care Planning:   Not reviewed during this call     Care Planning:   Education Documentation  No documentation found.  Education Comments  No comments found.     ,    Goals Addressed                   This Visit's Progress     Conditions and Symptoms   On track     I will schedule office visits, as directed by my provider.  I will notify my provider of any symptoms that indicate a worsening of my condition.    Barriers: none  Plan for overcoming my barriers: N/A  Confidence: 9/10  Anticipated Goal Completion Date: 24                 PCP/Specialist follow up:   Future Appointments         Provider Specialty Dept Phone    2024 11:30 AM Abhishek Cunningham MD Cardiology [phone]    4/15/2025 2:20 PM Loraine Farnsworth MD Internal Medicine [phone]            Follow Up:   Plan for next AC outreach in approximately 1 week to complete:  - goal progression  - education .   Patient  is agreeable to this plan.

## 2024-10-28 ENCOUNTER — CARE COORDINATION (OUTPATIENT)
Dept: CARE COORDINATION | Facility: CLINIC | Age: 84
End: 2024-10-28

## 2024-10-28 NOTE — CARE COORDINATION
Ambulatory Care Coordination Note     10/28/2024 8:01 AM     Patient Current Location:  [place]     ACM contacted the patient by telephone. Verified name and  with patient as identifiers.         ACM: Hien Moncada RN     Challenges to be reviewed by the provider   Additional needs identified to be addressed with provider No  none               Method of communication with provider: none.    Has the patient been seen in the ED since your last call? no    Care Summary Note: ccm outreached to patient. Patient states she is doing well. Patient states she goes to see dermatology today to get cancer spot removed. Patient states no questions or concerns. [facility] discussed that I would outreach next week. Patent agreeable.        Assessments Completed:   No changes since last call    Medications Reviewed:   Patient denies any changes with medications and reports taking all medications as prescribed.    Advance Care Planning:   Not reviewed during this call     Care Planning:   Education Documentation  No documentation found.  Education Comments  No comments found.     ,    Goals Addressed                   This Visit's Progress     Conditions and Symptoms   On track     I will schedule office visits, as directed by my provider.  I will notify my provider of any symptoms that indicate a worsening of my condition.    Barriers: none  Plan for overcoming my barriers: N/A  Confidence: 9/10  Anticipated Goal Completion Date: 24                 PCP/Specialist follow up:   Future Appointments         Provider Specialty Dept Phone    2024 11:30 AM Abhishek Cunningham MD Cardiology [phone]    4/15/2025 2:20 PM Loraine Farnsworth MD Internal Medicine [phone]            Follow Up:   Plan for next AC outreach in approximately 1 week to complete:  - goal progression  - education .   Patient  is agreeable to this plan.

## 2024-11-04 ENCOUNTER — CARE COORDINATION (OUTPATIENT)
Dept: CARE COORDINATION | Facility: CLINIC | Age: 84
End: 2024-11-04

## 2024-11-04 NOTE — CARE COORDINATION
Ambulatory Care Coordination Note     2024 7:46 AM     Patient Current Location:  [place]     ACM contacted the patient by telephone. Verified name and  with patient as identifiers.     Patient graduated from the High Risk Care Management program on 2024.  Patient verbalizes confidence in the ability to self-manage at this time..  Care management goals have been completed. No further Ambulatory Care Manager follow up scheduled.

## 2024-12-05 ENCOUNTER — OFFICE VISIT (OUTPATIENT)
Dept: INTERNAL MEDICINE CLINIC | Facility: CLINIC | Age: 84
End: 2024-12-05

## 2024-12-05 VITALS
DIASTOLIC BLOOD PRESSURE: 72 MMHG | BODY MASS INDEX: 22.16 KG/M2 | OXYGEN SATURATION: 96 % | HEIGHT: 64 IN | SYSTOLIC BLOOD PRESSURE: 148 MMHG | WEIGHT: 129.8 LBS | HEART RATE: 56 BPM | TEMPERATURE: 97.7 F

## 2024-12-05 DIAGNOSIS — J42 CHRONIC BRONCHITIS WITH ACUTE EXACERBATION (HCC): Primary | Chronic | ICD-10-CM

## 2024-12-05 DIAGNOSIS — Z91.81 AT HIGH RISK FOR FALLS: ICD-10-CM

## 2024-12-05 DIAGNOSIS — J20.9 CHRONIC BRONCHITIS WITH ACUTE EXACERBATION (HCC): ICD-10-CM

## 2024-12-05 DIAGNOSIS — I10 ESSENTIAL HYPERTENSION: Chronic | ICD-10-CM

## 2024-12-05 DIAGNOSIS — J42 CHRONIC BRONCHITIS WITH ACUTE EXACERBATION (HCC): ICD-10-CM

## 2024-12-05 DIAGNOSIS — J20.9 CHRONIC BRONCHITIS WITH ACUTE EXACERBATION (HCC): Primary | Chronic | ICD-10-CM

## 2024-12-05 RX ORDER — IPRATROPIUM BROMIDE AND ALBUTEROL SULFATE 2.5; .5 MG/3ML; MG/3ML
3 SOLUTION RESPIRATORY (INHALATION) EVERY 6 HOURS PRN
Qty: 360 ML | Refills: 3 | Status: SHIPPED | OUTPATIENT
Start: 2024-12-05

## 2024-12-05 RX ORDER — PREDNISONE 5 MG/1
5 TABLET ORAL 2 TIMES DAILY
Qty: 180 TABLET | Refills: 2 | Status: SHIPPED | OUTPATIENT
Start: 2024-12-05

## 2024-12-05 RX ORDER — GABAPENTIN 100 MG/1
100 CAPSULE ORAL NIGHTLY
Qty: 90 CAPSULE | Refills: 2 | Status: CANCELLED | OUTPATIENT
Start: 2024-12-05 | End: 2025-09-01

## 2024-12-05 RX ORDER — PREDNISONE 20 MG/1
TABLET ORAL
Qty: 13 TABLET | Refills: 0 | Status: SHIPPED | OUTPATIENT
Start: 2024-12-06 | End: 2024-12-14

## 2024-12-05 RX ORDER — OMEPRAZOLE 40 MG/1
40 CAPSULE, DELAYED RELEASE ORAL DAILY
Qty: 90 CAPSULE | Refills: 2 | Status: CANCELLED | OUTPATIENT
Start: 2024-12-05

## 2024-12-05 RX ORDER — ESTRADIOL 0.5 MG/1
0.5 TABLET ORAL DAILY
Qty: 90 TABLET | Refills: 2 | Status: SHIPPED | OUTPATIENT
Start: 2024-12-05

## 2024-12-05 RX ORDER — CEFTRIAXONE 500 MG/1
500 INJECTION, POWDER, FOR SOLUTION INTRAMUSCULAR; INTRAVENOUS ONCE
Status: COMPLETED | OUTPATIENT
Start: 2024-12-05 | End: 2024-12-05

## 2024-12-05 RX ORDER — LEVOTHYROXINE SODIUM 150 UG/1
150 TABLET ORAL
Qty: 90 TABLET | Refills: 2 | Status: CANCELLED | OUTPATIENT
Start: 2024-12-05

## 2024-12-05 RX ORDER — ALLOPURINOL 100 MG/1
100 TABLET ORAL DAILY
Qty: 90 TABLET | Refills: 1 | OUTPATIENT
Start: 2024-12-05

## 2024-12-05 RX ORDER — CEFUROXIME AXETIL 250 MG/1
250 TABLET ORAL 2 TIMES DAILY
Qty: 20 TABLET | Refills: 0 | Status: SHIPPED | OUTPATIENT
Start: 2024-12-05 | End: 2024-12-15

## 2024-12-05 RX ORDER — ALLOPURINOL 100 MG/1
100 TABLET ORAL DAILY
Qty: 90 TABLET | Refills: 1 | Status: SHIPPED | OUTPATIENT
Start: 2024-12-05

## 2024-12-05 RX ORDER — METHYLPREDNISOLONE SODIUM SUCCINATE 125 MG/2ML
125 INJECTION INTRAMUSCULAR; INTRAVENOUS ONCE
Status: COMPLETED | OUTPATIENT
Start: 2024-12-05 | End: 2024-12-05

## 2024-12-05 RX ORDER — METHYLPREDNISOLONE SODIUM SUCCINATE 40 MG/ML
40 INJECTION INTRAMUSCULAR; INTRAVENOUS ONCE
Status: DISCONTINUED | OUTPATIENT
Start: 2024-12-05 | End: 2024-12-05

## 2024-12-05 RX ORDER — LOSARTAN POTASSIUM 50 MG/1
50 TABLET ORAL 2 TIMES DAILY
Qty: 90 TABLET | Refills: 3 | Status: SHIPPED | OUTPATIENT
Start: 2024-12-05 | End: 2025-06-03

## 2024-12-05 RX ADMIN — METHYLPREDNISOLONE SODIUM SUCCINATE 125 MG: 125 INJECTION INTRAMUSCULAR; INTRAVENOUS at 12:00

## 2024-12-05 RX ADMIN — CEFTRIAXONE 500 MG: 500 INJECTION, POWDER, FOR SOLUTION INTRAMUSCULAR; INTRAVENOUS at 12:53

## 2024-12-05 ASSESSMENT — PATIENT HEALTH QUESTIONNAIRE - PHQ9
SUM OF ALL RESPONSES TO PHQ QUESTIONS 1-9: 0
SUM OF ALL RESPONSES TO PHQ9 QUESTIONS 1 & 2: 0
2. FEELING DOWN, DEPRESSED OR HOPELESS: NOT AT ALL
SUM OF ALL RESPONSES TO PHQ QUESTIONS 1-9: 0
1. LITTLE INTEREST OR PLEASURE IN DOING THINGS: NOT AT ALL
SUM OF ALL RESPONSES TO PHQ QUESTIONS 1-9: 0
SUM OF ALL RESPONSES TO PHQ QUESTIONS 1-9: 0

## 2024-12-05 NOTE — ASSESSMENT & PLAN NOTE
Well controlled on losartan.  Orders:    losartan (COZAAR) 50 MG tablet; Take 1 tablet by mouth in the morning and at bedtime

## 2024-12-05 NOTE — PROGRESS NOTES
Melania Dave (: 1940)     History of Present Illness  The patient is an 84-year-old female who presents for evaluation of a persistent cough.    She has been experiencing this cough for the past 3 weeks. The cough is productive, with greenish-yellow phlegm, and is accompanied by significant nasal drainage. She has been taking Benadryl to manage the drainage. Despite seeking help at an urgent care center in [place], her condition did not improve. She was prescribed doxycycline but chose not to take it. She is currently on low-dose prednisone, taken twice daily. Of note, she has COPD and chronic bronchitis. She has been using DuoNeb,.    She also reports dealing with fevers, headaches, and recently feeling shaky and weak to the point where her legs were unable to support her. Additionally, she has a sore tongue. She has been using NyQuil, which she finds helpful as it aids her sleep.      Chief Complaint   Patient presents with    Cough    Congestion     Patient Active Problem List   Diagnosis    Palpitations    Esophageal reflux    Murmur    IBS (irritable bowel syndrome)    Insomnia, unspecified    Gastritis    ASCVD (arteriosclerotic cardiovascular disease)    Chronic left shoulder pain    Leg cramps    Spinal stenosis of lumbar region with neurogenic claudication    Rotator cuff tear, left    Varicose veins of both legs with edema    Chronic kidney disease, stage 3b (HCC)    Essential hypertension    COPD (chronic obstructive pulmonary disease) (HCC)    Vitamin D deficiency    Osteoporosis, post-menopausal    Chronic bilateral low back pain with bilateral sciatica    Chronic chest wall pain    Nephrolithiasis    Mixed hyperlipidemia    Polymyalgia rheumatica (HCC)    Difficulty swallowing    Hypothyroidism    Fluid retention in legs    Weakness of right leg    Skin lesion of left arm    Skin atrophy    Atrophia cutis senilis    Sun-damaged skin    History of skin cancer    Cigarette smoker

## 2025-01-13 RX ORDER — FAMOTIDINE 20 MG/1
20 TABLET, FILM COATED ORAL
Qty: 90 TABLET | Refills: 2 | OUTPATIENT
Start: 2025-01-13

## 2025-01-13 RX ORDER — CEPHALEXIN 500 MG/1
500 CAPSULE ORAL 2 TIMES DAILY
Qty: 30 CAPSULE | Refills: 0 | OUTPATIENT
Start: 2025-01-13 | End: 2025-01-28

## 2025-01-14 RX ORDER — CEPHALEXIN 500 MG/1
500 CAPSULE ORAL 2 TIMES DAILY
Qty: 30 CAPSULE | Refills: 0 | OUTPATIENT
Start: 2025-01-14 | End: 2025-01-29

## 2025-01-14 RX ORDER — FAMOTIDINE 20 MG/1
20 TABLET, FILM COATED ORAL
Qty: 90 TABLET | Refills: 2 | OUTPATIENT
Start: 2025-01-14

## 2025-01-28 ENCOUNTER — OFFICE VISIT (OUTPATIENT)
Age: 85
End: 2025-01-28
Payer: MEDICARE

## 2025-01-28 VITALS
HEIGHT: 64 IN | WEIGHT: 145 LBS | DIASTOLIC BLOOD PRESSURE: 66 MMHG | SYSTOLIC BLOOD PRESSURE: 134 MMHG | BODY MASS INDEX: 24.75 KG/M2 | HEART RATE: 102 BPM

## 2025-01-28 DIAGNOSIS — R00.2 PALPITATIONS: ICD-10-CM

## 2025-01-28 DIAGNOSIS — I89.0 LYMPHEDEMA: ICD-10-CM

## 2025-01-28 DIAGNOSIS — E78.2 MIXED HYPERLIPIDEMIA: ICD-10-CM

## 2025-01-28 DIAGNOSIS — I10 ESSENTIAL HYPERTENSION: ICD-10-CM

## 2025-01-28 DIAGNOSIS — I25.10 ASCVD (ARTERIOSCLEROTIC CARDIOVASCULAR DISEASE): Primary | ICD-10-CM

## 2025-01-28 PROCEDURE — G8428 CUR MEDS NOT DOCUMENT: HCPCS | Performed by: INTERNAL MEDICINE

## 2025-01-28 PROCEDURE — G8420 CALC BMI NORM PARAMETERS: HCPCS | Performed by: INTERNAL MEDICINE

## 2025-01-28 PROCEDURE — 1126F AMNT PAIN NOTED NONE PRSNT: CPT | Performed by: INTERNAL MEDICINE

## 2025-01-28 PROCEDURE — 4004F PT TOBACCO SCREEN RCVD TLK: CPT | Performed by: INTERNAL MEDICINE

## 2025-01-28 PROCEDURE — 93000 ELECTROCARDIOGRAM COMPLETE: CPT | Performed by: INTERNAL MEDICINE

## 2025-01-28 PROCEDURE — 3075F SYST BP GE 130 - 139MM HG: CPT | Performed by: INTERNAL MEDICINE

## 2025-01-28 PROCEDURE — 99214 OFFICE O/P EST MOD 30 MIN: CPT | Performed by: INTERNAL MEDICINE

## 2025-01-28 PROCEDURE — G8399 PT W/DXA RESULTS DOCUMENT: HCPCS | Performed by: INTERNAL MEDICINE

## 2025-01-28 PROCEDURE — 1090F PRES/ABSN URINE INCON ASSESS: CPT | Performed by: INTERNAL MEDICINE

## 2025-01-28 PROCEDURE — 3078F DIAST BP <80 MM HG: CPT | Performed by: INTERNAL MEDICINE

## 2025-01-28 PROCEDURE — 1123F ACP DISCUSS/DSCN MKR DOCD: CPT | Performed by: INTERNAL MEDICINE

## 2025-01-28 RX ORDER — FUROSEMIDE 40 MG/1
40 TABLET ORAL DAILY
COMMUNITY
Start: 2025-01-14

## 2025-01-28 NOTE — PROGRESS NOTES
[facility] CARDIOLOGY  [address] [place] [address], [facility] [address]  [place] [address]  PHONE: [phone]      25    NAME:  Melania Dave  : 1940  MRN: [id number]       SUBJECTIVE:   Melania Dave is a 84 y.o. female seen for a follow up visit regarding the following:     Chief Complaint   Patient presents with    Hypertension    Hyperlipidemia         HPI:    No cp or johansen. No orthopnea or pnd. No palpitations or syncope.  Increased falls    Past Medical History, Past Surgical History, Family history, Social History, and Medications were all reviewed with the patient today and updated as necessary.     Current Outpatient Medications   Medication Sig Dispense Refill    furosemide (LASIX) 40 MG tablet Take 1 tablet by mouth daily      predniSONE (DELTASONE) 5 MG tablet TAKE 1 TABLET BY MOUTH TWICE A  tablet 2    allopurinol (ZYLOPRIM) 100 MG tablet Take 1 tablet by mouth daily 90 tablet 1    estradiol (ESTRACE) 0.5 MG tablet Take 1 tablet by mouth daily 90 tablet 2    ipratropium 0.5 mg-albuterol 2.5 mg (DUONEB) 0.5-2.5 (3) MG/3ML SOLN nebulizer solution Inhale 3 mLs into the lungs every 6 hours as needed for Shortness of Breath 360 mL 3    losartan (COZAAR) 50 MG tablet Take 1 tablet by mouth in the morning and at bedtime 90 tablet 3    levothyroxine (SYNTHROID) 150 MCG tablet Take 1 tablet by mouth every morning (before breakfast) 90 tablet 2    omeprazole (PRILOSEC) 40 MG delayed release capsule TAKE 1 CAPSULE BY MOUTH EVERY DAY 90 capsule 2    gabapentin (NEURONTIN) 100 MG capsule Take 1 capsule by mouth nightly for 270 days. Intended supply: 90 days 90 capsule 2    ondansetron (ZOFRAN) 4 MG tablet Take 1 tablet by mouth 3 times daily as needed for Nausea or Vomiting 15 tablet 0    MAGNESIUM PO Take by mouth      Calcium Carb-Cholecalciferol (CALCIUM 1000 + D PO) Take by mouth      aspirin 81 MG EC tablet Take 1 tablet by mouth      vitamin D 25 MCG (1000 UT) CAPS Take 1 capsule by mouth

## 2025-02-03 ENCOUNTER — APPOINTMENT (OUTPATIENT)
Dept: GENERAL RADIOLOGY | Age: 85
DRG: 193 | End: 2025-02-03
Payer: MEDICARE

## 2025-02-03 ENCOUNTER — HOSPITAL ENCOUNTER (INPATIENT)
Age: 85
LOS: 3 days | Discharge: HOME | DRG: 193 | End: 2025-02-06
Attending: INTERNAL MEDICINE | Admitting: FAMILY MEDICINE
Payer: MEDICARE

## 2025-02-03 ENCOUNTER — HOSPITAL ENCOUNTER (EMERGENCY)
Age: 85
Discharge: ANOTHER HOSPITAL | DRG: 193 | End: 2025-02-03
Attending: GENERAL PRACTICE
Payer: MEDICARE

## 2025-02-03 VITALS
RESPIRATION RATE: 19 BRPM | HEIGHT: 64 IN | TEMPERATURE: 98.3 F | OXYGEN SATURATION: 97 % | DIASTOLIC BLOOD PRESSURE: 47 MMHG | SYSTOLIC BLOOD PRESSURE: 117 MMHG | BODY MASS INDEX: 24.75 KG/M2 | WEIGHT: 145 LBS | HEART RATE: 71 BPM

## 2025-02-03 DIAGNOSIS — J10.1 INFLUENZA A: Primary | ICD-10-CM

## 2025-02-03 DIAGNOSIS — R09.02 HYPOXEMIA: ICD-10-CM

## 2025-02-03 PROBLEM — J11.1 INFLUENZA: Status: ACTIVE | Noted: 2025-02-03

## 2025-02-03 LAB
ALBUMIN SERPL-MCNC: 3.9 G/DL (ref 3.2–4.6)
ALBUMIN/GLOB SERPL: 1.4 (ref 1–1.9)
ALP SERPL-CCNC: 55 U/L (ref 35–104)
ALT SERPL-CCNC: 18 U/L (ref 12–65)
ANION GAP SERPL CALC-SCNC: 11 MMOL/L (ref 7–16)
AST SERPL-CCNC: 20 U/L (ref 15–37)
BASOPHILS # BLD: 0.06 K/UL (ref 0–0.2)
BASOPHILS NFR BLD: 0.6 % (ref 0–2)
BILIRUB SERPL-MCNC: 0.3 MG/DL (ref 0–1.2)
BUN SERPL-MCNC: 25 MG/DL (ref 8–23)
CALCIUM SERPL-MCNC: 9.5 MG/DL (ref 8.8–10.2)
CHLORIDE SERPL-SCNC: 101 MMOL/L (ref 98–107)
CO2 SERPL-SCNC: 27 MMOL/L (ref 20–29)
CREAT SERPL-MCNC: 0.9 MG/DL (ref 0.8–1.3)
DIFFERENTIAL METHOD BLD: ABNORMAL
EOSINOPHIL # BLD: 0.02 K/UL (ref 0–0.8)
EOSINOPHIL NFR BLD: 0.2 % (ref 0.5–7.8)
ERYTHROCYTE [DISTWIDTH] IN BLOOD BY AUTOMATED COUNT: 15.8 % (ref 11.9–14.6)
FLUAV RNA SPEC QL NAA+PROBE: DETECTED
FLUBV RNA SPEC QL NAA+PROBE: NOT DETECTED
GLOBULIN SER CALC-MCNC: 2.7 G/DL (ref 2.3–3.5)
GLUCOSE SERPL-MCNC: 106 MG/DL (ref 65–100)
HCT VFR BLD AUTO: 35.1 % (ref 35.8–46.3)
HGB BLD-MCNC: 11 G/DL (ref 11.7–15.4)
IMM GRANULOCYTES # BLD AUTO: 0.12 K/UL (ref 0–0.5)
IMM GRANULOCYTES NFR BLD AUTO: 1.3 % (ref 0–5)
LYMPHOCYTES # BLD: 1.08 K/UL (ref 0.5–4.6)
LYMPHOCYTES NFR BLD: 11.4 % (ref 13–44)
MCH RBC QN AUTO: 30.4 PG (ref 26.1–32.9)
MCHC RBC AUTO-ENTMCNC: 31.3 G/DL (ref 31.4–35)
MCV RBC AUTO: 97 FL (ref 82–102)
MONOCYTES # BLD: 0.64 K/UL (ref 0.1–1.3)
MONOCYTES NFR BLD: 6.7 % (ref 4–12)
NEUTS SEG # BLD: 7.58 K/UL (ref 1.7–8.2)
NEUTS SEG NFR BLD: 79.8 % (ref 43–78)
NRBC # BLD: 0 K/UL (ref 0–0.2)
PLATELET # BLD AUTO: 280 K/UL (ref 150–450)
PMV BLD AUTO: 8.9 FL (ref 9.4–12.3)
POTASSIUM SERPL-SCNC: 4.7 MMOL/L (ref 3.5–5.1)
PROT SERPL-MCNC: 6.6 G/DL (ref 6.3–8.2)
RBC # BLD AUTO: 3.62 M/UL (ref 4.05–5.2)
SARS-COV-2 RDRP RESP QL NAA+PROBE: NOT DETECTED
SODIUM SERPL-SCNC: 139 MMOL/L (ref 133–143)
SOURCE: NORMAL
WBC # BLD AUTO: 9.5 K/UL (ref 4.3–11.1)

## 2025-02-03 PROCEDURE — 2500000003 HC RX 250 WO HCPCS: Performed by: FAMILY MEDICINE

## 2025-02-03 PROCEDURE — 80053 COMPREHEN METABOLIC PANEL: CPT

## 2025-02-03 PROCEDURE — 2700000000 HC OXYGEN THERAPY PER DAY

## 2025-02-03 PROCEDURE — 99285 EMERGENCY DEPT VISIT HI MDM: CPT

## 2025-02-03 PROCEDURE — 87635 SARS-COV-2 COVID-19 AMP PRB: CPT

## 2025-02-03 PROCEDURE — 6370000000 HC RX 637 (ALT 250 FOR IP): Performed by: GENERAL PRACTICE

## 2025-02-03 PROCEDURE — 6370000000 HC RX 637 (ALT 250 FOR IP): Performed by: FAMILY MEDICINE

## 2025-02-03 PROCEDURE — 6360000002 HC RX W HCPCS: Performed by: FAMILY MEDICINE

## 2025-02-03 PROCEDURE — 94640 AIRWAY INHALATION TREATMENT: CPT

## 2025-02-03 PROCEDURE — 2580000003 HC RX 258: Performed by: GENERAL PRACTICE

## 2025-02-03 PROCEDURE — 71045 X-RAY EXAM CHEST 1 VIEW: CPT

## 2025-02-03 PROCEDURE — 85025 COMPLETE CBC W/AUTO DIFF WBC: CPT

## 2025-02-03 PROCEDURE — 1100000000 HC RM PRIVATE

## 2025-02-03 PROCEDURE — 87502 INFLUENZA DNA AMP PROBE: CPT

## 2025-02-03 RX ORDER — SODIUM CHLORIDE 0.9 % (FLUSH) 0.9 %
5-40 SYRINGE (ML) INJECTION EVERY 12 HOURS SCHEDULED
Status: DISCONTINUED | OUTPATIENT
Start: 2025-02-03 | End: 2025-02-06 | Stop reason: HOSPADM

## 2025-02-03 RX ORDER — GUAIFENESIN 600 MG/1
1200 TABLET, EXTENDED RELEASE ORAL 2 TIMES DAILY
Status: DISCONTINUED | OUTPATIENT
Start: 2025-02-03 | End: 2025-02-06 | Stop reason: HOSPADM

## 2025-02-03 RX ORDER — ACETAMINOPHEN 650 MG/1
650 SUPPOSITORY RECTAL EVERY 6 HOURS PRN
Status: DISCONTINUED | OUTPATIENT
Start: 2025-02-03 | End: 2025-02-06 | Stop reason: HOSPADM

## 2025-02-03 RX ORDER — OSELTAMIVIR PHOSPHATE 75 MG/1
75 CAPSULE ORAL ONCE
Status: COMPLETED | OUTPATIENT
Start: 2025-02-03 | End: 2025-02-03

## 2025-02-03 RX ORDER — IBUPROFEN 600 MG/1
600 TABLET, FILM COATED ORAL
Status: COMPLETED | OUTPATIENT
Start: 2025-02-03 | End: 2025-02-03

## 2025-02-03 RX ORDER — OSELTAMIVIR PHOSPHATE 75 MG/1
75 CAPSULE ORAL
Status: DISCONTINUED | OUTPATIENT
Start: 2025-02-03 | End: 2025-02-03 | Stop reason: HOSPADM

## 2025-02-03 RX ORDER — LOSARTAN POTASSIUM 50 MG/1
50 TABLET ORAL 2 TIMES DAILY
Status: DISCONTINUED | OUTPATIENT
Start: 2025-02-03 | End: 2025-02-06 | Stop reason: HOSPADM

## 2025-02-03 RX ORDER — MAGNESIUM HYDROXIDE/ALUMINUM HYDROXICE/SIMETHICONE 120; 1200; 1200 MG/30ML; MG/30ML; MG/30ML
30 SUSPENSION ORAL EVERY 6 HOURS PRN
Status: DISCONTINUED | OUTPATIENT
Start: 2025-02-03 | End: 2025-02-06 | Stop reason: HOSPADM

## 2025-02-03 RX ORDER — BISACODYL 10 MG
10 SUPPOSITORY, RECTAL RECTAL DAILY PRN
Status: DISCONTINUED | OUTPATIENT
Start: 2025-02-03 | End: 2025-02-06 | Stop reason: HOSPADM

## 2025-02-03 RX ORDER — LEVOTHYROXINE SODIUM 75 UG/1
150 TABLET ORAL
Status: DISCONTINUED | OUTPATIENT
Start: 2025-02-04 | End: 2025-02-06 | Stop reason: HOSPADM

## 2025-02-03 RX ORDER — 0.9 % SODIUM CHLORIDE 0.9 %
500 INTRAVENOUS SOLUTION INTRAVENOUS ONCE
Status: COMPLETED | OUTPATIENT
Start: 2025-02-03 | End: 2025-02-03

## 2025-02-03 RX ORDER — FAMOTIDINE 20 MG/1
10 TABLET, FILM COATED ORAL DAILY PRN
Status: DISCONTINUED | OUTPATIENT
Start: 2025-02-03 | End: 2025-02-06 | Stop reason: HOSPADM

## 2025-02-03 RX ORDER — POTASSIUM CHLORIDE 1500 MG/1
40 TABLET, EXTENDED RELEASE ORAL PRN
Status: DISCONTINUED | OUTPATIENT
Start: 2025-02-03 | End: 2025-02-06 | Stop reason: HOSPADM

## 2025-02-03 RX ORDER — BUDESONIDE 0.5 MG/2ML
0.5 INHALANT ORAL
Status: DISCONTINUED | OUTPATIENT
Start: 2025-02-03 | End: 2025-02-06 | Stop reason: HOSPADM

## 2025-02-03 RX ORDER — SODIUM CHLORIDE 0.9 % (FLUSH) 0.9 %
5-40 SYRINGE (ML) INJECTION PRN
Status: DISCONTINUED | OUTPATIENT
Start: 2025-02-03 | End: 2025-02-06 | Stop reason: HOSPADM

## 2025-02-03 RX ORDER — BENZONATATE 100 MG/1
100 CAPSULE ORAL 3 TIMES DAILY PRN
Status: DISCONTINUED | OUTPATIENT
Start: 2025-02-03 | End: 2025-02-06 | Stop reason: HOSPADM

## 2025-02-03 RX ORDER — POLYETHYLENE GLYCOL 3350 17 G/17G
17 POWDER, FOR SOLUTION ORAL DAILY PRN
Status: DISCONTINUED | OUTPATIENT
Start: 2025-02-03 | End: 2025-02-06 | Stop reason: HOSPADM

## 2025-02-03 RX ORDER — ALLOPURINOL 100 MG/1
100 TABLET ORAL DAILY
Status: DISCONTINUED | OUTPATIENT
Start: 2025-02-04 | End: 2025-02-06 | Stop reason: HOSPADM

## 2025-02-03 RX ORDER — MAGNESIUM SULFATE IN WATER 40 MG/ML
2000 INJECTION, SOLUTION INTRAVENOUS PRN
Status: DISCONTINUED | OUTPATIENT
Start: 2025-02-03 | End: 2025-02-06 | Stop reason: HOSPADM

## 2025-02-03 RX ORDER — OSELTAMIVIR PHOSPHATE 30 MG/1
30 CAPSULE ORAL 2 TIMES DAILY
Status: DISCONTINUED | OUTPATIENT
Start: 2025-02-04 | End: 2025-02-06 | Stop reason: HOSPADM

## 2025-02-03 RX ORDER — IPRATROPIUM BROMIDE AND ALBUTEROL SULFATE 2.5; .5 MG/3ML; MG/3ML
1 SOLUTION RESPIRATORY (INHALATION)
Status: COMPLETED | OUTPATIENT
Start: 2025-02-03 | End: 2025-02-03

## 2025-02-03 RX ORDER — SODIUM CHLORIDE 9 MG/ML
INJECTION, SOLUTION INTRAVENOUS PRN
Status: DISCONTINUED | OUTPATIENT
Start: 2025-02-03 | End: 2025-02-06 | Stop reason: HOSPADM

## 2025-02-03 RX ORDER — ACETAMINOPHEN 325 MG/1
650 TABLET ORAL EVERY 6 HOURS PRN
Status: DISCONTINUED | OUTPATIENT
Start: 2025-02-03 | End: 2025-02-06 | Stop reason: HOSPADM

## 2025-02-03 RX ORDER — ASPIRIN 81 MG/1
81 TABLET ORAL DAILY
Status: DISCONTINUED | OUTPATIENT
Start: 2025-02-04 | End: 2025-02-06 | Stop reason: HOSPADM

## 2025-02-03 RX ORDER — POTASSIUM CHLORIDE 7.45 MG/ML
10 INJECTION INTRAVENOUS PRN
Status: DISCONTINUED | OUTPATIENT
Start: 2025-02-03 | End: 2025-02-06 | Stop reason: HOSPADM

## 2025-02-03 RX ORDER — IPRATROPIUM BROMIDE AND ALBUTEROL SULFATE 2.5; .5 MG/3ML; MG/3ML
1 SOLUTION RESPIRATORY (INHALATION)
Status: DISCONTINUED | OUTPATIENT
Start: 2025-02-03 | End: 2025-02-05

## 2025-02-03 RX ORDER — FUROSEMIDE 40 MG/1
40 TABLET ORAL DAILY
Status: DISCONTINUED | OUTPATIENT
Start: 2025-02-04 | End: 2025-02-06 | Stop reason: HOSPADM

## 2025-02-03 RX ORDER — ALBUTEROL SULFATE 5 MG/ML
2.5 SOLUTION RESPIRATORY (INHALATION) EVERY 6 HOURS PRN
Status: DISCONTINUED | OUTPATIENT
Start: 2025-02-03 | End: 2025-02-06 | Stop reason: HOSPADM

## 2025-02-03 RX ORDER — GABAPENTIN 100 MG/1
100 CAPSULE ORAL NIGHTLY
Status: DISCONTINUED | OUTPATIENT
Start: 2025-02-03 | End: 2025-02-06 | Stop reason: HOSPADM

## 2025-02-03 RX ORDER — PANTOPRAZOLE SODIUM 40 MG/1
40 TABLET, DELAYED RELEASE ORAL
Status: DISCONTINUED | OUTPATIENT
Start: 2025-02-04 | End: 2025-02-06 | Stop reason: HOSPADM

## 2025-02-03 RX ADMIN — ACETAMINOPHEN 650 MG: 325 TABLET ORAL at 22:23

## 2025-02-03 RX ADMIN — FAMOTIDINE 10 MG: 20 TABLET, FILM COATED ORAL at 22:23

## 2025-02-03 RX ADMIN — GABAPENTIN 100 MG: 100 CAPSULE ORAL at 22:23

## 2025-02-03 RX ADMIN — SODIUM CHLORIDE 500 ML: 9 INJECTION, SOLUTION INTRAVENOUS at 13:11

## 2025-02-03 RX ADMIN — GUAIFENESIN 1200 MG: 600 TABLET ORAL at 23:41

## 2025-02-03 RX ADMIN — IPRATROPIUM BROMIDE AND ALBUTEROL SULFATE 1 DOSE: 2.5; .5 SOLUTION RESPIRATORY (INHALATION) at 22:28

## 2025-02-03 RX ADMIN — OSELTAMIVIR PHOSPHATE 75 MG: 75 CAPSULE ORAL at 22:23

## 2025-02-03 RX ADMIN — SODIUM CHLORIDE, PRESERVATIVE FREE 5 ML: 5 INJECTION INTRAVENOUS at 22:27

## 2025-02-03 RX ADMIN — BUDESONIDE 500 MCG: 0.5 INHALANT RESPIRATORY (INHALATION) at 22:28

## 2025-02-03 RX ADMIN — IPRATROPIUM BROMIDE AND ALBUTEROL SULFATE 1 DOSE: 2.5; .5 SOLUTION RESPIRATORY (INHALATION) at 14:02

## 2025-02-03 RX ADMIN — WATER 80 MG: 1 INJECTION INTRAMUSCULAR; INTRAVENOUS; SUBCUTANEOUS at 22:24

## 2025-02-03 RX ADMIN — IBUPROFEN 600 MG: 600 TABLET, FILM COATED ORAL at 12:08

## 2025-02-03 RX ADMIN — LOSARTAN POTASSIUM 50 MG: 50 TABLET, FILM COATED ORAL at 22:23

## 2025-02-03 ASSESSMENT — PAIN DESCRIPTION - LOCATION: LOCATION: GENERALIZED;HEAD

## 2025-02-03 ASSESSMENT — PAIN DESCRIPTION - DESCRIPTORS: DESCRIPTORS: ACHING

## 2025-02-03 ASSESSMENT — ENCOUNTER SYMPTOMS
SHORTNESS OF BREATH: 1
ABDOMINAL PAIN: 0

## 2025-02-03 ASSESSMENT — PAIN SCALES - GENERAL
PAINLEVEL_OUTOF10: 7
PAINLEVEL_OUTOF10: 0

## 2025-02-03 ASSESSMENT — PAIN - FUNCTIONAL ASSESSMENT: PAIN_FUNCTIONAL_ASSESSMENT: 0-10

## 2025-02-03 NOTE — ED NOTES
Pt resting on stretcher with family at bedside. Pt stated she was cold, rechecked temp- gave warm blanket.

## 2025-02-03 NOTE — ED PROVIDER NOTES
Emergency Department Provider Note       PCP: Loraine Farnsworth MD   Age: 84 y.o.   Sex: female     DISPOSITION Decision To Transfer 02/03/2025 02:02:51 PM   DISPOSITION CONDITION Stable            ICD-10-CM    1. Influenza A  J10.1       2. Hypoxemia  R09.02           Medical Decision Making     Patient presents with influenza.  No obvious pneumonia.  She is hypoxic and does have some wheezing and coarse breath sounds.  However she is not in severe distress but cannot send her home with mild oxygen requirement.  Patient will be admitted for further workup and management.  Nothing to suggest pneumonia, PE, ACS, myocarditis, pericarditis, or any other cardiopulmonary emergency.     1 or more acute illnesses that pose a threat to life or bodily function.   Drug therapy given requiring intensive monitoring for toxicity.  Discussion with external consultants.  Shared medical decision making was utilized in creating the patients health plan today.    I independently ordered and reviewed each unique test.  I reviewed external records: provider visit note from PCP.  I reviewed external records: previous lab results from outside ED.  I reviewed external records: previous imaging study including radiologist interpretation.     I interpreted the X-rays chest x-ray shows no obvious infiltrate or edema and heart size is normal.  I have reviewed and agree with radiology report.    The patient was admitted and I have discussed patient management with the admitting provider.          History     84-year-old female with past medical history of COPD, not on oxygen at home, presents to the ED with headache, chills, and cough that started last night.  Son at bedside and he endorses that this morning she was unable to stand up on her own and has been very lethargic since last night.  Denies chest pain, nausea, and vomiting.  Her oxygen sats decreased to around 88% prior to my arrival in the room and she has since been placed on  mg-albuterol 2.5 mg (DUONEB) nebulizer solution 1 Dose (1 Dose Inhalation Given 2/3/25 1402)       Discharge Medication List as of 2/3/2025  9:09 PM           Past Medical History:   Diagnosis Date    Adverse effect of anesthesia     hypotension with anesthesia    Back pain     lower back    CAD (coronary artery disease) 11/25/2014    Followed by [facility].    COPD (chronic obstructive pulmonary disease) (HCC)     managed with PRN inhaler and nebulizers    COVID-19 vaccine series completed     Pfizer vaccine completed    Difficulty swallowing 08/23/2013    pt denies    GERD (gastroesophageal reflux disease)     managed with Prilosec    H/O bone density study 08/15/2017    osteoporosis    H/O heart artery stent 02/2020    X2    H/O myocardial perfusion scan 09/08/2017    normal    History of gastritis     History of kidney stones     x 2 with surgical intervention     History of squamous cell carcinoma     removed from face and right ear    Hyperlipidemia 8/23/2013    no present treatment     Hypertension     Hypotensive episode     occassionally    Hypothyroidism     managed with medication     Murmur 09/22/2017 9/22/17 ECHO LVEF 65% Mitral regurgitation -     On prednisone therapy     Prednisone 5mg BID    Osteoarthritis     managed with medication     Osteoporosis     managed with medication     Pain and swelling of left ankle 4/9/2015    no recent episodes    Palpitations 5/12/2016    occassionally    Polymyalgia rheumatica (HCC)     Prednisone 5mg BID    Purpura (HCC) 8/23/2013    SECONDARY TO STEROID TREATMENT    Recurrent UTI 10/2019    pt denies any recent UTI's    Rotator cuff tear, left 8/23/2013    Rotator cuff tear, right     Routine eye exam 2020    cataracts    Seasonal allergic rhinitis     Smoker     smokes 1 ppd x since age 27    Statin intolerance         Past Surgical History:   Procedure Laterality Date    BLADDER REPAIR      CARDIAC CATHETERIZATION  2014    cardiac stent x2    CARPAL TUNNEL

## 2025-02-04 LAB
ANION GAP SERPL CALC-SCNC: 11 MMOL/L (ref 7–16)
APPEARANCE UR: CLEAR
BACTERIA URNS QL MICRO: ABNORMAL /HPF
BASOPHILS # BLD: 0.03 K/UL (ref 0–0.2)
BASOPHILS NFR BLD: 0.2 % (ref 0–2)
BILIRUB UR QL: NEGATIVE
BUN SERPL-MCNC: 26 MG/DL (ref 8–23)
CALCIUM SERPL-MCNC: 8.7 MG/DL (ref 8.8–10.2)
CHLORIDE SERPL-SCNC: 102 MMOL/L (ref 98–107)
CO2 SERPL-SCNC: 25 MMOL/L (ref 20–29)
COLOR UR: ABNORMAL
CREAT SERPL-MCNC: 0.91 MG/DL (ref 0.6–1.1)
DIFFERENTIAL METHOD BLD: ABNORMAL
EOSINOPHIL # BLD: 0 K/UL (ref 0–0.8)
EOSINOPHIL NFR BLD: 0 % (ref 0.5–7.8)
EPI CELLS #/AREA URNS HPF: ABNORMAL /HPF
ERYTHROCYTE [DISTWIDTH] IN BLOOD BY AUTOMATED COUNT: 15.9 % (ref 11.9–14.6)
GLUCOSE BLD STRIP.AUTO-MCNC: 222 MG/DL (ref 65–100)
GLUCOSE SERPL-MCNC: 208 MG/DL (ref 70–99)
GLUCOSE UR STRIP.AUTO-MCNC: NEGATIVE MG/DL
HCT VFR BLD AUTO: 31.5 % (ref 35.8–46.3)
HGB BLD-MCNC: 9.7 G/DL (ref 11.7–15.4)
HGB UR QL STRIP: NEGATIVE
IMM GRANULOCYTES # BLD AUTO: 0.15 K/UL (ref 0–0.5)
IMM GRANULOCYTES NFR BLD AUTO: 1.1 % (ref 0–5)
KETONES UR QL STRIP.AUTO: NEGATIVE MG/DL
LEUKOCYTE ESTERASE UR QL STRIP.AUTO: ABNORMAL
LYMPHOCYTES # BLD: 0.56 K/UL (ref 0.5–4.6)
LYMPHOCYTES NFR BLD: 4.1 % (ref 13–44)
MCH RBC QN AUTO: 29.9 PG (ref 26.1–32.9)
MCHC RBC AUTO-ENTMCNC: 30.8 G/DL (ref 31.4–35)
MCV RBC AUTO: 97.2 FL (ref 82–102)
MONOCYTES # BLD: 0.31 K/UL (ref 0.1–1.3)
MONOCYTES NFR BLD: 2.3 % (ref 4–12)
MUCOUS THREADS URNS QL MICRO: 0 /LPF
NEUTS SEG # BLD: 12.6 K/UL (ref 1.7–8.2)
NEUTS SEG NFR BLD: 92.3 % (ref 43–78)
NITRITE UR QL STRIP.AUTO: NEGATIVE
NRBC # BLD: 0 K/UL (ref 0–0.2)
OTHER OBSERVATIONS: ABNORMAL
PH UR STRIP: 5.5 (ref 5–9)
PLATELET # BLD AUTO: 247 K/UL (ref 150–450)
PMV BLD AUTO: 9.2 FL (ref 9.4–12.3)
POTASSIUM SERPL-SCNC: 4.7 MMOL/L (ref 3.5–5.1)
PROCALCITONIN SERPL-MCNC: 0.24 NG/ML (ref 0–0.1)
PROT UR STRIP-MCNC: 30 MG/DL
RBC # BLD AUTO: 3.24 M/UL (ref 4.05–5.2)
RBC #/AREA URNS HPF: ABNORMAL /HPF
SERVICE CMNT-IMP: ABNORMAL
SODIUM SERPL-SCNC: 139 MMOL/L (ref 136–145)
SP GR UR REFRACTOMETRY: 1.02 (ref 1–1.02)
UROBILINOGEN UR QL STRIP.AUTO: 0.2 EU/DL (ref 0.2–1)
WBC # BLD AUTO: 13.7 K/UL (ref 4.3–11.1)
WBC URNS QL MICRO: ABNORMAL /HPF

## 2025-02-04 PROCEDURE — 94760 N-INVAS EAR/PLS OXIMETRY 1: CPT

## 2025-02-04 PROCEDURE — 6370000000 HC RX 637 (ALT 250 FOR IP): Performed by: FAMILY MEDICINE

## 2025-02-04 PROCEDURE — 97530 THERAPEUTIC ACTIVITIES: CPT

## 2025-02-04 PROCEDURE — 80048 BASIC METABOLIC PNL TOTAL CA: CPT

## 2025-02-04 PROCEDURE — 94640 AIRWAY INHALATION TREATMENT: CPT

## 2025-02-04 PROCEDURE — 81015 MICROSCOPIC EXAM OF URINE: CPT

## 2025-02-04 PROCEDURE — 6360000002 HC RX W HCPCS: Performed by: FAMILY MEDICINE

## 2025-02-04 PROCEDURE — 2700000000 HC OXYGEN THERAPY PER DAY

## 2025-02-04 PROCEDURE — 87088 URINE BACTERIA CULTURE: CPT

## 2025-02-04 PROCEDURE — 97112 NEUROMUSCULAR REEDUCATION: CPT

## 2025-02-04 PROCEDURE — 82962 GLUCOSE BLOOD TEST: CPT

## 2025-02-04 PROCEDURE — 85025 COMPLETE CBC W/AUTO DIFF WBC: CPT

## 2025-02-04 PROCEDURE — 2500000003 HC RX 250 WO HCPCS: Performed by: FAMILY MEDICINE

## 2025-02-04 PROCEDURE — 97165 OT EVAL LOW COMPLEX 30 MIN: CPT

## 2025-02-04 PROCEDURE — 97161 PT EVAL LOW COMPLEX 20 MIN: CPT

## 2025-02-04 PROCEDURE — 87186 SC STD MICRODIL/AGAR DIL: CPT

## 2025-02-04 PROCEDURE — 36415 COLL VENOUS BLD VENIPUNCTURE: CPT

## 2025-02-04 PROCEDURE — 1100000000 HC RM PRIVATE

## 2025-02-04 PROCEDURE — 84145 PROCALCITONIN (PCT): CPT

## 2025-02-04 PROCEDURE — 81001 URINALYSIS AUTO W/SCOPE: CPT

## 2025-02-04 PROCEDURE — 97535 SELF CARE MNGMENT TRAINING: CPT

## 2025-02-04 PROCEDURE — 87086 URINE CULTURE/COLONY COUNT: CPT

## 2025-02-04 RX ADMIN — ASPIRIN 81 MG: 81 TABLET, COATED ORAL at 08:25

## 2025-02-04 RX ADMIN — IPRATROPIUM BROMIDE AND ALBUTEROL SULFATE 1 DOSE: 2.5; .5 SOLUTION RESPIRATORY (INHALATION) at 14:46

## 2025-02-04 RX ADMIN — OSELTAMIVIR 30 MG: 30 CAPSULE ORAL at 20:41

## 2025-02-04 RX ADMIN — IPRATROPIUM BROMIDE AND ALBUTEROL SULFATE 1 DOSE: 2.5; .5 SOLUTION RESPIRATORY (INHALATION) at 20:28

## 2025-02-04 RX ADMIN — GABAPENTIN 100 MG: 100 CAPSULE ORAL at 20:41

## 2025-02-04 RX ADMIN — ACETAMINOPHEN 650 MG: 325 TABLET ORAL at 08:34

## 2025-02-04 RX ADMIN — PANTOPRAZOLE SODIUM 40 MG: 40 TABLET, DELAYED RELEASE ORAL at 05:43

## 2025-02-04 RX ADMIN — SODIUM CHLORIDE, PRESERVATIVE FREE 10 ML: 5 INJECTION INTRAVENOUS at 08:34

## 2025-02-04 RX ADMIN — BUDESONIDE 500 MCG: 0.5 INHALANT RESPIRATORY (INHALATION) at 20:28

## 2025-02-04 RX ADMIN — GUAIFENESIN 1200 MG: 600 TABLET ORAL at 08:26

## 2025-02-04 RX ADMIN — SODIUM CHLORIDE, PRESERVATIVE FREE 5 ML: 5 INJECTION INTRAVENOUS at 20:42

## 2025-02-04 RX ADMIN — ALLOPURINOL 100 MG: 100 TABLET ORAL at 08:27

## 2025-02-04 RX ADMIN — LEVOTHYROXINE SODIUM 150 MCG: 0.07 TABLET ORAL at 05:43

## 2025-02-04 RX ADMIN — GUAIFENESIN 1200 MG: 600 TABLET ORAL at 20:41

## 2025-02-04 RX ADMIN — IPRATROPIUM BROMIDE AND ALBUTEROL SULFATE 1 DOSE: 2.5; .5 SOLUTION RESPIRATORY (INHALATION) at 03:10

## 2025-02-04 SDOH — ECONOMIC STABILITY: INCOME INSECURITY: IN THE LAST 12 MONTHS, WAS THERE A TIME WHEN YOU WERE NOT ABLE TO PAY THE MORTGAGE OR RENT ON TIME?: NO

## 2025-02-04 SDOH — ECONOMIC STABILITY: TRANSPORTATION INSECURITY
IN THE PAST 12 MONTHS, HAS LACK OF TRANSPORTATION KEPT YOU FROM MEETINGS, WORK, OR FROM GETTING THINGS NEEDED FOR DAILY LIVING?: NO

## 2025-02-04 ASSESSMENT — PAIN SCALES - GENERAL
PAINLEVEL_OUTOF10: 0
PAINLEVEL_OUTOF10: 0

## 2025-02-04 NOTE — PROGRESS NOTES
TRANSFER - IN REPORT:    Verbal report received from LEANDRO Dahl on Melania Dave  being received from [facility] ED for routine progression of patient care      Report consisted of patient's Situation, Background, Assessment and   Recommendations(SBAR).     Information from the following report(s) ED SBAR was reviewed with the receiving nurse.    Opportunity for questions and clarification was provided.      Assessment completed upon patient's arrival to unit and care assumed.

## 2025-02-04 NOTE — ED NOTES
TRANSFER - OUT REPORT:    Verbal report given to Carson CARSON RN on Melania Dave  being transferred to [facility]  for routine progression of patient care       Report consisted of patient's Situation, Background, Assessment and   Recommendations(SBAR).     Information from the following report(s) Index, ED Encounter Summary, ED SBAR, Adult Overview, MAR, Recent Results, Med Rec Status, and Neuro Assessment was reviewed with the receiving nurse.    Orestes Fall Assessment:    Presents to emergency department  because of falls (Syncope, seizure, or loss of consciousness): No  Age > 70: Yes  Altered Mental Status, Intoxication with alcohol or substance confusion (Disorientation, impaired judgment, poor safety awaremess, or inability to follow instructions): No  Impaired Mobility: Ambulates or transfers with assistive devices or assistance; Unable to ambulate or transer.: Yes  Nursing Judgement: Yes          Lines:   Peripheral IV 02/03/25 Right Antecubital (Active)   Site Assessment Clean, dry & intact 02/03/25 1427   Line Status Blood return noted;Flushed;Specimen collected 02/03/25 1427   Phlebitis Assessment No symptoms 02/03/25 1427   Infiltration Assessment 0 02/03/25 1427   Dressing Status New dressing applied 02/03/25 1427        Opportunity for questions and clarification was provided.      Patient transported with:  [facility]

## 2025-02-04 NOTE — THERAPY EVALUATION
ACUTE OCCUPATIONAL THERAPY GOALS:   (Developed with and agreed upon by patient and/or caregiver.)  1. Patient will complete lower body bathing and dressing with MODIFIED INDEPENDENCE and adaptive equipment as needed.     2. Patient will complete toilet transfers and toileting with MODIFIED INDEPENDENCE.  3. Patient will complete self-grooming ADL tasks at standing level with MODIFIED INDEPENDENCE.  4. Patient will tolerate 25 minutes of OT treatment with 1-2 rest breaks to increase activity tolerance for ADLs.   5. Patient will complete functional transfers with MODIFIED INDEPENDENCE and adaptive equipment as needed.   6. Patient will tolerate 10 minutes BUE exercises to increase strength for safe, functional transfers.     Timeframe: 7 visits      OCCUPATIONAL THERAPY Initial Assessment, Daily Note, and AM       OT Visit Days: 1  Acknowledge Orders  Time  OT Charge Capture  Rehab Caseload Tracker      Melania Dave is a 84 y.o. female   PRIMARY DIAGNOSIS: Influenza A  Influenza A [J10.1]       Reason for Referral: Generalized Muscle Weakness (M62.81)  Other lack of cordination (R27.8)  Inpatient: Payor: MEDICARE / Plan: MEDICARE PART A AND B / Product Type: *No Product type* /     ASSESSMENT:     REHAB RECOMMENDATIONS:   Recommendation to date pending progress:  Setting:  Home Health Therapy    Equipment:   None - patient has rolling walker, cane, walk in shower, shower chair, and grab bars at home     ASSESSMENT:  Ms. Dave is a 83 y/o F who presents to the hospital with generalized weakness. Found to be Influenza A +.     Today, pt is received supine in bed with adult son at bedside, agreeable to participate in the session. Per patient and son report, she lives with her supportive son and family in a single level home with ramped entrance. She is typically independent with all ADLs/IADLs with PRN use of cane for mobility at baseline. This date, OT educated patient  on walker management and safety,

## 2025-02-04 NOTE — ACP (ADVANCE CARE PLANNING)
Advance Care Planning Note   Admit Date:  2/3/2025  9:39 PM   Name:  Melania Dave   Age:  84 y.o.  Sex:  female  :  1940   MRN:  [id number]   Room:  [facility]    Melania Dave has capacity to make her own decisions:   Yes    Other people present:   Son    Patient / surrogate decision-maker directed code status:  DNR/DNI    Patient or surrogate consented to discussion of the current conditions, workup, management plans, prognosis, and the risk for further deterioration.  Time spent: 17 minutes in direct discussion.      Signed:  GÓMEZ GLASER DO

## 2025-02-04 NOTE — CARE COORDINATION
Chart reviewed by  for potential transition of care (DIVYA) needs or concerns.  Pt is insured with pharmacy benefits and is established with a PCP.  Therapy evals complete with the recommendation for HH therapy at [facility].  Pt has all needed DME in the home.  CM will follow up with pt/family as soon as possible to discuss this recommendation and proceed with a referral if they are agreeable. Please notify/consult  if other DIVYA needs arise.       02/04/25 3095   Service Assessment   Patient Orientation Alert and Oriented   Cognition Alert   History Provided By Medical Record   Primary Caregiver Self   Support Systems Family Members;Children   Patient's Healthcare Decision Maker is: Named in Scanned ACP Document   PCP Verified by CM Yes   Last Visit to PCP Within last 3 months  (12/5/2025)   Prior Functional Level Independent in ADLs/IADLs   Current Functional Level Independent in ADLs/IADLs   Can patient return to prior living arrangement Yes   Ability to make needs known: Good   Family able to assist with home care needs: Yes   Would you like for me to discuss the discharge plan with any other family members/significant others, and if so, who? Yes  (son)   Financial Resources Medicare   Social/Functional History   Lives With Family;Son   Type of Home House   Home Layout One level   Home Access Ramped entrance   Bathroom Shower/Tub Walk-in shower   Bathroom Equipment Grab bars in shower;Shower chair   Home Equipment Cane;Walker - Rolling   Receives Help From Family   Prior Level of Assist for ADLs Independent   Prior Level of Assist for Homemaking Independent   Ambulation Assistance Independent   Prior Level of Assist for Transfers Independent   Active  No   Patient's  Info family   Occupation Retired   Discharge Planning   Type of Residence House   Living Arrangements Family Members;Children   Current Services Prior To Admission Durable Medical Equipment   Current DME Prior to Arrival

## 2025-02-04 NOTE — PROGRESS NOTES
4 Eyes Skin Assessment     NAME:  Melania Dave  YOB: 1940  MEDICAL RECORD NUMBER:  [id number]    The patient is being assessed for  Admission    I agree that at least one RN has performed a thorough Head to Toe Skin Assessment on the patient. ALL assessment sites listed below have been assessed.      Areas assessed by both nurses:    Head, Face, Ears, Shoulders, Back, Chest, Arms, Elbows, Hands, Sacrum. Buttock, Coccyx, Ischium, Legs. Feet and Heels, and Under Medical Devices         Does the Patient have a Wound? No noted wound(s)       Ryan Prevention initiated by RN: Yes  Wound Care Orders initiated by RN: No    Pressure Injury (Stage 3,4, Unstageable, DTI, NWPT, and Complex wounds) if present, place Wound referral order by RN under : No    New Ostomies, if present place, Ostomy referral order under : No     Nurse 1 eSignature: Electronically signed by HUE CHAVEZ RN on 2/4/25 at 6:04 AM EST    **SHARE this note so that the co-signing nurse can place an eSignature**    Nurse 2 eSignature: Electronically signed by ELISE VAUGHAN RN on 2/4/25 at 6:05 AM EST

## 2025-02-04 NOTE — PROGRESS NOTES
ACUTE PHYSICAL THERAPY GOALS:   (Developed with and agreed upon by patient and/or caregiver.)  Patient will demonstrate sup<>sit transfer with I using bedrails in 7 therapy sessions.  Patient will demonstrate STS transfer with MI using BUE support and LRAD in 7 therapy sessions.  Patient will ambulate with MI and LRAD x 250 ft in 7 therapy sessions.   Patient will demonstrate toilet transfer with MI with BUE support in 7 therapy sessions.  Patient will improve AMPAC score to 24 / 24 in 7 therapy sessions.      PHYSICAL THERAPY Initial Assessment and Daily Note  (Link to Caseload Tracking: PT Visit Days : 1  Acknowledge Orders  Time In/Out  PT Charge Capture  Rehab Caseload Tracker    Melania Dave is a 84 y.o. female   PRIMARY DIAGNOSIS: Influenza A  Influenza A [J10.1]       Reason for Referral: Generalized Muscle Weakness (M62.81)  Difficulty in walking, Not elsewhere classified (R26.2)  Other abnormalities of gait and mobility (R26.89)  Repeated Falls (R29.6)  History of falling (Z91.81)  Low Back Pain (M54.5)  Inpatient: Payor: MEDICARE / Plan: MEDICARE PART A AND B / Product Type: *No Product type* /     ASSESSMENT:     REHAB RECOMMENDATIONS:   Recommendation to date pending progress:  Setting:  Home Health Therapy    Equipment:    None     ASSESSMENT:  Ms. Dave is found supine in bed in no apparent distress, NC 5L donned, son present in room. Patient is a 84 y.o. year old female admitted on 2/3/2025 for influenza, she is pleasantly confused with tangential and rambling speech which son reports is not her Baseline. RN requested to attempt for patient to void in BSC to test for UTI.  She declines pain. Patient requires SBA for sup>sit, cues for hand placement and sequencing, and CGA/Sierra for safety for STS using RW and BUE support. Next, she is able to amb with RW and CGA/Sierra x 5 ft to sink with cues for RW mgmt and safety. She completes standing balance at sink completing ADLs with OT fro ~5 min  Treatment: NA Vitals        Oxygen      IV    RESTRICTIONS/PRECAUTIONS:  Restrictions/Precautions: Isolation, Fall Risk, Bed Alarm                 GROSS EVALUATION:  Intact Impaired (Comments):   AROM []  WFL   PROM []    Strength []  Decreased compared to BL   Balance []  Decreased compared to BL   Posture [] WFL   Sensation []  B foot neuropathy   Coordination []      Tone []     Edema []    Activity Tolerance []  Decreased compared to BL    []      COGNITION/  PERCEPTION: Intact Impaired (Comments):   Orientation [x]     Vision [x]     Hearing [x]     Cognition  []  Rambling tangential speech, mild confusion     MOBILITY: I Mod I S SBA CGA Min Mod Max Total  NT x2 Comments:   Bed Mobility    Rolling [] [] [] [x] [] [] [] [] [] [] []    Supine to Sit [] [] [] [x] [] [] [] [] [] [] []    Scooting [] [] [] [] [x] [] [] [] [] [] []    Sit to Supine [] [] [] [] [] [] [] [] [] [x] []    Transfers    Sit to Stand [] [] [] [] [x] [x] [] [] [] [] []    Bed to Chair [] [] [] [] [x] [x] [] [] [] [] []    Stand to Sit [] [] [] [] [x] [x] [] [] [] [] []     [] [] [] [] [] [] [] [] [] [] []    I=Independent, Mod I=Modified Independent, S=Supervision, SBA=Standby Assistance, CGA=Contact Guard Assistance,   Min=Minimal Assistance, Mod=Moderate Assistance, Max=Maximal Assistance, Total=Total Assistance, NT=Not Tested    GAIT: I Mod I S SBA CGA Min Mod Max Total  NT x2 Comments:   Level of Assistance [] [] [] [] [x] [x] [] [] [] [] []    Distance 2x5  feet    DME Gait Belt and Rolling Walker    Gait Quality Decreased korey , Decreased step clearance, Decreased step length, and unsafe use of RW    Weightbearing Status Restrictions/Precautions  Restrictions/Precautions: Isolation, Fall Risk, Bed Alarm    Stairs      I=Independent, Mod I=Modified Independent, S=Supervision, SBA=Standby Assistance, CGA=Contact Guard Assistance,   Min=Minimal Assistance, Mod=Moderate Assistance, Max=Maximal Assistance, Total=Total Assistance, NT=Not

## 2025-02-04 NOTE — H&P
Hospitalist History and Physical   Admit Date:  2/3/2025  9:39 PM   Name:  Melania Dave   Age:  84 y.o.  Sex:  female  :  1940   MRN:  [id number]   Room:  [facility]/    Presenting/Chief Complaint: No chief complaint on file.     Reason(s) for Admission: Influenza A [J10.1]     History of Present Illness:   Melania Dave is a 84 y.o. female who is a transfer from [facility] for cc influenza, hypoxia, COPD exacerbation, and generalized weakness.     Hx of COPD, HERBERTH on CPAP, insomnia, CKD, 3, HTN, polymyalgia rheumatica, lumbar spinal stenosis, hypothyroidism, lymphedema to legs    Assessment & Plan:     Principal Problem:    Influenza A  Plan: Tamiflu. Supportive care. Currently on 4L NC    COPD exacerbation - Solumedrol. Duonebs. Pulmicort.     CA - change oral steroids to IV since with COPD exacerbation    Hypothyroidism - Synthroid.     Lymphedema to legs - States she takes lasix BID, but will reduce this to once daily since she admits to poor oral intake. I want to decrease risk of ADRIENNE    HTN - ARB    PPI    Gabapentin at night.     HERBERTH- CPAP    PT/OT evals ordered?  Therapy evals ordered  Diet: ADULT DIET; Regular; Low Fat/Low Chol/High Fiber/2 gm Na  VTE prophylaxis: SCD's   Code status: DNR  Code status discussed: Yes  Blood consent obtained: No      Non-peripheral Lines and Tubes (if present):             Hospital Problems:  Principal Problem:    Influenza A  Active Problems:    Insomnia, unspecified    Chronic kidney disease, stage 3b (HCC)    Essential hypertension    COPD (chronic obstructive pulmonary disease) (HCC)    Polymyalgia rheumatica (HCC)    Hypothyroidism  Resolved Problems:    * No resolved hospital problems. *        Objective:   No data found.         Estimated body mass index is 24.89 kg/m² as calculated from the following:    Height as of an earlier encounter on 2/3/25: 1.626 m (5' 4\").    Weight as of an earlier encounter on 2/3/25: 65.8 kg (145 lb).  No intake  - 32.9 PG    MCHC 31.3 (L) 31.4 - 35.0 g/dL    RDW 15.8 (H) 11.9 - 14.6 %    Platelets 280 150 - 450 K/uL    MPV 8.9 (L) 9.4 - 12.3 FL    nRBC 0.00 0.0 - 0.2 K/uL    Differential Type AUTOMATED      Neutrophils % 79.8 (H) 43.0 - 78.0 %    Lymphocytes % 11.4 (L) 13.0 - 44.0 %    Monocytes % 6.7 4.0 - 12.0 %    Eosinophils % 0.2 (L) 0.5 - 7.8 %    Basophils % 0.6 0.0 - 2.0 %    Immature Granulocytes % 1.3 0.0 - 5.0 %    Neutrophils Absolute 7.58 1.70 - 8.20 K/UL    Lymphocytes Absolute 1.08 0.50 - 4.60 K/UL    Monocytes Absolute 0.64 0.10 - 1.30 K/UL    Eosinophils Absolute 0.02 0.00 - 0.80 K/UL    Basophils Absolute 0.06 0.00 - 0.20 K/UL    Immature Granulocytes Absolute 0.12 0.0 - 0.5 K/UL   CMP    Collection Time: 02/03/25  1:05 PM   Result Value Ref Range    Sodium 139 133 - 143 mmol/L    Potassium 4.7 3.5 - 5.1 mmol/L    Chloride 101 98 - 107 mmol/L    CO2 27 20 - 29 mmol/L    Anion Gap 11 7 - 16 mmol/L    Glucose 106 (H) 65 - 100 mg/dL    BUN 25 (H) 8 - 23 MG/DL    Creatinine 0.90 0.80 - 1.30 MG/DL    Est, Glom Filt Rate 63 >60 ml/min/1.73m2    Calcium 9.5 8.8 - 10.2 MG/DL    Total Bilirubin 0.3 0.0 - 1.2 MG/DL    ALT 18 12 - 65 U/L    AST 20 15 - 37 U/L    Alk Phosphatase 55 35 - 104 U/L    Total Protein 6.6 6.3 - 8.2 g/dL    Albumin 3.9 3.2 - 4.6 g/dL    Globulin 2.7 2.3 - 3.5 g/dL    Albumin/Globulin Ratio 1.4 1.0 - 1.9         Recent Labs     02/03/25  1152   COVID19 Not detected       XR CHEST PORTABLE    Result Date: 2/3/2025  Chest X-ray INDICATION: cough COMPARISON:  None TECHNIQUE: AP/PA view of the chest was obtained. FINDINGS: Atherosclerotic calcification of the aortic arch. The lungs are clear. No pleural effusion or pneumothorax. No acute osseous findings.     No acute findings. No evidence of pneumonia Electronically signed by Erlin Mcallister        Signed:  GÓMEZ GLASER DO    Part of this note may have been written by using a voice dictation software.  The note has been proof read but may still

## 2025-02-04 NOTE — PROGRESS NOTES
Hospitalist Progress Note   Admit Date:  2/3/2025  9:39 PM   Name:  Melania Dave   Age:  84 y.o.  Sex:  female  :  1940   MRN:  [id number]   Room:  [facility]/    Presenting/Chief Complaint: No chief complaint on file.     Reason(s) for Admission: Influenza A [J10.1]     Hospital Course:   Melania Dave is a 84 y.o. female who is a transfer from [facility] for cc influenza, hypoxia, COPD exacerbation, and generalized weakness.      Hx of COPD, HERBERTH on CPAP, insomnia, CKD, 3, HTN, polymyalgia rheumatica, lumbar spinal stenosis, hypothyroidism, lymphedema to legs      Subjective & 24hr Events:   Patient was seen and examined at bedside.  Patient was febrile and on 5 LNC.  She was coughing.      Assessment & Plan:   Influenza A  Tamiflu. Supportive care. Currently on 5L NC     COPD exacerbation -decreased Solumedrol dose. Duonebs. Pulmicort.        Hypothyroidism - Synthroid.      Lymphedema to legs - States she takes lasix BID, but will reduce this to once daily since she admits to poor oral intake.      HTN holding Lasix and losartan as her BP was soft.     PPI     Gabapentin at night.      HERBERTH- CPAP    Leukocytosis of 13.7 can the setting of steroids  Follow-up with urine analysis and urine cultures  Chest x-ray was normal    Hyperglycemia in the setting of steroids    Anemia  Hemoglobin is stable around 9.      Anticipate hospital stay for 1 to 2 days     PT/OT evals ordered?  Therapy evals ordered  Diet: ADULT DIET; Regular; Low Fat/Low Chol/High Fiber/2 gm Na  VTE prophylaxis: SCD's   Code status: DNR  Code status discussed: Yes  Blood consent obtained: N      Non-peripheral Lines and Tubes (if present):          Telemetry (if present):           Hospital Problems:  Principal Problem:    Influenza A  Active Problems:    Insomnia, unspecified    Chronic kidney disease, stage 3b (HCC)    Essential hypertension    COPD (chronic obstructive pulmonary disease) (HCC)    Polymyalgia

## 2025-02-05 LAB
ANION GAP SERPL CALC-SCNC: 11 MMOL/L (ref 7–16)
BASOPHILS # BLD: 0.02 K/UL (ref 0–0.2)
BASOPHILS NFR BLD: 0.2 % (ref 0–2)
BUN SERPL-MCNC: 23 MG/DL (ref 8–23)
CALCIUM SERPL-MCNC: 8.4 MG/DL (ref 8.8–10.2)
CHLORIDE SERPL-SCNC: 103 MMOL/L (ref 98–107)
CO2 SERPL-SCNC: 23 MMOL/L (ref 20–29)
CREAT SERPL-MCNC: 0.79 MG/DL (ref 0.6–1.1)
DIFFERENTIAL METHOD BLD: ABNORMAL
EOSINOPHIL # BLD: 0 K/UL (ref 0–0.8)
EOSINOPHIL NFR BLD: 0 % (ref 0.5–7.8)
ERYTHROCYTE [DISTWIDTH] IN BLOOD BY AUTOMATED COUNT: 15.8 % (ref 11.9–14.6)
GLUCOSE SERPL-MCNC: 172 MG/DL (ref 70–99)
HCT VFR BLD AUTO: 28.6 % (ref 35.8–46.3)
HGB BLD-MCNC: 9 G/DL (ref 11.7–15.4)
IMM GRANULOCYTES # BLD AUTO: 0.09 K/UL (ref 0–0.5)
IMM GRANULOCYTES NFR BLD AUTO: 0.8 % (ref 0–5)
LYMPHOCYTES # BLD: 0.79 K/UL (ref 0.5–4.6)
LYMPHOCYTES NFR BLD: 7.1 % (ref 13–44)
MCH RBC QN AUTO: 30 PG (ref 26.1–32.9)
MCHC RBC AUTO-ENTMCNC: 31.5 G/DL (ref 31.4–35)
MCV RBC AUTO: 95.3 FL (ref 82–102)
MONOCYTES # BLD: 0.37 K/UL (ref 0.1–1.3)
MONOCYTES NFR BLD: 3.3 % (ref 4–12)
NEUTS SEG # BLD: 9.85 K/UL (ref 1.7–8.2)
NEUTS SEG NFR BLD: 88.6 % (ref 43–78)
NRBC # BLD: 0 K/UL (ref 0–0.2)
PHOSPHATE SERPL-MCNC: 2.6 MG/DL (ref 2.5–4.5)
PLATELET # BLD AUTO: 248 K/UL (ref 150–450)
PMV BLD AUTO: 9.4 FL (ref 9.4–12.3)
POTASSIUM SERPL-SCNC: 4.6 MMOL/L (ref 3.5–5.1)
RBC # BLD AUTO: 3 M/UL (ref 4.05–5.2)
SODIUM SERPL-SCNC: 137 MMOL/L (ref 136–145)
WBC # BLD AUTO: 11.1 K/UL (ref 4.3–11.1)

## 2025-02-05 PROCEDURE — 2700000000 HC OXYGEN THERAPY PER DAY

## 2025-02-05 PROCEDURE — 94761 N-INVAS EAR/PLS OXIMETRY MLT: CPT

## 2025-02-05 PROCEDURE — 6370000000 HC RX 637 (ALT 250 FOR IP): Performed by: FAMILY MEDICINE

## 2025-02-05 PROCEDURE — 6360000002 HC RX W HCPCS: Performed by: FAMILY MEDICINE

## 2025-02-05 PROCEDURE — 94640 AIRWAY INHALATION TREATMENT: CPT

## 2025-02-05 PROCEDURE — 80048 BASIC METABOLIC PNL TOTAL CA: CPT

## 2025-02-05 PROCEDURE — 84100 ASSAY OF PHOSPHORUS: CPT

## 2025-02-05 PROCEDURE — 6360000002 HC RX W HCPCS: Performed by: STUDENT IN AN ORGANIZED HEALTH CARE EDUCATION/TRAINING PROGRAM

## 2025-02-05 PROCEDURE — 85025 COMPLETE CBC W/AUTO DIFF WBC: CPT

## 2025-02-05 PROCEDURE — 2500000003 HC RX 250 WO HCPCS: Performed by: STUDENT IN AN ORGANIZED HEALTH CARE EDUCATION/TRAINING PROGRAM

## 2025-02-05 PROCEDURE — 6370000000 HC RX 637 (ALT 250 FOR IP): Performed by: STUDENT IN AN ORGANIZED HEALTH CARE EDUCATION/TRAINING PROGRAM

## 2025-02-05 PROCEDURE — 1100000000 HC RM PRIVATE

## 2025-02-05 PROCEDURE — 2500000003 HC RX 250 WO HCPCS: Performed by: FAMILY MEDICINE

## 2025-02-05 PROCEDURE — 36415 COLL VENOUS BLD VENIPUNCTURE: CPT

## 2025-02-05 RX ORDER — IPRATROPIUM BROMIDE AND ALBUTEROL SULFATE 2.5; .5 MG/3ML; MG/3ML
1 SOLUTION RESPIRATORY (INHALATION)
Status: DISCONTINUED | OUTPATIENT
Start: 2025-02-05 | End: 2025-02-06 | Stop reason: HOSPADM

## 2025-02-05 RX ORDER — PREDNISONE 10 MG/1
40 TABLET ORAL DAILY
Status: DISCONTINUED | OUTPATIENT
Start: 2025-02-05 | End: 2025-02-06 | Stop reason: HOSPADM

## 2025-02-05 RX ADMIN — BENZONATATE 100 MG: 100 CAPSULE ORAL at 05:02

## 2025-02-05 RX ADMIN — ASPIRIN 81 MG: 81 TABLET, COATED ORAL at 08:10

## 2025-02-05 RX ADMIN — ACETAMINOPHEN 650 MG: 325 TABLET ORAL at 21:19

## 2025-02-05 RX ADMIN — IPRATROPIUM BROMIDE AND ALBUTEROL SULFATE 1 DOSE: 2.5; .5 SOLUTION RESPIRATORY (INHALATION) at 11:14

## 2025-02-05 RX ADMIN — GABAPENTIN 100 MG: 100 CAPSULE ORAL at 20:54

## 2025-02-05 RX ADMIN — ACETAMINOPHEN 650 MG: 325 TABLET ORAL at 03:16

## 2025-02-05 RX ADMIN — IPRATROPIUM BROMIDE AND ALBUTEROL SULFATE 1 DOSE: 2.5; .5 SOLUTION RESPIRATORY (INHALATION) at 14:48

## 2025-02-05 RX ADMIN — ALLOPURINOL 100 MG: 100 TABLET ORAL at 08:10

## 2025-02-05 RX ADMIN — PANTOPRAZOLE SODIUM 40 MG: 40 TABLET, DELAYED RELEASE ORAL at 05:02

## 2025-02-05 RX ADMIN — BUDESONIDE 500 MCG: 0.5 INHALANT RESPIRATORY (INHALATION) at 20:12

## 2025-02-05 RX ADMIN — GUAIFENESIN 1200 MG: 600 TABLET ORAL at 20:54

## 2025-02-05 RX ADMIN — WATER 40 MG: 1 INJECTION INTRAMUSCULAR; INTRAVENOUS; SUBCUTANEOUS at 00:09

## 2025-02-05 RX ADMIN — GUAIFENESIN 1200 MG: 600 TABLET ORAL at 08:10

## 2025-02-05 RX ADMIN — SODIUM CHLORIDE, PRESERVATIVE FREE 10 ML: 5 INJECTION INTRAVENOUS at 08:10

## 2025-02-05 RX ADMIN — IPRATROPIUM BROMIDE AND ALBUTEROL SULFATE 1 DOSE: 2.5; .5 SOLUTION RESPIRATORY (INHALATION) at 20:12

## 2025-02-05 RX ADMIN — OSELTAMIVIR 30 MG: 30 CAPSULE ORAL at 20:54

## 2025-02-05 RX ADMIN — Medication 3 MG: at 21:19

## 2025-02-05 RX ADMIN — PREDNISONE 40 MG: 10 TABLET ORAL at 15:07

## 2025-02-05 RX ADMIN — LEVOTHYROXINE SODIUM 150 MCG: 0.07 TABLET ORAL at 05:02

## 2025-02-05 RX ADMIN — OSELTAMIVIR 30 MG: 30 CAPSULE ORAL at 08:10

## 2025-02-05 ASSESSMENT — PAIN DESCRIPTION - FREQUENCY: FREQUENCY: INTERMITTENT

## 2025-02-05 ASSESSMENT — PAIN - FUNCTIONAL ASSESSMENT: PAIN_FUNCTIONAL_ASSESSMENT: ACTIVITIES ARE NOT PREVENTED

## 2025-02-05 ASSESSMENT — PAIN SCALES - GENERAL
PAINLEVEL_OUTOF10: 0
PAINLEVEL_OUTOF10: 3

## 2025-02-05 ASSESSMENT — PAIN DESCRIPTION - ORIENTATION: ORIENTATION: RIGHT

## 2025-02-05 ASSESSMENT — PAIN DESCRIPTION - PAIN TYPE: TYPE: ACUTE PAIN

## 2025-02-05 ASSESSMENT — PAIN DESCRIPTION - ONSET: ONSET: PROGRESSIVE

## 2025-02-05 ASSESSMENT — PAIN DESCRIPTION - LOCATION: LOCATION: EAR

## 2025-02-05 ASSESSMENT — PAIN DESCRIPTION - DESCRIPTORS: DESCRIPTORS: ACHING;OTHER (COMMENT)

## 2025-02-05 NOTE — PROGRESS NOTES
Oxygen Qualifier       Room air: SpO2 with O2 and liter flow   Resting SpO2  89%     Ambulating SpO2  88% 92% on 1L       Completed by:Patient ambulated with some difficulty and required 1L NC while ambulating    Cruzito Sevilla RCP

## 2025-02-05 NOTE — PROGRESS NOTES
Physician Progress Note      PATIENT:               SANDIE FLETCHER  [facility] #:                  [id number]  :                       1940  ADMIT DATE:       2/3/2025 9:39 PM  DISCH DATE:  RESPONDING  PROVIDER #:        Itzel Garcia MD          QUERY TEXT:    Pt admitted with Influenza and COPD Exac. Pt noted to have Temp 101.7,, WBC   13.7-11.1, Procal 0.24, and /min. If possible, please document in the   progress notes and discharge summary if you are evaluating and /or treating   any of the following:  The medical record reflects the following:  Risk Factors: influenza, hypoxia, COPD exacerbation, and generalized weakness.  Clinical Indicators: Temp 101.7,, WBC 13.7-11.1, Procal 0.24, and /min.  Treatment: Tamiflu. Supportive care. Oxygen    Thank you.  Sharee Alcaraz RN, Clinical Documentation Integrity, Revl   Cycle, [facility], CRCR  Options provided:  -- influenza with Sepsis confirmed after study and was present on admission  -- No Sepsis, localized infection only  -- Other - I will add my own diagnosis  -- Disagree - Not applicable / Not valid  -- Disagree - Clinically unable to determine / Unknown  -- Refer to Clinical Documentation Reviewer    PROVIDER RESPONSE TEXT:    No Sepsis, localized infection only    Query created by: Sharee Alcaraz on 2025 11:49 AM      QUERY TEXT:    Pt admitted with Influenza and COPD.  Pt noted to have 88% on RA placed on 2   liters, dipped to 85% and placed on 5 liters oxygen. LOUIS, E wheezing, and   crackles. If possible, please document in the progress notes and discharge   summary if you are evaluating and/or treating any of the following:    The medical record reflects the following:  Risk Factors: Influenza and COPD.  Clinical Indicators: 88% on RA placed on 2 liters, dipped to 85% and placed on   5 liters oxygen. LOUIS, E wheezing, and crackles.  Treatment: Oxygen a 5 liters, CPAP,Solumedrol, Duonebs. Pulmicort    Thank you.  Sharee AKBAR

## 2025-02-05 NOTE — PROGRESS NOTES
Hospitalist Progress Note   Admit Date:  2/3/2025  9:39 PM   Name:  Melania Dave   Age:  84 y.o.  Sex:  female  :  1940   MRN:  [id number]   Room:  [facility]/    Presenting/Chief Complaint: No chief complaint on file.     Reason(s) for Admission: Influenza A [J10.1]     Hospital Course:   Melania Dave is a 84 y.o. female who is a transfer from [facility] for cc influenza, hypoxia, COPD exacerbation, and generalized weakness.      Hx of COPD, HERBERTH on CPAP, insomnia, CKD, 3, HTN, polymyalgia rheumatica, lumbar spinal stenosis, hypothyroidism, lymphedema to legs      Subjective & 24hr Events:   Patient was seen and examined at bedside.  Patient was afebrile and on 3LNC.  She was having productive cough.       Assessment & Plan:   Influenza A  Tamiflu. Supportive care. Currently on 3L NC     COPD exacerbation -Discontinued  Solumedrol and started on Prednisone.   Continue Duonebs. Pulmicort.        Hypothyroidism - Synthroid.      Lymphedema to legs - States she takes lasix BID     HTN holding Lasix and losartan as her BP was soft.     PPI     Gabapentin at night.      HERBERTH- CPAP    UTI  Leukocytosis improved   urine cultures growing Enterococcus       Hyperglycemia in the setting of steroids    Anemia  Hemoglobin is stable around 9.      Anticipate hospital stay for 1 to 2 days.  PT/OT recommended HHT.     PT/OT evals ordered?  Therapy evals ordered  Diet: ADULT DIET; Regular; Low Fat/Low Chol/High Fiber/2 gm Na  VTE prophylaxis: SCD's   Code status: DNR  Code status discussed: Yes  Blood consent obtained: N      Non-peripheral Lines and Tubes (if present):          Telemetry (if present):  Cardiac/Telemetry Monitor On: No        Hospital Problems:  Principal Problem:    Influenza A  Active Problems:    Insomnia, unspecified    Chronic kidney disease, stage 3b (HCC)    Essential hypertension    COPD (chronic obstructive pulmonary disease) (HCC)    Polymyalgia rheumatica (HCC)     Differential    Collection Time: 02/04/25  5:41 AM   Result Value Ref Range    WBC 13.7 (H) 4.3 - 11.1 K/uL    RBC 3.24 (L) 4.05 - 5.2 M/uL    Hemoglobin 9.7 (L) 11.7 - 15.4 g/dL    Hematocrit 31.5 (L) 35.8 - 46.3 %    MCV 97.2 82 - 102 FL    MCH 29.9 26.1 - 32.9 PG    MCHC 30.8 (L) 31.4 - 35.0 g/dL    RDW 15.9 (H) 11.9 - 14.6 %    Platelets 247 150 - 450 K/uL    MPV 9.2 (L) 9.4 - 12.3 FL    nRBC 0.00 0.0 - 0.2 K/uL    Differential Type AUTOMATED      Neutrophils % 92.3 (H) 43.0 - 78.0 %    Lymphocytes % 4.1 (L) 13.0 - 44.0 %    Monocytes % 2.3 (L) 4.0 - 12.0 %    Eosinophils % 0.0 (L) 0.5 - 7.8 %    Basophils % 0.2 0.0 - 2.0 %    Immature Granulocytes % 1.1 0.0 - 5.0 %    Neutrophils Absolute 12.60 (H) 1.70 - 8.20 K/UL    Lymphocytes Absolute 0.56 0.50 - 4.60 K/UL    Monocytes Absolute 0.31 0.10 - 1.30 K/UL    Eosinophils Absolute 0.00 0.00 - 0.80 K/UL    Basophils Absolute 0.03 0.00 - 0.20 K/UL    Immature Granulocytes Absolute 0.15 0.0 - 0.5 K/UL   Procalcitonin    Collection Time: 02/04/25  5:41 AM   Result Value Ref Range    Procalcitonin 0.24 (H) 0.00 - 0.10 ng/mL   POCT Glucose    Collection Time: 02/04/25  6:01 AM   Result Value Ref Range    POC Glucose 222 (H) 65 - 100 mg/dL    Performed by: ThaddeusNicolePCJESICA    Urinalysis w rflx microscopic    Collection Time: 02/04/25 11:16 AM   Result Value Ref Range    Color, UA YELLOW/STRAW      Appearance CLEAR      Specific Gravity, UA 1.025 (H) 1.001 - 1.023      pH, Urine 5.5 5.0 - 9.0      Protein, UA 30 (A) NEG mg/dL    Glucose, Ur Negative NEG mg/dL    Ketones, Urine Negative NEG mg/dL    Bilirubin, Urine Negative NEG      Blood, Urine Negative NEG      Urobilinogen, Urine 0.2 0.2 - 1.0 EU/dL    Nitrite, Urine Negative NEG      Leukocyte Esterase, Urine MODERATE (A) NEG     Culture, Urine    Collection Time: 02/04/25 11:16 AM    Specimen: Urine, clean catch    Urine   Result Value Ref Range    Special Requests NO SPECIAL REQUESTS      Culture (A)       >100,000

## 2025-02-06 VITALS
DIASTOLIC BLOOD PRESSURE: 49 MMHG | HEIGHT: 64 IN | BODY MASS INDEX: 24.75 KG/M2 | TEMPERATURE: 98.1 F | SYSTOLIC BLOOD PRESSURE: 140 MMHG | RESPIRATION RATE: 66 BRPM | HEART RATE: 63 BPM | WEIGHT: 145 LBS | OXYGEN SATURATION: 95 %

## 2025-02-06 LAB
ANION GAP SERPL CALC-SCNC: 11 MMOL/L (ref 7–16)
BACTERIA SPEC CULT: ABNORMAL
BACTERIA SPEC CULT: ABNORMAL
BASOPHILS # BLD: 0.01 K/UL (ref 0–0.2)
BASOPHILS NFR BLD: 0.1 % (ref 0–2)
BUN SERPL-MCNC: 24 MG/DL (ref 8–23)
CALCIUM SERPL-MCNC: 8.9 MG/DL (ref 8.8–10.2)
CHLORIDE SERPL-SCNC: 107 MMOL/L (ref 98–107)
CO2 SERPL-SCNC: 21 MMOL/L (ref 20–29)
CREAT SERPL-MCNC: 0.69 MG/DL (ref 0.6–1.1)
DIFFERENTIAL METHOD BLD: ABNORMAL
EOSINOPHIL # BLD: 0 K/UL (ref 0–0.8)
EOSINOPHIL NFR BLD: 0 % (ref 0.5–7.8)
ERYTHROCYTE [DISTWIDTH] IN BLOOD BY AUTOMATED COUNT: 15.8 % (ref 11.9–14.6)
GLUCOSE SERPL-MCNC: 145 MG/DL (ref 70–99)
HCT VFR BLD AUTO: 28 % (ref 35.8–46.3)
HGB BLD-MCNC: 9 G/DL (ref 11.7–15.4)
IMM GRANULOCYTES # BLD AUTO: 0.08 K/UL (ref 0–0.5)
IMM GRANULOCYTES NFR BLD AUTO: 0.8 % (ref 0–5)
LYMPHOCYTES # BLD: 1.11 K/UL (ref 0.5–4.6)
LYMPHOCYTES NFR BLD: 11 % (ref 13–44)
MCH RBC QN AUTO: 29.5 PG (ref 26.1–32.9)
MCHC RBC AUTO-ENTMCNC: 32.1 G/DL (ref 31.4–35)
MCV RBC AUTO: 91.8 FL (ref 82–102)
MONOCYTES # BLD: 0.38 K/UL (ref 0.1–1.3)
MONOCYTES NFR BLD: 3.8 % (ref 4–12)
NEUTS SEG # BLD: 8.5 K/UL (ref 1.7–8.2)
NEUTS SEG NFR BLD: 84.3 % (ref 43–78)
NRBC # BLD: 0.04 K/UL (ref 0–0.2)
PHOSPHATE SERPL-MCNC: 2.6 MG/DL (ref 2.5–4.5)
PLATELET # BLD AUTO: 258 K/UL (ref 150–450)
PMV BLD AUTO: 9.3 FL (ref 9.4–12.3)
POTASSIUM SERPL-SCNC: 4.6 MMOL/L (ref 3.5–5.1)
RBC # BLD AUTO: 3.05 M/UL (ref 4.05–5.2)
SERVICE CMNT-IMP: ABNORMAL
SODIUM SERPL-SCNC: 139 MMOL/L (ref 136–145)
WBC # BLD AUTO: 10.1 K/UL (ref 4.3–11.1)

## 2025-02-06 PROCEDURE — 6370000000 HC RX 637 (ALT 250 FOR IP): Performed by: STUDENT IN AN ORGANIZED HEALTH CARE EDUCATION/TRAINING PROGRAM

## 2025-02-06 PROCEDURE — 84100 ASSAY OF PHOSPHORUS: CPT

## 2025-02-06 PROCEDURE — 36415 COLL VENOUS BLD VENIPUNCTURE: CPT

## 2025-02-06 PROCEDURE — 6370000000 HC RX 637 (ALT 250 FOR IP): Performed by: FAMILY MEDICINE

## 2025-02-06 PROCEDURE — 80048 BASIC METABOLIC PNL TOTAL CA: CPT

## 2025-02-06 PROCEDURE — 85025 COMPLETE CBC W/AUTO DIFF WBC: CPT

## 2025-02-06 RX ORDER — AMOXICILLIN 500 MG/1
500 CAPSULE ORAL EVERY 8 HOURS SCHEDULED
Status: DISCONTINUED | OUTPATIENT
Start: 2025-02-06 | End: 2025-02-06 | Stop reason: HOSPADM

## 2025-02-06 RX ORDER — AMOXICILLIN 500 MG/1
500 CAPSULE ORAL EVERY 8 HOURS SCHEDULED
Qty: 21 CAPSULE | Refills: 0 | Status: SHIPPED | OUTPATIENT
Start: 2025-02-06 | End: 2025-02-13

## 2025-02-06 RX ORDER — OSELTAMIVIR PHOSPHATE 30 MG/1
30 CAPSULE ORAL 2 TIMES DAILY
Qty: 4 CAPSULE | Refills: 0 | Status: SHIPPED | OUTPATIENT
Start: 2025-02-06 | End: 2025-02-08

## 2025-02-06 RX ORDER — PREDNISONE 10 MG/1
10 TABLET ORAL DAILY
Qty: 5 TABLET | Refills: 0 | Status: SHIPPED | OUTPATIENT
Start: 2025-02-06 | End: 2025-02-11

## 2025-02-06 RX ADMIN — LEVOTHYROXINE SODIUM 150 MCG: 0.07 TABLET ORAL at 05:04

## 2025-02-06 RX ADMIN — ASPIRIN 81 MG: 81 TABLET, COATED ORAL at 08:25

## 2025-02-06 RX ADMIN — PANTOPRAZOLE SODIUM 40 MG: 40 TABLET, DELAYED RELEASE ORAL at 05:04

## 2025-02-06 RX ADMIN — GUAIFENESIN 1200 MG: 600 TABLET ORAL at 08:25

## 2025-02-06 RX ADMIN — ACETAMINOPHEN 650 MG: 325 TABLET ORAL at 08:30

## 2025-02-06 RX ADMIN — AMOXICILLIN 500 MG: 500 CAPSULE ORAL at 11:58

## 2025-02-06 RX ADMIN — OSELTAMIVIR 30 MG: 30 CAPSULE ORAL at 08:25

## 2025-02-06 RX ADMIN — ALLOPURINOL 100 MG: 100 TABLET ORAL at 08:25

## 2025-02-06 RX ADMIN — PREDNISONE 40 MG: 10 TABLET ORAL at 08:25

## 2025-02-06 ASSESSMENT — PAIN DESCRIPTION - ORIENTATION: ORIENTATION: RIGHT

## 2025-02-06 ASSESSMENT — PAIN SCALES - GENERAL
PAINLEVEL_OUTOF10: 3
PAINLEVEL_OUTOF10: 0

## 2025-02-06 ASSESSMENT — PAIN - FUNCTIONAL ASSESSMENT: PAIN_FUNCTIONAL_ASSESSMENT: ACTIVITIES ARE NOT PREVENTED

## 2025-02-06 ASSESSMENT — PAIN DESCRIPTION - LOCATION: LOCATION: EAR

## 2025-02-06 ASSESSMENT — PAIN DESCRIPTION - FREQUENCY: FREQUENCY: INTERMITTENT

## 2025-02-06 ASSESSMENT — PAIN DESCRIPTION - PAIN TYPE: TYPE: ACUTE PAIN

## 2025-02-06 ASSESSMENT — PAIN DESCRIPTION - DESCRIPTORS: DESCRIPTORS: ACHING

## 2025-02-06 ASSESSMENT — PAIN DESCRIPTION - ONSET: ONSET: ON-GOING

## 2025-02-06 NOTE — DISCHARGE SUMMARY
Hospitalist Discharge Summary   Admit Date:  2/3/2025  9:39 PM   DC Note date: 2025  Name:  Melania Dave   Age:  84 y.o.  Sex:  female  :  1940   MRN:  [id number]   Room:  [facility]  PCP:  Loraine Farnsworth MD    Presenting Complaint: No chief complaint on file.     Initial Admission Diagnosis: Influenza A [J10.1]     Problem List for this Hospitalization (present on admission):    Principal Problem:    Influenza A  Active Problems:    Insomnia, unspecified    Chronic kidney disease, stage 3b (HCC)    Essential hypertension    COPD (chronic obstructive pulmonary disease) (HCC)    Polymyalgia rheumatica (HCC)    Hypothyroidism  Resolved Problems:    * No resolved hospital problems. *      Hospital Course:  Melania Dave is a 84 y.o. female with  Hx of COPD, HERBERTH on CPAP, insomnia, CKD, 3, HTN, polymyalgia rheumatica, lumbar spinal stenosis, hypothyroidism, lymphedema to legs who is a transfer from [facility] for cc influenza, hypoxia, COPD exacerbation, and generalized weakness.  She was treated with Tamiflu and IV Solu-Medrol.  She was transitioned to p.o. prednisone.  Urine analysis was positive.  She was empirically treated with Rocephin urine cultures was positive for Enterococcus and pansensitive.  She was transitioned to amoxicillin.  Her oxygen requirement came down to 1 LNC.  RT was consulted and she was qualified for home oxygen 1 LNC.  Discussed with the CM to arrange for oxygen.  PT/OT recommended HHT.  Patient is hemodynamically stable for discharge.            Disposition: Home with Home Health  Diet: ADULT DIET; Dysphagia - Soft and Bite Sized; Low Fat/Low Chol/High Fiber/2 gm Na  Code Status: DNR    Follow Ups:  Follow-up Information    None         Future Appointments         Provider Specialty Dept Phone    2025 2:20 PM (Arrive by 1:50 PM) Alvaro Parisi MD Pain Management [phone]    4/15/2025 2:20 PM Loraine Farnsworth MD Internal Medicine  Isothermal Nucleic Acid Amplification                 All Labs from Last 24 Hrs:  Recent Results (from the past 24 hour(s))   Basic Metabolic Panel w/ Reflex to MG    Collection Time: 02/06/25  5:19 AM   Result Value Ref Range    Sodium 139 136 - 145 mmol/L    Potassium 4.6 3.5 - 5.1 mmol/L    Chloride 107 98 - 107 mmol/L    CO2 21 20 - 29 mmol/L    Anion Gap 11 7 - 16 mmol/L    Glucose 145 (H) 70 - 99 mg/dL    BUN 24 (H) 8 - 23 MG/DL    Creatinine 0.69 0.60 - 1.10 MG/DL    Est, Glom Filt Rate 85 >60 ml/min/1.73m2    Calcium 8.9 8.8 - 10.2 MG/DL   CBC with Auto Differential    Collection Time: 02/06/25  5:19 AM   Result Value Ref Range    WBC 10.1 4.3 - 11.1 K/uL    RBC 3.05 (L) 4.05 - 5.2 M/uL    Hemoglobin 9.0 (L) 11.7 - 15.4 g/dL    Hematocrit 28.0 (L) 35.8 - 46.3 %    MCV 91.8 82 - 102 FL    MCH 29.5 26.1 - 32.9 PG    MCHC 32.1 31.4 - 35.0 g/dL    RDW 15.8 (H) 11.9 - 14.6 %    Platelets 258 150 - 450 K/uL    MPV 9.3 (L) 9.4 - 12.3 FL    nRBC 0.04 0.0 - 0.2 K/uL    Differential Type AUTOMATED      Neutrophils % 84.3 (H) 43.0 - 78.0 %    Lymphocytes % 11.0 (L) 13.0 - 44.0 %    Monocytes % 3.8 (L) 4.0 - 12.0 %    Eosinophils % 0.0 (L) 0.5 - 7.8 %    Basophils % 0.1 0.0 - 2.0 %    Immature Granulocytes % 0.8 0.0 - 5.0 %    Neutrophils Absolute 8.50 (H) 1.70 - 8.20 K/UL    Lymphocytes Absolute 1.11 0.50 - 4.60 K/UL    Monocytes Absolute 0.38 0.10 - 1.30 K/UL    Eosinophils Absolute 0.00 0.00 - 0.80 K/UL    Basophils Absolute 0.01 0.00 - 0.20 K/UL    Immature Granulocytes Absolute 0.08 0.0 - 0.5 K/UL   Phosphorus    Collection Time: 02/06/25  5:19 AM   Result Value Ref Range    Phosphorus 2.6 2.5 - 4.5 MG/DL       No results for input(s): \"COVID19\" in the last 72 hours.    Recent Vital Data:  Patient Vitals for the past 24 hrs:   Temp Pulse Resp BP SpO2   02/06/25 0715 98.1 °F (36.7 °C) -- (!) 66 (!) 140/49 95 %   02/06/25 0520 -- -- -- -- 94 %   02/06/25 0455 98.2 °F (36.8 °C) 63 18 (!) 143/57 95 %   02/05/25 2029

## 2025-02-06 NOTE — PROGRESS NOTES
Physical Therapy Note:    Attempted to see patient this PM for physical therapy treatment  session. Patient refused stating she is too tired and \"knows her limits\". She is to go home today and wants to save her energy for that.. Will follow and re-attempt as schedule permits/patient available. Thank you,    FELIPE DOBSON, PT     Rehab Caseload Tracker

## 2025-02-06 NOTE — PROGRESS NOTES
Pt's IV infiltrated and currently does not have any scheduled IV medications. Per hospitalist, okay for no IV access at this time. Care continues.

## 2025-02-07 ENCOUNTER — CARE COORDINATION (OUTPATIENT)
Dept: CARE COORDINATION | Facility: CLINIC | Age: 85
End: 2025-02-07

## 2025-02-07 NOTE — CARE COORDINATION
LATE ENTRY FROM 2/6/2025    Pt was medically cleared for dc to home today with HH SN/PT/OT services.  Pt expressed no agency preference.  Referral submitted to [facility] and pt accepted for services.  Pt requiring 1 lpm O2 with activity/ambulation.  Order submitted to [facility] and a portable tank was provided to the pt prior to dc.  No other dc needs or concerns identified or reported.  Family transported pt home.       02/06/25 1500   Service Assessment   Patient Orientation Alert and Oriented   Cognition Alert   History Provided By Patient;Medical Record   Primary Caregiver Self   Support Systems Children;Family Members   Patient's Healthcare Decision Maker is: Named in Scanned ACP Document   PCP Verified by CM Yes   Last Visit to PCP Within last 3 months   Prior Functional Level Independent in ADLs/IADLs   Current Functional Level Independent in ADLs/IADLs   Can patient return to prior living arrangement Yes   Ability to make needs known: Good   Family able to assist with home care needs: Yes   Would you like for me to discuss the discharge plan with any other family members/significant others, and if so, who? Yes   Financial Resources Medicare   Community Resources None   Social/Functional History   Lives With Family;Son   Type of Home House   Home Layout One level   Home Access Ramped entrance   Bathroom Shower/Tub Walk-in shower   Bathroom Equipment Grab bars in shower;Shower chair   Home Equipment Cane;Walker - Rolling   Receives Help From Family   Prior Level of Assist for ADLs Independent   Prior Level of Assist for Homemaking Independent   Ambulation Assistance Independent   Active  No   Patient's  Info family   Occupation Retired   Discharge Planning   Type of Residence House   Living Arrangements Children;Family Members   Current Services Prior To Admission Durable Medical Equipment   Current DME Prior to Arrival Walker;Cane;Shower Chair   Potential Assistance Needed Home

## 2025-02-07 NOTE — CARE COORDINATION
Care Transitions Note    Initial Call - Call within 2 business days of discharge: Yes    Patient Current Location:  Home: [address] [place] [address] [address] [place] [address]    Care Transition Nurse contacted the patient by telephone to perform post hospital discharge assessment, verified name and  as identifiers. Provided introduction to self, and explanation of the Care Transition Nurse role.     Patient: Melania Dave  Patient : 1940   MRN: [id number]    Reason for Admission: Influenza A   Discharge Date: 25  RURS: Readmission Risk Score: 15.5    Last Discharge Facility       Date Complaint Diagnosis Description Type Department Provider    2/3/25   Admission (Discharged) Itzel Medina MD    2/3/25 Fatigue Influenza A ... ED (TRANSFER) Wai Slater, DO            Was this an external facility discharge? No    Additional needs identified to be addressed with provider   No needs identified             Method of communication with provider: none.    Patients top risk factors for readmission: medical condition-Influenza A, Insomnia, CKD, HTN, COPD, Polymyalgia, hypothyroidism, HERBERTH uses CPAP,    Interventions to address risk factors:   Patient reports she is doing okay since discharge, but still coughing. Patient reports she has supplemental oxygen and is using.   Reviewed discharge instructions and offered opportunity to ask any questions regarding discharge instructions.  Confirmed medications obtained and taking as ordered  Discussed the importance of medication adherence and management.   CTN encouraged self-monitoring and when to seek care  CTN called and left a message for PCP's nurse requesting a hospital follow up  Care Transition Nurse reviewed discharge instructions, medical action plan, and red flags with patient and family. The patient and family was given an opportunity to ask questions; all questions answered at this time.. The patient and family verbalized understanding.   Were

## 2025-02-10 ENCOUNTER — CARE COORDINATION (OUTPATIENT)
Dept: CARE COORDINATION | Facility: CLINIC | Age: 85
End: 2025-02-10

## 2025-02-12 ENCOUNTER — HOSPITAL ENCOUNTER (OUTPATIENT)
Dept: GENERAL RADIOLOGY | Age: 85
Discharge: HOME | End: 2025-02-14
Payer: MEDICARE

## 2025-02-12 ENCOUNTER — OFFICE VISIT (OUTPATIENT)
Dept: INTERNAL MEDICINE CLINIC | Facility: CLINIC | Age: 85
End: 2025-02-12

## 2025-02-12 VITALS
TEMPERATURE: 97.7 F | HEIGHT: 64 IN | BODY MASS INDEX: 24.75 KG/M2 | HEART RATE: 81 BPM | SYSTOLIC BLOOD PRESSURE: 134 MMHG | DIASTOLIC BLOOD PRESSURE: 62 MMHG | WEIGHT: 145 LBS | OXYGEN SATURATION: 99 %

## 2025-02-12 DIAGNOSIS — B37.31 VAGINAL CANDIDIASIS: ICD-10-CM

## 2025-02-12 DIAGNOSIS — Z09 HOSPITAL DISCHARGE FOLLOW-UP: Primary | ICD-10-CM

## 2025-02-12 DIAGNOSIS — R06.2 WHEEZING: ICD-10-CM

## 2025-02-12 DIAGNOSIS — Z79.52 LONG TERM CURRENT USE OF SYSTEMIC STEROIDS: ICD-10-CM

## 2025-02-12 DIAGNOSIS — R05.3 PERSISTENT COUGH: ICD-10-CM

## 2025-02-12 DIAGNOSIS — R31.9 URINARY TRACT INFECTION WITH HEMATURIA, SITE UNSPECIFIED: ICD-10-CM

## 2025-02-12 DIAGNOSIS — J20.9 CHRONIC BRONCHITIS WITH ACUTE EXACERBATION (HCC): ICD-10-CM

## 2025-02-12 DIAGNOSIS — J42 CHRONIC BRONCHITIS WITH ACUTE EXACERBATION (HCC): ICD-10-CM

## 2025-02-12 DIAGNOSIS — J10.1 INFLUENZA A: ICD-10-CM

## 2025-02-12 DIAGNOSIS — B37.0 ORAL THRUSH: ICD-10-CM

## 2025-02-12 DIAGNOSIS — N39.0 URINARY TRACT INFECTION WITH HEMATURIA, SITE UNSPECIFIED: ICD-10-CM

## 2025-02-12 DIAGNOSIS — Z91.81 AT HIGH RISK FOR FALLS: ICD-10-CM

## 2025-02-12 LAB
BILIRUBIN, URINE, POC: NEGATIVE
BLOOD URINE, POC: NEGATIVE
GLUCOSE URINE, POC: NEGATIVE
INFLUENZA A ANTIGEN, POC: NEGATIVE
INFLUENZA B ANTIGEN, POC: NEGATIVE
KETONES, URINE, POC: NEGATIVE
LEUKOCYTE ESTERASE, URINE, POC: NEGATIVE
LOT EXPIRE DATE: NORMAL
LOT KIT NUMBER: NORMAL
NITRITE, URINE, POC: NEGATIVE
PH, URINE, POC: 5.5 (ref 4.6–8)
PROTEIN,URINE, POC: NEGATIVE
SARS-COV-2 RNA, POC: NEGATIVE
SPECIFIC GRAVITY, URINE, POC: 1.01 (ref 1–1.03)
URINALYSIS CLARITY, POC: CLEAR
URINALYSIS COLOR, POC: YELLOW
UROBILINOGEN, POC: NORMAL
VALID INTERNAL CONTROL: YES
VENDOR AND KIT NAME POC: NORMAL

## 2025-02-12 PROCEDURE — 71046 X-RAY EXAM CHEST 2 VIEWS: CPT

## 2025-02-12 RX ORDER — PREDNISONE 20 MG/1
TABLET ORAL
Qty: 13 TABLET | Refills: 0 | Status: SHIPPED | OUTPATIENT
Start: 2025-02-13 | End: 2025-02-21

## 2025-02-12 RX ORDER — FLUCONAZOLE 150 MG/1
150 TABLET ORAL
Qty: 2 TABLET | Refills: 0 | Status: SHIPPED | OUTPATIENT
Start: 2025-02-12 | End: 2025-02-18

## 2025-02-12 RX ORDER — CEFTRIAXONE 500 MG/1
500 INJECTION, POWDER, FOR SOLUTION INTRAMUSCULAR; INTRAVENOUS ONCE
Status: COMPLETED | OUTPATIENT
Start: 2025-02-12 | End: 2025-02-12

## 2025-02-12 RX ORDER — METHYLPREDNISOLONE SODIUM SUCCINATE 40 MG/ML
40 INJECTION INTRAMUSCULAR; INTRAVENOUS ONCE
Status: COMPLETED | OUTPATIENT
Start: 2025-02-12 | End: 2025-02-12

## 2025-02-12 RX ORDER — CLOTRIMAZOLE 10 MG/1
10 LOZENGE ORAL
Qty: 50 TABLET | Refills: 0 | Status: SHIPPED | OUTPATIENT
Start: 2025-02-12 | End: 2025-02-22

## 2025-02-12 RX ADMIN — METHYLPREDNISOLONE SODIUM SUCCINATE 40 MG: 40 INJECTION INTRAMUSCULAR; INTRAVENOUS at 12:36

## 2025-02-12 RX ADMIN — CEFTRIAXONE 500 MG: 500 INJECTION, POWDER, FOR SOLUTION INTRAMUSCULAR; INTRAVENOUS at 12:35

## 2025-02-12 ASSESSMENT — PATIENT HEALTH QUESTIONNAIRE - PHQ9
SUM OF ALL RESPONSES TO PHQ QUESTIONS 1-9: 0
SUM OF ALL RESPONSES TO PHQ9 QUESTIONS 1 & 2: 0
SUM OF ALL RESPONSES TO PHQ QUESTIONS 1-9: 0
SUM OF ALL RESPONSES TO PHQ QUESTIONS 1-9: 0
2. FEELING DOWN, DEPRESSED OR HOPELESS: NOT AT ALL
SUM OF ALL RESPONSES TO PHQ QUESTIONS 1-9: 0
1. LITTLE INTEREST OR PLEASURE IN DOING THINGS: NOT AT ALL

## 2025-02-12 ASSESSMENT — ENCOUNTER SYMPTOMS
SHORTNESS OF BREATH: 1
GASTROINTESTINAL NEGATIVE: 1
COUGH: 1
STRIDOR: 0

## 2025-02-12 NOTE — PROGRESS NOTES
pain with bilateral sciatica    Chronic chest wall pain    Nephrolithiasis    Mixed hyperlipidemia    Polymyalgia rheumatica (HCC)    Difficulty swallowing    Hypothyroidism    Fluid retention in legs    Weakness of right leg    Skin lesion of left arm    Skin atrophy    Atrophia cutis senilis    Sun-damaged skin    History of skin cancer    Cigarette smoker    Long-term corticosteroid use    Adverse effect of statin    At high risk for injury related to fall    Bladder prolapse, female, acquired    Hypoxia    Influenza    Influenza A       Medications listed as ordered at the time of discharge from hospital     Medication List            Accurate as of February 12, 2025  6:01 PM. If you have any questions, ask your nurse or doctor.                START taking these medications      clotrimazole 10 MG stalin  Commonly known as: MYCELEX  Take 1 tablet by mouth 5 times daily for 10 days  Started by: Julia Paduano, APRN, FNPc     fluconazole 150 MG tablet  Commonly known as: DIFLUCAN  Take 1 tablet by mouth every 72 hours for 6 days  Started by: Julia Paduano, APRN, FNPc            CHANGE how you take these medications      * predniSONE 5 MG tablet  Commonly known as: DELTASONE  TAKE 1 TABLET BY MOUTH TWICE A DAY  What changed: Another medication with the same name was added. Make sure you understand how and when to take each.  Changed by: Julia Paduano, APRN, FNPc     * predniSONE 20 MG tablet  Commonly known as: DELTASONE  3 tabs daily x 2 days; 2 tabs daily x 2 days; 1 tab daily x 2 days; 1/2 tab daily x 2 days; then d/c  Start taking on: February 13, 2025  What changed: You were already taking a medication with the same name, and this prescription was added. Make sure you understand how and when to take each.  Changed by: Julia Paduano, APRN, FNPc     silver sulfADIAZINE 1 % cream  Commonly known as: SILVADENE  Apply topically daily.  What changed:   how much to take  how to take this  when to take this  reasons to

## 2025-02-13 RX ORDER — AZITHROMYCIN 250 MG/1
TABLET, FILM COATED ORAL
Qty: 6 TABLET | Refills: 0 | Status: SHIPPED | OUTPATIENT
Start: 2025-02-13 | End: 2025-02-23

## 2025-02-14 LAB
BACTERIA SPEC CULT: NORMAL
SERVICE CMNT-IMP: NORMAL

## 2025-02-17 ENCOUNTER — CARE COORDINATION (OUTPATIENT)
Dept: CARE COORDINATION | Facility: CLINIC | Age: 85
End: 2025-02-17

## 2025-02-17 NOTE — CARE COORDINATION
Care Transitions Note    Follow Up Call     Patient Current Location:  Home: [address] [place] [address] [address] [place] [address]    [facility] Care Coordinator contacted the patient by telephone. Verified name and  as identifiers.    Additional needs identified to be addressed with provider   No needs identified                 Method of communication with provider: none.    Care Summary Note: patient reports she is progressing well, denies cough or SOB.  Patient is wearing O2 as prescribed.  Patient attended pcp follow up and started prednisone dose pack, zithromax, diflucan and mycelex, she indicates she will finish diflucan tomorrow.  [facility].  Patient denies new needs or concerns.    Plan of care updates since last contact:  No changes since last call       Advance Care Planning:   Does patient have an Advance Directive: reviewed and current.    Medication Review:  Patient started prednisone, zithromax, diflucan, mycelex as ordered    Remote Patient Monitoring:  Offered patient enrollment in the Remote Patient Monitoring (RPM) program for in-home monitoring: Patient is not eligible for RPM program because: na .    Assessments:  Care Transitions Subsequent and Final Call    Schedule Follow Up Appointment with PCP: Completed  Subsequent and Final Calls  Do you have any ongoing symptoms?: No  Have your medications changed?: Yes  Do you have any questions related to your medications?: No  Do you currently have any active services?: Yes  Are you currently active with any services?: Home Health  Do you have any needs or concerns that I can assist you with?: No  Identified Barriers: None  Care Transitions Interventions  No Identified Needs  Other Interventions:              Follow Up Appointment:   Reviewed upcoming appointment(s). and DIVYA appointment attended as scheduled   Future Appointments         Provider Specialty Dept Phone    2025 2:20 PM (Arrive by 1:50 PM) Alvaro Parisi MD Pain Management

## 2025-02-20 NOTE — PROGRESS NOTES
Previous interventions:  Procedure Date Results   Right L3/4, 4/5, 5/1 lumbar Intraarticular Facet Injection Under Fluoroscopic Imaging 12/14/23                                                         Previous medication trials:  Listed:   Class Medication Results   NSAIDs Ibuprofen naproxen Limited benefit   Oral steroids   Limited benefit   Tylenol   Limited benefit   Antiepileptics       Antidepressants       Relaxants       Topicals/transdermal patches Lidocaine over-the-counter Limited benefit   Narcotics             Previous tests/studies:  Study Date Results   Lumbar MRI 10/21/2023 Impression     1) Abnormal signal intensity in L1 and L2 as above. There is edema signal and   enhancement of the L2 body suggesting abnormal stress or recent trauma. No   enhancement of the intervening disc. Mild anterior wedge compression of L2.   2) L3-4: No foraminal narrowing on the right..   3) L4-5: Severely narrowed disc, status post L5 process ectomy.   4) L5-S1: Moderately severe bilateral foraminal narrowing.  Narrative     MRI LUMBAR SPINE WITHOUT AND WITH INTRAVENOUS CONTRAST.     INDICATION: Low back pain and bilateral extremity pain, left more than right.   Injury one year ago, progressive pain.     COMPARISON: February 2022     TECHNIQUE:  Axial and sagittal, T1 and T2 and sagittal STIR images. Following 13   cc intravenous ProHance, fat-saturated axial and sagittal T1 images obtained.     FINDINGS:     Spinal alignment: Demonstrates a mild scoliosis with convexity to the left.     The conus is at: T12-L1 disc level.     Bone marrow signal intensity: Abnormal L1-L2.     Included portion of the retroperitoneum: Nonenhancing cyst posterior lower pole   on the right, 2.3 cm.     L1-L2: There is abnormal low T1 and low T2 signal in the inferior endplate of L1   and superior endplate of L2. There is increased STIR signal in the L2 vertebral   body. There is contrast enhancement of the T2 vertebral body and a small amount

## 2025-02-26 ENCOUNTER — OFFICE VISIT (OUTPATIENT)
Age: 85
End: 2025-02-26
Payer: COMMERCIAL

## 2025-02-26 ENCOUNTER — CARE COORDINATION (OUTPATIENT)
Dept: CARE COORDINATION | Facility: CLINIC | Age: 85
End: 2025-02-26

## 2025-02-26 DIAGNOSIS — M06.9 RHEUMATOID ARTHRITIS, INVOLVING UNSPECIFIED SITE, UNSPECIFIED WHETHER RHEUMATOID FACTOR PRESENT (HCC): ICD-10-CM

## 2025-02-26 DIAGNOSIS — M06.9 RHEUMATOID ARTHRITIS, INVOLVING UNSPECIFIED SITE, UNSPECIFIED WHETHER RHEUMATOID FACTOR PRESENT (HCC): Primary | ICD-10-CM

## 2025-02-26 LAB
CRP SERPL-MCNC: <0.3 MG/DL (ref 0–0.4)
ERYTHROCYTE [SEDIMENTATION RATE] IN BLOOD: 6 MM/HR (ref 0–30)
URATE SERPL-MCNC: 5.9 MG/DL (ref 2.5–7.1)

## 2025-02-26 PROCEDURE — G8399 PT W/DXA RESULTS DOCUMENT: HCPCS | Performed by: ANESTHESIOLOGY

## 2025-02-26 PROCEDURE — 1123F ACP DISCUSS/DSCN MKR DOCD: CPT | Performed by: ANESTHESIOLOGY

## 2025-02-26 PROCEDURE — 4004F PT TOBACCO SCREEN RCVD TLK: CPT | Performed by: ANESTHESIOLOGY

## 2025-02-26 PROCEDURE — 99213 OFFICE O/P EST LOW 20 MIN: CPT | Performed by: ANESTHESIOLOGY

## 2025-02-26 PROCEDURE — G8428 CUR MEDS NOT DOCUMENT: HCPCS | Performed by: ANESTHESIOLOGY

## 2025-02-26 PROCEDURE — 1090F PRES/ABSN URINE INCON ASSESS: CPT | Performed by: ANESTHESIOLOGY

## 2025-02-26 PROCEDURE — 1111F DSCHRG MED/CURRENT MED MERGE: CPT | Performed by: ANESTHESIOLOGY

## 2025-02-26 PROCEDURE — G8420 CALC BMI NORM PARAMETERS: HCPCS | Performed by: ANESTHESIOLOGY

## 2025-02-26 NOTE — CARE COORDINATION
Care Transitions Note    Follow Up Call     Attempted to reach patient for transitions of care follow up.  Unable to reach patient.      Outreach Attempts:   HIPAA compliant voicemail left for patient.     Care Summary Note: per chart review patient is following plan of care, antibiotics and prednisone dose pack prescribed at PCP visit should be complete.  No new needs are noted at this time.    Follow Up Appointment:   Future Appointments         Provider Specialty Dept Phone    2/26/2025 2:20 PM (Arrive by 1:50 PM) Alvaro Parisi MD Pain Management [phone]    4/15/2025 2:20 PM Loraine Farnsworth MD Internal Medicine [phone]    5/8/2025 3:00 PM Abhishek Cunningham MD Cardiology [phone]            Plan for follow-up call in 6-10 days based on severity of symptoms and risk factors. Plan for next call:  compliance with plan of care    Valerie Ramos LPN

## 2025-02-26 NOTE — PATIENT INSTRUCTIONS
Neurology referral sent, [phone]  Rheumatology referral sent, labs must be drawn first. [phone]

## 2025-02-26 NOTE — PROGRESS NOTES
Chronic Pain Consult Note      Plan:     A comprehensive pain management plan may consist of the following: Testing, Therapy, Medications, Interventions, Consults, and Follow up.    Vertebral compression fractures lumbar L1 and 2  Do not recommend further augmentation at this time as patient has limited to no pain associated with these locations or sides.  Patient has history of osteoporosis and symptomatology in the past.  Discussed need to have further follow-up and consideration of treatment with specialty team, discussed referral to rheumatology  Chronic right shoulder pain   Scheduled for right suprascapular nerve block with potential RFA pending response  Peripheral neuropathy  Patient presents with neuropathy as well as change in condition temperatures and coloration of hands and feet without cause.  He is not diabetic.  Also discussed his increased dizziness and confusion.  This occurred after influenza infection.  Patient discussed neurology referral for further evaluation and consideration.  Lumbar spondylosis without myelopathy  Schedule for right L3/4, 4/5 and 5/1 intra-articular facet injections  Potential for medial branch blocks pending response  Lumbar degenerative disc disease  We will continue to monitor for further interventional therapy if warranted  Failed back  We will continue to monitor for further interventional therapy if warranted  Chronic low back pain  Encourage continuation of active healthy lifestyle  Patient takes chronic oral prednisone due to PMR     General Recommendations: The pain condition that the patient suffers from is best treated with a multidisciplinary approach that involves an increase in physical activity to prevent de-conditioning and worsening of the pain cycle, as well as psychological counseling (formal and/or informal) to address the co morbid psychological effects of pain. Treatment will often involve judicious use of pain medications and interventional procedures

## 2025-02-27 LAB
ANA SER QL: NEGATIVE
CCP IGA+IGG SERPL IA-ACNC: 5 UNITS (ref 0–19)
RHEUMATOID FACT SER QL LA: NEGATIVE

## 2025-03-04 DIAGNOSIS — J44.9 CHRONIC OBSTRUCTIVE PULMONARY DISEASE, UNSPECIFIED COPD TYPE (HCC): Primary | ICD-10-CM

## 2025-03-05 PROBLEM — J10.1 INFLUENZA A: Status: RESOLVED | Noted: 2025-02-03 | Resolved: 2025-03-05

## 2025-03-07 ENCOUNTER — CARE COORDINATION (OUTPATIENT)
Dept: CARE COORDINATION | Facility: CLINIC | Age: 85
End: 2025-03-07

## 2025-03-07 NOTE — CARE COORDINATION
Therapy [phone]    4/15/2025 2:20 PM Loraine Farnsworth MD Internal Medicine [phone]    5/8/2025 3:00 PM Abhishek Cunningham MD Cardiology [phone]    8/25/2025 1:00 PM Geetha Ramirez MD Neurology [phone]            Patient has agreed to contact primary care provider and/or specialist for any further questions, concerns, or needs.    Valerie Ramos LPN

## 2025-03-20 RX ORDER — FAMOTIDINE 20 MG/1
20 TABLET, FILM COATED ORAL DAILY
Qty: 90 TABLET | Refills: 1 | Status: SHIPPED | OUTPATIENT
Start: 2025-03-20

## 2025-03-21 RX ORDER — FAMOTIDINE 20 MG/1
20 TABLET, FILM COATED ORAL
Qty: 90 TABLET | Refills: 2 | OUTPATIENT
Start: 2025-03-21

## 2025-03-25 NOTE — PROGRESS NOTES
Procedure Date: 2025  Location: [facility]       Procedure: Right Suprascapular Nerve Block       Time Out performed prior to start of the procedure:       Alvaro Parisi MD  performed the following reviews on Melania Dave 1940 prior to the start of the procedure:       patient was identified by name and     agreement on procedure being performed was verified   risks and benefits explained to patient by the provider  procedure site verified as Right  patient was positioned for comfort   consent signed and verified for procedure       Time:  2:00 pm        Procedure performed by:   Alvaro Parisi MD       Patient assisted by:   PHILLIP SLOAN MA

## 2025-03-31 ENCOUNTER — OFFICE VISIT (OUTPATIENT)
Age: 85
End: 2025-03-31
Payer: MEDICARE

## 2025-03-31 DIAGNOSIS — M25.511 CHRONIC RIGHT SHOULDER PAIN: Primary | ICD-10-CM

## 2025-03-31 DIAGNOSIS — G89.29 CHRONIC RIGHT SHOULDER PAIN: Primary | ICD-10-CM

## 2025-03-31 PROCEDURE — 20610 DRAIN/INJ JOINT/BURSA W/O US: CPT | Performed by: ANESTHESIOLOGY

## 2025-03-31 RX ORDER — METHYLPREDNISOLONE ACETATE 40 MG/ML
40 INJECTION, SUSPENSION INTRA-ARTICULAR; INTRALESIONAL; INTRAMUSCULAR; SOFT TISSUE ONCE
Status: COMPLETED | OUTPATIENT
Start: 2025-03-31 | End: 2025-03-31

## 2025-03-31 RX ADMIN — METHYLPREDNISOLONE ACETATE 40 MG: 40 INJECTION, SUSPENSION INTRA-ARTICULAR; INTRALESIONAL; INTRAMUSCULAR; SOFT TISSUE at 13:46

## 2025-03-31 NOTE — PROGRESS NOTES
NAME: Melania Dave   ID:[id number]   :1940    Location: 3 90    Procedure: Right lntraarticular Shoulder Injection    Pre-op Diagnosis: 1. Shoulder Pain. 2. Shoulder Osteoarthritis     Post-op Diagnosis: Same    Anesthesia: Local only     Complications: None    After confirming written and informed consent and discussing the risk, benefits and alternatives for the  procedure, the patient had the correct site marked by the physician performing the procedure. The specific risks of bleeding and infection were discussed.    The patient was then placed in the supine position. The skin overlying the right shoulder was then prepped with chlorhexidine gluconate and sterile towels were placed to drape the procedure site. A hat and mask were worn, and the hands were cleaned with an alcohol-containing solution. Sterile gloves were then worn. A time out was then performed involving the physician and the patient.    Next, via an anterior approach to the shoulder, a 25 G 1.5 inch needle was advanced into the intra-articular space. Aspiration was negative for effusion fluid or blood. 2 ml of normal saline and Depo-Medrol 40 mg were injected in incremental doses.    The needles were withdrawn and Band-Aids were applied. The patient was transported to the recovery room and monitored for an appropriate amount of time, and after meeting discharge criteria, was discharged home with a . No complications were noted through the procedure or recovery period.

## 2025-04-01 ENCOUNTER — TELEPHONE (OUTPATIENT)
Age: 85
End: 2025-04-01

## 2025-04-03 ENCOUNTER — HOSPITAL ENCOUNTER (OUTPATIENT)
Dept: PHYSICAL THERAPY | Age: 85
Setting detail: RECURRING SERIES
Discharge: HOME | End: 2025-04-06
Payer: MEDICARE

## 2025-04-03 DIAGNOSIS — I89.0 LYMPHEDEMA, NOT ELSEWHERE CLASSIFIED: Primary | ICD-10-CM

## 2025-04-03 DIAGNOSIS — R26.2 DIFFICULTY IN WALKING, NOT ELSEWHERE CLASSIFIED: ICD-10-CM

## 2025-04-03 DIAGNOSIS — R22.43 LOCALIZED SWELLING OF BOTH LOWER LEGS: ICD-10-CM

## 2025-04-03 PROCEDURE — 97166 OT EVAL MOD COMPLEX 45 MIN: CPT

## 2025-04-03 PROCEDURE — 97535 SELF CARE MNGMENT TRAINING: CPT

## 2025-04-03 ASSESSMENT — PAIN SCALES - GENERAL: PAINLEVEL_OUTOF10: 10

## 2025-04-03 NOTE — PROGRESS NOTES
Occupational Therapy        Melania Dave  : 1940  Primary: Medicare Part A And B (Medicare)  Secondary: [employer] SENIOR MEDICARE SUPP [facility] @ [facility]  [address] [place] [address]  Phone: [phone]  Fax: [phone]    Plan of Care/Certification Expiration Date:  Certification Period Expiration Date: 25      Frequency/Duration: Plan Frequency: twice a week up to 90 days      Time In/Out:   Time In: 1350  Time Out: 1430    OT Visit Info:  Plan Frequency: twice a week up to 90 days  Total # of Visits to Date: 1  Progress Note Counter: 1       Visit Count: Visit count could not be calculated. Make sure you are using a visit which is associated with an episode.   OUTPATIENT OCCUPATIONAL THERAPY: Treatment Note 4/3/2025  Episode: (lymphedema)         Treatment Diagnosis:    Lymphedema, not elsewhere classified  Localized swelling of both lower legs  Difficulty in walking, not elsewhere classified  Medical/Referring Diagnosis:   Lymphedema   Referring Physician:  Abhishek Cunningham MD MD Orders:  OT Eval and Treat   Return MD Appt:  TBD   Date of Onset:    2020  Allergies:  Macadamia nut oil, Nitrofurantoin, Lactose, Levofloxacin, Morphine, Simvastatin, Codeine, and Meperidine  Restrictions/Precautions:   None      Medications Last Reviewed:  4/3/2025     Interventions Planned (Treatment may consist of any combination of the following):     See Assessment Note      Subjective Comments:   \"To walk better\"   >Initial Pain Level:     10/10  >Post Session Pain Level:     10/10  Medications Last Reviewed:  4/3/2025  Updated Objective Findings:  See Evaluation Note from today  Treatment   MANUAL THERAPY: ( minutes):   Manual lymphatic drainage was utilized and necessary because of the patient's  stage 2 B LE lymphedema .   Manual Lymph Drainage:   Lymph Nodes:    Cervical Supraclavicular Axillary Abdominal Inguinal Popliteal Antecubital   RIGHT []

## 2025-04-03 NOTE — THERAPY EVALUATION
Melania Dave  : 1940  Primary: Medicare Part A And B (Medicare)  Secondary: [employer] SENIOR MEDICARE SUPP [facility] @ [address] [place] [address]  Phone: [phone]  Fax: [phone] Plan Frequency: twice a week up to 90 days    Certification Period Expiration Date: 25        Plan of Care/Certification Expiration Date:  Certification Period Expiration Date: 25      Frequency/Duration: Plan Frequency: twice a week up to 90 days      Time In/Out:   Time In: 1350  Time Out: 1430    OT Visit Info:  Plan Frequency: twice a week up to 90 days  Total # of Visits to Date: 1  Progress Note Counter: 1       Visit Count: Visit count could not be calculated. Make sure you are using a visit which is associated with an episode.   OUTPATIENT OCCUPATIONAL THERAPY:Initial Assessment 4/3/2025  Episode: (lymphedema)           Treatment Diagnosis:    Lymphedema, not elsewhere classified  Localized swelling of both lower legs  Difficulty in walking, not elsewhere classified  Medical/Referring Diagnosis:    Lymphedema   Referring Physician:  Abhishek Cunningham MD MD Orders:  OT Eval and Treat   Return MD Appt:  TBD  Date of Onset:    2020  Allergies:  Macadamia nut oil, Nitrofurantoin, Lactose, Levofloxacin, Morphine, Simvastatin, Codeine, and Meperidine  Restrictions/Precautions:    None      Medications Last Reviewed:  4/3/2025     SUBJECTIVE   History of Injury/Illness (Reason for Referral):  Patient referred to OT for evaluation and treatment of LE lymphedema. Swelling affects ability to stand, walk, transfer, perform ADLs. Skilled OT recommended for complete decongestive therapy to reduce swelling before transitioning to garments and home program.       Patient Stated Goal(s):  \"To walk better\"  Initial Pain Assessment:     10/10    Post Session Pain Level:     10/10  Past Medical History/Comorbidities:   Ms. Dave  has a past medical

## 2025-04-08 ENCOUNTER — HOSPITAL ENCOUNTER (OUTPATIENT)
Dept: WOUND CARE | Age: 85
Discharge: HOME | End: 2025-04-08
Payer: MEDICARE

## 2025-04-08 VITALS
SYSTOLIC BLOOD PRESSURE: 122 MMHG | HEART RATE: 70 BPM | HEIGHT: 64 IN | RESPIRATION RATE: 18 BRPM | BODY MASS INDEX: 24.41 KG/M2 | OXYGEN SATURATION: 100 % | DIASTOLIC BLOOD PRESSURE: 48 MMHG | WEIGHT: 143 LBS | TEMPERATURE: 97.7 F

## 2025-04-08 PROCEDURE — 11042 DBRDMT SUBQ TIS 1ST 20SQCM/<: CPT

## 2025-04-08 ASSESSMENT — PAIN SCALES - GENERAL: PAINLEVEL_OUTOF10: 0

## 2025-04-08 NOTE — FLOWSHEET NOTE
Dressing/Treatment Xeroform   Wound Length (cm) 0.7 cm   Wound Width (cm) 2 cm   Wound Depth (cm) 0.1 cm   Wound Surface Area (cm^2) 1.4 cm^2   Wound Volume (cm^3) 0.14 cm^3   Post-Procedure Length (cm) 0.8 cm   Post-Procedure Width (cm) 2.2 cm   Post-Procedure Depth (cm) 0.1 cm   Post-Procedure Surface Area (cm^2) 1.76 cm^2   Post-Procedure Volume (cm^3) 0.176 cm^3   Wound Assessment Pale granulation tissue   Drainage Amount Moderate (25-50%)   Drainage Description Serosanguinous   Odor None   Yolanda-wound Assessment Fragile   Wound Thickness Description not for Pressure Injury Full thickness   Wound 04/08/25 Pretibial Right medial pretibial   Date First Assessed/Time First Assessed: 04/08/25 1343   Wound Approximate Age at First Assessment (Weeks): 4 weeks  Primary Wound Type: Vascular Ulcer  Location: Pretibial  Wound Location Orientation: Right  Wound Description (Comments): medial preti...   Wound Image     Wound Etiology Venous   Dressing Status Clean;Dry;Intact   Wound Cleansed Cleansed with saline   Dressing/Treatment Xeroform   Wound Length (cm) 1 cm   Wound Width (cm) 1.4 cm   Wound Depth (cm) 0.3 cm   Wound Surface Area (cm^2) 1.4 cm^2   Wound Volume (cm^3) 0.42 cm^3   Post-Procedure Length (cm) 1.2 cm   Post-Procedure Width (cm) 1.4 cm   Post-Procedure Depth (cm) 0.3 cm   Post-Procedure Surface Area (cm^2) 1.68 cm^2   Post-Procedure Volume (cm^3) 0.504 cm^3   Wound Assessment Pale granulation tissue   Drainage Amount Moderate (25-50%)   Drainage Description Serosanguinous   Odor None   Yolanda-wound Assessment Fragile   Wound Thickness Description not for Pressure Injury Full thickness   Patient taking ASA

## 2025-04-08 NOTE — WOUND CARE
Discharge Instructions for  [facility]  [address] [place] [address]  [address]  [place] [address]  Phone [phone]   Fax [phone]      NAME:  Melania Dave  YOB: 1940  MEDICAL RECORD NUMBER:  [id number]  DATE:  4/8/2025    Return Appointment:   1 week with Parag Quezada DO      Instructions: Right pretibial  Cleanse with normal saline  Cover with Hydrofera Ready  Cover with ABD pad  Wrap with kerlix and secure with tape  Change 3 times weekly  Tubigrip toe to knee both legs, wear at all times    Do not get wound wet in shower, pool or tub. May purchase cast cover at local pharmacy to keep dry in shower.    Increase protein to 100g daily for optimal wound healing. Aim for 30g protein per shake, two shakes a day.  Protein:   Egg ~ 6g  Yogurt ~ 15g  Half cup of cottage cheese ~ 15g  Chicken breast ~ 30g  [place] filet ~ 40g     Should you experience increased redness, swelling, pain, foul odor, size of wound(s), or have a temperature over 101 degrees please contact the Wound Healing Center at [phone] or if after hours contact your primary care physician or go to the hospital emergency department.    PLEASE NOTE: IF YOU ARE UNABLE TO OBTAIN WOUND SUPPLIES, CONTINUE TO USE THE SUPPLIES YOU HAVE AVAILABLE UNTIL YOU ARE ABLE TO REACH US. IT IS MOST IMPORTANT TO KEEP THE WOUND COVERED AT ALL TIMES.    Electronically signed ZACHARY WEEMS RN on 4/8/2025 at 1:56 PM

## 2025-04-14 ENCOUNTER — HOSPITAL ENCOUNTER (OUTPATIENT)
Dept: WOUND CARE | Age: 85
Discharge: HOME | End: 2025-04-14
Payer: MEDICARE

## 2025-04-14 VITALS
HEIGHT: 64 IN | SYSTOLIC BLOOD PRESSURE: 144 MMHG | BODY MASS INDEX: 24.41 KG/M2 | WEIGHT: 143 LBS | HEART RATE: 51 BPM | DIASTOLIC BLOOD PRESSURE: 42 MMHG

## 2025-04-14 PROCEDURE — 11042 DBRDMT SUBQ TIS 1ST 20SQCM/<: CPT

## 2025-04-14 ASSESSMENT — PAIN SCALES - GENERAL: PAINLEVEL_OUTOF10: 4

## 2025-04-14 NOTE — WOUND CARE
Discharge Instructions for  [facility]  [address] [place] [address]  [address] [place] [address]  Phone [phone]   Fax [phone]      NAME:  Melania Dave  YOB: 1940  MEDICAL RECORD NUMBER:  [id number]  DATE:  4/14/2025    Return Appointment:   1 week with Kika Patterson NP   Dressing changes F follow up next Tuesday for appointment with Kika      Instructions: Right pretibial  Cleanse with normal saline  Cover with Hydrofera Ready  Cover with ABD pad  2 layer compression with wound and lower extremity assessment.  If there is any problem with the dressing (too tight, slides down, etc.) Patient to return to wound clinic to have re-wrapped by clinician.  Change 3 times per week at wound center      Do not get wound wet in shower, pool or tub. May purchase cast cover at local pharmacy to keep dry in shower.    Increase protein to 100g daily for optimal wound healing. Aim for 30g protein per shake, two shakes a day.  Protein:   Egg ~ 6g  Yogurt ~ 15g  Half cup of cottage cheese ~ 15g  Chicken breast ~ 30g  [place] filet ~ 40g     Should you experience increased redness, swelling, pain, foul odor, size of wound(s), or have a temperature over 101 degrees please contact the Wound Healing Center at [phone] or if after hours contact your primary care physician or go to the hospital emergency department.    PLEASE NOTE: IF YOU ARE UNABLE TO OBTAIN WOUND SUPPLIES, CONTINUE TO USE THE SUPPLIES YOU HAVE AVAILABLE UNTIL YOU ARE ABLE TO REACH US. IT IS MOST IMPORTANT TO KEEP THE WOUND COVERED AT ALL TIMES.    Electronically signed Sana Aguero RN on 4/14/2025 at 2:33 PM

## 2025-04-14 NOTE — WOUND CARE
Multilayer Compression Wrap   (Not Unna) Below the Knee    NAME:  Melania Dave  YOB: 1940  MEDICAL RECORD NUMBER:  [id number]  DATE:  4/14/2025    Multilayer compression wrap: Removed old Multilayer wrap if indicated and wash leg with mild soap/water.  Applied moisturizing agent to dry skin as needed.   Applied primary and secondary dressing as ordered.  Applied multilayered dressing below the knee to right lower leg.  Applied multilayered dressing below the knee to left lower leg.  Instructed patient/caregiver not to remove dressing and to keep it clean and dry.   Instructed patient/caregiver on complications to report to provider, such as pain, numbness in toes, heavy drainage, and slippage of dressing.  Instructed patient on purpose of compression dressing and on activity and exercise recommendations.      Electronically signed by Sana Aguero RN on 4/14/2025 at 2:37 PM

## 2025-04-14 NOTE — FLOWSHEET NOTE
04/14/25 1401   Right Leg Edema Point of Measurement   Leg circumference 35 cm   Ankle circumference 25 cm   Foot circumference 24 cm   Compression Therapy Tubular elastic support bandage   Left Leg Edema Point of Measurement   Leg circumference 42 cm   Ankle circumference 28 cm   Foot circumference 24 cm   Compression Therapy Tubular elastic support bandage   Wound 04/14/25 Pretibial Left;Proximal;Lateral #4   Date First Assessed/Time First Assessed: 04/14/25 1401   Present on Original Admission: Yes  Wound Approximate Age at First Assessment (Weeks): 1 weeks  Primary Wound Type: Traumatic  Location: Pretibial  Wound Location Orientation: Left;Proximal;Late...   Wound Image    Wound Etiology Traumatic   Dressing Status Clean;Dry;Intact   Wound Cleansed Cleansed with saline   Dressing/Treatment Gauze dressing/dressing sponge   Wound Length (cm) 3 cm   Wound Width (cm) 2 cm   Wound Depth (cm) 0.2 cm   Wound Surface Area (cm^2) 6 cm^2   Wound Volume (cm^3) 1.2 cm^3   Wound Assessment Pink/red;Exposed structure fascia   Drainage Amount Moderate (25-50%)   Drainage Description Serosanguinous   Odor None   Yolanda-wound Assessment Edematous   Wound Thickness Description not for Pressure Injury Full thickness   Wound 04/08/25 Pretibial Right mid anterior lower leg   Date First Assessed/Time First Assessed: 04/08/25 1341   Present on Original Admission: Yes  Wound Approximate Age at First Assessment (Weeks): 4 weeks  Primary Wound Type: Vascular Ulcer  Secondary Wound Type - Vascular Ulcer: Venous  Location: Preti...   Wound Image    Wound Etiology Venous   Dressing Status Clean;Dry;Intact   Wound Cleansed Cleansed with saline   Dressing/Treatment Hydrofera blue   Wound Length (cm) 0.5 cm   Wound Width (cm) 0.5 cm   Wound Depth (cm) 0.3 cm   Wound Surface Area (cm^2) 0.25 cm^2   Change in Wound Size % (l*w) -108.33   Wound Volume (cm^3) 0.075 cm^3   Wound Healing % -108   Wound Assessment Slough;Pink/red   Drainage Amount

## 2025-04-14 NOTE — FLOWSHEET NOTE
04/14/25 1401   Right Leg Edema Point of Measurement   Leg circumference 35 cm   Ankle circumference 25 cm   Foot circumference 24 cm   Compression Therapy Tubular elastic support bandage   Left Leg Edema Point of Measurement   Leg circumference 42 cm   Ankle circumference 28 cm   Foot circumference 24 cm   Compression Therapy Tubular elastic support bandage   Wound 04/14/25 Pretibial Left;Proximal;Lateral #4   Date First Assessed/Time First Assessed: 04/14/25 1401   Present on Original Admission: Yes  Wound Approximate Age at First Assessment (Weeks): 1 weeks  Primary Wound Type: Traumatic  Location: Pretibial  Wound Location Orientation: Left;Proximal;Late...   Wound Image    Wound Etiology Traumatic   Dressing Status Clean;Dry;Intact   Wound Cleansed Cleansed with saline   Dressing/Treatment Gauze dressing/dressing sponge   Wound Length (cm) 3 cm   Wound Width (cm) 2 cm   Wound Depth (cm) 0.2 cm   Wound Surface Area (cm^2) 6 cm^2   Wound Volume (cm^3) 1.2 cm^3   Wound Assessment Pink/red;Exposed structure fascia   Drainage Amount Moderate (25-50%)   Drainage Description Serosanguinous   Odor None   Yolanda-wound Assessment Edematous   Wound Thickness Description not for Pressure Injury Full thickness   Wound 04/08/25 Pretibial Right mid anterior lower leg   Date First Assessed/Time First Assessed: 04/08/25 1341   Present on Original Admission: Yes  Wound Approximate Age at First Assessment (Weeks): 4 weeks  Primary Wound Type: Vascular Ulcer  Secondary Wound Type - Vascular Ulcer: Venous  Location: Preti...   Wound Image     Wound Etiology Venous   Dressing Status Clean;Dry;Intact   Wound Cleansed Cleansed with saline   Dressing/Treatment Hydrofera blue   Wound Length (cm) 0.5 cm   Wound Width (cm) 0.5 cm   Wound Depth (cm) 0.3 cm   Wound Surface Area (cm^2) 0.25 cm^2   Change in Wound Size % (l*w) -108.33   Wound Volume (cm^3) 0.075 cm^3   Wound Healing % -108   Post-Procedure Length (cm) 0.5 cm   Post-Procedure

## 2025-04-14 NOTE — DISCHARGE INSTRUCTIONS
Discharge Instructions for  [facility]  [address]  [address]  [place] [address]  Phone [phone]   Fax [phone]      NAME:  Melania Dave  YOB: 1940  MEDICAL RECORD NUMBER:  [id number]  DATE:  @ED@    Return Appointment:   1 week with Kika Patterson NP      Instructions: Bilateral lower leg:  Cleanse with normal saline  Cover with Hydrofera Ready  Cover with ABD pad  2 layer compression with wound and lower extremity assessment.  If there is any problem with the dressing (too tight, slides down, etc.) Patient to return to wound clinic to have re-wrapped by clinician.  Change 3 times per week at wound center        Do not get wound wet in shower, pool or tub. May purchase cast cover at local pharmacy to keep dry in shower.     Increase protein to 100g daily for optimal wound healing. Aim for 30g protein per shake, two shakes a day.  Protein:   Egg ~ 6g  Yogurt ~ 15g  Half cup of cottage cheese ~ 15g  Chicken breast ~ 30g  [place] filet ~ 40g            Should you experience increased redness, swelling, pain, foul odor, size of wound(s), or have a temperature over 101 degrees please contact the Wound Healing Center at [phone] or if after hours contact your primary care physician or go to the hospital emergency department.    PLEASE NOTE: IF YOU ARE UNABLE TO OBTAIN WOUND SUPPLIES, CONTINUE TO USE THE SUPPLIES YOU HAVE AVAILABLE UNTIL YOU ARE ABLE TO REACH US. IT IS MOST IMPORTANT TO KEEP THE WOUND COVERED AT ALL TIMES.    Electronically signed Sana Aguero RN on 4/14/2025 at 2:34 PM

## 2025-04-15 ENCOUNTER — OFFICE VISIT (OUTPATIENT)
Dept: INTERNAL MEDICINE CLINIC | Facility: CLINIC | Age: 85
End: 2025-04-15
Payer: MEDICARE

## 2025-04-15 VITALS
HEIGHT: 64 IN | WEIGHT: 143.4 LBS | BODY MASS INDEX: 24.48 KG/M2 | SYSTOLIC BLOOD PRESSURE: 122 MMHG | DIASTOLIC BLOOD PRESSURE: 80 MMHG

## 2025-04-15 DIAGNOSIS — J20.9 CHRONIC BRONCHITIS WITH ACUTE EXACERBATION (HCC): Chronic | ICD-10-CM

## 2025-04-15 DIAGNOSIS — J42 CHRONIC BRONCHITIS WITH ACUTE EXACERBATION (HCC): Chronic | ICD-10-CM

## 2025-04-15 DIAGNOSIS — Z79.52 LONG-TERM CORTICOSTEROID USE: ICD-10-CM

## 2025-04-15 DIAGNOSIS — R26.89 POOR BALANCE: ICD-10-CM

## 2025-04-15 DIAGNOSIS — T46.6X5A ADVERSE EFFECT OF STATIN: ICD-10-CM

## 2025-04-15 DIAGNOSIS — E03.9 ACQUIRED HYPOTHYROIDISM: ICD-10-CM

## 2025-04-15 DIAGNOSIS — I10 ESSENTIAL HYPERTENSION: Chronic | ICD-10-CM

## 2025-04-15 DIAGNOSIS — D64.9 ANEMIA, UNSPECIFIED TYPE: ICD-10-CM

## 2025-04-15 DIAGNOSIS — E61.1 IRON DEFICIENCY: ICD-10-CM

## 2025-04-15 DIAGNOSIS — R73.9 ELEVATED BLOOD SUGAR: ICD-10-CM

## 2025-04-15 DIAGNOSIS — E03.9 ACQUIRED HYPOTHYROIDISM: Primary | ICD-10-CM

## 2025-04-15 DIAGNOSIS — K21.9 GASTROESOPHAGEAL REFLUX DISEASE WITHOUT ESOPHAGITIS: ICD-10-CM

## 2025-04-15 DIAGNOSIS — M1A.9XX0 CHRONIC GOUT WITHOUT TOPHUS, UNSPECIFIED CAUSE, UNSPECIFIED SITE: ICD-10-CM

## 2025-04-15 LAB
ALBUMIN SERPL-MCNC: 3.4 G/DL (ref 3.2–4.6)
ALBUMIN/GLOB SERPL: 1.2 (ref 1–1.9)
ALP SERPL-CCNC: 73 U/L (ref 35–104)
ALT SERPL-CCNC: 15 U/L (ref 8–45)
ANION GAP SERPL CALC-SCNC: 10 MMOL/L (ref 7–16)
AST SERPL-CCNC: 18 U/L (ref 15–37)
BASOPHILS # BLD: 0.05 K/UL (ref 0–0.2)
BASOPHILS NFR BLD: 0.5 % (ref 0–2)
BILIRUB SERPL-MCNC: 0.2 MG/DL (ref 0–1.2)
BUN SERPL-MCNC: 20 MG/DL (ref 8–23)
CALCIUM SERPL-MCNC: 9.7 MG/DL (ref 8.8–10.2)
CHLORIDE SERPL-SCNC: 105 MMOL/L (ref 98–107)
CO2 SERPL-SCNC: 26 MMOL/L (ref 20–29)
CREAT SERPL-MCNC: 0.84 MG/DL (ref 0.6–1.1)
DIFFERENTIAL METHOD BLD: ABNORMAL
EOSINOPHIL # BLD: 0.03 K/UL (ref 0–0.8)
EOSINOPHIL NFR BLD: 0.3 % (ref 0.5–7.8)
ERYTHROCYTE [DISTWIDTH] IN BLOOD BY AUTOMATED COUNT: 15.9 % (ref 11.9–14.6)
EST. AVERAGE GLUCOSE BLD GHB EST-MCNC: 130 MG/DL
GLOBULIN SER CALC-MCNC: 2.7 G/DL (ref 2.3–3.5)
GLUCOSE SERPL-MCNC: 93 MG/DL (ref 70–99)
HBA1C MFR BLD: 6.2 % (ref 0–5.6)
HCT VFR BLD AUTO: 32.9 % (ref 35.8–46.3)
HGB BLD-MCNC: 9.9 G/DL (ref 11.7–15.4)
IMM GRANULOCYTES # BLD AUTO: 0.25 K/UL (ref 0–0.5)
IMM GRANULOCYTES NFR BLD AUTO: 2.3 % (ref 0–5)
IRON SERPL-MCNC: 32 UG/DL (ref 35–100)
LYMPHOCYTES # BLD: 2.78 K/UL (ref 0.5–4.6)
LYMPHOCYTES NFR BLD: 25.3 % (ref 13–44)
MCH RBC QN AUTO: 29.3 PG (ref 26.1–32.9)
MCHC RBC AUTO-ENTMCNC: 30.1 G/DL (ref 31.4–35)
MCV RBC AUTO: 97.3 FL (ref 82–102)
MONOCYTES # BLD: 0.66 K/UL (ref 0.1–1.3)
MONOCYTES NFR BLD: 6 % (ref 4–12)
NEUTS SEG # BLD: 7.23 K/UL (ref 1.7–8.2)
NEUTS SEG NFR BLD: 65.6 % (ref 43–78)
NRBC # BLD: 0 K/UL (ref 0–0.2)
PLATELET # BLD AUTO: 387 K/UL (ref 150–450)
PMV BLD AUTO: 9.7 FL (ref 9.4–12.3)
POTASSIUM SERPL-SCNC: 4.8 MMOL/L (ref 3.5–5.1)
PROT SERPL-MCNC: 6 G/DL (ref 6.3–8.2)
RBC # BLD AUTO: 3.38 M/UL (ref 4.05–5.2)
SODIUM SERPL-SCNC: 141 MMOL/L (ref 136–145)
TSH, 3RD GENERATION: 2.01 UIU/ML (ref 0.27–4.2)
VIT B12 SERPL-MCNC: >4000 PG/ML (ref 193–986)
WBC # BLD AUTO: 11 K/UL (ref 4.3–11.1)

## 2025-04-15 PROCEDURE — G8399 PT W/DXA RESULTS DOCUMENT: HCPCS | Performed by: INTERNAL MEDICINE

## 2025-04-15 PROCEDURE — 1159F MED LIST DOCD IN RCRD: CPT | Performed by: INTERNAL MEDICINE

## 2025-04-15 PROCEDURE — 3079F DIAST BP 80-89 MM HG: CPT | Performed by: INTERNAL MEDICINE

## 2025-04-15 PROCEDURE — 3074F SYST BP LT 130 MM HG: CPT | Performed by: INTERNAL MEDICINE

## 2025-04-15 PROCEDURE — 99214 OFFICE O/P EST MOD 30 MIN: CPT | Performed by: INTERNAL MEDICINE

## 2025-04-15 PROCEDURE — G8420 CALC BMI NORM PARAMETERS: HCPCS | Performed by: INTERNAL MEDICINE

## 2025-04-15 PROCEDURE — 1123F ACP DISCUSS/DSCN MKR DOCD: CPT | Performed by: INTERNAL MEDICINE

## 2025-04-15 PROCEDURE — 3023F SPIROM DOC REV: CPT | Performed by: INTERNAL MEDICINE

## 2025-04-15 PROCEDURE — 1090F PRES/ABSN URINE INCON ASSESS: CPT | Performed by: INTERNAL MEDICINE

## 2025-04-15 PROCEDURE — 4004F PT TOBACCO SCREEN RCVD TLK: CPT | Performed by: INTERNAL MEDICINE

## 2025-04-15 PROCEDURE — G8427 DOCREV CUR MEDS BY ELIG CLIN: HCPCS | Performed by: INTERNAL MEDICINE

## 2025-04-15 RX ORDER — FAMOTIDINE 20 MG/1
20 TABLET, FILM COATED ORAL DAILY
Qty: 90 TABLET | Refills: 2 | Status: SHIPPED | OUTPATIENT
Start: 2025-04-15

## 2025-04-15 RX ORDER — PREDNISONE 5 MG/1
5 TABLET ORAL 2 TIMES DAILY
Qty: 180 TABLET | Refills: 2 | Status: SHIPPED | OUTPATIENT
Start: 2025-04-15

## 2025-04-15 RX ORDER — ESTRADIOL 0.5 MG/1
0.5 TABLET ORAL DAILY
Qty: 90 TABLET | Refills: 2 | Status: CANCELLED | OUTPATIENT
Start: 2025-04-15

## 2025-04-15 RX ORDER — ALLOPURINOL 100 MG/1
100 TABLET ORAL DAILY
Qty: 90 TABLET | Refills: 2 | Status: SHIPPED | OUTPATIENT
Start: 2025-04-15

## 2025-04-15 RX ORDER — GABAPENTIN 100 MG/1
100 CAPSULE ORAL NIGHTLY
Qty: 90 CAPSULE | Refills: 2 | Status: SHIPPED | OUTPATIENT
Start: 2025-04-15 | End: 2026-01-10

## 2025-04-15 RX ORDER — FUROSEMIDE 40 MG/1
40 TABLET ORAL DAILY
Qty: 90 TABLET | Refills: 2 | Status: SHIPPED | OUTPATIENT
Start: 2025-04-15

## 2025-04-15 RX ORDER — OMEPRAZOLE 40 MG/1
40 CAPSULE, DELAYED RELEASE ORAL DAILY
Qty: 90 CAPSULE | Refills: 2 | Status: SHIPPED | OUTPATIENT
Start: 2025-04-15

## 2025-04-15 RX ORDER — LOSARTAN POTASSIUM 50 MG/1
50 TABLET ORAL 2 TIMES DAILY
Qty: 180 TABLET | Refills: 2 | Status: SHIPPED | OUTPATIENT
Start: 2025-04-15 | End: 2026-01-10

## 2025-04-15 RX ORDER — LEVOTHYROXINE SODIUM 150 UG/1
150 TABLET ORAL
Qty: 90 TABLET | Refills: 2 | Status: CANCELLED | OUTPATIENT
Start: 2025-04-15

## 2025-04-15 RX ORDER — IPRATROPIUM BROMIDE AND ALBUTEROL SULFATE 2.5; .5 MG/3ML; MG/3ML
3 SOLUTION RESPIRATORY (INHALATION) EVERY 6 HOURS PRN
Qty: 360 ML | Refills: 3 | Status: SHIPPED | OUTPATIENT
Start: 2025-04-15

## 2025-04-15 ASSESSMENT — ENCOUNTER SYMPTOMS
BACK PAIN: 1
COUGH: 0
SHORTNESS OF BREATH: 1
WHEEZING: 0

## 2025-04-15 NOTE — PROGRESS NOTES
HPI: Melania Dave (: 1940)    Needs labs updated august related to her anemia    Has been going to the Wound clinic for her legs     Also going to Pain management    Needs RF on meds      Problem List:  Patient Active Problem List   Diagnosis   • Palpitations   • Esophageal reflux   • Murmur   • IBS (irritable bowel syndrome)   • Insomnia, unspecified   • Gastritis   • ASCVD (arteriosclerotic cardiovascular disease)   • Chronic left shoulder pain   • Leg cramps   • Spinal stenosis of lumbar region with neurogenic claudication   • Rotator cuff tear, left   • Varicose veins of both legs with edema   • Chronic kidney disease, stage 3b ([facility])   • Essential hypertension   • COPD (chronic obstructive pulmonary disease) ([facility])   • Vitamin D deficiency   • Osteoporosis, post-menopausal   • Chronic bilateral low back pain with bilateral sciatica   • Chronic chest wall pain   • Nephrolithiasis   • Mixed hyperlipidemia   • Polymyalgia rheumatica   • Difficulty swallowing   • Hypothyroidism   • Fluid retention in legs   • Weakness of right leg   • Skin lesion of left arm   • Skin atrophy   • Atrophia cutis senilis   • Sun-damaged skin   • History of skin cancer   • Cigarette smoker   • Long-term corticosteroid use   • Adverse effect of statin   • At high risk for injury related to fall   • Bladder prolapse, female, acquired   • Hypoxia   • Influenza       History:  Past Medical History:   Diagnosis Date   • Adverse effect of anesthesia     hypotension with anesthesia   • Back pain     lower back   • CAD (coronary artery disease) 2014    Followed by [facility]   • COPD (chronic obstructive pulmonary disease) ([facility])     managed with PRN inhaler and nebulizers   • COVID-19 vaccine series completed     Pfizer vaccine completed   • Difficulty swallowing 2013    pt denies   • GERD (gastroesophageal reflux disease)     managed with Prilosec   • H/O bone density study 08/15/2017    osteoporosis   • H/O

## 2025-04-16 ENCOUNTER — HOSPITAL ENCOUNTER (OUTPATIENT)
Dept: WOUND CARE | Age: 85
Discharge: HOME | End: 2025-04-16
Payer: MEDICARE

## 2025-04-16 ENCOUNTER — RESULTS FOLLOW-UP (OUTPATIENT)
Dept: INTERNAL MEDICINE CLINIC | Facility: CLINIC | Age: 85
End: 2025-04-16

## 2025-04-16 VITALS
HEART RATE: 61 BPM | TEMPERATURE: 98.3 F | WEIGHT: 143 LBS | BODY MASS INDEX: 24.41 KG/M2 | SYSTOLIC BLOOD PRESSURE: 131 MMHG | DIASTOLIC BLOOD PRESSURE: 60 MMHG | RESPIRATION RATE: 18 BRPM | HEIGHT: 64 IN

## 2025-04-16 PROCEDURE — 29581 APPL MULTLAYER CMPRN SYS LEG: CPT

## 2025-04-16 RX ORDER — LEVOTHYROXINE SODIUM 150 UG/1
150 TABLET ORAL
Qty: 90 TABLET | Refills: 1 | Status: SHIPPED | OUTPATIENT
Start: 2025-04-16

## 2025-04-16 NOTE — WOUND CARE
Multilayer Compression Wrap   (Not Unna) Below the Knee    NAME:  Melania Dave  YOB: 1940  MEDICAL RECORD NUMBER:  [id number]  DATE:  4/16/2025    Multilayer compression wrap: Removed old Multilayer wrap if indicated and wash leg with mild soap/water.  Applied moisturizing agent to dry skin as needed.   Applied primary and secondary dressing as ordered.  Applied multilayered dressing below the knee to right lower leg.  Applied multilayered dressing below the knee to left lower leg.  Instructed patient/caregiver not to remove dressing and to keep it clean and dry.   Instructed patient/caregiver on complications to report to provider, such as pain, numbness in toes, heavy drainage, and slippage of dressing.  Instructed patient on purpose of compression dressing and on activity and exercise recommendations.      Electronically signed by Rosa Pablo RN on 4/16/2025 at 12:09 PM

## 2025-04-16 NOTE — FLOWSHEET NOTE
04/16/25 1203   Right Leg Edema Point of Measurement   Leg circumference 32 cm   Ankle circumference 22 cm   Foot circumference 22 cm   Compression Therapy 2 layer compression wrap   Left Leg Edema Point of Measurement   Leg circumference 39 cm   Ankle circumference 25 cm   Foot circumference 21 cm   Compression Therapy 2 layer compression wrap   Wound 04/14/25 Pretibial Left;Proximal;Lateral #4   Date First Assessed/Time First Assessed: 04/14/25 1401   Present on Original Admission: Yes  Wound Approximate Age at First Assessment (Weeks): 1 weeks  Primary Wound Type: Traumatic  Location: Pretibial  Wound Location Orientation: Left;Proximal;Late...   Wound Image    Wound Etiology Traumatic   Dressing Status Old drainage noted   Wound Cleansed Soap and water   Dressing/Treatment Hydrofera blue   Wound Length (cm) 3 cm   Wound Width (cm) 1.8 cm   Wound Depth (cm) 0.1 cm   Wound Surface Area (cm^2) 5.4 cm^2   Change in Wound Size % (l*w) 10   Wound Volume (cm^3) 0.54 cm^3   Wound Healing % 55   Wound Assessment Pink/red;Exposed structure fascia   Drainage Amount Moderate (25-50%)   Drainage Description Serosanguinous   Odor None   Yolanda-wound Assessment Edematous   Wound Thickness Description not for Pressure Injury Full thickness   Wound 04/08/25 Pretibial Right mid anterior lower leg   Date First Assessed/Time First Assessed: 04/08/25 1341   Present on Original Admission: Yes  Wound Approximate Age at First Assessment (Weeks): 4 weeks  Primary Wound Type: Vascular Ulcer  Secondary Wound Type - Vascular Ulcer: Venous  Location: Preti...   Wound Image    Wound Etiology Venous   Dressing Status Old drainage noted   Wound Cleansed Soap and water   Dressing/Treatment Hydrofera blue   Wound Length (cm) 0.5 cm   Wound Width (cm) 0.5 cm   Wound Depth (cm) 0.3 cm   Wound Surface Area (cm^2) 0.25 cm^2   Change in Wound Size % (l*w) -108.33   Wound Volume (cm^3) 0.075 cm^3   Wound Healing % -108   Wound Assessment

## 2025-04-17 PROBLEM — L97.912 NON-PRESSURE CHRONIC ULCER OF RIGHT LOWER LEG WITH FAT LAYER EXPOSED (HCC): Status: ACTIVE | Noted: 2025-04-17

## 2025-04-17 PROBLEM — L97.912 NON-PRESSURE CHRONIC ULCER OF RIGHT LOWER LEG WITH FAT LAYER EXPOSED (HCC): Chronic | Status: ACTIVE | Noted: 2025-04-17

## 2025-04-17 PROBLEM — S41.112A SKIN TEAR OF UPPER ARM WITHOUT COMPLICATION, LEFT, INITIAL ENCOUNTER: Status: RESOLVED | Noted: 2024-07-31 | Resolved: 2024-10-15

## 2025-04-17 PROBLEM — S91.302A OPEN WOUND OF LEFT FOOT WITH COMPLICATION: Status: RESOLVED | Noted: 2024-07-31 | Resolved: 2024-10-15

## 2025-04-17 PROBLEM — R60.0 BILATERAL LOWER EXTREMITY EDEMA: Status: ACTIVE | Noted: 2025-04-17

## 2025-04-17 PROBLEM — S41.111A SKIN TEAR OF RIGHT UPPER ARM WITHOUT COMPLICATION: Status: RESOLVED | Noted: 2024-07-29 | Resolved: 2024-10-15

## 2025-04-17 PROBLEM — R60.0 BILATERAL LOWER EXTREMITY EDEMA: Chronic | Status: ACTIVE | Noted: 2025-04-17

## 2025-04-18 ENCOUNTER — HOSPITAL ENCOUNTER (OUTPATIENT)
Dept: WOUND CARE | Age: 85
Discharge: HOME | End: 2025-04-18
Payer: MEDICARE

## 2025-04-18 VITALS
SYSTOLIC BLOOD PRESSURE: 135 MMHG | DIASTOLIC BLOOD PRESSURE: 65 MMHG | HEART RATE: 67 BPM | BODY MASS INDEX: 24.41 KG/M2 | HEIGHT: 64 IN | TEMPERATURE: 98.3 F | WEIGHT: 143 LBS | RESPIRATION RATE: 18 BRPM

## 2025-04-18 DIAGNOSIS — R60.0 BILATERAL LOWER EXTREMITY EDEMA: Chronic | ICD-10-CM

## 2025-04-18 DIAGNOSIS — L97.912 NON-PRESSURE CHRONIC ULCER OF RIGHT LOWER LEG WITH FAT LAYER EXPOSED (HCC): Primary | Chronic | ICD-10-CM

## 2025-04-18 PROCEDURE — 29581 APPL MULTLAYER CMPRN SYS LEG: CPT

## 2025-04-18 NOTE — FLOWSHEET NOTE
Drainage Amount Small (< 25%)   Drainage Description Serosanguinous   Odor None   Yolanda-wound Assessment Fragile   Wound Thickness Description not for Pressure Injury Full thickness   Wound 04/08/25 Pretibial Right lateral lower leg   Date First Assessed/Time First Assessed: 04/08/25 1341   Present on Original Admission: Yes  Wound Approximate Age at First Assessment (Weeks): 4 weeks  Primary Wound Type: Vascular Ulcer  Location: Pretibial  Wound Location Orientation: Right  Wound ...   Wound Image    Wound Etiology Venous   Dressing Status Old drainage noted   Wound Cleansed Soap and water   Dressing/Treatment Hydrofera blue   Wound Length (cm) 1.5 cm   Wound Width (cm) 1.5 cm   Wound Depth (cm) 0.1 cm   Wound Surface Area (cm^2) 2.25 cm^2   Change in Wound Size % (l*w) -60.71   Wound Volume (cm^3) 0.225 cm^3   Wound Healing % -61   Wound Assessment [place]/red;Slough   Drainage Amount Small (< 25%)   Drainage Description Serosanguinous   Odor None   Yolanda-wound Assessment Fragile   Wound Thickness Description not for Pressure Injury Full thickness   Wound 04/08/25 Pretibial Right medial pretibial   Date First Assessed/Time First Assessed: 04/08/25 1343   Wound Approximate Age at First Assessment (Weeks): 4 weeks  Primary Wound Type: Vascular Ulcer  Location: Pretibial  Wound Location Orientation: Right  Wound Description (Comments): medial preti...   Wound Image    Wound Etiology Venous   Dressing Status Old drainage noted   Wound Cleansed Soap and water   Dressing/Treatment Hydrofera blue   Wound Length (cm) 0.5 cm   Wound Width (cm) 0.5 cm   Wound Depth (cm) 0.1 cm   Wound Surface Area (cm^2) 0.25 cm^2   Change in Wound Size % (l*w) 82.14   Wound Volume (cm^3) 0.025 cm^3   Wound Healing % 94   Wound Assessment [place]/red;Slough   Drainage Amount Small (< 25%)   Drainage Description Serosanguinous   Odor None   Yolanda-wound Assessment Fragile   Margins Attached edges   Wound Thickness Description not for Pressure Injury

## 2025-04-18 NOTE — WOUND CARE
Multilayer Compression Wrap   (Not Unna) Below the Knee    NAME:  Melania Dave  YOB: 1940  MEDICAL RECORD NUMBER:  [id number]  DATE:  4/18/2025    Multilayer compression wrap: Removed old Multilayer wrap if indicated and wash leg with mild soap/water.  Applied moisturizing agent to dry skin as needed.   Applied primary and secondary dressing as ordered.  Applied multilayered dressing below the knee to right lower leg.  Applied multilayered dressing below the knee to left lower leg.  Instructed patient/caregiver not to remove dressing and to keep it clean and dry.   Instructed patient/caregiver on complications to report to provider, such as pain, numbness in toes, heavy drainage, and slippage of dressing.  Instructed patient on purpose of compression dressing and on activity and exercise recommendations.      Electronically signed by AMBER TILLMAN RN on 4/18/2025 at 11:46 AM

## 2025-04-22 ENCOUNTER — HOSPITAL ENCOUNTER (OUTPATIENT)
Dept: WOUND CARE | Age: 85
Discharge: HOME | End: 2025-04-22
Payer: MEDICARE

## 2025-04-22 VITALS
HEART RATE: 71 BPM | BODY MASS INDEX: 23.32 KG/M2 | HEIGHT: 64 IN | WEIGHT: 136.6 LBS | TEMPERATURE: 97.4 F | DIASTOLIC BLOOD PRESSURE: 82 MMHG | SYSTOLIC BLOOD PRESSURE: 138 MMHG | RESPIRATION RATE: 18 BRPM

## 2025-04-22 DIAGNOSIS — R60.0 LOWER EXTREMITY EDEMA: Chronic | ICD-10-CM

## 2025-04-22 DIAGNOSIS — L97.929 CHRONIC VENOUS HYPERTENSION (IDIOPATHIC) WITH ULCER OF BILATERAL LOWER EXTREMITY (HCC): Primary | Chronic | ICD-10-CM

## 2025-04-22 DIAGNOSIS — L97.922 NON-PRESSURE CHRONIC ULCER OF LEFT LOWER LEG WITH FAT LAYER EXPOSED (HCC): Chronic | ICD-10-CM

## 2025-04-22 DIAGNOSIS — I87.313 CHRONIC VENOUS HYPERTENSION (IDIOPATHIC) WITH ULCER OF BILATERAL LOWER EXTREMITY (HCC): Primary | Chronic | ICD-10-CM

## 2025-04-22 DIAGNOSIS — L97.912 NON-PRESSURE CHRONIC ULCER OF LOWER LEG WITH FAT LAYER EXPOSED, RIGHT (HCC): Chronic | ICD-10-CM

## 2025-04-22 DIAGNOSIS — L97.919 CHRONIC VENOUS HYPERTENSION (IDIOPATHIC) WITH ULCER OF BILATERAL LOWER EXTREMITY (HCC): Primary | Chronic | ICD-10-CM

## 2025-04-22 PROCEDURE — 11042 DBRDMT SUBQ TIS 1ST 20SQCM/<: CPT

## 2025-04-22 RX ORDER — SODIUM CHLOR/HYPOCHLOROUS ACID 0.033 %
SOLUTION, IRRIGATION IRRIGATION PRN
Status: DISCONTINUED | OUTPATIENT
Start: 2025-04-22 | End: 2025-04-23 | Stop reason: HOSPADM

## 2025-04-22 RX ORDER — GINSENG 100 MG
CAPSULE ORAL PRN
OUTPATIENT
Start: 2025-04-22

## 2025-04-22 RX ORDER — LIDOCAINE 50 MG/G
OINTMENT TOPICAL PRN
OUTPATIENT
Start: 2025-04-22

## 2025-04-22 RX ORDER — TRIAMCINOLONE ACETONIDE 1 MG/G
OINTMENT TOPICAL PRN
OUTPATIENT
Start: 2025-04-22

## 2025-04-22 RX ORDER — NEOMYCIN/BACITRACIN/POLYMYXINB 3.5-400-5K
OINTMENT (GRAM) TOPICAL PRN
OUTPATIENT
Start: 2025-04-22

## 2025-04-22 RX ORDER — BETAMETHASONE DIPROPIONATE 0.5 MG/G
CREAM TOPICAL PRN
OUTPATIENT
Start: 2025-04-22

## 2025-04-22 RX ORDER — MUPIROCIN 20 MG/G
OINTMENT TOPICAL PRN
OUTPATIENT
Start: 2025-04-22

## 2025-04-22 RX ORDER — SODIUM CHLOR/HYPOCHLOROUS ACID 0.033 %
SOLUTION, IRRIGATION IRRIGATION PRN
OUTPATIENT
Start: 2025-04-22

## 2025-04-22 RX ORDER — LIDOCAINE HYDROCHLORIDE 40 MG/ML
SOLUTION TOPICAL PRN
OUTPATIENT
Start: 2025-04-22

## 2025-04-22 RX ORDER — LIDOCAINE HYDROCHLORIDE 20 MG/ML
JELLY TOPICAL PRN
Status: DISCONTINUED | OUTPATIENT
Start: 2025-04-22 | End: 2025-04-23 | Stop reason: HOSPADM

## 2025-04-22 RX ORDER — LIDOCAINE HYDROCHLORIDE 20 MG/ML
JELLY TOPICAL PRN
OUTPATIENT
Start: 2025-04-22

## 2025-04-22 RX ORDER — CLOBETASOL PROPIONATE 0.5 MG/G
OINTMENT TOPICAL PRN
OUTPATIENT
Start: 2025-04-22

## 2025-04-22 RX ORDER — LIDOCAINE 40 MG/G
CREAM TOPICAL PRN
OUTPATIENT
Start: 2025-04-22

## 2025-04-22 RX ORDER — BACITRACIN ZINC AND POLYMYXIN B SULFATE 500; 1000 [USP'U]/G; [USP'U]/G
OINTMENT TOPICAL PRN
OUTPATIENT
Start: 2025-04-22

## 2025-04-22 RX ORDER — GENTAMICIN SULFATE 1 MG/G
OINTMENT TOPICAL PRN
OUTPATIENT
Start: 2025-04-22

## 2025-04-22 RX ORDER — SILVER SULFADIAZINE 10 MG/G
CREAM TOPICAL PRN
OUTPATIENT
Start: 2025-04-22

## 2025-04-22 RX ADMIN — Medication: at 15:29

## 2025-04-22 RX ADMIN — LIDOCAINE HYDROCHLORIDE: 20 JELLY TOPICAL at 15:29

## 2025-04-22 NOTE — WOUND CARE
Discharge Instructions for  [facility]  [address] [place] [address]  [address] [place] [id number]  Phone [phone]   Fax [phone]      NAME:  Melania Dave  YOB: 1940  MEDICAL RECORD NUMBER:  [id number]  DATE:  4/22/2025    Return Appointment:    Next week   with [facility]  Thur or Fri  Dressing changes Friday and Mon or Tue        Instructions: Bilateral lower leg:  Cleanse with normal saline  Cover with Hydrofera Ready  Cover with ABD pad  2 layer compression with wound and lower extremity assessment.  If there is any problem with the dressing (too tight, slides down, etc.) Patient to return to wound clinic to have re-wrapped by clinician.\  Right 5th toe wound - xeroform /gauze or foam/ tape   Change 2 times per week at wound center      Do not get wound wet in shower, pool or tub. May purchase cast cover at local pharmacy to keep dry in shower.    Increase protein to 100g daily for optimal wound healing. Aim for 30g protein per shake, two shakes a day.  Protein:   Egg ~ 6g  Yogurt ~ 15g  Half cup of cottage cheese ~ 15g  Chicken breast ~ 30g  [place] filet ~ 40g     Should you experience increased redness, swelling, pain, foul odor, size of wound(s), or have a temperature over 101 degrees please contact the Wound Healing Center at [phone] or if after hours contact your primary care physician or go to the hospital emergency department.    PLEASE NOTE: IF YOU ARE UNABLE TO OBTAIN WOUND SUPPLIES, CONTINUE TO USE THE SUPPLIES YOU HAVE AVAILABLE UNTIL YOU ARE ABLE TO REACH US. IT IS MOST IMPORTANT TO KEEP THE WOUND COVERED AT ALL TIMES.    Electronically signed AMBER TILLMAN RN on 4/22/2025 at 2:25 PM

## 2025-04-22 NOTE — WOUND CARE
Multilayer Compression Wrap   (Not Unna) Below the Knee    NAME:  Melania Dave  YOB: 1940  MEDICAL RECORD NUMBER:  [id number]  DATE:  4/22/2025    Multilayer compression wrap: Removed old Multilayer wrap if indicated and wash leg with mild soap/water.  Applied moisturizing agent to dry skin as needed.   Applied primary and secondary dressing as ordered.  Applied multilayered dressing below the knee to right lower leg.  Applied multilayered dressing below the knee to left lower leg.  Instructed patient/caregiver not to remove dressing and to keep it clean and dry.   Instructed patient/caregiver on complications to report to provider, such as pain, numbness in toes, heavy drainage, and slippage of dressing.  Instructed patient on purpose of compression dressing and on activity and exercise recommendations.      Electronically signed by AMBER TILLMAN RN on 4/22/2025 at 3:24 PM

## 2025-04-22 NOTE — PROGRESS NOTES
[facility]  Procedure Note    Melania aDve  MEDICAL RECORD NUMBER: [id number]  AGE: 84 y.o.     GENDER: female    : 1940  EPISODE DATE: 2025    Problem List Items Addressed This Visit          Circulatory    Chronic venous hypertension (idiopathic) with ulcer of bilateral lower extremity - Primary (Chronic)     Assessment  Wounds are generally improved; wound beds are mix of pink tissue and slough  No evidence of acute infection  Plan  Excisional debridement to remove devitalized tissue and promote wound healing   Wound care: Vashe cleanse, Hydrofera blue, multilayer compression  Change twice weekly here and provider visit next Friday          Relevant Medications    lidocaine (XYLOCAINE) 2 % jelly    vashe wound therapy external solution    Other Relevant Orders    MD COMMUNICATION 1    MD COMMUNICATION 2    Initiate Outpatient Wound Care Protocol    Initiate Oxygen Therapy Protocol       Other    Lower extremity edema (Chronic)    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, right (HCC) (Chronic)    Relevant Medications    lidocaine (XYLOCAINE) 2 % jelly    vashe wound therapy external solution    Other Relevant Orders    MD COMMUNICATION 1    MD COMMUNICATION 2    Initiate Outpatient Wound Care Protocol    Initiate Oxygen Therapy Protocol    Non-pressure chronic ulcer of left lower leg with fat layer exposed (HCC) (Chronic)    Relevant Medications    lidocaine (XYLOCAINE) 2 % jelly    vashe wound therapy external solution    Other Relevant Orders    MD COMMUNICATION 1    MD COMMUNICATION 2    Initiate Outpatient Wound Care Protocol    Initiate Oxygen Therapy Protocol     Procedure Note: Excisional Debridement  Indications: Based on my examination of the patient's wound(s) today, debridement is required to remove devitalized tissue, evaluate the wound base, and promote wound healing.  Performed by: CARLINE Street -

## 2025-04-22 NOTE — DISCHARGE INSTRUCTIONS
Instructions: Bilateral lower leg:  Cleanse with normal saline  Cover with Hydrofera Ready  Cover with ABD pad  2 layer compression with wound and lower extremity assessment.  If there is any problem with the dressing (too tight, slides down, etc.) Patient to return to wound clinic to have re-wrapped by clinician.\  Right 5th toe wound - xeroform /gauze or foam/ tape   Change 2 times per week at wound center        Do not get wound wet in shower, pool or tub. May purchase cast cover at local pharmacy to keep dry in shower.     Increase protein to 100g daily for optimal wound healing. Aim for 30g protein per shake, two shakes a day.  Protein:   Egg ~ 6g  Yogurt ~ 15g  Half cup of cottage cheese ~ 15g  Chicken breast ~ 30g  [place] filet ~ 40g      Should you experience increased redness, swelling, pain, foul odor, size of wound(s), or have a temperature over 101 degrees please contact the Wound Healing Center at [phone] or if after hours contact your primary care physician or go to the hospital emergency department.     PLEASE NOTE: IF YOU ARE UNABLE TO OBTAIN WOUND SUPPLIES, CONTINUE TO USE THE SUPPLIES YOU HAVE AVAILABLE UNTIL YOU ARE ABLE TO REACH US. IT IS MOST IMPORTANT TO KEEP THE WOUND COVERED AT ALL TIMES.

## 2025-04-22 NOTE — ASSESSMENT & PLAN NOTE
Assessment  Wounds are generally improved; wound beds are mix of pink tissue and slough  No evidence of acute infection  Plan  Excisional debridement to remove devitalized tissue and promote wound healing   Wound care: Vashe cleanse, Hydrofera blue, multilayer compression  Change twice weekly here and provider visit next Friday

## 2025-04-22 NOTE — FLOWSHEET NOTE
04/22/25 1342   Right Leg Edema Point of Measurement   Leg circumference 30 cm   Ankle circumference 21.5 cm   Foot circumference 21.5 cm   Compression Therapy 2 layer compression wrap   Left Leg Edema Point of Measurement   Leg circumference 33.5 cm   Ankle circumference 24 cm   Foot circumference 22 cm   Compression Therapy 2 layer compression wrap   Wound 04/18/25 Toe (Comment  which one) Left #5 5th toe   Date First Assessed/Time First Assessed: 04/18/25 1119   Present on Original Admission: No  Wound Approximate Age at First Assessment (Weeks): 1 weeks  Location: (c) Toe (Comment  which one)  Wound Location Orientation: Left  Wound Description (Commen...   Wound Image     Wound Etiology Pressure Stage 2   Dressing Status New drainage noted   Wound Cleansed Cleansed with saline   Dressing/Treatment Gauze dressing/dressing sponge   Wound Length (cm) 0.2 cm   Wound Width (cm) 0.4 cm   Wound Depth (cm) 0.1 cm   Wound Surface Area (cm^2) 0.08 cm^2   Change in Wound Size % (l*w) 0   Wound Volume (cm^3) 0.008 cm^3   Wound Healing % 50   Post-Procedure Length (cm) 0.3 cm   Post-Procedure Width (cm) 0.4 cm   Post-Procedure Depth (cm) 0.1 cm   Post-Procedure Surface Area (cm^2) 0.12 cm^2   Post-Procedure Volume (cm^3) 0.012 cm^3   Wound Assessment Pale granulation tissue   Drainage Amount Scant (moist but unmeasurable)   Drainage Description Serous   Odor None   Yolanda-wound Assessment Hyperkeratosis (callous)   Margins Defined edges   Wound 04/14/25 Pretibial Left;Proximal;Lateral #4   Date First Assessed/Time First Assessed: 04/14/25 1401   Present on Original Admission: Yes  Wound Approximate Age at First Assessment (Weeks): 1 weeks  Primary Wound Type: Traumatic  Location: Pretibial  Wound Location Orientation: Left;Proximal;Late...   Wound Image     Wound Etiology Traumatic   Dressing Status Old drainage noted   Wound Cleansed Soap and water   Dressing/Treatment Hydrofera blue   Wound Length (cm) 2.5 cm   Wound

## 2025-04-30 ENCOUNTER — HOSPITAL ENCOUNTER (OUTPATIENT)
Dept: WOUND CARE | Age: 85
Discharge: HOME | End: 2025-04-30
Payer: MEDICARE

## 2025-04-30 ENCOUNTER — OFFICE VISIT (OUTPATIENT)
Age: 85
End: 2025-04-30
Payer: MEDICARE

## 2025-04-30 VITALS — BODY MASS INDEX: 23.22 KG/M2 | HEIGHT: 64 IN | WEIGHT: 136 LBS

## 2025-04-30 DIAGNOSIS — L97.912 NON-PRESSURE CHRONIC ULCER OF LOWER LEG WITH FAT LAYER EXPOSED, RIGHT (HCC): ICD-10-CM

## 2025-04-30 DIAGNOSIS — L97.922 NON-PRESSURE CHRONIC ULCER OF LEFT LOWER LEG WITH FAT LAYER EXPOSED (HCC): ICD-10-CM

## 2025-04-30 DIAGNOSIS — M54.12 CERVICAL RADICULOPATHY: Primary | ICD-10-CM

## 2025-04-30 DIAGNOSIS — I87.313 CHRONIC VENOUS HYPERTENSION (IDIOPATHIC) WITH ULCER OF BILATERAL LOWER EXTREMITY (HCC): ICD-10-CM

## 2025-04-30 DIAGNOSIS — R60.0 BILATERAL LOWER EXTREMITY EDEMA: Primary | ICD-10-CM

## 2025-04-30 DIAGNOSIS — L97.919 CHRONIC VENOUS HYPERTENSION (IDIOPATHIC) WITH ULCER OF BILATERAL LOWER EXTREMITY (HCC): ICD-10-CM

## 2025-04-30 DIAGNOSIS — L97.929 CHRONIC VENOUS HYPERTENSION (IDIOPATHIC) WITH ULCER OF BILATERAL LOWER EXTREMITY (HCC): ICD-10-CM

## 2025-04-30 PROCEDURE — G8420 CALC BMI NORM PARAMETERS: HCPCS | Performed by: PHYSICIAN ASSISTANT

## 2025-04-30 PROCEDURE — G8427 DOCREV CUR MEDS BY ELIG CLIN: HCPCS | Performed by: PHYSICIAN ASSISTANT

## 2025-04-30 PROCEDURE — 99213 OFFICE O/P EST LOW 20 MIN: CPT | Performed by: PHYSICIAN ASSISTANT

## 2025-04-30 PROCEDURE — 4004F PT TOBACCO SCREEN RCVD TLK: CPT | Performed by: PHYSICIAN ASSISTANT

## 2025-04-30 PROCEDURE — 1123F ACP DISCUSS/DSCN MKR DOCD: CPT | Performed by: PHYSICIAN ASSISTANT

## 2025-04-30 PROCEDURE — 29581 APPL MULTLAYER CMPRN SYS LEG: CPT

## 2025-04-30 PROCEDURE — G8399 PT W/DXA RESULTS DOCUMENT: HCPCS | Performed by: PHYSICIAN ASSISTANT

## 2025-04-30 PROCEDURE — 1159F MED LIST DOCD IN RCRD: CPT | Performed by: PHYSICIAN ASSISTANT

## 2025-04-30 PROCEDURE — 1090F PRES/ABSN URINE INCON ASSESS: CPT | Performed by: PHYSICIAN ASSISTANT

## 2025-04-30 RX ORDER — SILVER SULFADIAZINE 10 MG/G
CREAM TOPICAL PRN
OUTPATIENT
Start: 2025-04-30

## 2025-04-30 RX ORDER — LIDOCAINE HYDROCHLORIDE 20 MG/ML
JELLY TOPICAL PRN
Status: CANCELLED | OUTPATIENT
Start: 2025-04-30

## 2025-04-30 RX ORDER — SODIUM CHLOR/HYPOCHLOROUS ACID 0.033 %
SOLUTION, IRRIGATION IRRIGATION PRN
OUTPATIENT
Start: 2025-04-30

## 2025-04-30 RX ORDER — BACITRACIN ZINC AND POLYMYXIN B SULFATE 500; 1000 [USP'U]/G; [USP'U]/G
OINTMENT TOPICAL PRN
OUTPATIENT
Start: 2025-04-30

## 2025-04-30 RX ORDER — LIDOCAINE 40 MG/G
CREAM TOPICAL PRN
OUTPATIENT
Start: 2025-04-30

## 2025-04-30 RX ORDER — GINSENG 100 MG
CAPSULE ORAL PRN
OUTPATIENT
Start: 2025-04-30

## 2025-04-30 RX ORDER — NEOMYCIN/BACITRACIN/POLYMYXINB 3.5-400-5K
OINTMENT (GRAM) TOPICAL PRN
OUTPATIENT
Start: 2025-04-30

## 2025-04-30 RX ORDER — LIDOCAINE HYDROCHLORIDE 20 MG/ML
JELLY TOPICAL PRN
OUTPATIENT
Start: 2025-04-30

## 2025-04-30 RX ORDER — MUPIROCIN 20 MG/G
OINTMENT TOPICAL PRN
OUTPATIENT
Start: 2025-04-30

## 2025-04-30 RX ORDER — TRIAMCINOLONE ACETONIDE 1 MG/G
OINTMENT TOPICAL PRN
OUTPATIENT
Start: 2025-04-30

## 2025-04-30 RX ORDER — CLOBETASOL PROPIONATE 0.5 MG/G
OINTMENT TOPICAL PRN
OUTPATIENT
Start: 2025-04-30

## 2025-04-30 RX ORDER — BETAMETHASONE DIPROPIONATE 0.5 MG/G
CREAM TOPICAL PRN
OUTPATIENT
Start: 2025-04-30

## 2025-04-30 RX ORDER — LIDOCAINE HYDROCHLORIDE 40 MG/ML
SOLUTION TOPICAL PRN
OUTPATIENT
Start: 2025-04-30

## 2025-04-30 RX ORDER — LIDOCAINE 50 MG/G
OINTMENT TOPICAL PRN
OUTPATIENT
Start: 2025-04-30

## 2025-04-30 RX ORDER — GENTAMICIN SULFATE 1 MG/G
OINTMENT TOPICAL PRN
OUTPATIENT
Start: 2025-04-30

## 2025-04-30 ASSESSMENT — ENCOUNTER SYMPTOMS
EYES NEGATIVE: 1
ALLERGIC/IMMUNOLOGIC NEGATIVE: 1
SHORTNESS OF BREATH: 0
ABDOMINAL PAIN: 0

## 2025-04-30 NOTE — PROGRESS NOTES
Ms. Dave is a follow-up for a right intra-articular shoulder injection performed by Dr. Parisi 4 weeks ago.  She is followed by our office for low back pain secondary to vertebral compression fractures at L1 and L2, peripheral neuropathy and chronic right shoulder pain.  
Place 1 tablet under the tongue       No facility-administered encounter medications on file as of 4/30/2025.          Review of Systems   Constitutional:  Negative for chills, fatigue, fever and unexpected weight change.   HENT:  Negative for congestion and ear pain.    Eyes: Negative.    Respiratory:  Negative for shortness of breath.    Cardiovascular:  Negative for chest pain.   Gastrointestinal:  Negative for abdominal pain.   Endocrine: Negative.    Genitourinary: Negative.    Skin:  Negative for rash.   Allergic/Immunologic: Negative.    Neurological:  Negative for dizziness.   Hematological: Negative.    Psychiatric/Behavioral: Negative.           All 11 systems reviewed and were negative.          The [place] database for controlled substance prescription monitoring was reviewed.    Date: April 30, 2025  Patient Name: Melania Dave  MRN:[id number]  PCP: Loraine Farnsworth personally performed the HPI, exam, and assessment/plan, verified the documentation and approve it is updated, accurate, and complete. Parts or the entirety of this document were transcribed utilizing voice recognition software. Transcription errors may be present.    Iris Morales PA-C

## 2025-04-30 NOTE — WOUND CARE
Multilayer Compression Wrap   (Not Unna) Below the Knee    NAME:  Melania Dave  YOB: 1940  MEDICAL RECORD NUMBER:  [id number]  DATE:  4/30/2025    Multilayer compression wrap: Removed old Multilayer wrap if indicated and wash leg with mild soap/water.  Applied moisturizing agent to dry skin as needed.   Applied primary and secondary dressing as ordered.  Applied multilayered dressing below the knee to right lower leg.  Applied multilayered dressing below the knee to left lower leg.  Instructed patient/caregiver not to remove dressing and to keep it clean and dry.   Instructed patient/caregiver on complications to report to provider, such as pain, numbness in toes, heavy drainage, and slippage of dressing.  Instructed patient on purpose of compression dressing and on activity and exercise recommendations.      Electronically signed by Lilly Delgado RN on 4/30/2025 at 11:59 AM

## 2025-04-30 NOTE — PROGRESS NOTES
Procedure Date: May 5, 2025      Location: [facility]       Procedure: C6-7 ANAT       Time Out performed prior to start of the procedure:       Alvaro Parisi MD performed the following reviews on Melania Dave 1940 prior to the start of the procedure:       patient was identified by name and     agreement on procedure being performed was verified   risks and benefits explained to patient by the provider  procedure site verified as Bilateral  patient was positioned for comfort   consent signed and verified for procedure       Time:  1:20 PM        Procedure performed by:   Alvaro Parisi MD      Patient assisted by:   HANS BRAUN

## 2025-04-30 NOTE — FLOWSHEET NOTE
1343   Wound Approximate Age at First Assessment (Weeks): 4 weeks  Primary Wound Type: Vascular Ulcer  Location: Pretibial  Wound Location Orientation: Right  Wound Description (Comments): medial preti...   Wound Image    Wound Etiology Venous   Dressing Status Intact   Wound Cleansed Soap and water   Dressing/Treatment Hydrofera blue   Wound Length (cm) 0 cm   Wound Width (cm) 0 cm   Wound Depth (cm) 0 cm   Wound Surface Area (cm^2) 0 cm^2   Change in Wound Size % (l*w) 100   Wound Volume (cm^3) 0 cm^3   Wound Healing % 100   Wound Assessment Epithelialization   Drainage Amount None (dry)   Odor None   Yolanda-wound Assessment Fragile   Wound Thickness Description not for Pressure Injury Full thickness   Patient is on asa daily

## 2025-05-02 ENCOUNTER — HOSPITAL ENCOUNTER (OUTPATIENT)
Dept: WOUND CARE | Age: 85
Discharge: HOME | End: 2025-05-02
Payer: MEDICARE

## 2025-05-02 ENCOUNTER — HOSPITAL ENCOUNTER (OUTPATIENT)
Dept: GENERAL RADIOLOGY | Age: 85
End: 2025-05-02
Payer: MEDICARE

## 2025-05-02 VITALS
RESPIRATION RATE: 18 BRPM | SYSTOLIC BLOOD PRESSURE: 118 MMHG | HEART RATE: 69 BPM | TEMPERATURE: 98.4 F | DIASTOLIC BLOOD PRESSURE: 54 MMHG | BODY MASS INDEX: 22.88 KG/M2 | HEIGHT: 64 IN | WEIGHT: 134 LBS | OXYGEN SATURATION: 98 %

## 2025-05-02 DIAGNOSIS — G60.9 HEREDITARY AND IDIOPATHIC NEUROPATHY: ICD-10-CM

## 2025-05-02 DIAGNOSIS — L97.922 NON-PRESSURE CHRONIC ULCER OF LEFT LOWER LEG WITH FAT LAYER EXPOSED (HCC): Chronic | ICD-10-CM

## 2025-05-02 DIAGNOSIS — I87.313 CHRONIC VENOUS HYPERTENSION (IDIOPATHIC) WITH ULCER OF BILATERAL LOWER EXTREMITY (HCC): Chronic | ICD-10-CM

## 2025-05-02 DIAGNOSIS — L03.116 CELLULITIS OF LEFT FOOT: ICD-10-CM

## 2025-05-02 DIAGNOSIS — L97.919 CHRONIC VENOUS HYPERTENSION (IDIOPATHIC) WITH ULCER OF BILATERAL LOWER EXTREMITY (HCC): Chronic | ICD-10-CM

## 2025-05-02 DIAGNOSIS — L97.524 TOE ULCER, LEFT, WITH NECROSIS OF BONE (HCC): ICD-10-CM

## 2025-05-02 DIAGNOSIS — R60.0 BILATERAL LOWER EXTREMITY EDEMA: Chronic | ICD-10-CM

## 2025-05-02 DIAGNOSIS — L97.912 NON-PRESSURE CHRONIC ULCER OF LOWER LEG WITH FAT LAYER EXPOSED, RIGHT (HCC): Primary | Chronic | ICD-10-CM

## 2025-05-02 DIAGNOSIS — L97.929 CHRONIC VENOUS HYPERTENSION (IDIOPATHIC) WITH ULCER OF BILATERAL LOWER EXTREMITY (HCC): Chronic | ICD-10-CM

## 2025-05-02 PROCEDURE — 11042 DBRDMT SUBQ TIS 1ST 20SQCM/<: CPT

## 2025-05-02 PROCEDURE — 87075 CULTR BACTERIA EXCEPT BLOOD: CPT

## 2025-05-02 PROCEDURE — 87070 CULTURE OTHR SPECIMN AEROBIC: CPT

## 2025-05-02 PROCEDURE — 87205 SMEAR GRAM STAIN: CPT

## 2025-05-02 PROCEDURE — 73660 X-RAY EXAM OF TOE(S): CPT

## 2025-05-02 RX ORDER — BACITRACIN ZINC AND POLYMYXIN B SULFATE 500; 1000 [USP'U]/G; [USP'U]/G
OINTMENT TOPICAL PRN
OUTPATIENT
Start: 2025-05-02

## 2025-05-02 RX ORDER — LIDOCAINE HYDROCHLORIDE 20 MG/ML
JELLY TOPICAL PRN
OUTPATIENT
Start: 2025-05-02

## 2025-05-02 RX ORDER — GINSENG 100 MG
CAPSULE ORAL PRN
OUTPATIENT
Start: 2025-05-02

## 2025-05-02 RX ORDER — GENTAMICIN SULFATE 1 MG/G
OINTMENT TOPICAL PRN
OUTPATIENT
Start: 2025-05-02

## 2025-05-02 RX ORDER — LIDOCAINE HYDROCHLORIDE 40 MG/ML
SOLUTION TOPICAL PRN
OUTPATIENT
Start: 2025-05-02

## 2025-05-02 RX ORDER — BETAMETHASONE DIPROPIONATE 0.5 MG/G
CREAM TOPICAL PRN
OUTPATIENT
Start: 2025-05-02

## 2025-05-02 RX ORDER — LIDOCAINE 50 MG/G
OINTMENT TOPICAL PRN
OUTPATIENT
Start: 2025-05-02

## 2025-05-02 RX ORDER — CLOBETASOL PROPIONATE 0.5 MG/G
OINTMENT TOPICAL PRN
OUTPATIENT
Start: 2025-05-02

## 2025-05-02 RX ORDER — MUPIROCIN 20 MG/G
OINTMENT TOPICAL PRN
OUTPATIENT
Start: 2025-05-02

## 2025-05-02 RX ORDER — NEOMYCIN/BACITRACIN/POLYMYXINB 3.5-400-5K
OINTMENT (GRAM) TOPICAL PRN
OUTPATIENT
Start: 2025-05-02

## 2025-05-02 RX ORDER — LIDOCAINE HYDROCHLORIDE 20 MG/ML
JELLY TOPICAL PRN
Status: DISCONTINUED | OUTPATIENT
Start: 2025-05-02 | End: 2025-05-03 | Stop reason: HOSPADM

## 2025-05-02 RX ORDER — LIDOCAINE 40 MG/G
CREAM TOPICAL PRN
OUTPATIENT
Start: 2025-05-02

## 2025-05-02 RX ORDER — SILVER SULFADIAZINE 10 MG/G
CREAM TOPICAL PRN
OUTPATIENT
Start: 2025-05-02

## 2025-05-02 RX ORDER — TRIAMCINOLONE ACETONIDE 1 MG/G
OINTMENT TOPICAL PRN
OUTPATIENT
Start: 2025-05-02

## 2025-05-02 RX ORDER — SODIUM CHLOR/HYPOCHLOROUS ACID 0.033 %
SOLUTION, IRRIGATION IRRIGATION PRN
OUTPATIENT
Start: 2025-05-02

## 2025-05-02 RX ADMIN — LIDOCAINE HYDROCHLORIDE: 20 JELLY TOPICAL at 15:41

## 2025-05-02 ASSESSMENT — PAIN SCALES - GENERAL: PAINLEVEL_OUTOF10: 4

## 2025-05-02 ASSESSMENT — PAIN DESCRIPTION - DESCRIPTORS: DESCRIPTORS: ACHING

## 2025-05-02 ASSESSMENT — PAIN DESCRIPTION - ORIENTATION: ORIENTATION: RIGHT

## 2025-05-02 ASSESSMENT — PAIN DESCRIPTION - LOCATION: LOCATION: TOE (COMMENT WHICH ONE)

## 2025-05-02 NOTE — PROGRESS NOTES
Drainage Description Sanguinous 05/02/25 1344   Odor None 05/02/25 1344   Yolanda-wound Assessment Fragile;Ecchymosis 05/02/25 1344   Wound Thickness Description not for Pressure Injury Full thickness 05/02/25 1344   Number of days: 0       Total surface area debrided: 0.95 sq cm   Estimated blood loss: minimal amount blood loss  Hemostasis achieved: pressure  Procedural pain: 2 / 10   Post procedural pain: 2 / 10   Response to treatment: tolerated procedure well with minimal complaints of pain    This dictation may have been generated in part by voice recognition computer software. Although all attempts are made to edit the dictation for accuracy, there may be errors in the transcription that are not intended.

## 2025-05-02 NOTE — WOUND CARE
Multilayer Compression Wrap   (Not Unna) Below the Knee    NAME:  Melania Dave  YOB: 1940  MEDICAL RECORD NUMBER:  [id number]  DATE:  5/2/2025    Multilayer compression wrap: Removed old Multilayer wrap if indicated and wash leg with mild soap/water.  Applied moisturizing agent to dry skin as needed.   Applied primary and secondary dressing as ordered.  Applied multilayered dressing below the knee to right lower leg.  Applied multilayered dressing below the knee to left lower leg.  Instructed patient/caregiver not to remove dressing and to keep it clean and dry.   Instructed patient/caregiver on complications to report to provider, such as pain, numbness in toes, heavy drainage, and slippage of dressing.  Instructed patient on purpose of compression dressing and on activity and exercise recommendations.      Electronically signed by Karin Lauren PT, WCC on 5/2/2025 at 3:02 PM

## 2025-05-02 NOTE — ASSESSMENT & PLAN NOTE
Assessment  Ulcer left lateral 5th toe is larger and deeper with exposed bone; toe is tender, toe and forefoot are edematous and erythematous  Plan  Excisional debridement to remove devitalized tissue and promote wound healing   Tissue culture taken; given history of MRSA will start Bactrim pending result  XR left 5th toe to evaluate for OM  Wound care: Vashe cleanse, Hydrofera blue, change every other day  Change twice weekly here and provider visit next Friday

## 2025-05-02 NOTE — FLOWSHEET NOTE
05/02/25 1344   Right Leg Edema Point of Measurement   Leg circumference 28.5 cm   Ankle circumference 19 cm   Foot circumference 21 cm   Compression Therapy 2 layer compression wrap   Left Leg Edema Point of Measurement   Leg circumference 31 cm   Ankle circumference 22 cm   Foot circumference 22 cm   Compression Therapy 2 layer compression wrap   Wound 04/18/25 Toe (Comment  which one) Left #5 5th toe   Date First Assessed/Time First Assessed: 04/18/25 1119   Present on Original Admission: No  Wound Approximate Age at First Assessment (Weeks): 1 weeks  Location: (c) Toe (Comment  which one)  Wound Location Orientation: Left  Wound Description (Commen...   Wound Image     Wound Etiology Pressure Stage 2   Dressing Status Old drainage noted   Wound Cleansed Soap and water   Dressing/Treatment Open to air   Wound Length (cm) 0.5 cm   Wound Width (cm) 0.4 cm   Wound Depth (cm) 0.1 cm   Wound Surface Area (cm^2) 0.2 cm^2   Change in Wound Size % (l*w) -150   Wound Volume (cm^3) 0.02 cm^3   Wound Healing % -25   Post-Procedure Length (cm) 0.5 cm   Post-Procedure Width (cm) 0.4 cm   Post-Procedure Depth (cm) 0.1 cm   Post-Procedure Surface Area (cm^2) 0.2 cm^2   Post-Procedure Volume (cm^3) 0.02 cm^3   Wound Assessment Pink/red   Drainage Amount Small (< 25%)   Drainage Description Serosanguinous   Odor None   Yolanda-wound Assessment Fragile   Wound 04/14/25 Pretibial Left;Proximal;Lateral #4   Date First Assessed/Time First Assessed: 04/14/25 1401   Present on Original Admission: Yes  Wound Approximate Age at First Assessment (Weeks): 1 weeks  Primary Wound Type: Traumatic  Location: Pretibial  Wound Location Orientation: Left;Proximal;Late...   Wound Image     Wound Etiology Traumatic   Dressing Status Old drainage noted   Wound Cleansed Soap and water   Dressing/Treatment Hydrofera blue   Wound Length (cm) 2.5 cm   Wound Width (cm) 1.5 cm   Wound Depth (cm) 0.1 cm   Wound Surface Area (cm^2) 3.75 cm^2   Change in

## 2025-05-02 NOTE — WOUND CARE
Discharge Instructions for  [facility]  [address] [place] [address]  [address]  [place] [address]  Phone [phone]   Fax [phone]      NAME:  Melania Dave  YOB: 1940  MEDICAL RECORD NUMBER:  [id number]  DATE:  5/2/2025    Return Appointment:    1 week  with Dr. Quezada   Dressing changes:  Monday and Thursday         Instructions: Bilateral lower leg:  Cleanse with normal saline  Xeroform- apply to wound bed.   Cover with ABD pad  2 layer compression with wound and lower extremity assessment.  If there is any problem with the dressing (too tight, slides down, etc.) Patient to return to wound clinic to have re-wrapped by clinician.  Change dressing 2 times per week    Right 5th toe:  Hydrofera Ready: Cut to wound size, place in wound bed, shiny side out.    Cover with gauze and tape  Change 2 times per week at wound center    Tissue culture taken from left 5th toe  Prescription for Bactrim sent to pharmacy; obtain and take with food as prescribed until complete  X-Ray of left foot ordered. Please call [phone] to speak directly with a  to schedule your appointment; may go by there today.     Do not get wound wet in shower, pool or tub. May purchase cast cover at local pharmacy to keep dry in shower.    Increase protein to 100g daily for optimal wound healing. Aim for 30g protein per shake, two shakes a day.  Protein:   Egg ~ 6g  Yogurt ~ 15g  Half cup of cottage cheese ~ 15g  Chicken breast ~ 30g  [place] filet ~ 40g     Should you experience increased redness, swelling, pain, foul odor, size of wound(s), or have a temperature over 101 degrees please contact the Wound Healing Center at [phone] or if after hours contact your primary care physician or go to the hospital emergency department.    PLEASE NOTE: IF YOU ARE UNABLE TO OBTAIN WOUND SUPPLIES, CONTINUE TO USE THE SUPPLIES YOU HAVE AVAILABLE UNTIL YOU ARE ABLE TO REACH US. IT IS MOST IMPORTANT

## 2025-05-02 NOTE — ASSESSMENT & PLAN NOTE
Assessment  Wounds are generally; wound beds are mix of pink tissue and slough  No evidence of acute infection  Plan  Excisional debridement to remove devitalized tissue and promote wound healing   Wound care: Vashe cleanse, Xeroform gauze, multilayer compression  Change twice weekly here and provider visit next Friday

## 2025-05-02 NOTE — DISCHARGE INSTRUCTIONS
Return Appointment:    1 week  with Dr. Quezada   Dressing changes:  Monday and Thursday           Instructions: Bilateral lower leg:  Cleanse with normal saline  Xeroform- apply to wound bed.   Cover with ABD pad  2 layer compression with wound and lower extremity assessment.  If there is any problem with the dressing (too tight, slides down, etc.) Patient to return to wound clinic to have re-wrapped by clinician.  Change dressing 2 times per week     Right 5th toe:  Hydrofera Ready: Cut to wound size, place in wound bed, shiny side out.    Cover with gauze and tape  Change 2 times per week at wound center     Tissue culture taken from left 5th toe  Prescription for Bactrim sent to pharmacy; obtain and take with food as prescribed until complete  X-Ray of left foot ordered. Please call [phone] to speak directly with a  to schedule your appointment; may go by there today.      Do not get wound wet in shower, pool or tub. May purchase cast cover at local pharmacy to keep dry in shower.     Increase protein to 100g daily for optimal wound healing. Aim for 30g protein per shake, two shakes a day.  Protein:   Egg ~ 6g  Yogurt ~ 15g  Half cup of cottage cheese ~ 15g  Chicken breast ~ 30g  [place] filet ~ 40g

## 2025-05-05 ENCOUNTER — RESULTS FOLLOW-UP (OUTPATIENT)
Dept: WOUND CARE | Age: 85
End: 2025-05-05

## 2025-05-05 ENCOUNTER — OFFICE VISIT (OUTPATIENT)
Age: 85
End: 2025-05-05
Payer: MEDICARE

## 2025-05-05 ENCOUNTER — HOSPITAL ENCOUNTER (OUTPATIENT)
Dept: WOUND CARE | Age: 85
Discharge: HOME | End: 2025-05-05
Payer: MEDICARE

## 2025-05-05 VITALS
WEIGHT: 134 LBS | BODY MASS INDEX: 22.88 KG/M2 | TEMPERATURE: 98.4 F | HEIGHT: 64 IN | RESPIRATION RATE: 18 BRPM | SYSTOLIC BLOOD PRESSURE: 142 MMHG | DIASTOLIC BLOOD PRESSURE: 77 MMHG | HEART RATE: 75 BPM

## 2025-05-05 DIAGNOSIS — L97.922 NON-PRESSURE CHRONIC ULCER OF LEFT LOWER LEG WITH FAT LAYER EXPOSED (HCC): ICD-10-CM

## 2025-05-05 DIAGNOSIS — L97.929 CHRONIC VENOUS HYPERTENSION (IDIOPATHIC) WITH ULCER OF BILATERAL LOWER EXTREMITY (HCC): ICD-10-CM

## 2025-05-05 DIAGNOSIS — I87.313 CHRONIC VENOUS HYPERTENSION (IDIOPATHIC) WITH ULCER OF BILATERAL LOWER EXTREMITY (HCC): ICD-10-CM

## 2025-05-05 DIAGNOSIS — L97.919 CHRONIC VENOUS HYPERTENSION (IDIOPATHIC) WITH ULCER OF BILATERAL LOWER EXTREMITY (HCC): ICD-10-CM

## 2025-05-05 DIAGNOSIS — M54.12 CERVICAL RADICULOPATHY: Primary | ICD-10-CM

## 2025-05-05 DIAGNOSIS — L97.912 NON-PRESSURE CHRONIC ULCER OF LOWER LEG WITH FAT LAYER EXPOSED, RIGHT (HCC): ICD-10-CM

## 2025-05-05 DIAGNOSIS — L97.524 TOE ULCER, LEFT, WITH NECROSIS OF BONE (HCC): Primary | ICD-10-CM

## 2025-05-05 DIAGNOSIS — R60.0 BILATERAL LOWER EXTREMITY EDEMA: Primary | ICD-10-CM

## 2025-05-05 PROCEDURE — 29581 APPL MULTLAYER CMPRN SYS LEG: CPT

## 2025-05-05 PROCEDURE — 62321 NJX INTERLAMINAR CRV/THRC: CPT | Performed by: ANESTHESIOLOGY

## 2025-05-05 RX ORDER — LIDOCAINE 40 MG/G
CREAM TOPICAL PRN
OUTPATIENT
Start: 2025-05-05

## 2025-05-05 RX ORDER — MUPIROCIN 20 MG/G
OINTMENT TOPICAL PRN
OUTPATIENT
Start: 2025-05-05

## 2025-05-05 RX ORDER — SILVER SULFADIAZINE 10 MG/G
CREAM TOPICAL PRN
OUTPATIENT
Start: 2025-05-05

## 2025-05-05 RX ORDER — LIDOCAINE HYDROCHLORIDE 20 MG/ML
JELLY TOPICAL PRN
Status: CANCELLED | OUTPATIENT
Start: 2025-05-05

## 2025-05-05 RX ORDER — NEOMYCIN/BACITRACIN/POLYMYXINB 3.5-400-5K
OINTMENT (GRAM) TOPICAL PRN
OUTPATIENT
Start: 2025-05-05

## 2025-05-05 RX ORDER — LIDOCAINE 50 MG/G
OINTMENT TOPICAL PRN
OUTPATIENT
Start: 2025-05-05

## 2025-05-05 RX ORDER — TRIAMCINOLONE ACETONIDE 1 MG/G
OINTMENT TOPICAL PRN
OUTPATIENT
Start: 2025-05-05

## 2025-05-05 RX ORDER — BACITRACIN ZINC AND POLYMYXIN B SULFATE 500; 1000 [USP'U]/G; [USP'U]/G
OINTMENT TOPICAL PRN
OUTPATIENT
Start: 2025-05-05

## 2025-05-05 RX ORDER — LIDOCAINE HYDROCHLORIDE 20 MG/ML
JELLY TOPICAL PRN
OUTPATIENT
Start: 2025-05-05

## 2025-05-05 RX ORDER — SODIUM CHLOR/HYPOCHLOROUS ACID 0.033 %
SOLUTION, IRRIGATION IRRIGATION PRN
Status: CANCELLED | OUTPATIENT
Start: 2025-05-05

## 2025-05-05 RX ORDER — BETAMETHASONE DIPROPIONATE 0.5 MG/G
CREAM TOPICAL PRN
OUTPATIENT
Start: 2025-05-05

## 2025-05-05 RX ORDER — CLOBETASOL PROPIONATE 0.5 MG/G
OINTMENT TOPICAL PRN
OUTPATIENT
Start: 2025-05-05

## 2025-05-05 RX ORDER — SODIUM CHLOR/HYPOCHLOROUS ACID 0.033 %
SOLUTION, IRRIGATION IRRIGATION PRN
Status: DISCONTINUED | OUTPATIENT
Start: 2025-05-05 | End: 2025-05-06 | Stop reason: HOSPADM

## 2025-05-05 RX ORDER — DOXYCYCLINE HYCLATE 100 MG
100 TABLET ORAL 2 TIMES DAILY
Qty: 14 TABLET | Refills: 0 | Status: SHIPPED | OUTPATIENT
Start: 2025-05-05 | End: 2025-05-12

## 2025-05-05 RX ORDER — BETAMETHASONE SODIUM PHOSPHATE AND BETAMETHASONE ACETATE 3; 3 MG/ML; MG/ML
30 INJECTION, SUSPENSION INTRA-ARTICULAR; INTRALESIONAL; INTRAMUSCULAR; SOFT TISSUE ONCE
Status: COMPLETED | OUTPATIENT
Start: 2025-05-05 | End: 2025-05-05

## 2025-05-05 RX ORDER — GINSENG 100 MG
CAPSULE ORAL PRN
OUTPATIENT
Start: 2025-05-05

## 2025-05-05 RX ORDER — GENTAMICIN SULFATE 1 MG/G
OINTMENT TOPICAL PRN
OUTPATIENT
Start: 2025-05-05

## 2025-05-05 RX ORDER — LIDOCAINE HYDROCHLORIDE 40 MG/ML
SOLUTION TOPICAL PRN
OUTPATIENT
Start: 2025-05-05

## 2025-05-05 RX ADMIN — Medication: at 16:15

## 2025-05-05 RX ADMIN — BETAMETHASONE SODIUM PHOSPHATE AND BETAMETHASONE ACETATE 30 MG: 3; 3 INJECTION, SUSPENSION INTRA-ARTICULAR; INTRALESIONAL; INTRAMUSCULAR; SOFT TISSUE at 13:19

## 2025-05-05 ASSESSMENT — PAIN SCALES - GENERAL: PAINLEVEL_OUTOF10: 8

## 2025-05-05 ASSESSMENT — PAIN DESCRIPTION - DESCRIPTORS: DESCRIPTORS: THROBBING

## 2025-05-05 ASSESSMENT — PAIN DESCRIPTION - ORIENTATION: ORIENTATION: LEFT

## 2025-05-05 NOTE — RESULT ENCOUNTER NOTE
Please have patient continue Augmentin and start Doxycyline as well. Please advise her XR could not exclude OM so I will order an MRI, and give her the number to call and schedule. Thanks.

## 2025-05-05 NOTE — FLOWSHEET NOTE
bone;Pale granulation tissue   Drainage Amount Small (< 25%)   Drainage Description Serosanguinous   Odor None   Yolanda-wound Assessment Fragile;Blanchable erythema   Margins Defined edges   Wound 04/14/25 Pretibial Left;Proximal;Lateral #4   Date First Assessed/Time First Assessed: 04/14/25 1401   Present on Original Admission: Yes  Wound Approximate Age at First Assessment (Weeks): 1 weeks  Primary Wound Type: Traumatic  Location: Pretibial  Wound Location Orientation: Left;Proximal;Late...   Wound Image    Wound Etiology Traumatic   Dressing Status Old drainage noted   Wound Cleansed Vashe;Soap and water   Dressing/Treatment Xeroform   Wound Length (cm) 1.5 cm   Wound Width (cm) 1.5 cm   Wound Depth (cm) 0.1 cm   Wound Surface Area (cm^2) 2.25 cm^2   Change in Wound Size % (l*w) 62.5   Wound Volume (cm^3) 0.225 cm^3   Wound Healing % 81   Wound Assessment Pink/red   Drainage Amount Small (< 25%)   Drainage Description Serosanguinous   Odor None   Yolanda-wound Assessment Edematous   Margins Defined edges   Wound Thickness Description not for Pressure Injury Full thickness   Wound 04/08/25 Pretibial Right mid anterior lower leg #3   Date First Assessed/Time First Assessed: 04/08/25 1341   Present on Original Admission: Yes  Wound Approximate Age at First Assessment (Weeks): 4 weeks  Primary Wound Type: Vascular Ulcer  Secondary Wound Type - Vascular Ulcer: Venous  Location: Preti...   Wound Image    Wound Etiology Venous   Dressing Status Old drainage noted   Wound Cleansed Vashe;Soap and water   Dressing/Treatment Xeroform   Wound Length (cm) 0.5 cm   Wound Width (cm) 0.3 cm   Wound Depth (cm) 0.1 cm   Wound Surface Area (cm^2) 0.15 cm^2   Change in Wound Size % (l*w) -25   Wound Volume (cm^3) 0.015 cm^3   Wound Healing % 58   Wound Assessment Pink/red   Drainage Amount Small (< 25%)   Drainage Description Serosanguinous   Odor None   Yolanda-wound Assessment Edematous;Fragile   Margins Defined edges   Wound Thickness

## 2025-05-05 NOTE — WOUND CARE
Multilayer Compression Wrap   (Not Unna) Below the Knee    NAME:  Melania Dave  YOB: 1940  MEDICAL RECORD NUMBER:  [id number]  DATE:  5/5/2025    Multilayer compression wrap: Removed old Multilayer wrap if indicated and wash leg with mild soap/water.  Applied moisturizing agent to dry skin as needed.   Applied primary and secondary dressing as ordered.  Applied multilayered dressing below the knee to right lower leg.  Applied multilayered dressing below the knee to left lower leg.  Instructed patient/caregiver not to remove dressing and to keep it clean and dry.   Instructed patient/caregiver on complications to report to provider, such as pain, numbness in toes, heavy drainage, and slippage of dressing.  Instructed patient on purpose of compression dressing and on activity and exercise recommendations.      Electronically signed by AMBER TILLMAN RN on 5/5/2025 at 4:25 PM

## 2025-05-05 NOTE — TELEPHONE ENCOUNTER
----- Message from CARLINE White NP sent at 5/5/2025  8:36 AM EDT -----  Please have patient continue Augmentin and start Doxycyline as well. Please advise her XR could not exclude OM so I will order an MRI, and give her the number to call and schedule. Thanks.

## 2025-05-05 NOTE — PROGRESS NOTES
NAME: Melania Dave     ID:[id number]     :1940    Location: 390    Procedure: Cerivcal Epidural Steroid Injection Under Fluoroscopic Imaging    Pre-op Diagnosis: 1. Cervical Degenerative Disc Disease. 2. Cervical Radicular Pain    Post-op Diagnosis: Same    Anesthesia: Local only     Complications: None    After confirming written and informed consent and discussing the risk, benefits and alternatives for the procedure, the patient had the correct site marked by the physician performing the procedure. The specific risks of bleeding and infection were discussed. The patient was taken to the fluoroscopy suite.    The patient was then placed in the prone position. A pulse oximeter was placed, and verbal and visual monitoring were maintained throughout the procedure. The skin overlying the cervical and thoracic spine was then prepped with chlorhexidine gluconate and a sterile drape was aced. Appropriate sterile attire was worn,  including sterile gloves. A time out was then performed involving the physician, radiation technologist and the patient.    Fluoroscopy was then used to identify the anatomy of the cervical and thoracic spine. The skin overlying the  C6-7 interspace was then anesthetized with 3 ml of 1% lidocaine using a 25G 1.5 inch needle. Next, an 20 G 9 cm Tuohy needle was then advanced through the skin, underlying subcutaneous fat and into the supraspinous ligament using intermittent fluoroscopy. After contacting ligament, a contralateral oblique view of the interspace was obtained with fluoroscopy to determine the depth of the Tuohy needle. Proper medial-lateral location of the needle was obtained using anterior-posterior fluoroscopic views. A loss of resistance to air technique was then used to traverse the ligamentum flavum into the epidural space.  1 ml of Omnipaque-240 was then used to confirm the location of the needle tip in the epidural space, excluding vascular or intrathecal

## 2025-05-07 ENCOUNTER — HOSPITAL ENCOUNTER (OUTPATIENT)
Age: 85
Discharge: HOME | End: 2025-05-09
Payer: MEDICARE

## 2025-05-07 DIAGNOSIS — L97.524 TOE ULCER, LEFT, WITH NECROSIS OF BONE (HCC): ICD-10-CM

## 2025-05-07 PROCEDURE — 6360000004 HC RX CONTRAST MEDICATION

## 2025-05-07 PROCEDURE — A9579 GAD-BASE MR CONTRAST NOS,1ML: HCPCS

## 2025-05-07 PROCEDURE — 73720 MRI LWR EXTREMITY W/O&W/DYE: CPT

## 2025-05-07 RX ADMIN — GADOTERIDOL 12 ML: 279.3 INJECTION, SOLUTION INTRAVENOUS at 16:38

## 2025-05-08 ENCOUNTER — OFFICE VISIT (OUTPATIENT)
Age: 85
End: 2025-05-08
Payer: MEDICARE

## 2025-05-08 ENCOUNTER — HOSPITAL ENCOUNTER (OUTPATIENT)
Dept: WOUND CARE | Age: 85
Discharge: HOME | End: 2025-05-08
Payer: MEDICARE

## 2025-05-08 ENCOUNTER — RESULTS FOLLOW-UP (OUTPATIENT)
Dept: WOUND CARE | Age: 85
End: 2025-05-08

## 2025-05-08 VITALS
HEIGHT: 64 IN | BODY MASS INDEX: 23.9 KG/M2 | DIASTOLIC BLOOD PRESSURE: 82 MMHG | SYSTOLIC BLOOD PRESSURE: 142 MMHG | WEIGHT: 140 LBS | HEART RATE: 68 BPM

## 2025-05-08 VITALS
HEART RATE: 58 BPM | TEMPERATURE: 98.7 F | BODY MASS INDEX: 23.9 KG/M2 | SYSTOLIC BLOOD PRESSURE: 122 MMHG | WEIGHT: 140 LBS | RESPIRATION RATE: 22 BRPM | DIASTOLIC BLOOD PRESSURE: 67 MMHG | HEIGHT: 64 IN

## 2025-05-08 DIAGNOSIS — L97.912 NON-PRESSURE CHRONIC ULCER OF LOWER LEG WITH FAT LAYER EXPOSED, RIGHT (HCC): Primary | Chronic | ICD-10-CM

## 2025-05-08 DIAGNOSIS — I87.313 CHRONIC VENOUS HYPERTENSION (IDIOPATHIC) WITH ULCER OF BILATERAL LOWER EXTREMITY (HCC): Chronic | ICD-10-CM

## 2025-05-08 DIAGNOSIS — L97.929 CHRONIC VENOUS HYPERTENSION (IDIOPATHIC) WITH ULCER OF BILATERAL LOWER EXTREMITY (HCC): Chronic | ICD-10-CM

## 2025-05-08 DIAGNOSIS — I25.10 ASCVD (ARTERIOSCLEROTIC CARDIOVASCULAR DISEASE): Primary | ICD-10-CM

## 2025-05-08 DIAGNOSIS — I89.0 LYMPHEDEMA: ICD-10-CM

## 2025-05-08 DIAGNOSIS — L97.919 CHRONIC VENOUS HYPERTENSION (IDIOPATHIC) WITH ULCER OF BILATERAL LOWER EXTREMITY (HCC): Chronic | ICD-10-CM

## 2025-05-08 DIAGNOSIS — I10 ESSENTIAL HYPERTENSION: ICD-10-CM

## 2025-05-08 DIAGNOSIS — L97.922 NON-PRESSURE CHRONIC ULCER OF LEFT LOWER LEG WITH FAT LAYER EXPOSED (HCC): Chronic | ICD-10-CM

## 2025-05-08 DIAGNOSIS — R60.0 BILATERAL LOWER EXTREMITY EDEMA: Chronic | ICD-10-CM

## 2025-05-08 DIAGNOSIS — I48.92 ATRIAL FLUTTER, UNSPECIFIED TYPE (HCC): ICD-10-CM

## 2025-05-08 DIAGNOSIS — E78.2 MIXED HYPERLIPIDEMIA: ICD-10-CM

## 2025-05-08 PROCEDURE — G8399 PT W/DXA RESULTS DOCUMENT: HCPCS | Performed by: INTERNAL MEDICINE

## 2025-05-08 PROCEDURE — G8420 CALC BMI NORM PARAMETERS: HCPCS | Performed by: INTERNAL MEDICINE

## 2025-05-08 PROCEDURE — 99214 OFFICE O/P EST MOD 30 MIN: CPT | Performed by: INTERNAL MEDICINE

## 2025-05-08 PROCEDURE — 3077F SYST BP >= 140 MM HG: CPT | Performed by: INTERNAL MEDICINE

## 2025-05-08 PROCEDURE — 1126F AMNT PAIN NOTED NONE PRSNT: CPT | Performed by: INTERNAL MEDICINE

## 2025-05-08 PROCEDURE — 3079F DIAST BP 80-89 MM HG: CPT | Performed by: INTERNAL MEDICINE

## 2025-05-08 PROCEDURE — 1123F ACP DISCUSS/DSCN MKR DOCD: CPT | Performed by: INTERNAL MEDICINE

## 2025-05-08 PROCEDURE — 29581 APPL MULTLAYER CMPRN SYS LEG: CPT

## 2025-05-08 PROCEDURE — 4004F PT TOBACCO SCREEN RCVD TLK: CPT | Performed by: INTERNAL MEDICINE

## 2025-05-08 PROCEDURE — G8428 CUR MEDS NOT DOCUMENT: HCPCS | Performed by: INTERNAL MEDICINE

## 2025-05-08 PROCEDURE — 1090F PRES/ABSN URINE INCON ASSESS: CPT | Performed by: INTERNAL MEDICINE

## 2025-05-08 RX ORDER — LIDOCAINE HYDROCHLORIDE 20 MG/ML
JELLY TOPICAL PRN
OUTPATIENT
Start: 2025-05-08

## 2025-05-08 RX ORDER — CLOBETASOL PROPIONATE 0.5 MG/G
OINTMENT TOPICAL PRN
OUTPATIENT
Start: 2025-05-08

## 2025-05-08 RX ORDER — DOXYCYCLINE HYCLATE 100 MG
100 TABLET ORAL 2 TIMES DAILY
Qty: 60 TABLET | Refills: 0 | Status: SHIPPED | OUTPATIENT
Start: 2025-05-12 | End: 2025-06-11

## 2025-05-08 RX ORDER — GINSENG 100 MG
CAPSULE ORAL PRN
OUTPATIENT
Start: 2025-05-08

## 2025-05-08 RX ORDER — GENTAMICIN SULFATE 1 MG/G
OINTMENT TOPICAL PRN
OUTPATIENT
Start: 2025-05-08

## 2025-05-08 RX ORDER — FERROUS SULFATE 325(65) MG
325 TABLET ORAL
COMMUNITY

## 2025-05-08 RX ORDER — LIDOCAINE 40 MG/G
CREAM TOPICAL PRN
OUTPATIENT
Start: 2025-05-08

## 2025-05-08 RX ORDER — NEOMYCIN/BACITRACIN/POLYMYXINB 3.5-400-5K
OINTMENT (GRAM) TOPICAL PRN
OUTPATIENT
Start: 2025-05-08

## 2025-05-08 RX ORDER — LIDOCAINE HYDROCHLORIDE 20 MG/ML
JELLY TOPICAL PRN
Status: DISCONTINUED | OUTPATIENT
Start: 2025-05-08 | End: 2025-05-09 | Stop reason: HOSPADM

## 2025-05-08 RX ORDER — LIDOCAINE HYDROCHLORIDE 40 MG/ML
SOLUTION TOPICAL PRN
OUTPATIENT
Start: 2025-05-08

## 2025-05-08 RX ORDER — MUPIROCIN 20 MG/G
OINTMENT TOPICAL PRN
OUTPATIENT
Start: 2025-05-08

## 2025-05-08 RX ORDER — BACITRACIN ZINC AND POLYMYXIN B SULFATE 500; 1000 [USP'U]/G; [USP'U]/G
OINTMENT TOPICAL PRN
OUTPATIENT
Start: 2025-05-08

## 2025-05-08 RX ORDER — SODIUM CHLOR/HYPOCHLOROUS ACID 0.033 %
SOLUTION, IRRIGATION IRRIGATION PRN
OUTPATIENT
Start: 2025-05-08

## 2025-05-08 RX ORDER — SODIUM CHLOR/HYPOCHLOROUS ACID 0.033 %
SOLUTION, IRRIGATION IRRIGATION PRN
Status: DISCONTINUED | OUTPATIENT
Start: 2025-05-08 | End: 2025-05-09 | Stop reason: HOSPADM

## 2025-05-08 RX ORDER — LIDOCAINE 50 MG/G
OINTMENT TOPICAL PRN
OUTPATIENT
Start: 2025-05-08

## 2025-05-08 RX ORDER — TRIAMCINOLONE ACETONIDE 1 MG/G
OINTMENT TOPICAL PRN
OUTPATIENT
Start: 2025-05-08

## 2025-05-08 RX ORDER — BETAMETHASONE DIPROPIONATE 0.5 MG/G
CREAM TOPICAL PRN
OUTPATIENT
Start: 2025-05-08

## 2025-05-08 RX ORDER — SILVER SULFADIAZINE 10 MG/G
CREAM TOPICAL PRN
OUTPATIENT
Start: 2025-05-08

## 2025-05-08 RX ADMIN — LIDOCAINE HYDROCHLORIDE: 20 JELLY TOPICAL at 12:35

## 2025-05-08 RX ADMIN — Medication: at 12:35

## 2025-05-08 NOTE — FLOWSHEET NOTE
05/08/25 1102   Right Leg Edema Point of Measurement   Leg circumference 34.5 cm   Ankle circumference 20 cm   Foot circumference 21.5 cm   Compression Therapy 2 layer compression wrap   Left Leg Edema Point of Measurement   Leg circumference 34.5 cm   Ankle circumference 22.5 cm   Foot circumference 23 cm   Compression Therapy 2 layer compression wrap   Wound 05/02/25 Thigh Distal;Right;Anterior #6   Date First Assessed/Time First Assessed: 05/02/25 1351   Present on Original Admission: Yes  Primary Wound Type: Traumatic  Secondary Wound Type - Traumatic: Skin Tear  Location: Thigh  Wound Location Orientation: Distal;Right;Anterior  Wound Descript...   Wound Image    Wound Etiology Skin Tear   Dressing Status Old drainage noted   Wound Cleansed Cleansed with saline   Dressing/Treatment Foam   Wound Length (cm) 1.3 cm   Wound Width (cm) 1.5 cm   Wound Depth (cm) 0.1 cm   Wound Surface Area (cm^2) 1.95 cm^2   Change in Wound Size % (l*w) -62.5   Wound Volume (cm^3) 0.195 cm^3   Wound Healing % -63   Drainage Amount Small (< 25%)   Drainage Description Sanguinous   Odor None   Yolanda-wound Assessment Fragile;Ecchymosis   Wound Thickness Description not for Pressure Injury Full thickness   Wound 04/18/25 Toe (Comment  which one) Left #5 5th toe   Date First Assessed/Time First Assessed: 04/18/25 1119   Present on Original Admission: No  Wound Approximate Age at First Assessment (Weeks): 1 weeks  Location: (c) Toe (Comment  which one)  Wound Location Orientation: Left  Wound Description (Commen...   Wound Image     Wound Etiology Pressure Stage 4   Dressing Status Old drainage noted   Wound Cleansed Cleansed with saline;Vashe   Dressing/Treatment Hydrofera blue   Wound Length (cm) 0.7 cm   Wound Width (cm) 0.6 cm   Wound Depth (cm) 0.2 cm   Wound Surface Area (cm^2) 0.42 cm^2   Change in Wound Size % (l*w) -425   Wound Volume (cm^3) 0.084 cm^3   Wound Healing % -425   Post-Procedure Length (cm) 0.7 cm   Post-Procedure

## 2025-05-08 NOTE — PROGRESS NOTES
[facility] CARDIOLOGY  [address] [place] [address], [facility] [address]  [place] [address]  PHONE: [phone]      25    NAME:  Melania Dave  : 1940  MRN: [id number]       SUBJECTIVE:   Melania Dave is a 84 y.o. female seen for a follow up visit regarding the following:     Chief Complaint   Patient presents with    Coronary Artery Disease         HPI:    No cp or johansen. No orthopnea or pnd. No palpitations or syncope.      Past Medical History, Past Surgical History, Family history, Social History, and Medications were all reviewed with the patient today and updated as necessary.     Current Outpatient Medications   Medication Sig Dispense Refill    ferrous sulfate (IRON 325) 325 (65 Fe) MG tablet Take 1 tablet by mouth three times a week      doxycycline hyclate (VIBRA-TABS) 100 MG tablet Take 1 tablet by mouth 2 times daily for 7 days 14 tablet 0    amoxicillin-clavulanate (AUGMENTIN) 875-125 MG per tablet Take 1 tablet by mouth 2 times daily for 10 days 20 tablet 0    levothyroxine (SYNTHROID) 150 MCG tablet Take 1 tablet by mouth every morning (before breakfast) 90 tablet 1    furosemide (LASIX) 40 MG tablet TAKE 1 TABLET BY MOUTH EVERY DAY 90 tablet 2    allopurinol (ZYLOPRIM) 100 MG tablet Take 1 tablet by mouth daily 90 tablet 2    famotidine (PEPCID) 20 MG tablet Take 1 tablet by mouth daily 90 tablet 2    gabapentin (NEURONTIN) 100 MG capsule Take 1 capsule by mouth nightly for 270 days. Intended supply: 90 days 90 capsule 2    ipratropium 0.5 mg-albuterol 2.5 mg (DUONEB) 0.5-2.5 (3) MG/3ML SOLN nebulizer solution Inhale 3 mLs into the lungs every 6 hours as needed for Shortness of Breath 360 mL 3    losartan (COZAAR) 50 MG tablet Take 1 tablet by mouth in the morning and at bedtime 180 tablet 2    omeprazole (PRILOSEC) 40 MG delayed release capsule Take 1 capsule by mouth daily 90 capsule 2    predniSONE (DELTASONE) 5 MG tablet Take 1 tablet by mouth 2 times daily 180 tablet 2    estradiol

## 2025-05-08 NOTE — WOUND CARE
Discharge Instructions for  [facility]  [address] [place] [address]  [address]  [place] [address]  Phone [phone]   Fax [phone]      NAME:  Melania Dave  YOB: 1940  MEDICAL RECORD NUMBER:  [id number]  DATE:  5/8/2025    Return Appointment:    1 week  with Dr. Quezada   Dressing changes:  Monday and Thursday       Instructions: Bilateral lower leg:  Cleanse with normal saline  Xeroform- apply to wound bed.   Cover with ABD pad  2 layer compression with wound and lower extremity assessment.  If there is any problem with the dressing (too tight, slides down, etc.) Patient to return to wound clinic to have re-wrapped by clinician.  Change dressing 2 times per week    Right 5th toe:  Hydrofera Ready: Cut to wound size, place in wound bed, shiny side out.    Cover with gauze and tape  Change 2 times per week at wound center    Continue antibiotics until finished.     Do not get wound wet in shower, pool or tub. May purchase cast cover at local pharmacy to keep dry in shower.    Patient to see Cardiologist today @ 3:00pm     Increase protein to 100g daily for optimal wound healing. Aim for 30g protein per shake, two shakes a day.  Protein:   Egg ~ 6g  Yogurt ~ 15g  Half cup of cottage cheese ~ 15g  Chicken breast ~ 30g  [place] filet ~ 40g     Should you experience increased redness, swelling, pain, foul odor, size of wound(s), or have a temperature over 101 degrees please contact the Wound Healing Center at [phone] or if after hours contact your primary care physician or go to the hospital emergency department.    PLEASE NOTE: IF YOU ARE UNABLE TO OBTAIN WOUND SUPPLIES, CONTINUE TO USE THE SUPPLIES YOU HAVE AVAILABLE UNTIL YOU ARE ABLE TO REACH US. IT IS MOST IMPORTANT TO KEEP THE WOUND COVERED AT ALL TIMES.    Electronically signed AMBER TILLMAN RN, [facility] on 5/8/2025 at 12:02 PM

## 2025-05-08 NOTE — RESULT ENCOUNTER NOTE
Please advise MRI did confirm bone infection, which we expected. I will go ahead and refill abx for 30 days, and we can discuss further at next provider visit. Thanks!

## 2025-05-08 NOTE — DISCHARGE INSTRUCTIONS
Instructions: Bilateral lower leg:  Cleanse with normal saline  Xeroform- apply to wound bed.   Cover with ABD pad  2 layer compression with wound and lower extremity assessment.  If there is any problem with the dressing (too tight, slides down, etc.) Patient to return to wound clinic to have re-wrapped by clinician.  Change dressing 2 times per week     Right 5th toe:  Hydrofera Ready: Cut to wound size, place in wound bed, shiny side out.    Cover with gauze and tape  Change 2 times per week at wound center     Continue antibiotics until finished.      Do not get wound wet in shower, pool or tub. May purchase cast cover at local pharmacy to keep dry in shower.     Patient to see Cardiologist today @ 3:00pm      Increase protein to 100g daily for optimal wound healing. Aim for 30g protein per shake, two shakes a day.  Protein:   Egg ~ 6g  Yogurt ~ 15g  Half cup of cottage cheese ~ 15g  Chicken breast ~ 30g  [place] filet ~ 40g      Should you experience increased redness, swelling, pain, foul odor, size of wound(s), or have a temperature over 101 degrees please contact the Wound Healing Center at [phone] or if after hours contact your primary care physician or go to the hospital emergency department.     PLEASE NOTE: IF YOU ARE UNABLE TO OBTAIN WOUND SUPPLIES, CONTINUE TO USE THE SUPPLIES YOU HAVE AVAILABLE UNTIL YOU ARE ABLE TO REACH US. IT IS MOST IMPORTANT TO KEEP THE WOUND COVERED AT ALL TIMES.

## 2025-05-08 NOTE — WOUND CARE
Multilayer Compression Wrap   (Not Unna) Below the Knee    NAME:  Melania Dave  YOB: 1940  MEDICAL RECORD NUMBER:  [id number]  DATE:  5/8/2025    Multilayer compression wrap: Removed old Multilayer wrap if indicated and wash leg with mild soap/water.  Applied moisturizing agent to dry skin as needed.   Applied primary and secondary dressing as ordered.  Applied multilayered dressing below the knee to right lower leg.  Applied multilayered dressing below the knee to left lower leg.  Instructed patient/caregiver not to remove dressing and to keep it clean and dry.   Instructed patient/caregiver on complications to report to provider, such as pain, numbness in toes, heavy drainage, and slippage of dressing.  Instructed patient on purpose of compression dressing and on activity and exercise recommendations.      Electronically signed by AMBER TILLMAN RN on 5/8/2025 at 12:37 PM

## 2025-05-09 ENCOUNTER — TELEPHONE (OUTPATIENT)
Dept: WOUND CARE | Age: 85
End: 2025-05-09

## 2025-05-09 LAB
BACTERIA SPEC CULT: NORMAL
SERVICE CMNT-IMP: NORMAL

## 2025-05-09 NOTE — TELEPHONE ENCOUNTER
Telephone conference with patient per MICHAEL White request.  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  Patient stated she had received information in a phone message regarding MRI result as well as that additional antibiotics were being sent to pharmacy for continuing antibiotic therapy.    Patient also verbalized \" I feel much better today than yesterday.  I think I was just tired from multiple doctor's appointments every day this week. \"

## 2025-05-09 NOTE — TELEPHONE ENCOUNTER
----- Message from CARLINE White NP sent at 5/8/2025  4:13 PM EDT -----  Please advise MRI did confirm bone infection, which we expected. I will go ahead and refill abx for 30 days, and we can discuss further at next provider visit. Thanks!

## 2025-05-12 ENCOUNTER — HOSPITAL ENCOUNTER (OUTPATIENT)
Dept: WOUND CARE | Age: 85
Discharge: HOME | End: 2025-05-12
Payer: MEDICARE

## 2025-05-12 VITALS
RESPIRATION RATE: 20 BRPM | HEIGHT: 64 IN | BODY MASS INDEX: 23.9 KG/M2 | WEIGHT: 140 LBS | DIASTOLIC BLOOD PRESSURE: 77 MMHG | SYSTOLIC BLOOD PRESSURE: 139 MMHG | HEART RATE: 60 BPM | TEMPERATURE: 98.2 F

## 2025-05-12 DIAGNOSIS — L97.919 CHRONIC VENOUS HYPERTENSION (IDIOPATHIC) WITH ULCER OF BILATERAL LOWER EXTREMITY (HCC): ICD-10-CM

## 2025-05-12 DIAGNOSIS — L97.912 NON-PRESSURE CHRONIC ULCER OF LOWER LEG WITH FAT LAYER EXPOSED, RIGHT (HCC): ICD-10-CM

## 2025-05-12 DIAGNOSIS — L97.922 NON-PRESSURE CHRONIC ULCER OF LEFT LOWER LEG WITH FAT LAYER EXPOSED (HCC): ICD-10-CM

## 2025-05-12 DIAGNOSIS — R60.0 BILATERAL LOWER EXTREMITY EDEMA: Primary | ICD-10-CM

## 2025-05-12 DIAGNOSIS — L97.929 CHRONIC VENOUS HYPERTENSION (IDIOPATHIC) WITH ULCER OF BILATERAL LOWER EXTREMITY (HCC): ICD-10-CM

## 2025-05-12 DIAGNOSIS — I87.313 CHRONIC VENOUS HYPERTENSION (IDIOPATHIC) WITH ULCER OF BILATERAL LOWER EXTREMITY (HCC): ICD-10-CM

## 2025-05-12 PROCEDURE — 29581 APPL MULTLAYER CMPRN SYS LEG: CPT

## 2025-05-12 RX ORDER — BACITRACIN ZINC AND POLYMYXIN B SULFATE 500; 1000 [USP'U]/G; [USP'U]/G
OINTMENT TOPICAL PRN
OUTPATIENT
Start: 2025-05-12

## 2025-05-12 RX ORDER — CLOTRIMAZOLE 10 MG/1
10 LOZENGE ORAL
Qty: 50 TABLET | Refills: 0 | Status: SHIPPED | OUTPATIENT
Start: 2025-05-12 | End: 2025-05-22

## 2025-05-12 RX ORDER — LIDOCAINE 50 MG/G
OINTMENT TOPICAL PRN
OUTPATIENT
Start: 2025-05-12

## 2025-05-12 RX ORDER — GINSENG 100 MG
CAPSULE ORAL PRN
OUTPATIENT
Start: 2025-05-12

## 2025-05-12 RX ORDER — NEOMYCIN/BACITRACIN/POLYMYXINB 3.5-400-5K
OINTMENT (GRAM) TOPICAL PRN
OUTPATIENT
Start: 2025-05-12

## 2025-05-12 RX ORDER — GENTAMICIN SULFATE 1 MG/G
OINTMENT TOPICAL PRN
OUTPATIENT
Start: 2025-05-12

## 2025-05-12 RX ORDER — TRIAMCINOLONE ACETONIDE 1 MG/G
OINTMENT TOPICAL PRN
OUTPATIENT
Start: 2025-05-12

## 2025-05-12 RX ORDER — LIDOCAINE HYDROCHLORIDE 40 MG/ML
SOLUTION TOPICAL PRN
OUTPATIENT
Start: 2025-05-12

## 2025-05-12 RX ORDER — SODIUM CHLOR/HYPOCHLOROUS ACID 0.033 %
SOLUTION, IRRIGATION IRRIGATION PRN
Status: CANCELLED | OUTPATIENT
Start: 2025-05-12

## 2025-05-12 RX ORDER — SILVER SULFADIAZINE 10 MG/G
CREAM TOPICAL PRN
OUTPATIENT
Start: 2025-05-12

## 2025-05-12 RX ORDER — BETAMETHASONE DIPROPIONATE 0.5 MG/G
CREAM TOPICAL PRN
OUTPATIENT
Start: 2025-05-12

## 2025-05-12 RX ORDER — CLOBETASOL PROPIONATE 0.5 MG/G
OINTMENT TOPICAL PRN
OUTPATIENT
Start: 2025-05-12

## 2025-05-12 RX ORDER — LIDOCAINE 40 MG/G
CREAM TOPICAL PRN
OUTPATIENT
Start: 2025-05-12

## 2025-05-12 RX ORDER — LIDOCAINE HYDROCHLORIDE 20 MG/ML
JELLY TOPICAL PRN
Status: CANCELLED | OUTPATIENT
Start: 2025-05-12

## 2025-05-12 RX ORDER — LIDOCAINE HYDROCHLORIDE 20 MG/ML
JELLY TOPICAL PRN
OUTPATIENT
Start: 2025-05-12

## 2025-05-12 RX ORDER — MUPIROCIN 20 MG/G
OINTMENT TOPICAL PRN
OUTPATIENT
Start: 2025-05-12

## 2025-05-12 NOTE — WOUND CARE
Multilayer Compression Wrap   (Not Unna) Below the Knee    NAME:  Melania Dave  YOB: 1940  MEDICAL RECORD NUMBER:  [id number]  DATE:  5/12/2025    Multilayer compression wrap: Removed old Multilayer wrap if indicated and wash leg with mild soap/water.  Applied moisturizing agent to dry skin as needed.   Applied primary and secondary dressing as ordered.  Applied multilayered dressing below the knee to right lower leg.  Applied multilayered dressing below the knee to left lower leg.  Instructed patient/caregiver not to remove dressing and to keep it clean and dry.   Instructed patient/caregiver on complications to report to provider, such as pain, numbness in toes, heavy drainage, and slippage of dressing.  Instructed patient on purpose of compression dressing and on activity and exercise recommendations.      Electronically signed by Rosa Pablo RN on 5/12/2025 at 1:58 PM

## 2025-05-12 NOTE — FLOWSHEET NOTE
05/12/25 1317   Right Leg Edema Point of Measurement   Leg circumference 34 cm   Ankle circumference 19 cm   Foot circumference 21 cm   Compression Therapy 2 layer compression wrap   Left Leg Edema Point of Measurement   Leg circumference 39 cm   Ankle circumference 26 cm   Foot circumference 22 cm   Compression Therapy 2 layer compression wrap   Wound 05/02/25 Thigh Distal;Right;Anterior #6   Date First Assessed/Time First Assessed: 05/02/25 1351   Present on Original Admission: Yes  Primary Wound Type: Traumatic  Secondary Wound Type - Traumatic: Skin Tear  Location: Thigh  Wound Location Orientation: Distal;Right;Anterior  Wound Descript...   Wound Image    Wound Etiology Skin Tear   Dressing Status Dry   Wound Cleansed Not Cleansed   Dressing/Treatment Open to air   Wound Length (cm) 0 cm   Wound Width (cm) 0 cm   Wound Depth (cm) 0 cm   Wound Surface Area (cm^2) 0 cm^2   Change in Wound Size % (l*w) 100   Wound Volume (cm^3) 0 cm^3   Wound Healing % 100   Drainage Amount None (dry)   Odor None   Yolanda-wound Assessment Fragile;Ecchymosis   Wound Thickness Description not for Pressure Injury Full thickness   Wound 04/18/25 Toe (Comment  which one) Left #5 5th toe   Date First Assessed/Time First Assessed: 04/18/25 1119   Present on Original Admission: No  Wound Approximate Age at First Assessment (Weeks): 1 weeks  Location: (c) Toe (Comment  which one)  Wound Location Orientation: Left  Wound Description (Commen...   Wound Image    Wound Etiology Pressure Stage 4   Dressing Status Old drainage noted   Wound Cleansed Cleansed with saline;Vashe   Dressing/Treatment Hydrofera blue   Wound Length (cm) 0.3 cm   Wound Width (cm) 0.3 cm   Wound Depth (cm) 0.1 cm   Wound Surface Area (cm^2) 0.09 cm^2   Change in Wound Size % (l*w) -12.5   Wound Volume (cm^3) 0.009 cm^3   Wound Healing % 44   Wound Assessment Exposed structure bone;Pale granulation tissue   Drainage Amount Small (< 25%)   Drainage Description

## 2025-05-15 ENCOUNTER — HOSPITAL ENCOUNTER (OUTPATIENT)
Dept: WOUND CARE | Age: 85
Discharge: HOME | End: 2025-05-15
Payer: MEDICARE

## 2025-05-15 VITALS
SYSTOLIC BLOOD PRESSURE: 124 MMHG | HEIGHT: 64 IN | OXYGEN SATURATION: 96 % | DIASTOLIC BLOOD PRESSURE: 59 MMHG | TEMPERATURE: 98.2 F | BODY MASS INDEX: 23.39 KG/M2 | HEART RATE: 56 BPM | RESPIRATION RATE: 18 BRPM | WEIGHT: 137 LBS

## 2025-05-15 DIAGNOSIS — I87.313 CHRONIC VENOUS HYPERTENSION (IDIOPATHIC) WITH ULCER OF BILATERAL LOWER EXTREMITY (HCC): Chronic | ICD-10-CM

## 2025-05-15 DIAGNOSIS — L97.929 CHRONIC VENOUS HYPERTENSION (IDIOPATHIC) WITH ULCER OF BILATERAL LOWER EXTREMITY (HCC): Chronic | ICD-10-CM

## 2025-05-15 DIAGNOSIS — L97.919 CHRONIC VENOUS HYPERTENSION (IDIOPATHIC) WITH ULCER OF BILATERAL LOWER EXTREMITY (HCC): Chronic | ICD-10-CM

## 2025-05-15 DIAGNOSIS — L97.912 NON-PRESSURE CHRONIC ULCER OF LOWER LEG WITH FAT LAYER EXPOSED, RIGHT (HCC): Primary | Chronic | ICD-10-CM

## 2025-05-15 DIAGNOSIS — R60.0 BILATERAL LOWER EXTREMITY EDEMA: Chronic | ICD-10-CM

## 2025-05-15 DIAGNOSIS — L97.922 NON-PRESSURE CHRONIC ULCER OF LEFT LOWER LEG WITH FAT LAYER EXPOSED (HCC): Chronic | ICD-10-CM

## 2025-05-15 PROBLEM — L97.524: Chronic | Status: ACTIVE | Noted: 2025-05-02

## 2025-05-15 PROCEDURE — 11042 DBRDMT SUBQ TIS 1ST 20SQCM/<: CPT

## 2025-05-15 RX ORDER — LIDOCAINE 50 MG/G
OINTMENT TOPICAL PRN
OUTPATIENT
Start: 2025-05-15

## 2025-05-15 RX ORDER — LIDOCAINE HYDROCHLORIDE 20 MG/ML
JELLY TOPICAL PRN
Status: DISCONTINUED | OUTPATIENT
Start: 2025-05-15 | End: 2025-05-16 | Stop reason: HOSPADM

## 2025-05-15 RX ORDER — LIDOCAINE HYDROCHLORIDE 40 MG/ML
SOLUTION TOPICAL PRN
OUTPATIENT
Start: 2025-05-15

## 2025-05-15 RX ORDER — BACITRACIN ZINC AND POLYMYXIN B SULFATE 500; 1000 [USP'U]/G; [USP'U]/G
OINTMENT TOPICAL PRN
OUTPATIENT
Start: 2025-05-15

## 2025-05-15 RX ORDER — LIDOCAINE HYDROCHLORIDE 20 MG/ML
JELLY TOPICAL PRN
OUTPATIENT
Start: 2025-05-15

## 2025-05-15 RX ORDER — BETAMETHASONE DIPROPIONATE 0.5 MG/G
CREAM TOPICAL PRN
OUTPATIENT
Start: 2025-05-15

## 2025-05-15 RX ORDER — SILVER SULFADIAZINE 10 MG/G
CREAM TOPICAL PRN
OUTPATIENT
Start: 2025-05-15

## 2025-05-15 RX ORDER — SODIUM CHLOR/HYPOCHLOROUS ACID 0.033 %
SOLUTION, IRRIGATION IRRIGATION PRN
Status: DISCONTINUED | OUTPATIENT
Start: 2025-05-15 | End: 2025-05-16 | Stop reason: HOSPADM

## 2025-05-15 RX ORDER — CLOBETASOL PROPIONATE 0.5 MG/G
OINTMENT TOPICAL PRN
OUTPATIENT
Start: 2025-05-15

## 2025-05-15 RX ORDER — MUPIROCIN 20 MG/G
OINTMENT TOPICAL PRN
OUTPATIENT
Start: 2025-05-15

## 2025-05-15 RX ORDER — TRIAMCINOLONE ACETONIDE 1 MG/G
OINTMENT TOPICAL PRN
OUTPATIENT
Start: 2025-05-15

## 2025-05-15 RX ORDER — NEOMYCIN/BACITRACIN/POLYMYXINB 3.5-400-5K
OINTMENT (GRAM) TOPICAL PRN
OUTPATIENT
Start: 2025-05-15

## 2025-05-15 RX ORDER — SODIUM CHLOR/HYPOCHLOROUS ACID 0.033 %
SOLUTION, IRRIGATION IRRIGATION PRN
OUTPATIENT
Start: 2025-05-15

## 2025-05-15 RX ORDER — GENTAMICIN SULFATE 1 MG/G
OINTMENT TOPICAL PRN
OUTPATIENT
Start: 2025-05-15

## 2025-05-15 RX ORDER — GINSENG 100 MG
CAPSULE ORAL PRN
OUTPATIENT
Start: 2025-05-15

## 2025-05-15 RX ORDER — LIDOCAINE 40 MG/G
CREAM TOPICAL PRN
OUTPATIENT
Start: 2025-05-15

## 2025-05-15 RX ADMIN — Medication: at 14:47

## 2025-05-15 RX ADMIN — LIDOCAINE HYDROCHLORIDE: 20 JELLY TOPICAL at 14:46

## 2025-05-15 NOTE — WOUND CARE
Multilayer Compression Wrap   (Not Unna) Below the Knee    NAME:  Melania Dave  YOB: 1940  MEDICAL RECORD NUMBER:  [id number]  DATE:  5/15/2025    Multilayer compression wrap: Removed old Multilayer wrap if indicated and wash leg with mild soap/water.  Applied moisturizing agent to dry skin as needed.   Applied primary and secondary dressing as ordered.  Applied multilayered dressing below the knee to right lower leg.  Applied multilayered dressing below the knee to left lower leg.  Instructed patient/caregiver not to remove dressing and to keep it clean and dry.   Instructed patient/caregiver on complications to report to provider, such as pain, numbness in toes, heavy drainage, and slippage of dressing.  Instructed patient on purpose of compression dressing and on activity and exercise recommendations.      Electronically signed by OLE DELACRUZ RN on 5/15/2025 at 4:16 PM

## 2025-05-15 NOTE — WOUND CARE
Discharge Instructions for  [facility]  [address] [place] [address]  [address] [place] [address]  Phone [phone]   Fax [phone]      NAME:  Melania Dave  YOB: 1940  MEDICAL RECORD NUMBER:  [id number]  DATE:  5/15/2025    Return Appointment:   2 weekswith Dr. Quezada   Dressing changes:  Monday and Thursday       Instructions: Bilateral lower leg:  Cleanse with normal saline  Xeroform- apply to any open wound beds.   Cover with ABD pad  2 layer compression with wound and lower extremity assessment.  If there is any problem with the dressing (too tight, slides down, etc.) Patient to return to wound clinic to have re-wrapped by clinician.  Change dressing 2 times per week    Right 5th toe:  Hydrofera Ready: Cut to wound size, place in wound bed, shiny side out.    Cover with gauze and tape  Change 2 times per week at wound center    Continue antibiotics until finished.     Do not get wound wet in shower, pool or tub. May purchase cast cover at local pharmacy to keep dry in shower.      Increase protein to 100g daily for optimal wound healing. Aim for 30g protein per shake, two shakes a day.  Protein:   Egg ~ 6g  Yogurt ~ 15g  Half cup of cottage cheese ~ 15g  Chicken breast ~ 30g  [place] filet ~ 40g     Should you experience increased redness, swelling, pain, foul odor, size of wound(s), or have a temperature over 101 degrees please contact the Wound Healing Center at [phone] or if after hours contact your primary care physician or go to the hospital emergency department.    PLEASE NOTE: IF YOU ARE UNABLE TO OBTAIN WOUND SUPPLIES, CONTINUE TO USE THE SUPPLIES YOU HAVE AVAILABLE UNTIL YOU ARE ABLE TO REACH US. IT IS MOST IMPORTANT TO KEEP THE WOUND COVERED AT ALL TIMES.    Electronically signed OLE DELACRUZ RN, C on 5/15/2025 at 2:48 PM

## 2025-05-15 NOTE — FLOWSHEET NOTE
05/15/25 1415   Right Leg Edema Point of Measurement   Leg circumference 34 cm   Ankle circumference 19 cm   Foot circumference 21 cm   Compression Therapy 2 layer compression wrap   Left Leg Edema Point of Measurement   Leg circumference 39 cm   Ankle circumference 26 cm   Foot circumference 22 cm   Compression Therapy 2 layer compression wrap   Wound 04/18/25 Toe (Comment  which one) Left #5 5th toe   Date First Assessed/Time First Assessed: 04/18/25 1119   Present on Original Admission: No  Wound Approximate Age at First Assessment (Weeks): 1 weeks  Location: (c) Toe (Comment  which one)  Wound Location Orientation: Left  Wound Description (Commen...   Wound Image     Wound Etiology Pressure Stage 4   Dressing Status Old drainage noted   Wound Cleansed Cleansed with saline;Vashe   Dressing/Treatment Hydrofera blue   Wound Length (cm) 0.3 cm   Wound Width (cm) 0.3 cm   Wound Depth (cm) 0.2 cm   Wound Surface Area (cm^2) 0.09 cm^2   Change in Wound Size % (l*w) -12.5   Wound Volume (cm^3) 0.018 cm^3   Wound Healing % -13   Post-Procedure Length (cm) 0.3 cm   Post-Procedure Width (cm) 0.3 cm   Post-Procedure Depth (cm) 0.3 cm   Post-Procedure Surface Area (cm^2) 0.09 cm^2   Post-Procedure Volume (cm^3) 0.027 cm^3   Wound Assessment Exposed structure bone;Pale granulation tissue   Drainage Amount Small (< 25%)   Drainage Description Serosanguinous   Odor None   Yolanda-wound Assessment Fragile;Blanchable erythema   Margins Defined edges   [REMOVED] Wound 05/02/25 Thigh Distal;Right;Anterior #6   Final Assessment Date/Final Assessment Time: 05/15/25 1439  Date First Assessed/Time First Assessed: 05/02/25 1351   Present on Original Admission: Yes  Primary Wound Type: Traumatic  Secondary Wound Type - Traumatic: Skin Tear  Location: Thigh  Wound...   Wound Image    Wound Etiology Skin Tear   Dressing Status Dry   Wound Cleansed Not Cleansed   Dressing/Treatment Open to air   Wound Length (cm) 0 cm   Wound Width (cm) 0

## 2025-05-19 ENCOUNTER — HOSPITAL ENCOUNTER (OUTPATIENT)
Dept: WOUND CARE | Age: 85
Discharge: HOME | End: 2025-05-19
Payer: MEDICARE

## 2025-05-19 VITALS — WEIGHT: 137 LBS | BODY MASS INDEX: 23.39 KG/M2 | HEIGHT: 64 IN

## 2025-05-19 DIAGNOSIS — R60.0 BILATERAL LOWER EXTREMITY EDEMA: Chronic | ICD-10-CM

## 2025-05-19 DIAGNOSIS — L97.919 CHRONIC VENOUS HYPERTENSION (IDIOPATHIC) WITH ULCER OF BILATERAL LOWER EXTREMITY (HCC): Chronic | ICD-10-CM

## 2025-05-19 DIAGNOSIS — I87.313 CHRONIC VENOUS HYPERTENSION (IDIOPATHIC) WITH ULCER OF BILATERAL LOWER EXTREMITY (HCC): Chronic | ICD-10-CM

## 2025-05-19 DIAGNOSIS — L97.929 CHRONIC VENOUS HYPERTENSION (IDIOPATHIC) WITH ULCER OF BILATERAL LOWER EXTREMITY (HCC): Chronic | ICD-10-CM

## 2025-05-19 DIAGNOSIS — L97.922 NON-PRESSURE CHRONIC ULCER OF LEFT LOWER LEG WITH FAT LAYER EXPOSED (HCC): Chronic | ICD-10-CM

## 2025-05-19 DIAGNOSIS — L97.912 NON-PRESSURE CHRONIC ULCER OF LOWER LEG WITH FAT LAYER EXPOSED, RIGHT (HCC): Primary | Chronic | ICD-10-CM

## 2025-05-19 PROCEDURE — 29581 APPL MULTLAYER CMPRN SYS LEG: CPT

## 2025-05-19 NOTE — WOUND CARE
Multilayer Compression Wrap   (Not Unna) Below the Knee    NAME:  Melania Dave  YOB: 1940  MEDICAL RECORD NUMBER:  [id number]  DATE:  5/19/2025    Multilayer compression wrap: Removed old Multilayer wrap if indicated and wash leg with mild soap/water.  Applied moisturizing agent to dry skin as needed.   Applied primary and secondary dressing as ordered.  Applied multilayered dressing below the knee to right lower leg.  Applied multilayered dressing below the knee to left lower leg.  Instructed patient/caregiver not to remove dressing and to keep it clean and dry.   Instructed patient/caregiver on complications to report to provider, such as pain, numbness in toes, heavy drainage, and slippage of dressing.  Instructed patient on purpose of compression dressing and on activity and exercise recommendations.      Electronically signed by Sana Aguero RN on 5/19/2025 at 3:26 PM

## 2025-05-19 NOTE — FLOWSHEET NOTE
05/19/25 1522   Right Leg Edema Point of Measurement   Leg circumference 33 cm   Ankle circumference 19 cm   Foot circumference 21 cm   Compression Therapy 2 layer compression wrap   Left Leg Edema Point of Measurement   Leg circumference 38 cm   Ankle circumference 26 cm   Foot circumference 22 cm   Compression Therapy 2 layer compression wrap   Wound 04/18/25 Toe (Comment  which one) Left #5 5th toe   Date First Assessed/Time First Assessed: 04/18/25 1119   Present on Original Admission: No  Wound Approximate Age at First Assessment (Weeks): 1 weeks  Location: (c) Toe (Comment  which one)  Wound Location Orientation: Left  Wound Description (Commen...   Wound Image    Wound Etiology Pressure Stage 4   Dressing Status Old drainage noted   Wound Cleansed Cleansed with saline;Vashe   Dressing/Treatment Hydrofera blue   Wound Length (cm) 0.5 cm   Wound Width (cm) 0.6 cm   Wound Depth (cm) 0.2 cm   Wound Surface Area (cm^2) 0.3 cm^2   Change in Wound Size % (l*w) -275   Wound Volume (cm^3) 0.06 cm^3   Wound Healing % -275   Wound Assessment Exposed structure bone;Pale granulation tissue   Drainage Amount Small (< 25%)   Drainage Description Serosanguinous   Odor None   Yolanda-wound Assessment Fragile;Blanchable erythema   Margins Defined edges

## 2025-05-22 ENCOUNTER — HOSPITAL ENCOUNTER (OUTPATIENT)
Dept: WOUND CARE | Age: 85
Discharge: HOME | End: 2025-05-22
Payer: MEDICARE

## 2025-05-22 DIAGNOSIS — L97.922 NON-PRESSURE CHRONIC ULCER OF LEFT LOWER LEG WITH FAT LAYER EXPOSED (HCC): ICD-10-CM

## 2025-05-22 DIAGNOSIS — I87.313 CHRONIC VENOUS HYPERTENSION (IDIOPATHIC) WITH ULCER OF BILATERAL LOWER EXTREMITY (HCC): ICD-10-CM

## 2025-05-22 DIAGNOSIS — L97.929 CHRONIC VENOUS HYPERTENSION (IDIOPATHIC) WITH ULCER OF BILATERAL LOWER EXTREMITY (HCC): ICD-10-CM

## 2025-05-22 DIAGNOSIS — R60.0 BILATERAL LOWER EXTREMITY EDEMA: Primary | ICD-10-CM

## 2025-05-22 DIAGNOSIS — L97.919 CHRONIC VENOUS HYPERTENSION (IDIOPATHIC) WITH ULCER OF BILATERAL LOWER EXTREMITY (HCC): ICD-10-CM

## 2025-05-22 DIAGNOSIS — L97.912 NON-PRESSURE CHRONIC ULCER OF LOWER LEG WITH FAT LAYER EXPOSED, RIGHT (HCC): ICD-10-CM

## 2025-05-22 PROCEDURE — 29581 APPL MULTLAYER CMPRN SYS LEG: CPT

## 2025-05-22 RX ORDER — GINSENG 100 MG
CAPSULE ORAL PRN
OUTPATIENT
Start: 2025-05-22

## 2025-05-22 RX ORDER — BETAMETHASONE DIPROPIONATE 0.5 MG/G
CREAM TOPICAL PRN
OUTPATIENT
Start: 2025-05-22

## 2025-05-22 RX ORDER — LIDOCAINE 50 MG/G
OINTMENT TOPICAL PRN
OUTPATIENT
Start: 2025-05-22

## 2025-05-22 RX ORDER — NEOMYCIN/BACITRACIN/POLYMYXINB 3.5-400-5K
OINTMENT (GRAM) TOPICAL PRN
OUTPATIENT
Start: 2025-05-22

## 2025-05-22 RX ORDER — LIDOCAINE 40 MG/G
CREAM TOPICAL PRN
OUTPATIENT
Start: 2025-05-22

## 2025-05-22 RX ORDER — TRIAMCINOLONE ACETONIDE 1 MG/G
OINTMENT TOPICAL PRN
OUTPATIENT
Start: 2025-05-22

## 2025-05-22 RX ORDER — BACITRACIN ZINC AND POLYMYXIN B SULFATE 500; 1000 [USP'U]/G; [USP'U]/G
OINTMENT TOPICAL PRN
OUTPATIENT
Start: 2025-05-22

## 2025-05-22 RX ORDER — SILVER SULFADIAZINE 10 MG/G
CREAM TOPICAL PRN
OUTPATIENT
Start: 2025-05-22

## 2025-05-22 RX ORDER — LIDOCAINE HYDROCHLORIDE 20 MG/ML
JELLY TOPICAL PRN
OUTPATIENT
Start: 2025-05-22

## 2025-05-22 RX ORDER — LIDOCAINE HYDROCHLORIDE 40 MG/ML
SOLUTION TOPICAL PRN
OUTPATIENT
Start: 2025-05-22

## 2025-05-22 RX ORDER — CLOBETASOL PROPIONATE 0.5 MG/G
OINTMENT TOPICAL PRN
OUTPATIENT
Start: 2025-05-22

## 2025-05-22 RX ORDER — SODIUM CHLOR/HYPOCHLOROUS ACID 0.033 %
SOLUTION, IRRIGATION IRRIGATION PRN
OUTPATIENT
Start: 2025-05-22

## 2025-05-22 RX ORDER — GENTAMICIN SULFATE 1 MG/G
OINTMENT TOPICAL PRN
OUTPATIENT
Start: 2025-05-22

## 2025-05-22 RX ORDER — MUPIROCIN 20 MG/G
OINTMENT TOPICAL PRN
OUTPATIENT
Start: 2025-05-22

## 2025-05-22 NOTE — WOUND CARE
Multilayer Compression Wrap   (Not Unna) Below the Knee    NAME:  Melania Dave  YOB: 1940  MEDICAL RECORD NUMBER:  [id number]  DATE:  5/22/2025    Multilayer compression wrap: Removed old Multilayer wrap if indicated and wash leg with mild soap/water.  Applied moisturizing agent to dry skin as needed.   Applied primary and secondary dressing as ordered.  Applied multilayered dressing below the knee to right lower leg.  Applied multilayered dressing below the knee to left lower leg.  Instructed patient/caregiver not to remove dressing and to keep it clean and dry.   Instructed patient/caregiver on complications to report to provider, such as pain, numbness in toes, heavy drainage, and slippage of dressing.  Instructed patient on purpose of compression dressing and on activity and exercise recommendations.      Electronically signed by AMBER TILLMAN RN on 5/22/2025 at 5:15 PM

## 2025-05-22 NOTE — FLOWSHEET NOTE
05/22/25 1712   Right Leg Edema Point of Measurement   Leg circumference 33 cm   Ankle circumference 20 cm   Foot circumference 22.5 cm   Compression Therapy 2 layer compression wrap   Left Leg Edema Point of Measurement   Leg circumference 36.5 cm   Ankle circumference 24 cm   Foot circumference 22 cm   Compression Therapy 2 layer compression wrap   Wound 04/18/25 Toe (Comment  which one) Left #5 5th toe   Date First Assessed/Time First Assessed: 04/18/25 1119   Present on Original Admission: No  Wound Approximate Age at First Assessment (Weeks): 1 weeks  Location: (c) Toe (Comment  which one)  Wound Location Orientation: Left  Wound Description (Commen...   Wound Image    Wound Etiology Pressure Stage 4   Dressing Status Old drainage noted   Wound Cleansed Cleansed with saline;Vashe   Dressing/Treatment Hydrofera blue   Wound Length (cm) 0.3 cm   Wound Width (cm) 0.3 cm   Wound Depth (cm) 0.2 cm   Wound Surface Area (cm^2) 0.09 cm^2   Change in Wound Size % (l*w) -12.5   Wound Volume (cm^3) 0.018 cm^3   Wound Healing % -13   Wound Assessment Exposed structure bone   Drainage Amount Small (< 25%)   Drainage Description Serosanguinous   Odor None   Yolanda-wound Assessment Fragile   Margins Defined edges   Pain Assessment   Pain Assessment None - Denies Pain     Patient takes aspirin daily

## 2025-05-27 ENCOUNTER — HOSPITAL ENCOUNTER (OUTPATIENT)
Dept: WOUND CARE | Age: 85
Discharge: HOME | End: 2025-05-27
Payer: MEDICARE

## 2025-05-27 VITALS
WEIGHT: 137 LBS | HEART RATE: 65 BPM | TEMPERATURE: 97.9 F | HEIGHT: 64 IN | BODY MASS INDEX: 23.39 KG/M2 | RESPIRATION RATE: 18 BRPM | DIASTOLIC BLOOD PRESSURE: 64 MMHG | SYSTOLIC BLOOD PRESSURE: 137 MMHG

## 2025-05-27 DIAGNOSIS — L97.922 NON-PRESSURE CHRONIC ULCER OF LEFT LOWER LEG WITH FAT LAYER EXPOSED (HCC): Chronic | ICD-10-CM

## 2025-05-27 DIAGNOSIS — L97.929 CHRONIC VENOUS HYPERTENSION (IDIOPATHIC) WITH ULCER OF BILATERAL LOWER EXTREMITY (HCC): Chronic | ICD-10-CM

## 2025-05-27 DIAGNOSIS — I87.313 CHRONIC VENOUS HYPERTENSION (IDIOPATHIC) WITH ULCER OF BILATERAL LOWER EXTREMITY (HCC): Chronic | ICD-10-CM

## 2025-05-27 DIAGNOSIS — L97.912 NON-PRESSURE CHRONIC ULCER OF LOWER LEG WITH FAT LAYER EXPOSED, RIGHT (HCC): Primary | Chronic | ICD-10-CM

## 2025-05-27 DIAGNOSIS — L97.919 CHRONIC VENOUS HYPERTENSION (IDIOPATHIC) WITH ULCER OF BILATERAL LOWER EXTREMITY (HCC): Chronic | ICD-10-CM

## 2025-05-27 DIAGNOSIS — R60.0 BILATERAL LOWER EXTREMITY EDEMA: Chronic | ICD-10-CM

## 2025-05-27 PROCEDURE — 29581 APPL MULTLAYER CMPRN SYS LEG: CPT

## 2025-05-27 RX ORDER — GINSENG 100 MG
CAPSULE ORAL PRN
Status: CANCELLED | OUTPATIENT
Start: 2025-05-27

## 2025-05-27 RX ORDER — BETAMETHASONE DIPROPIONATE 0.5 MG/G
CREAM TOPICAL PRN
Status: CANCELLED | OUTPATIENT
Start: 2025-05-27

## 2025-05-27 RX ORDER — LIDOCAINE HYDROCHLORIDE 20 MG/ML
JELLY TOPICAL PRN
Status: CANCELLED | OUTPATIENT
Start: 2025-05-27

## 2025-05-27 RX ORDER — MUPIROCIN 20 MG/G
OINTMENT TOPICAL PRN
Status: CANCELLED | OUTPATIENT
Start: 2025-05-27

## 2025-05-27 RX ORDER — BACITRACIN ZINC AND POLYMYXIN B SULFATE 500; 1000 [USP'U]/G; [USP'U]/G
OINTMENT TOPICAL PRN
Status: CANCELLED | OUTPATIENT
Start: 2025-05-27

## 2025-05-27 RX ORDER — TRIAMCINOLONE ACETONIDE 1 MG/G
OINTMENT TOPICAL PRN
Status: CANCELLED | OUTPATIENT
Start: 2025-05-27

## 2025-05-27 RX ORDER — SILVER SULFADIAZINE 10 MG/G
CREAM TOPICAL PRN
Status: CANCELLED | OUTPATIENT
Start: 2025-05-27

## 2025-05-27 RX ORDER — LIDOCAINE 50 MG/G
OINTMENT TOPICAL PRN
Status: CANCELLED | OUTPATIENT
Start: 2025-05-27

## 2025-05-27 RX ORDER — SODIUM CHLOR/HYPOCHLOROUS ACID 0.033 %
SOLUTION, IRRIGATION IRRIGATION PRN
Status: CANCELLED | OUTPATIENT
Start: 2025-05-27

## 2025-05-27 RX ORDER — GENTAMICIN SULFATE 1 MG/G
OINTMENT TOPICAL PRN
Status: CANCELLED | OUTPATIENT
Start: 2025-05-27

## 2025-05-27 RX ORDER — LIDOCAINE 40 MG/G
CREAM TOPICAL PRN
Status: CANCELLED | OUTPATIENT
Start: 2025-05-27

## 2025-05-27 RX ORDER — NEOMYCIN/BACITRACIN/POLYMYXINB 3.5-400-5K
OINTMENT (GRAM) TOPICAL PRN
Status: CANCELLED | OUTPATIENT
Start: 2025-05-27

## 2025-05-27 RX ORDER — LIDOCAINE HYDROCHLORIDE 40 MG/ML
SOLUTION TOPICAL PRN
Status: CANCELLED | OUTPATIENT
Start: 2025-05-27

## 2025-05-27 RX ORDER — CLOBETASOL PROPIONATE 0.5 MG/G
OINTMENT TOPICAL PRN
Status: CANCELLED | OUTPATIENT
Start: 2025-05-27

## 2025-05-27 NOTE — WOUND CARE
Multilayer Compression Wrap   (Not Unna) Below the Knee    NAME:  Melania Dave  YOB: 1940  MEDICAL RECORD NUMBER:  [id number]  DATE:  5/27/2025    Multilayer compression wrap: Removed old Multilayer wrap if indicated and wash leg with mild soap/water.  Applied moisturizing agent to dry skin as needed.   Applied primary and secondary dressing as ordered.  Applied multilayered dressing below the knee to right lower leg.  Applied multilayered dressing below the knee to left lower leg.  Instructed patient/caregiver not to remove dressing and to keep it clean and dry.   Instructed patient/caregiver on complications to report to provider, such as pain, numbness in toes, heavy drainage, and slippage of dressing.  Instructed patient on purpose of compression dressing and on activity and exercise recommendations.      Electronically signed by OLE DELACRUZ RN on 5/27/2025 at 11:37 AM

## 2025-05-27 NOTE — FLOWSHEET NOTE
05/27/25 1100   Right Leg Edema Point of Measurement   Leg circumference 32 cm   Ankle circumference 21 cm   Foot circumference 21 cm   Compression Therapy 2 layer compression wrap   Left Leg Edema Point of Measurement   Leg circumference 31.5 cm   Ankle circumference 25 cm   Foot circumference 21 cm   Compression Therapy 2 layer compression wrap   Wound 04/18/25 Toe (Comment  which one) Left #5 5th toe   Date First Assessed/Time First Assessed: 04/18/25 1119   Present on Original Admission: No  Wound Approximate Age at First Assessment (Weeks): 1 weeks  Location: (c) Toe (Comment  which one)  Wound Location Orientation: Left  Wound Description (Commen...   Wound Image    Wound Etiology Pressure Stage 4   Dressing Status Old drainage noted   Wound Cleansed Cleansed with saline;Vashe   Dressing/Treatment Hydrofera blue   Wound Length (cm) 0.3 cm   Wound Width (cm) 0.3 cm   Wound Depth (cm) 0.2 cm   Wound Surface Area (cm^2) 0.09 cm^2   Change in Wound Size % (l*w) -12.5   Wound Volume (cm^3) 0.018 cm^3   Wound Healing % -13   Wound Assessment Exposed structure bone   Drainage Amount Small (< 25%)   Drainage Description Serosanguinous   Odor None   Yolanda-wound Assessment Fragile   Margins Defined edges   Pain Assessment   Pain Assessment None - Denies Pain     Pt is taking Aspirin.

## 2025-05-29 ENCOUNTER — HOSPITAL ENCOUNTER (OUTPATIENT)
Dept: WOUND CARE | Age: 85
Discharge: HOME | End: 2025-05-29
Payer: MEDICARE

## 2025-05-29 VITALS
OXYGEN SATURATION: 93 % | BODY MASS INDEX: 22.53 KG/M2 | HEART RATE: 89 BPM | WEIGHT: 132 LBS | HEIGHT: 64 IN | RESPIRATION RATE: 18 BRPM | SYSTOLIC BLOOD PRESSURE: 139 MMHG | DIASTOLIC BLOOD PRESSURE: 63 MMHG | TEMPERATURE: 98.4 F

## 2025-05-29 DIAGNOSIS — M86.9 OSTEOMYELITIS OF FIFTH TOE OF LEFT FOOT (HCC): ICD-10-CM

## 2025-05-29 DIAGNOSIS — R60.0 BILATERAL LOWER EXTREMITY EDEMA: Chronic | ICD-10-CM

## 2025-05-29 DIAGNOSIS — L97.929 CHRONIC VENOUS HYPERTENSION (IDIOPATHIC) WITH ULCER OF BILATERAL LOWER EXTREMITY (HCC): Chronic | ICD-10-CM

## 2025-05-29 DIAGNOSIS — L97.912 NON-PRESSURE CHRONIC ULCER OF LOWER LEG WITH FAT LAYER EXPOSED, RIGHT (HCC): Primary | Chronic | ICD-10-CM

## 2025-05-29 DIAGNOSIS — L97.919 CHRONIC VENOUS HYPERTENSION (IDIOPATHIC) WITH ULCER OF BILATERAL LOWER EXTREMITY (HCC): Chronic | ICD-10-CM

## 2025-05-29 DIAGNOSIS — I87.313 CHRONIC VENOUS HYPERTENSION (IDIOPATHIC) WITH ULCER OF BILATERAL LOWER EXTREMITY (HCC): Chronic | ICD-10-CM

## 2025-05-29 DIAGNOSIS — L97.922 NON-PRESSURE CHRONIC ULCER OF LEFT LOWER LEG WITH FAT LAYER EXPOSED (HCC): Chronic | ICD-10-CM

## 2025-05-29 PROCEDURE — 29581 APPL MULTLAYER CMPRN SYS LEG: CPT

## 2025-05-29 PROCEDURE — 11042 DBRDMT SUBQ TIS 1ST 20SQCM/<: CPT

## 2025-05-29 RX ORDER — TRIAMCINOLONE ACETONIDE 1 MG/G
OINTMENT TOPICAL PRN
OUTPATIENT
Start: 2025-05-29

## 2025-05-29 RX ORDER — LIDOCAINE HYDROCHLORIDE 20 MG/ML
JELLY TOPICAL PRN
OUTPATIENT
Start: 2025-05-29

## 2025-05-29 RX ORDER — NEOMYCIN/BACITRACIN/POLYMYXINB 3.5-400-5K
OINTMENT (GRAM) TOPICAL PRN
OUTPATIENT
Start: 2025-05-29

## 2025-05-29 RX ORDER — LIDOCAINE HYDROCHLORIDE 20 MG/ML
JELLY TOPICAL PRN
Status: DISCONTINUED | OUTPATIENT
Start: 2025-05-29 | End: 2025-05-30 | Stop reason: HOSPADM

## 2025-05-29 RX ORDER — BETAMETHASONE DIPROPIONATE 0.5 MG/G
CREAM TOPICAL PRN
OUTPATIENT
Start: 2025-05-29

## 2025-05-29 RX ORDER — CLOBETASOL PROPIONATE 0.5 MG/G
OINTMENT TOPICAL PRN
OUTPATIENT
Start: 2025-05-29

## 2025-05-29 RX ORDER — GENTAMICIN SULFATE 1 MG/G
OINTMENT TOPICAL PRN
OUTPATIENT
Start: 2025-05-29

## 2025-05-29 RX ORDER — LIDOCAINE 40 MG/G
CREAM TOPICAL PRN
OUTPATIENT
Start: 2025-05-29

## 2025-05-29 RX ORDER — SODIUM CHLOR/HYPOCHLOROUS ACID 0.033 %
SOLUTION, IRRIGATION IRRIGATION PRN
OUTPATIENT
Start: 2025-05-29

## 2025-05-29 RX ORDER — SILVER SULFADIAZINE 10 MG/G
CREAM TOPICAL PRN
OUTPATIENT
Start: 2025-05-29

## 2025-05-29 RX ORDER — LIDOCAINE 50 MG/G
OINTMENT TOPICAL PRN
OUTPATIENT
Start: 2025-05-29

## 2025-05-29 RX ORDER — MUPIROCIN 20 MG/G
OINTMENT TOPICAL PRN
OUTPATIENT
Start: 2025-05-29

## 2025-05-29 RX ORDER — GINSENG 100 MG
CAPSULE ORAL PRN
OUTPATIENT
Start: 2025-05-29

## 2025-05-29 RX ORDER — BACITRACIN ZINC AND POLYMYXIN B SULFATE 500; 1000 [USP'U]/G; [USP'U]/G
OINTMENT TOPICAL PRN
OUTPATIENT
Start: 2025-05-29

## 2025-05-29 RX ORDER — LIDOCAINE HYDROCHLORIDE 40 MG/ML
SOLUTION TOPICAL PRN
OUTPATIENT
Start: 2025-05-29

## 2025-05-29 RX ADMIN — LIDOCAINE HYDROCHLORIDE: 20 JELLY TOPICAL at 15:08

## 2025-05-29 ASSESSMENT — PAIN SCALES - GENERAL: PAINLEVEL_OUTOF10: 8

## 2025-05-29 NOTE — WOUND CARE
Multilayer Compression Wrap   (Not Unna) Below the Knee    NAME:  Melania Dave  YOB: 1940  MEDICAL RECORD NUMBER:  [id number]  DATE:  5/29/2025    Multilayer compression wrap: Removed old Multilayer wrap if indicated and wash leg with mild soap/water.  Applied moisturizing agent to dry skin as needed.   Applied primary and secondary dressing as ordered.  Applied multilayered dressing below the knee to right lower leg.  Applied multilayered dressing below the knee to left lower leg.  Instructed patient/caregiver not to remove dressing and to keep it clean and dry.   Instructed patient/caregiver on complications to report to provider, such as pain, numbness in toes, heavy drainage, and slippage of dressing.  Instructed patient on purpose of compression dressing and on activity and exercise recommendations.      Electronically signed by Hunter Wilkes RN on 5/29/2025 at 3:47 PM

## 2025-05-29 NOTE — FLOWSHEET NOTE
05/29/25 1449   Right Leg Edema Point of Measurement   Leg circumference 31.5 cm   Ankle circumference 20.5 cm   Foot circumference 22 cm   Compression Therapy 2 layer compression wrap   Left Leg Edema Point of Measurement   Leg circumference 31.5 cm   Ankle circumference 22.5 cm   Foot circumference 21.5 cm   Compression Therapy 2 layer compression wrap   Wound 04/18/25 Toe (Comment  which one) Left #5 5th toe   Date First Assessed/Time First Assessed: 04/18/25 1119   Present on Original Admission: No  Wound Approximate Age at First Assessment (Weeks): 1 weeks  Location: (c) Toe (Comment  which one)  Wound Location Orientation: Left  Wound Description (Commen...   Wound Image     Wound Etiology Pressure Stage 4   Dressing Status Old drainage noted   Wound Cleansed Soap and water   Dressing/Treatment Hydrofera blue   Wound Length (cm) 0.5 cm   Wound Width (cm) 0.5 cm   Wound Depth (cm) 0.2 cm   Wound Surface Area (cm^2) 0.25 cm^2   Change in Wound Size % (l*w) -212.5   Wound Volume (cm^3) 0.05 cm^3   Wound Healing % -213   Post-Procedure Length (cm) 0.6 cm   Post-Procedure Width (cm) 0.5 cm   Post-Procedure Depth (cm) 0.2 cm   Post-Procedure Surface Area (cm^2) 0.3 cm^2   Post-Procedure Volume (cm^3) 0.06 cm^3   Wound Assessment Exposed structure bone   Drainage Amount Small (< 25%)   Drainage Description Serosanguinous   Odor None   Yolanda-wound Assessment Fragile   Margins Defined edges   Wound Thickness Description not for Pressure Injury Full thickness   Pain Assessment   Pain Assessment 0-10   Pain Level 8     Taking ASA

## 2025-05-29 NOTE — WOUND CARE
Discharge Instructions for  [facility]  [address] [place] [address]  [address]  [place] [address]  Phone [phone]   Fax [phone]      NAME:  Melania Dave  YOB: 1940  MEDICAL RECORD NUMBER:  [id number]  DATE:  5/29/2025    Return Appointment:   2 weeks with Dr. Quezada   Dressing changes:  Monday and Thursday       Instructions: Bilateral lower leg:  Cleanse with normal saline  Xeroform- apply to any open wound beds.   Cover with ABD pad  2 layer compression with wound and lower extremity assessment.  If there is any problem with the dressing (too tight, slides down, etc.) Patient to return to wound clinic to have re-wrapped by clinician.  Change dressing 2 times per week    Right 5th toe:  Hydrofera Ready: Cut to wound size, place in wound bed, shiny side out.    Cover with gauze and tape  Change 2 times per week at wound center    Continue antibiotics     Do not get wound wet in shower, pool or tub. May purchase cast cover at local pharmacy to keep dry in shower.    Increase protein to 100g daily for optimal wound healing. Aim for 30g protein per shake, two shakes a day.  Protein:   Egg ~ 6g  Yogurt ~ 15g  Half cup of cottage cheese ~ 15g  Chicken breast ~ 30g  [place] filet ~ 40g     Should you experience increased redness, swelling, pain, foul odor, size of wound(s), or have a temperature over 101 degrees please contact the Wound Healing Center at [phone] or if after hours contact your primary care physician or go to the hospital emergency department.    PLEASE NOTE: IF YOU ARE UNABLE TO OBTAIN WOUND SUPPLIES, CONTINUE TO USE THE SUPPLIES YOU HAVE AVAILABLE UNTIL YOU ARE ABLE TO REACH US. IT IS MOST IMPORTANT TO KEEP THE WOUND COVERED AT ALL TIMES.    Electronically signed Hunter Wilkes RN, [facility] on 5/29/2025 at 3:17 PM

## 2025-05-29 NOTE — DISCHARGE INSTRUCTIONS
Instructions: Bilateral lower leg:  Cleanse with normal saline  Xeroform- apply to any open wound beds.   Cover with ABD pad  2 layer compression with wound and lower extremity assessment.  If there is any problem with the dressing (too tight, slides down, etc.) Patient to return to wound clinic to have re-wrapped by clinician.  Change dressing 2 times per week     Right 5th toe:  Hydrofera Ready: Cut to wound size, place in wound bed, shiny side out.    Cover with gauze and tape  Change 2 times per week at wound center     Continue antibiotics

## 2025-05-31 PROBLEM — M86.9 OSTEOMYELITIS OF FIFTH TOE OF LEFT FOOT (HCC): Status: ACTIVE | Noted: 2025-05-31

## 2025-05-31 PROBLEM — M86.9 OSTEOMYELITIS OF FIFTH TOE OF LEFT FOOT (HCC): Chronic | Status: ACTIVE | Noted: 2025-05-31

## 2025-05-31 NOTE — PROGRESS NOTES
Procedure Note  Indications: Based on my examination of this patient's wound(s)/ulcer(s) today, debridement is required to promote healing and evaluate the wound base.    Return Appointment:   2 weeks with Dr. Quezada   Dressing changes:  Monday and Thursday        Instructions: Bilateral lower leg:  Cleanse with normal saline  Xeroform- apply to any open wound beds.   Cover with ABD pad  2 layer compression with wound and lower extremity assessment.  If there is any problem with the dressing (too tight, slides down, etc.) Patient to return to wound clinic to have re-wrapped by clinician.  Change dressing 2 times per week     Right 5th toe:  Hydrofera Ready: Cut to wound size, place in wound bed, shiny side out.    Cover with gauze and tape  Change 2 times per week at wound center     Continue antibiotics      Do not get wound wet in shower, pool or tub. May purchase cast cover at local pharmacy to keep dry in shower.     Increase protein to 100g daily for optimal wound healing. Aim for 30g protein per shake, two shakes a day.  Protein:   Egg ~ 6g  Yogurt ~ 15g  Half cup of cottage cheese ~ 15g  Chicken breast ~ 30g  [place] filet ~ 40g   Debridement: Excisional Debridement    Using: curette the wound(s)/ulcer(s) was/were debrided down through and including the removal of epidermis, dermis, and subcutaneous tissue.  Performed by: Parag Quezada DO  Consent obtained: Yes  Time out taken: Yes  Pain Control:         Devitalized Tissue Debrided: fibrin, biofilm, slough, and necrotic/eschar    Pre Debridement Measurements:  Are located in the Wound/Ulcer Documentation Flow Sheet    Diabetic/Pressure/Non Pressure Ulcers only:  Ulcer: Pressure ulcer, Stage 4     Wound/Ulcer #: 5  Post Debridement Measurements:  Wound/Ulcer Descriptions are Pre Debridement except measurements:  Wound 04/18/25 Toe (Comment  which one) Left #5 5th toe (Active)   Wound Image    05/29/25 1449   Wound Etiology Pressure Stage 4 05/29/25

## 2025-06-02 ENCOUNTER — HOSPITAL ENCOUNTER (OUTPATIENT)
Dept: WOUND CARE | Age: 85
Discharge: HOME | End: 2025-06-02
Payer: MEDICARE

## 2025-06-02 VITALS
RESPIRATION RATE: 16 BRPM | HEART RATE: 72 BPM | DIASTOLIC BLOOD PRESSURE: 45 MMHG | TEMPERATURE: 98.1 F | OXYGEN SATURATION: 94 % | SYSTOLIC BLOOD PRESSURE: 124 MMHG

## 2025-06-02 DIAGNOSIS — L97.919 CHRONIC VENOUS HYPERTENSION (IDIOPATHIC) WITH ULCER OF BILATERAL LOWER EXTREMITY (HCC): ICD-10-CM

## 2025-06-02 DIAGNOSIS — R60.0 BILATERAL LOWER EXTREMITY EDEMA: Primary | ICD-10-CM

## 2025-06-02 DIAGNOSIS — L97.922 NON-PRESSURE CHRONIC ULCER OF LEFT LOWER LEG WITH FAT LAYER EXPOSED (HCC): ICD-10-CM

## 2025-06-02 DIAGNOSIS — L97.929 CHRONIC VENOUS HYPERTENSION (IDIOPATHIC) WITH ULCER OF BILATERAL LOWER EXTREMITY (HCC): ICD-10-CM

## 2025-06-02 DIAGNOSIS — L97.912 NON-PRESSURE CHRONIC ULCER OF LOWER LEG WITH FAT LAYER EXPOSED, RIGHT (HCC): ICD-10-CM

## 2025-06-02 DIAGNOSIS — M86.9 OSTEOMYELITIS OF FIFTH TOE OF LEFT FOOT (HCC): ICD-10-CM

## 2025-06-02 DIAGNOSIS — I87.313 CHRONIC VENOUS HYPERTENSION (IDIOPATHIC) WITH ULCER OF BILATERAL LOWER EXTREMITY (HCC): ICD-10-CM

## 2025-06-02 PROCEDURE — 29581 APPL MULTLAYER CMPRN SYS LEG: CPT

## 2025-06-02 RX ORDER — MUPIROCIN 20 MG/G
OINTMENT TOPICAL PRN
OUTPATIENT
Start: 2025-06-02

## 2025-06-02 RX ORDER — SODIUM CHLOR/HYPOCHLOROUS ACID 0.033 %
SOLUTION, IRRIGATION IRRIGATION PRN
OUTPATIENT
Start: 2025-06-02

## 2025-06-02 RX ORDER — BETAMETHASONE DIPROPIONATE 0.5 MG/G
CREAM TOPICAL PRN
OUTPATIENT
Start: 2025-06-02

## 2025-06-02 RX ORDER — SILVER SULFADIAZINE 10 MG/G
CREAM TOPICAL PRN
OUTPATIENT
Start: 2025-06-02

## 2025-06-02 RX ORDER — CLOBETASOL PROPIONATE 0.5 MG/G
OINTMENT TOPICAL PRN
OUTPATIENT
Start: 2025-06-02

## 2025-06-02 RX ORDER — LIDOCAINE HYDROCHLORIDE 40 MG/ML
SOLUTION TOPICAL PRN
OUTPATIENT
Start: 2025-06-02

## 2025-06-02 RX ORDER — LIDOCAINE HYDROCHLORIDE 20 MG/ML
JELLY TOPICAL PRN
OUTPATIENT
Start: 2025-06-02

## 2025-06-02 RX ORDER — TRIAMCINOLONE ACETONIDE 1 MG/G
OINTMENT TOPICAL PRN
OUTPATIENT
Start: 2025-06-02

## 2025-06-02 RX ORDER — GENTAMICIN SULFATE 1 MG/G
OINTMENT TOPICAL PRN
OUTPATIENT
Start: 2025-06-02

## 2025-06-02 RX ORDER — NEOMYCIN/BACITRACIN/POLYMYXINB 3.5-400-5K
OINTMENT (GRAM) TOPICAL PRN
OUTPATIENT
Start: 2025-06-02

## 2025-06-02 RX ORDER — LIDOCAINE 50 MG/G
OINTMENT TOPICAL PRN
OUTPATIENT
Start: 2025-06-02

## 2025-06-02 RX ORDER — GINSENG 100 MG
CAPSULE ORAL PRN
OUTPATIENT
Start: 2025-06-02

## 2025-06-02 RX ORDER — BACITRACIN ZINC AND POLYMYXIN B SULFATE 500; 1000 [USP'U]/G; [USP'U]/G
OINTMENT TOPICAL PRN
OUTPATIENT
Start: 2025-06-02

## 2025-06-02 RX ORDER — LIDOCAINE 40 MG/G
CREAM TOPICAL PRN
OUTPATIENT
Start: 2025-06-02

## 2025-06-02 NOTE — FLOWSHEET NOTE
06/02/25 1542   Right Leg Edema Point of Measurement   Leg circumference 31.5 cm   Ankle circumference 20.5 cm   Foot circumference 21.5 cm   Compression Therapy 2 layer compression wrap   Left Leg Edema Point of Measurement   Leg circumference 31.5 cm   Ankle circumference 22.5 cm   Foot circumference 21.5 cm   Compression Therapy 2 layer compression wrap   Wound 04/18/25 Toe (Comment  which one) Left #5 5th toe   Date First Assessed/Time First Assessed: 04/18/25 1119   Present on Original Admission: No  Wound Approximate Age at First Assessment (Weeks): 1 weeks  Location: (c) Toe (Comment  which one)  Wound Location Orientation: Left  Wound Description (Commen...   Wound Image    Wound Etiology Pressure Stage 4   Dressing Status Old drainage noted   Wound Cleansed Cleansed with saline;Soap and water;Vashe   Dressing/Treatment Hydrofera blue  (secured with Mepilex Transfer)   Wound Length (cm) 0.5 cm   Wound Width (cm) 0.5 cm   Wound Depth (cm) 0.3 cm   Wound Surface Area (cm^2) 0.25 cm^2   Change in Wound Size % (l*w) -212.5   Wound Volume (cm^3) 0.075 cm^3   Wound Healing % -369   Wound Assessment Exposed structure bone   Drainage Amount Small (< 25%)   Drainage Description Pink;Gray;Other (Comment)  ([place])   Odor None   Yolanda-wound Assessment Fragile   Wound Thickness Description not for Pressure Injury Full thickness   Pain Assessment   Pain Assessment None - Denies Pain     Patient takes ASA  Clinician visit only.

## 2025-06-05 ENCOUNTER — OFFICE VISIT (OUTPATIENT)
Age: 85
End: 2025-06-05

## 2025-06-05 ENCOUNTER — OFFICE VISIT (OUTPATIENT)
Age: 85
End: 2025-06-05
Payer: MEDICARE

## 2025-06-05 DIAGNOSIS — M25.511 CHRONIC RIGHT SHOULDER PAIN: Primary | ICD-10-CM

## 2025-06-05 DIAGNOSIS — G89.29 CHRONIC RIGHT SHOULDER PAIN: Primary | ICD-10-CM

## 2025-06-05 PROCEDURE — G8428 CUR MEDS NOT DOCUMENT: HCPCS | Performed by: ANESTHESIOLOGY

## 2025-06-05 PROCEDURE — 4004F PT TOBACCO SCREEN RCVD TLK: CPT | Performed by: ANESTHESIOLOGY

## 2025-06-05 PROCEDURE — G8399 PT W/DXA RESULTS DOCUMENT: HCPCS | Performed by: ANESTHESIOLOGY

## 2025-06-05 PROCEDURE — G8420 CALC BMI NORM PARAMETERS: HCPCS | Performed by: ANESTHESIOLOGY

## 2025-06-05 PROCEDURE — 99213 OFFICE O/P EST LOW 20 MIN: CPT | Performed by: ANESTHESIOLOGY

## 2025-06-05 PROCEDURE — 1123F ACP DISCUSS/DSCN MKR DOCD: CPT | Performed by: ANESTHESIOLOGY

## 2025-06-05 PROCEDURE — 1090F PRES/ABSN URINE INCON ASSESS: CPT | Performed by: ANESTHESIOLOGY

## 2025-06-05 RX ORDER — METHYLPREDNISOLONE ACETATE 40 MG/ML
40 INJECTION, SUSPENSION INTRA-ARTICULAR; INTRALESIONAL; INTRAMUSCULAR; SOFT TISSUE ONCE
Status: COMPLETED | OUTPATIENT
Start: 2025-06-05 | End: 2025-06-05

## 2025-06-05 ASSESSMENT — ENCOUNTER SYMPTOMS
ALLERGIC/IMMUNOLOGIC NEGATIVE: 1
EYES NEGATIVE: 1
SHORTNESS OF BREATH: 0
ABDOMINAL PAIN: 0

## 2025-06-05 NOTE — PROGRESS NOTES
Chronic Pain Consult Note      Plan:     A comprehensive pain management plan may consist of the following: Testing, Therapy, Medications, Interventions, Consults, and Follow up.    Chronic Right Shoulder Pain  -Perform repeat R IA shoulder injection by Dr. Morrison  Vertebral compression fractures lumbar L1 and 2  -Do not recommend further augmentation at this time as patient has limited to no pain associated with these locations or sides.  -Patient has history of osteoporosis and symptomatology in the past.  Discussed need to have further follow-up and consideration of treatment with specialty team, discussed referral to rheumatology  Cervical Radiculopathy C6 R side   -S/P C6/7 ANAT to see if this pain radiating into the R shoulder is radicular based on CT scan of her neck  Chronic right shoulder pain   -Scheduled for right suprascapular nerve block with potential RFA pending response  Peripheral neuropathy  -Patient presents with neuropathy as well as change in condition temperatures and coloration of hands and feet without cause.  He is not diabetic.  Also discussed his increased dizziness and confusion.  This occurred after influenza infection.  Patient discussed neurology referral for further evaluation and consideration.  Lumbar spondylosis without myelopathy  -S/P right L3/4, 4/5 and 5/1 intra-articular facet injections  -Potential for medial branch blocks pending response  Lumbar degenerative disc disease  -We will continue to monitor for further interventional therapy if warranted  Failed back  -We will continue to monitor for further interventional therapy if warranted  Chronic low back pain  -Encourage continuation of active healthy lifestyle  -Patient takes chronic oral prednisone due to PMR     General Recommendations: The pain condition that the patient suffers from is best treated with a multidisciplinary approach that involves an increase in physical activity to prevent de-conditioning and worsening

## 2025-06-05 NOTE — PROGRESS NOTES
NAME: Melania Dave   ID:[id number]   :1940    Location: [address] [place] [address]    Procedure: Right lntraarticular Shoulder Injection    Pre-op Diagnosis: 1. Shoulder Pain. 2. Shoulder Osteoarthritis     Post-op Diagnosis: Same    Anesthesia: Local only     Complications: None    After confirming written and informed consent and discussing the risk, benefits and alternatives for the  procedure, the patient had the correct site marked by the physician performing the procedure. The specific risks of bleeding and infection were discussed.    The patient was then placed in the supine position. The skin overlying the right shoulder was then prepped with chlorhexidine gluconate and sterile towels were placed to drape the procedure site. A hat and mask were worn, and the hands were cleaned with an alcohol-containing solution. Sterile gloves were then worn. A time out was then performed involving the physician and the patient.    Next, via an anterior approach to the shoulder, a 25 G 5 inch needle was advanced into the intra-articular space. Aspiration was negative for effusion fluid or blood.  3 mL of 0.25% bupivacaine and Depo-Medrol 40 mg were injected in incremental doses.    The needles were withdrawn and Band-Aids were applied. The patient was transported to the recovery room and monitored for an appropriate amount of time, and after meeting discharge criteria, was discharged home with a . No complications were noted through the procedure or recovery period.           Location: [facility]       Procedure: right IA shoulder injection       Time Out performed prior to start of the procedure:       Edgar Morrison MD performed the following reviews on Melania Dave 1940 prior to the start of the procedure:       patient was identified by name and     agreement on procedure being performed was verified   risks and benefits explained to patient by the provider  procedure site

## 2025-06-06 ENCOUNTER — HOSPITAL ENCOUNTER (OUTPATIENT)
Dept: WOUND CARE | Age: 85
Discharge: HOME | End: 2025-06-06
Payer: MEDICARE

## 2025-06-06 VITALS
TEMPERATURE: 97.6 F | DIASTOLIC BLOOD PRESSURE: 90 MMHG | RESPIRATION RATE: 18 BRPM | SYSTOLIC BLOOD PRESSURE: 160 MMHG | HEART RATE: 70 BPM

## 2025-06-06 DIAGNOSIS — L97.922 NON-PRESSURE CHRONIC ULCER OF LEFT LOWER LEG WITH FAT LAYER EXPOSED (HCC): ICD-10-CM

## 2025-06-06 DIAGNOSIS — L97.912 NON-PRESSURE CHRONIC ULCER OF LOWER LEG WITH FAT LAYER EXPOSED, RIGHT (HCC): Primary | ICD-10-CM

## 2025-06-06 DIAGNOSIS — R60.0 BILATERAL LOWER EXTREMITY EDEMA: Chronic | ICD-10-CM

## 2025-06-06 DIAGNOSIS — I87.313 CHRONIC VENOUS HYPERTENSION (IDIOPATHIC) WITH ULCER OF BILATERAL LOWER EXTREMITY (HCC): Chronic | ICD-10-CM

## 2025-06-06 DIAGNOSIS — M86.9 OSTEOMYELITIS OF FIFTH TOE OF LEFT FOOT (HCC): Chronic | ICD-10-CM

## 2025-06-06 DIAGNOSIS — L97.919 CHRONIC VENOUS HYPERTENSION (IDIOPATHIC) WITH ULCER OF BILATERAL LOWER EXTREMITY (HCC): Chronic | ICD-10-CM

## 2025-06-06 DIAGNOSIS — L97.929 CHRONIC VENOUS HYPERTENSION (IDIOPATHIC) WITH ULCER OF BILATERAL LOWER EXTREMITY (HCC): Chronic | ICD-10-CM

## 2025-06-06 PROCEDURE — 29581 APPL MULTLAYER CMPRN SYS LEG: CPT

## 2025-06-06 RX ORDER — LIDOCAINE HYDROCHLORIDE 40 MG/ML
SOLUTION TOPICAL PRN
OUTPATIENT
Start: 2025-06-06

## 2025-06-06 RX ORDER — LIDOCAINE HYDROCHLORIDE 20 MG/ML
JELLY TOPICAL PRN
OUTPATIENT
Start: 2025-06-06

## 2025-06-06 RX ORDER — SILVER SULFADIAZINE 10 MG/G
CREAM TOPICAL PRN
OUTPATIENT
Start: 2025-06-06

## 2025-06-06 RX ORDER — CLOBETASOL PROPIONATE 0.5 MG/G
OINTMENT TOPICAL PRN
OUTPATIENT
Start: 2025-06-06

## 2025-06-06 RX ORDER — MUPIROCIN 20 MG/G
OINTMENT TOPICAL PRN
OUTPATIENT
Start: 2025-06-06

## 2025-06-06 RX ORDER — BETAMETHASONE DIPROPIONATE 0.5 MG/G
CREAM TOPICAL PRN
OUTPATIENT
Start: 2025-06-06

## 2025-06-06 RX ORDER — LIDOCAINE 50 MG/G
OINTMENT TOPICAL PRN
OUTPATIENT
Start: 2025-06-06

## 2025-06-06 RX ORDER — TRIAMCINOLONE ACETONIDE 1 MG/G
OINTMENT TOPICAL PRN
OUTPATIENT
Start: 2025-06-06

## 2025-06-06 RX ORDER — GENTAMICIN SULFATE 1 MG/G
OINTMENT TOPICAL PRN
OUTPATIENT
Start: 2025-06-06

## 2025-06-06 RX ORDER — BACITRACIN ZINC AND POLYMYXIN B SULFATE 500; 1000 [USP'U]/G; [USP'U]/G
OINTMENT TOPICAL PRN
OUTPATIENT
Start: 2025-06-06

## 2025-06-06 RX ORDER — LIDOCAINE 40 MG/G
CREAM TOPICAL PRN
OUTPATIENT
Start: 2025-06-06

## 2025-06-06 RX ORDER — GINSENG 100 MG
CAPSULE ORAL PRN
OUTPATIENT
Start: 2025-06-06

## 2025-06-06 RX ORDER — SODIUM CHLOR/HYPOCHLOROUS ACID 0.033 %
SOLUTION, IRRIGATION IRRIGATION PRN
OUTPATIENT
Start: 2025-06-06

## 2025-06-06 RX ORDER — NEOMYCIN/BACITRACIN/POLYMYXINB 3.5-400-5K
OINTMENT (GRAM) TOPICAL PRN
OUTPATIENT
Start: 2025-06-06

## 2025-06-06 NOTE — FLOWSHEET NOTE
06/06/25 1330   Right Leg Edema Point of Measurement   Leg circumference 31 cm   Ankle circumference 20 cm   Foot circumference 21 cm   Compression Therapy 2 layer compression wrap   Left Leg Edema Point of Measurement   Leg circumference 31 cm   Ankle circumference 22 cm   Foot circumference 21 cm   Compression Therapy 2 layer compression wrap   Wound 04/18/25 Toe (Comment  which one) Left #5 5th toe   Date First Assessed/Time First Assessed: 04/18/25 1119   Present on Original Admission: No  Wound Approximate Age at First Assessment (Weeks): 1 weeks  Location: (c) Toe (Comment  which one)  Wound Location Orientation: Left  Wound Description (Commen...   Wound Image    Wound Etiology Pressure Stage 4   Dressing Status Old drainage noted   Wound Cleansed Cleansed with saline;Soap and water;Vashe   Dressing/Treatment Hydrofera blue   Wound Length (cm) 0.1 cm   Wound Width (cm) 0.1 cm   Wound Depth (cm) 0.2 cm   Wound Surface Area (cm^2) 0.01 cm^2   Change in Wound Size % (l*w) 87.5   Wound Volume (cm^3) 0.002 cm^3   Wound Healing % 88   Wound Assessment Exposed structure bone   Drainage Amount Small (< 25%)   Drainage Description Serosanguinous   Odor None   Yolanda-wound Assessment Fragile   Margins Defined edges   Wound Thickness Description not for Pressure Injury Full thickness   Pain Assessment   Pain Assessment None - Denies Pain     Pt is taking Aspirin.

## 2025-06-06 NOTE — WOUND CARE
Multilayer Compression Wrap   (Not Unna) Below the Knee    NAME:  Melania Dave  YOB: 1940  MEDICAL RECORD NUMBER:  [id number]  DATE:  6/6/2025    Multilayer compression wrap: Removed old Multilayer wrap if indicated and wash leg with mild soap/water.  Applied moisturizing agent to dry skin as needed.   Applied primary and secondary dressing as ordered.  Applied multilayered dressing below the knee to right lower leg.  Applied multilayered dressing below the knee to left lower leg.  Instructed patient/caregiver not to remove dressing and to keep it clean and dry.   Instructed patient/caregiver on complications to report to provider, such as pain, numbness in toes, heavy drainage, and slippage of dressing.  Instructed patient on purpose of compression dressing and on activity and exercise recommendations.      Electronically signed by OLE DELACRUZ RN on 6/6/2025 at 1:49 PM

## 2025-06-09 ENCOUNTER — HOSPITAL ENCOUNTER (OUTPATIENT)
Dept: WOUND CARE | Age: 85
Discharge: HOME | End: 2025-06-09
Payer: MEDICARE

## 2025-06-09 DIAGNOSIS — L97.929 CHRONIC VENOUS HYPERTENSION (IDIOPATHIC) WITH ULCER OF BILATERAL LOWER EXTREMITY (HCC): Chronic | ICD-10-CM

## 2025-06-09 DIAGNOSIS — L97.919 CHRONIC VENOUS HYPERTENSION (IDIOPATHIC) WITH ULCER OF BILATERAL LOWER EXTREMITY (HCC): Chronic | ICD-10-CM

## 2025-06-09 DIAGNOSIS — I87.313 CHRONIC VENOUS HYPERTENSION (IDIOPATHIC) WITH ULCER OF BILATERAL LOWER EXTREMITY (HCC): Chronic | ICD-10-CM

## 2025-06-09 DIAGNOSIS — M86.9 OSTEOMYELITIS OF FIFTH TOE OF LEFT FOOT (HCC): Chronic | ICD-10-CM

## 2025-06-09 DIAGNOSIS — L97.922 NON-PRESSURE CHRONIC ULCER OF LEFT LOWER LEG WITH FAT LAYER EXPOSED (HCC): ICD-10-CM

## 2025-06-09 DIAGNOSIS — R60.0 BILATERAL LOWER EXTREMITY EDEMA: Chronic | ICD-10-CM

## 2025-06-09 DIAGNOSIS — L97.912 NON-PRESSURE CHRONIC ULCER OF LOWER LEG WITH FAT LAYER EXPOSED, RIGHT (HCC): Primary | ICD-10-CM

## 2025-06-09 PROCEDURE — 29581 APPL MULTLAYER CMPRN SYS LEG: CPT

## 2025-06-09 NOTE — WOUND CARE
Multilayer Compression Wrap   (Not Unna) Below the Knee    NAME:  Melania Dave  YOB: 1940  MEDICAL RECORD NUMBER:  [id number]  DATE:  6/9/2025    Multilayer compression wrap: Removed old Multilayer wrap if indicated and wash leg with mild soap/water.  Applied moisturizing agent to dry skin as needed.   Applied primary and secondary dressing as ordered.  Applied multilayered dressing below the knee to right lower leg.  Applied multilayered dressing below the knee to left lower leg.  Instructed patient/caregiver not to remove dressing and to keep it clean and dry.   Instructed patient/caregiver on complications to report to provider, such as pain, numbness in toes, heavy drainage, and slippage of dressing.  Instructed patient on purpose of compression dressing and on activity and exercise recommendations.      Electronically signed by Sana Aguero RN on 6/9/2025 at 3:11 PM

## 2025-06-09 NOTE — FLOWSHEET NOTE
06/09/25 1510   Right Leg Edema Point of Measurement   Leg circumference 31 cm   Ankle circumference 22 cm   Foot circumference 21 cm   Compression Therapy 2 layer compression wrap   Left Leg Edema Point of Measurement   Leg circumference 31 cm   Ankle circumference 22 cm   Foot circumference 21 cm   Compression Therapy 2 layer compression wrap   Wound 04/18/25 Toe (Comment  which one) Left #5 5th toe   Date First Assessed/Time First Assessed: 04/18/25 1119   Present on Original Admission: No  Wound Approximate Age at First Assessment (Weeks): 1 weeks  Location: (c) Toe (Comment  which one)  Wound Location Orientation: Left  Wound Description (Commen...   Wound Image    Wound Etiology Pressure Stage 4   Dressing Status Old drainage noted   Wound Cleansed Cleansed with saline;Soap and water;Vashe   Dressing/Treatment Hydrofera blue   Wound Length (cm) 0.1 cm   Wound Width (cm) 0.1 cm   Wound Depth (cm) 0.1 cm   Wound Surface Area (cm^2) 0.01 cm^2   Change in Wound Size % (l*w) 87.5   Wound Volume (cm^3) 0.001 cm^3   Wound Healing % 94   Wound Assessment Epithelialization   Drainage Amount None (dry)   Odor None   Yolanda-wound Assessment Fragile   Wound Thickness Description not for Pressure Injury Full thickness     Patient is currently taking aspirin daily

## 2025-06-12 ENCOUNTER — HOSPITAL ENCOUNTER (OUTPATIENT)
Dept: WOUND CARE | Age: 85
Discharge: HOME | End: 2025-06-12
Payer: MEDICARE

## 2025-06-12 VITALS
RESPIRATION RATE: 12 BRPM | DIASTOLIC BLOOD PRESSURE: 52 MMHG | TEMPERATURE: 98.1 F | HEART RATE: 62 BPM | OXYGEN SATURATION: 94 % | SYSTOLIC BLOOD PRESSURE: 119 MMHG

## 2025-06-12 DIAGNOSIS — L97.912 NON-PRESSURE CHRONIC ULCER OF LOWER LEG WITH FAT LAYER EXPOSED, RIGHT (HCC): ICD-10-CM

## 2025-06-12 DIAGNOSIS — L97.922 NON-PRESSURE CHRONIC ULCER OF LEFT LOWER LEG WITH FAT LAYER EXPOSED (HCC): ICD-10-CM

## 2025-06-12 DIAGNOSIS — M86.9 OSTEOMYELITIS OF FIFTH TOE OF LEFT FOOT (HCC): ICD-10-CM

## 2025-06-12 DIAGNOSIS — I87.313 CHRONIC VENOUS HYPERTENSION (IDIOPATHIC) WITH ULCER OF BILATERAL LOWER EXTREMITY (HCC): ICD-10-CM

## 2025-06-12 DIAGNOSIS — L97.919 CHRONIC VENOUS HYPERTENSION (IDIOPATHIC) WITH ULCER OF BILATERAL LOWER EXTREMITY (HCC): ICD-10-CM

## 2025-06-12 DIAGNOSIS — R60.0 BILATERAL LOWER EXTREMITY EDEMA: Primary | ICD-10-CM

## 2025-06-12 DIAGNOSIS — L97.929 CHRONIC VENOUS HYPERTENSION (IDIOPATHIC) WITH ULCER OF BILATERAL LOWER EXTREMITY (HCC): ICD-10-CM

## 2025-06-12 PROCEDURE — 29581 APPL MULTLAYER CMPRN SYS LEG: CPT

## 2025-06-12 RX ORDER — SODIUM CHLOR/HYPOCHLOROUS ACID 0.033 %
SOLUTION, IRRIGATION IRRIGATION PRN
Status: DISCONTINUED | OUTPATIENT
Start: 2025-06-12 | End: 2025-06-13 | Stop reason: HOSPADM

## 2025-06-12 RX ORDER — GENTAMICIN SULFATE 1 MG/G
OINTMENT TOPICAL PRN
OUTPATIENT
Start: 2025-06-12

## 2025-06-12 RX ORDER — LIDOCAINE 40 MG/G
CREAM TOPICAL PRN
OUTPATIENT
Start: 2025-06-12

## 2025-06-12 RX ORDER — LIDOCAINE HYDROCHLORIDE 40 MG/ML
SOLUTION TOPICAL PRN
OUTPATIENT
Start: 2025-06-12

## 2025-06-12 RX ORDER — SILVER SULFADIAZINE 10 MG/G
CREAM TOPICAL PRN
OUTPATIENT
Start: 2025-06-12

## 2025-06-12 RX ORDER — LIDOCAINE HYDROCHLORIDE 20 MG/ML
JELLY TOPICAL PRN
OUTPATIENT
Start: 2025-06-12

## 2025-06-12 RX ORDER — DOXYCYCLINE HYCLATE 100 MG
100 TABLET ORAL 2 TIMES DAILY
Qty: 60 TABLET | Refills: 0 | Status: SHIPPED | OUTPATIENT
Start: 2025-06-12 | End: 2025-07-12

## 2025-06-12 RX ORDER — NEOMYCIN/BACITRACIN/POLYMYXINB 3.5-400-5K
OINTMENT (GRAM) TOPICAL PRN
OUTPATIENT
Start: 2025-06-12

## 2025-06-12 RX ORDER — BETAMETHASONE DIPROPIONATE 0.5 MG/G
CREAM TOPICAL PRN
OUTPATIENT
Start: 2025-06-12

## 2025-06-12 RX ORDER — GINSENG 100 MG
CAPSULE ORAL PRN
OUTPATIENT
Start: 2025-06-12

## 2025-06-12 RX ORDER — TRIAMCINOLONE ACETONIDE 1 MG/G
CREAM TOPICAL ONCE
Status: COMPLETED | OUTPATIENT
Start: 2025-06-12 | End: 2025-06-12

## 2025-06-12 RX ORDER — LIDOCAINE 50 MG/G
OINTMENT TOPICAL PRN
OUTPATIENT
Start: 2025-06-12

## 2025-06-12 RX ORDER — BACITRACIN ZINC AND POLYMYXIN B SULFATE 500; 1000 [USP'U]/G; [USP'U]/G
OINTMENT TOPICAL PRN
OUTPATIENT
Start: 2025-06-12

## 2025-06-12 RX ORDER — MUPIROCIN 2 %
OINTMENT (GRAM) TOPICAL PRN
OUTPATIENT
Start: 2025-06-12

## 2025-06-12 RX ORDER — CLOBETASOL PROPIONATE 0.5 MG/G
OINTMENT TOPICAL PRN
OUTPATIENT
Start: 2025-06-12

## 2025-06-12 RX ORDER — SODIUM CHLOR/HYPOCHLOROUS ACID 0.033 %
SOLUTION, IRRIGATION IRRIGATION PRN
OUTPATIENT
Start: 2025-06-12

## 2025-06-12 RX ORDER — TRIAMCINOLONE ACETONIDE 1 MG/G
OINTMENT TOPICAL PRN
OUTPATIENT
Start: 2025-06-12

## 2025-06-12 RX ADMIN — TRIAMCINOLONE ACETONIDE: 1 CREAM TOPICAL at 14:40

## 2025-06-12 RX ADMIN — Medication: at 14:21

## 2025-06-12 NOTE — WOUND CARE
Multilayer Compression Wrap   (Not Unna) Below the Knee    NAME:  Melania Dave  YOB: 1940  MEDICAL RECORD NUMBER:  [id number]  DATE:  6/12/2025    Multilayer compression wrap: Removed old Multilayer wrap if indicated and wash leg with mild soap/water.  Applied moisturizing agent to dry skin as needed.   Applied primary and secondary dressing as ordered.  Applied multilayered dressing below the knee to right lower leg.  Applied multilayered dressing below the knee to left lower leg.  Instructed patient/caregiver not to remove dressing and to keep it clean and dry.   Instructed patient/caregiver on complications to report to provider, such as pain, numbness in toes, heavy drainage, and slippage of dressing.  Instructed patient on purpose of compression dressing and on activity and exercise recommendations.      Electronically signed by Aliya Tillman RN on 6/12/2025 at 1:55 PM

## 2025-06-12 NOTE — WOUND CARE
Discharge Instructions for  [facility]  [address] [place] [address]  [address] [place] [address]  Phone [phone]   Fax [phone]      NAME:  Melania Dave  YOB: 1940  MEDICAL RECORD NUMBER:  [id number]  DATE:  6/12/2025    Return Appointment:   2 weeks with Parag Quezada, DO  Dressing changes: Monday and Thursday      Instructions:   Bilateral lower leg:  Cleanse with normal saline  Vaseline to lower legs  Calmoseptine to proximal lower legs  Triamcinolone Cream to Left Medial Lower Leg dry skin patch  Xeroform- apply to any open wound beds.   Cover with ABD pad  2 layer compression with wound and lower extremity assessment.  If there is any problem with the dressing (too tight, slides down, etc.) Patient to return to wound clinic to have re-wrapped by clinician.  Change dressing 2 times per week     Left 5th toe:  Cleanse with normal saline  Vashe moistened gauze application for 1 minute  Xeroform to wound bed  Cover with gauze and tape  Change 2 times per week at wound center     Continue Augmentin and Doxycycline : refills available at your pharmacy     Do not get wound wet in shower, pool or tub. May purchase cast cover at local pharmacy to keep dry in shower.     Increase protein to 100g daily for optimal wound healing. Aim for 30g protein per shake, two shakes a day.  Protein:   Egg ~ 6g  Yogurt ~ 15g  Half cup of cottage cheese ~ 15g  Chicken breast ~ 30g  [place] filet ~ 40g       Should you experience increased redness, swelling, pain, foul odor, size of wound(s), or have a temperature over 101 degrees please contact the Wound Healing Center at [phone] or if after hours contact your primary care physician or go to the hospital emergency department.    PLEASE NOTE: IF YOU ARE UNABLE TO OBTAIN WOUND SUPPLIES, CONTINUE TO USE THE SUPPLIES YOU HAVE AVAILABLE UNTIL YOU ARE ABLE TO REACH US. IT IS MOST IMPORTANT TO KEEP THE WOUND COVERED AT ALL

## 2025-06-12 NOTE — FLOWSHEET NOTE
06/12/25 1347   Right Leg Edema Point of Measurement   Leg circumference 31 cm   Ankle circumference 19 cm   Foot circumference 20.5 cm   Compression Therapy 2 layer compression wrap   Left Leg Edema Point of Measurement   Leg circumference 34 cm   Ankle circumference 20 cm   Foot circumference 21 cm   Compression Therapy 2 layer compression wrap   Wound 04/18/25 Toe (Comment  which one) Left #5 5th toe   Date First Assessed/Time First Assessed: 04/18/25 1119   Present on Original Admission: No  Wound Approximate Age at First Assessment (Weeks): 1 weeks  Location: (c) Toe (Comment  which one)  Wound Location Orientation: Left  Wound Description (Commen...   Wound Image     Wound Etiology Pressure Stage 4   Dressing Status Old drainage noted   Wound Cleansed Cleansed with saline;Vashe   Dressing/Treatment Hydrofera blue   Wound Length (cm) 0.1 cm   Wound Width (cm) 0.1 cm   Wound Depth (cm) 0.1 cm   Wound Surface Area (cm^2) 0.01 cm^2   Change in Wound Size % (l*w) 87.5   Wound Volume (cm^3) 0.001 cm^3   Wound Healing % 94   Wound Assessment Pink/red   Drainage Amount None (dry)   Yolanda-wound Assessment Fragile   Margins Attached edges   Wound Thickness Description not for Pressure Injury Full thickness   Pain Assessment   Pain Assessment None - Denies Pain

## 2025-06-12 NOTE — DISCHARGE INSTRUCTIONS
Bilateral lower leg:  Cleanse with normal saline  Vaseline to lower legs  Calmoseptine to proximal lower legs  Triamcinolone Cream to Left Medial Lower Leg dry skin patch  Xeroform- apply to any open wound beds.   Cover with ABD pad  2 layer compression with wound and lower extremity assessment.  If there is any problem with the dressing (too tight, slides down, etc.) Patient to return to wound clinic to have re-wrapped by clinician.  Change dressing 2 times per week     Left 5th toe:  Cleanse with normal saline  Vashe moistened gauze application for 1 minute  Xeroform to wound bed  Cover with gauze and tape  Change 2 times per week at wound center     Continue Augmentin and Doxycycline : refills available at your pharmacy     Do not get wound wet in shower, pool or tub. May purchase cast cover at local pharmacy to keep dry in shower.     Increase protein to 100g daily for optimal wound healing. Aim for 30g protein per shake, two shakes a day.  Protein:   Egg ~ 6g  Yogurt ~ 15g  Half cup of cottage cheese ~ 15g  Chicken breast ~ 30g  [place] filet ~ 40g

## 2025-06-16 ENCOUNTER — HOSPITAL ENCOUNTER (OUTPATIENT)
Dept: WOUND CARE | Age: 85
Discharge: HOME | End: 2025-06-16
Payer: MEDICARE

## 2025-06-16 VITALS
SYSTOLIC BLOOD PRESSURE: 133 MMHG | DIASTOLIC BLOOD PRESSURE: 83 MMHG | TEMPERATURE: 99.6 F | RESPIRATION RATE: 12 BRPM | HEART RATE: 60 BPM

## 2025-06-16 DIAGNOSIS — M86.9 OSTEOMYELITIS OF FIFTH TOE OF LEFT FOOT (HCC): ICD-10-CM

## 2025-06-16 DIAGNOSIS — I87.313 CHRONIC VENOUS HYPERTENSION (IDIOPATHIC) WITH ULCER OF BILATERAL LOWER EXTREMITY (HCC): ICD-10-CM

## 2025-06-16 DIAGNOSIS — L97.919 CHRONIC VENOUS HYPERTENSION (IDIOPATHIC) WITH ULCER OF BILATERAL LOWER EXTREMITY (HCC): ICD-10-CM

## 2025-06-16 DIAGNOSIS — L97.922 NON-PRESSURE CHRONIC ULCER OF LEFT LOWER LEG WITH FAT LAYER EXPOSED (HCC): ICD-10-CM

## 2025-06-16 DIAGNOSIS — L97.912 NON-PRESSURE CHRONIC ULCER OF LOWER LEG WITH FAT LAYER EXPOSED, RIGHT (HCC): ICD-10-CM

## 2025-06-16 DIAGNOSIS — R60.0 BILATERAL LOWER EXTREMITY EDEMA: Primary | ICD-10-CM

## 2025-06-16 DIAGNOSIS — L97.929 CHRONIC VENOUS HYPERTENSION (IDIOPATHIC) WITH ULCER OF BILATERAL LOWER EXTREMITY (HCC): ICD-10-CM

## 2025-06-16 PROCEDURE — 29581 APPL MULTLAYER CMPRN SYS LEG: CPT

## 2025-06-16 PROCEDURE — 6370000000 HC RX 637 (ALT 250 FOR IP): Performed by: FAMILY MEDICINE

## 2025-06-16 RX ORDER — CLOBETASOL PROPIONATE 0.5 MG/G
OINTMENT TOPICAL PRN
OUTPATIENT
Start: 2025-06-16

## 2025-06-16 RX ORDER — SODIUM CHLOR/HYPOCHLOROUS ACID 0.033 %
SOLUTION, IRRIGATION IRRIGATION PRN
OUTPATIENT
Start: 2025-06-16

## 2025-06-16 RX ORDER — LIDOCAINE 50 MG/G
OINTMENT TOPICAL PRN
OUTPATIENT
Start: 2025-06-16

## 2025-06-16 RX ORDER — SILVER SULFADIAZINE 10 MG/G
CREAM TOPICAL PRN
OUTPATIENT
Start: 2025-06-16

## 2025-06-16 RX ORDER — LIDOCAINE HYDROCHLORIDE 20 MG/ML
JELLY TOPICAL PRN
OUTPATIENT
Start: 2025-06-16

## 2025-06-16 RX ORDER — TRIAMCINOLONE ACETONIDE 1 MG/G
CREAM TOPICAL ONCE
Status: COMPLETED | OUTPATIENT
Start: 2025-06-16 | End: 2025-06-16

## 2025-06-16 RX ORDER — NEOMYCIN/BACITRACIN/POLYMYXINB 3.5-400-5K
OINTMENT (GRAM) TOPICAL PRN
OUTPATIENT
Start: 2025-06-16

## 2025-06-16 RX ORDER — SODIUM CHLOR/HYPOCHLOROUS ACID 0.033 %
SOLUTION, IRRIGATION IRRIGATION PRN
Status: DISCONTINUED | OUTPATIENT
Start: 2025-06-16 | End: 2025-06-17 | Stop reason: HOSPADM

## 2025-06-16 RX ORDER — LIDOCAINE HYDROCHLORIDE 40 MG/ML
SOLUTION TOPICAL PRN
OUTPATIENT
Start: 2025-06-16

## 2025-06-16 RX ORDER — BACITRACIN ZINC AND POLYMYXIN B SULFATE 500; 1000 [USP'U]/G; [USP'U]/G
OINTMENT TOPICAL PRN
OUTPATIENT
Start: 2025-06-16

## 2025-06-16 RX ORDER — GENTAMICIN SULFATE 1 MG/G
OINTMENT TOPICAL PRN
OUTPATIENT
Start: 2025-06-16

## 2025-06-16 RX ORDER — GINSENG 100 MG
CAPSULE ORAL PRN
OUTPATIENT
Start: 2025-06-16

## 2025-06-16 RX ORDER — LIDOCAINE 40 MG/G
CREAM TOPICAL PRN
OUTPATIENT
Start: 2025-06-16

## 2025-06-16 RX ORDER — BETAMETHASONE DIPROPIONATE 0.5 MG/G
CREAM TOPICAL PRN
OUTPATIENT
Start: 2025-06-16

## 2025-06-16 RX ORDER — TRIAMCINOLONE ACETONIDE 1 MG/G
OINTMENT TOPICAL PRN
OUTPATIENT
Start: 2025-06-16

## 2025-06-16 RX ORDER — MUPIROCIN 2 %
OINTMENT (GRAM) TOPICAL PRN
OUTPATIENT
Start: 2025-06-16

## 2025-06-16 RX ADMIN — Medication: at 14:40

## 2025-06-16 RX ADMIN — TRIAMCINOLONE ACETONIDE: 1 CREAM TOPICAL at 14:41

## 2025-06-16 NOTE — WOUND CARE
Multilayer Compression Wrap   (Not Unna) Below the Knee    NAME:  Melania Dave  YOB: 1940  MEDICAL RECORD NUMBER:  [id number]  DATE:  6/16/2025    Multilayer compression wrap: Removed old Multilayer wrap if indicated and wash leg with mild soap/water.  Applied moisturizing agent to dry skin as needed.   Applied primary and secondary dressing as ordered.  Applied multilayered dressing below the knee to right lower leg.  Applied multilayered dressing below the knee to left lower leg.  Instructed patient/caregiver not to remove dressing and to keep it clean and dry.   Instructed patient/caregiver on complications to report to provider, such as pain, numbness in toes, heavy drainage, and slippage of dressing.  Instructed patient on purpose of compression dressing and on activity and exercise recommendations.      Electronically signed by Aliya Tillman RN on 6/16/2025 at 2:44 PM

## 2025-06-16 NOTE — FLOWSHEET NOTE
06/16/25 1442   Right Leg Edema Point of Measurement   Leg circumference 31 cm   Ankle circumference 18 cm   Foot circumference 21 cm   Compression Therapy 2 layer compression wrap   Left Leg Edema Point of Measurement   Leg circumference 34.5 cm   Ankle circumference 21 cm   Foot circumference 21 cm   Compression Therapy 2 layer compression wrap   Wound 04/18/25 Toe (Comment  which one) Left #5 5th toe   Date First Assessed/Time First Assessed: 04/18/25 1119   Present on Original Admission: No  Wound Approximate Age at First Assessment (Weeks): 1 weeks  Location: (c) Toe (Comment  which one)  Wound Location Orientation: Left  Wound Description (Commen...   Wound Image    Wound Etiology Pressure Stage 4   Dressing Status Dry;Intact;Clean   Wound Cleansed Vashe   Dressing/Treatment Xeroform   Wound Length (cm) 0.1 cm   Wound Width (cm) 0.1 cm   Wound Depth (cm) 0.1 cm   Wound Surface Area (cm^2) 0.01 cm^2   Change in Wound Size % (l*w) 87.5   Wound Volume (cm^3) 0.001 cm^3   Wound Healing % 94   Wound Assessment Pink/red;Epithelialization   Drainage Amount None (dry)   Yolanda-wound Assessment Fragile   Margins Attached edges   Wound Thickness Description not for Pressure Injury Full thickness   Pain Assessment   Pain Assessment None - Denies Pain     Patient takes ASA 81 daily.  Clinician visit only.

## 2025-06-19 ENCOUNTER — HOSPITAL ENCOUNTER (OUTPATIENT)
Dept: WOUND CARE | Age: 85
Discharge: HOME | End: 2025-06-19
Payer: MEDICARE

## 2025-06-19 VITALS
DIASTOLIC BLOOD PRESSURE: 73 MMHG | HEART RATE: 53 BPM | SYSTOLIC BLOOD PRESSURE: 117 MMHG | RESPIRATION RATE: 16 BRPM | TEMPERATURE: 97.7 F

## 2025-06-19 DIAGNOSIS — L97.912 NON-PRESSURE CHRONIC ULCER OF LOWER LEG WITH FAT LAYER EXPOSED, RIGHT (HCC): Primary | ICD-10-CM

## 2025-06-19 DIAGNOSIS — R60.0 BILATERAL LOWER EXTREMITY EDEMA: Chronic | ICD-10-CM

## 2025-06-19 DIAGNOSIS — L97.929 CHRONIC VENOUS HYPERTENSION (IDIOPATHIC) WITH ULCER OF BILATERAL LOWER EXTREMITY (HCC): Chronic | ICD-10-CM

## 2025-06-19 DIAGNOSIS — L97.919 CHRONIC VENOUS HYPERTENSION (IDIOPATHIC) WITH ULCER OF BILATERAL LOWER EXTREMITY (HCC): Chronic | ICD-10-CM

## 2025-06-19 DIAGNOSIS — I87.313 CHRONIC VENOUS HYPERTENSION (IDIOPATHIC) WITH ULCER OF BILATERAL LOWER EXTREMITY (HCC): Chronic | ICD-10-CM

## 2025-06-19 DIAGNOSIS — L97.922 NON-PRESSURE CHRONIC ULCER OF LEFT LOWER LEG WITH FAT LAYER EXPOSED (HCC): ICD-10-CM

## 2025-06-19 DIAGNOSIS — M86.9 OSTEOMYELITIS OF FIFTH TOE OF LEFT FOOT (HCC): Chronic | ICD-10-CM

## 2025-06-19 PROCEDURE — 29581 APPL MULTLAYER CMPRN SYS LEG: CPT

## 2025-06-19 NOTE — WOUND CARE
Multilayer Compression Wrap   (Not Unna) Below the Knee    NAME:  Melanai Dave  YOB: 1940  MEDICAL RECORD NUMBER:  [id number]  DATE:  6/19/2025    Multilayer compression wrap: Removed old Multilayer wrap if indicated and wash leg with mild soap/water.  Applied moisturizing agent to dry skin as needed.   Applied primary and secondary dressing as ordered.  Applied multilayered dressing below the knee to right lower leg.  Applied multilayered dressing below the knee to left lower leg.  Instructed patient/caregiver not to remove dressing and to keep it clean and dry.   Instructed patient/caregiver on complications to report to provider, such as pain, numbness in toes, heavy drainage, and slippage of dressing.  Instructed patient on purpose of compression dressing and on activity and exercise recommendations.      Electronically signed by Rosa Galeas RN on 6/19/2025 at 11:18 AM

## 2025-06-23 ENCOUNTER — HOSPITAL ENCOUNTER (OUTPATIENT)
Dept: WOUND CARE | Age: 85
Discharge: HOME | End: 2025-06-23
Payer: MEDICARE

## 2025-06-23 VITALS
HEART RATE: 93 BPM | SYSTOLIC BLOOD PRESSURE: 138 MMHG | DIASTOLIC BLOOD PRESSURE: 78 MMHG | RESPIRATION RATE: 16 BRPM | TEMPERATURE: 98 F

## 2025-06-23 PROCEDURE — 29581 APPL MULTLAYER CMPRN SYS LEG: CPT

## 2025-06-23 NOTE — FLOWSHEET NOTE
06/23/25 1357   Right Leg Edema Point of Measurement   Leg circumference 31.5 cm   Ankle circumference 19.5 cm   Foot circumference 22.5 cm   Compression Therapy 2 layer compression wrap   Left Leg Edema Point of Measurement   Leg circumference 32.5 cm   Ankle circumference 22 cm   Foot circumference 22 cm   Compression Therapy 2 layer compression wrap   Wound 04/18/25 Toe (Comment  which one) Left #5 5th toe   Date First Assessed/Time First Assessed: 04/18/25 1119   Present on Original Admission: No  Wound Approximate Age at First Assessment (Weeks): 1 weeks  Location: (c) Toe (Comment  which one)  Wound Location Orientation: Left  Wound Description (Commen...   Wound Image    Wound Etiology Pressure Stage 4   Dressing Status Intact   Wound Cleansed Soap and water   Dressing/Treatment Xeroform   Wound Length (cm) 0 cm   Wound Width (cm) 0 cm   Wound Depth (cm) 0 cm   Wound Surface Area (cm^2) 0 cm^2   Change in Wound Size % (l*w) 100   Wound Volume (cm^3) 0 cm^3   Wound Healing % 100   Wound Assessment Epithelialization   Drainage Amount None (dry)   Odor None   Yolanda-wound Assessment Fragile   Margins Attached edges   Wound Thickness Description not for Pressure Injury Full thickness   Pain Assessment   Pain Assessment 0-10   Pain Level 4   Patient's Stated Pain Goal 0 - No pain   Pain Location Leg   Pain Orientation Right   Pain Descriptors Other (Comment)  (\"feels like shin splints\")     Patient takes aspirin

## 2025-06-23 NOTE — WOUND CARE
Multilayer Compression Wrap   (Not Unna) Below the Knee    NAME:  Melania Dave  YOB: 1940  MEDICAL RECORD NUMBER:  [id number]  DATE:  6/23/2025    Multilayer compression wrap: Removed old Multilayer wrap if indicated and wash leg with mild soap/water.  Applied moisturizing agent to dry skin as needed.   Applied primary and secondary dressing as ordered.  Applied multilayered dressing below the knee to right lower leg.  Applied multilayered dressing below the knee to left lower leg.  Instructed patient/caregiver not to remove dressing and to keep it clean and dry.   Instructed patient/caregiver on complications to report to provider, such as pain, numbness in toes, heavy drainage, and slippage of dressing.  Instructed patient on purpose of compression dressing and on activity and exercise recommendations.      Electronically signed by AMBER TILLMAN RN on 6/23/2025 at 1:59 PM

## 2025-06-26 ENCOUNTER — HOSPITAL ENCOUNTER (OUTPATIENT)
Dept: WOUND CARE | Age: 85
Discharge: HOME | End: 2025-06-26
Payer: MEDICARE

## 2025-06-26 VITALS
DIASTOLIC BLOOD PRESSURE: 66 MMHG | SYSTOLIC BLOOD PRESSURE: 134 MMHG | OXYGEN SATURATION: 96 % | TEMPERATURE: 98.2 F | HEART RATE: 60 BPM | RESPIRATION RATE: 16 BRPM

## 2025-06-26 DIAGNOSIS — L97.922 NON-PRESSURE CHRONIC ULCER OF LEFT LOWER LEG WITH FAT LAYER EXPOSED (HCC): ICD-10-CM

## 2025-06-26 DIAGNOSIS — L97.929 CHRONIC VENOUS HYPERTENSION (IDIOPATHIC) WITH ULCER OF BILATERAL LOWER EXTREMITY (HCC): ICD-10-CM

## 2025-06-26 DIAGNOSIS — R60.0 BILATERAL LOWER EXTREMITY EDEMA: Primary | ICD-10-CM

## 2025-06-26 DIAGNOSIS — L97.912 NON-PRESSURE CHRONIC ULCER OF LOWER LEG WITH FAT LAYER EXPOSED, RIGHT (HCC): ICD-10-CM

## 2025-06-26 DIAGNOSIS — I87.313 CHRONIC VENOUS HYPERTENSION (IDIOPATHIC) WITH ULCER OF BILATERAL LOWER EXTREMITY (HCC): ICD-10-CM

## 2025-06-26 DIAGNOSIS — M86.9 OSTEOMYELITIS OF FIFTH TOE OF LEFT FOOT (HCC): ICD-10-CM

## 2025-06-26 DIAGNOSIS — L97.919 CHRONIC VENOUS HYPERTENSION (IDIOPATHIC) WITH ULCER OF BILATERAL LOWER EXTREMITY (HCC): ICD-10-CM

## 2025-06-26 PROCEDURE — 11044 DBRDMT BONE 1ST 20 SQ CM/<: CPT

## 2025-06-26 RX ORDER — CLOBETASOL PROPIONATE 0.5 MG/G
OINTMENT TOPICAL PRN
OUTPATIENT
Start: 2025-06-26

## 2025-06-26 RX ORDER — BETAMETHASONE DIPROPIONATE 0.5 MG/G
CREAM TOPICAL PRN
OUTPATIENT
Start: 2025-06-26

## 2025-06-26 RX ORDER — GENTAMICIN SULFATE 1 MG/G
OINTMENT TOPICAL PRN
OUTPATIENT
Start: 2025-06-26

## 2025-06-26 RX ORDER — LIDOCAINE 50 MG/G
OINTMENT TOPICAL PRN
OUTPATIENT
Start: 2025-06-26

## 2025-06-26 RX ORDER — LIDOCAINE 40 MG/G
CREAM TOPICAL PRN
OUTPATIENT
Start: 2025-06-26

## 2025-06-26 RX ORDER — SODIUM CHLOR/HYPOCHLOROUS ACID 0.033 %
SOLUTION, IRRIGATION IRRIGATION PRN
Status: DISCONTINUED | OUTPATIENT
Start: 2025-06-26 | End: 2025-06-27 | Stop reason: HOSPADM

## 2025-06-26 RX ORDER — SODIUM CHLOR/HYPOCHLOROUS ACID 0.033 %
SOLUTION, IRRIGATION IRRIGATION PRN
OUTPATIENT
Start: 2025-06-26

## 2025-06-26 RX ORDER — NEOMYCIN/BACITRACIN/POLYMYXINB 3.5-400-5K
OINTMENT (GRAM) TOPICAL PRN
OUTPATIENT
Start: 2025-06-26

## 2025-06-26 RX ORDER — MUPIROCIN 2 %
OINTMENT (GRAM) TOPICAL PRN
OUTPATIENT
Start: 2025-06-26

## 2025-06-26 RX ORDER — GINSENG 100 MG
CAPSULE ORAL PRN
OUTPATIENT
Start: 2025-06-26

## 2025-06-26 RX ORDER — TRIAMCINOLONE ACETONIDE 1 MG/G
OINTMENT TOPICAL PRN
OUTPATIENT
Start: 2025-06-26

## 2025-06-26 RX ORDER — LIDOCAINE HYDROCHLORIDE 20 MG/ML
JELLY TOPICAL PRN
Status: DISCONTINUED | OUTPATIENT
Start: 2025-06-26 | End: 2025-06-27 | Stop reason: HOSPADM

## 2025-06-26 RX ORDER — LIDOCAINE HYDROCHLORIDE 40 MG/ML
SOLUTION TOPICAL PRN
OUTPATIENT
Start: 2025-06-26

## 2025-06-26 RX ORDER — LIDOCAINE HYDROCHLORIDE 20 MG/ML
JELLY TOPICAL PRN
OUTPATIENT
Start: 2025-06-26

## 2025-06-26 RX ORDER — BACITRACIN ZINC AND POLYMYXIN B SULFATE 500; 1000 [USP'U]/G; [USP'U]/G
OINTMENT TOPICAL PRN
OUTPATIENT
Start: 2025-06-26

## 2025-06-26 RX ORDER — SILVER SULFADIAZINE 10 MG/G
CREAM TOPICAL PRN
OUTPATIENT
Start: 2025-06-26

## 2025-06-26 RX ADMIN — LIDOCAINE HYDROCHLORIDE: 20 JELLY TOPICAL at 14:19

## 2025-06-26 RX ADMIN — Medication: at 14:20

## 2025-06-26 NOTE — FLOWSHEET NOTE
Pre and Post Debridement Notes  Silver Nitrate application post debridement.   06/26/25 1406   Right Leg Edema Point of Measurement   Leg circumference 36 cm   Ankle circumference 19 cm   Foot circumference 21 cm   Compression Therapy 2 layer compression wrap   Left Leg Edema Point of Measurement   Leg circumference 30 cm   Ankle circumference 20 cm   Foot circumference 20 cm   Compression Therapy 2 layer compression wrap   Wound 04/18/25 Toe (Comment  which one) Left #5 5th toe   Date First Assessed/Time First Assessed: 04/18/25 1119   Present on Original Admission: No  Wound Approximate Age at First Assessment (Weeks): 1 weeks  Location: (c) Toe (Comment  which one)  Wound Location Orientation: Left  Wound Description (Commen...   Wound Image    Wound Etiology Pressure Stage 4   Dressing Status New drainage noted   Wound Cleansed Cleansed with saline;Vashe   Dressing/Treatment Xeroform   Wound Length (cm) 0.2 cm   Wound Width (cm) 0.1 cm   Wound Depth (cm) 0.1 cm   Wound Surface Area (cm^2) 0.02 cm^2   Change in Wound Size % (l*w) 75   Wound Volume (cm^3) 0.002 cm^3   Wound Healing % 88   Post-Procedure Length (cm) 0.2 cm   Post-Procedure Width (cm) 0.2 cm   Post-Procedure Depth (cm) 0.2 cm   Post-Procedure Surface Area (cm^2) 0.04 cm^2   Post-Procedure Volume (cm^3) 0.008 cm^3   Wound Assessment Pink/red   Drainage Amount Scant (moist but unmeasurable)   Drainage Description Serosanguinous   Odor None   Yolanda-wound Assessment Hyperkeratosis (callous)   Wound Thickness Description not for Pressure Injury Full thickness   Pain Assessment   Pain Assessment None - Denies Pain     Patient takes ASA.

## 2025-06-26 NOTE — DISCHARGE INSTRUCTIONS
Bilateral lower leg:  Vaseline to lower legs  Compression Stockings (Zipper Device) 24/7    Left 5th toe: (bone fragment removed 06/26/2025)  Cleanse with normal saline  Vashe moistened gauze application for 1 minute  Hydrofera Ready to wound bed  Cover with gauze and tape  Dressing change 3x per week at home. (Friend to assist).     Continue Augmentin and Doxycycline : continue as prescribed.   End Date 7/12/2025     Do not get wound wet in shower, pool or tub. May purchase cast cover at local pharmacy to keep dry in shower.     Increase protein to 100g daily for optimal wound healing. Aim for 30g protein per shake, two shakes a day.  Protein:   Egg ~ 6g  Yogurt ~ 15g  Half cup of cottage cheese ~ 15g  Chicken breast ~ 30g  [place] filet ~ 40g     Silver Nitrate application x 1    Please take additional Lasix 20 mg 2 x this week (this evening and Sunday).

## 2025-06-26 NOTE — WOUND CARE
Discharge Instructions for  [facility]  [address]  [address] [place] [address]  Phone [phone]   Fax [phone]      NAME:  Melania Dave  YOB: 1940  MEDICAL RECORD NUMBER:  [id number]  DATE:  6/26/2025    Return Appointment:   2 weeks with Parag Quezada DO      Instructions:   Bilateral lower leg:  Vaseline to lower legs  Compression Stockings (Zipper Device) 24/7    Left 5th toe: (bone fragment removed 06/26/2025)  Cleanse with normal saline  Vashe moistened gauze application for 1 minute  Hydrofera Ready to wound bed  Cover with gauze and tape  Dressing change 3x per week at home. (Friend to assist).     Continue Augmentin and Doxycycline : continue as prescribed.   End Date 7/12/2025     Do not get wound wet in shower, pool or tub. May purchase cast cover at local pharmacy to keep dry in shower.     Increase protein to 100g daily for optimal wound healing. Aim for 30g protein per shake, two shakes a day.  Protein:   Egg ~ 6g  Yogurt ~ 15g  Half cup of cottage cheese ~ 15g  Chicken breast ~ 30g  [place] filet ~ 40g     Silver Nitrate application x 1    Please take additional Lasix 20 mg 2 x this week (this evening and Sunday).           Should you experience increased redness, swelling, pain, foul odor, size of wound(s), or have a temperature over 101 degrees please contact the [facility] Healing Center at [phone] or if after hours contact your primary care physician or go to the hospital emergency department.    PLEASE NOTE: IF YOU ARE UNABLE TO OBTAIN WOUND SUPPLIES, CONTINUE TO USE THE SUPPLIES YOU HAVE AVAILABLE UNTIL YOU ARE ABLE TO REACH US. IT IS MOST IMPORTANT TO KEEP THE WOUND COVERED AT ALL TIMES.    Electronically signed Aliya Tillman RN on 6/26/2025 at 2:21 PM

## 2025-06-26 NOTE — FLOWSHEET NOTE
Post Debridement Note   06/26/25 1406   Right Leg Edema Point of Measurement   Leg circumference 36 cm   Ankle circumference 19 cm   Foot circumference 21 cm   Compression Therapy 2 layer compression wrap   Left Leg Edema Point of Measurement   Leg circumference 30 cm   Ankle circumference 20 cm   Foot circumference 20 cm   Compression Therapy 2 layer compression wrap   Wound 04/18/25 Toe (Comment  which one) Left #5 5th toe   Date First Assessed/Time First Assessed: 04/18/25 1119   Present on Original Admission: No  Wound Approximate Age at First Assessment (Weeks): 1 weeks  Location: (c) Toe (Comment  which one)  Wound Location Orientation: Left  Wound Description (Commen...   Wound Image     Wound Etiology Pressure Stage 4   Dressing Status New drainage noted   Wound Cleansed Cleansed with saline;Vashe   Dressing/Treatment Xeroform   Wound Length (cm) 0.2 cm   Wound Width (cm) 0.1 cm   Wound Depth (cm) 0.1 cm   Wound Surface Area (cm^2) 0.02 cm^2   Change in Wound Size % (l*w) 75   Wound Volume (cm^3) 0.002 cm^3   Wound Healing % 88   Post-Procedure Length (cm) 0.2 cm   Post-Procedure Width (cm) 0.5 cm   Post-Procedure Depth (cm) 0.3 cm   Post-Procedure Surface Area (cm^2) 0.1 cm^2   Post-Procedure Volume (cm^3) 0.03 cm^3   Wound Assessment Pink/red   Drainage Amount Scant (moist but unmeasurable)   Drainage Description Serosanguinous   Odor None   Yolanda-wound Assessment Hyperkeratosis (callous)   Wound Thickness Description not for Pressure Injury Full thickness   Pain Assessment   Pain Assessment None - Denies Pain

## 2025-07-10 ENCOUNTER — HOSPITAL ENCOUNTER (OUTPATIENT)
Dept: WOUND CARE | Age: 85
Discharge: HOME | End: 2025-07-10
Payer: MEDICARE

## 2025-07-10 VITALS
BODY MASS INDEX: 23.39 KG/M2 | SYSTOLIC BLOOD PRESSURE: 123 MMHG | HEIGHT: 64 IN | HEART RATE: 66 BPM | DIASTOLIC BLOOD PRESSURE: 64 MMHG | WEIGHT: 137 LBS | RESPIRATION RATE: 16 BRPM | TEMPERATURE: 98.2 F

## 2025-07-10 DIAGNOSIS — M86.9 OSTEOMYELITIS OF FIFTH TOE OF LEFT FOOT (HCC): ICD-10-CM

## 2025-07-10 DIAGNOSIS — L97.929 CHRONIC VENOUS HYPERTENSION (IDIOPATHIC) WITH ULCER OF BILATERAL LOWER EXTREMITY (HCC): ICD-10-CM

## 2025-07-10 DIAGNOSIS — L97.912 NON-PRESSURE CHRONIC ULCER OF LOWER LEG WITH FAT LAYER EXPOSED, RIGHT (HCC): ICD-10-CM

## 2025-07-10 DIAGNOSIS — I87.313 CHRONIC VENOUS HYPERTENSION (IDIOPATHIC) WITH ULCER OF BILATERAL LOWER EXTREMITY (HCC): ICD-10-CM

## 2025-07-10 DIAGNOSIS — L97.922 NON-PRESSURE CHRONIC ULCER OF LEFT LOWER LEG WITH FAT LAYER EXPOSED (HCC): ICD-10-CM

## 2025-07-10 DIAGNOSIS — R60.0 BILATERAL LOWER EXTREMITY EDEMA: Primary | ICD-10-CM

## 2025-07-10 DIAGNOSIS — L97.919 CHRONIC VENOUS HYPERTENSION (IDIOPATHIC) WITH ULCER OF BILATERAL LOWER EXTREMITY (HCC): ICD-10-CM

## 2025-07-10 PROCEDURE — 11042 DBRDMT SUBQ TIS 1ST 20SQCM/<: CPT

## 2025-07-10 RX ORDER — LIDOCAINE HYDROCHLORIDE 20 MG/ML
JELLY TOPICAL PRN
OUTPATIENT
Start: 2025-07-10

## 2025-07-10 RX ORDER — LIDOCAINE HYDROCHLORIDE 20 MG/ML
JELLY TOPICAL PRN
Status: DISCONTINUED | OUTPATIENT
Start: 2025-07-10 | End: 2025-07-11 | Stop reason: HOSPADM

## 2025-07-10 RX ORDER — SODIUM CHLOR/HYPOCHLOROUS ACID 0.033 %
SOLUTION, IRRIGATION IRRIGATION PRN
OUTPATIENT
Start: 2025-07-10

## 2025-07-10 RX ORDER — BACITRACIN ZINC AND POLYMYXIN B SULFATE 500; 1000 [USP'U]/G; [USP'U]/G
OINTMENT TOPICAL PRN
OUTPATIENT
Start: 2025-07-10

## 2025-07-10 RX ORDER — GENTAMICIN SULFATE 1 MG/G
OINTMENT TOPICAL PRN
OUTPATIENT
Start: 2025-07-10

## 2025-07-10 RX ORDER — LIDOCAINE 50 MG/G
OINTMENT TOPICAL PRN
OUTPATIENT
Start: 2025-07-10

## 2025-07-10 RX ORDER — GINSENG 100 MG
CAPSULE ORAL PRN
OUTPATIENT
Start: 2025-07-10

## 2025-07-10 RX ORDER — TRIAMCINOLONE ACETONIDE 1 MG/G
OINTMENT TOPICAL PRN
OUTPATIENT
Start: 2025-07-10

## 2025-07-10 RX ORDER — NEOMYCIN/BACITRACIN/POLYMYXINB 3.5-400-5K
OINTMENT (GRAM) TOPICAL PRN
OUTPATIENT
Start: 2025-07-10

## 2025-07-10 RX ORDER — SODIUM CHLOR/HYPOCHLOROUS ACID 0.033 %
SOLUTION, IRRIGATION IRRIGATION PRN
Status: DISCONTINUED | OUTPATIENT
Start: 2025-07-10 | End: 2025-07-11 | Stop reason: HOSPADM

## 2025-07-10 RX ORDER — DOXYCYCLINE HYCLATE 100 MG
100 TABLET ORAL 2 TIMES DAILY
Qty: 60 TABLET | Refills: 0 | Status: SHIPPED | OUTPATIENT
Start: 2025-07-10 | End: 2025-08-09

## 2025-07-10 RX ORDER — LIDOCAINE 40 MG/G
CREAM TOPICAL PRN
OUTPATIENT
Start: 2025-07-10

## 2025-07-10 RX ORDER — CLOBETASOL PROPIONATE 0.5 MG/G
OINTMENT TOPICAL PRN
OUTPATIENT
Start: 2025-07-10

## 2025-07-10 RX ORDER — MUPIROCIN 2 %
OINTMENT (GRAM) TOPICAL PRN
OUTPATIENT
Start: 2025-07-10

## 2025-07-10 RX ORDER — BETAMETHASONE DIPROPIONATE 0.5 MG/G
CREAM TOPICAL PRN
OUTPATIENT
Start: 2025-07-10

## 2025-07-10 RX ORDER — LIDOCAINE HYDROCHLORIDE 40 MG/ML
SOLUTION TOPICAL PRN
OUTPATIENT
Start: 2025-07-10

## 2025-07-10 RX ORDER — SILVER SULFADIAZINE 10 MG/G
CREAM TOPICAL PRN
OUTPATIENT
Start: 2025-07-10

## 2025-07-10 RX ADMIN — LIDOCAINE HYDROCHLORIDE: 20 JELLY TOPICAL at 14:06

## 2025-07-10 RX ADMIN — Medication: at 14:07

## 2025-07-10 NOTE — WOUND CARE
Discharge Instructions for  [facility]  [address] [facility] [address] [facility] [facility]  [address]  [place] [address]  Phone [phone]   Fax [phone]      NAME:  Melania Dave  YOB: 1940  MEDICAL RECORD NUMBER:  [id number]  DATE:  7/10/2025    Return Appointment:   2 weeks with Parag Quezada, DO  Dressing changes 2x weekly with clinician.      Instructions:   Bilateral lower leg:  Cleanse with Normal Saline  Apply Vashe moistened gauze 1 min post debridement and with dressing changes.  Vaseline to lower legs  PolyMem to wound beds  2 layer compression with wound and lower extremity assessment.  If there is any problem with the dressing (too tight, slides down, etc.) Patient to return to wound clinic to have re-wrapped by clinician.   Dressing change 2x weekly     Left 5th toe: (bone fragment removed 06/26/2025)  Cleanse with normal saline  Vashe moistened gauze application for 1 minute  Hydrofera Ready to wound bed  Cover with gauze and tape  Dressing change 3x per week at home. (Friend to assist).     Continue Augmentin and Doxycycline : continue as prescribed.     Do not get wound wet in shower, pool or tub. May purchase cast cover at local pharmacy to keep dry in shower.     Increase protein to 100g daily for optimal wound healing. Aim for 30g protein per shake, two shakes a day.  Protein:   Egg ~ 6g  Yogurt ~ 15g  Half cup of cottage cheese ~ 15g  Chicken breast ~ 30g  [place] filet ~ 40g     Continue water pill as prescribed by PCP.  Please discuss with PCP about your water pill, since lower leg swelling persists with current medication, dosage, and frequency.          Should you experience increased redness, swelling, pain, foul odor, size of wound(s), or have a temperature over 101 degrees please contact the [facility] Healing Center at [phone] or if after hours contact your primary care physician or go to the hospital emergency department.    PLEASE NOTE: IF YOU ARE UNABLE TO

## 2025-07-10 NOTE — DISCHARGE INSTRUCTIONS
Bilateral lower leg:  Cleanse with Normal Saline  Apply Vashe moistened gauze 1 min post debridement and with dressing changes.  Vaseline to lower legs  PolyMem to wound beds  2 layer compression with wound and lower extremity assessment.  If there is any problem with the dressing (too tight, slides down, etc.) Patient to return to wound clinic to have re-wrapped by clinician.   Dressing change 2x weekly     Left 5th toe: (bone fragment removed 06/26/2025)  Cleanse with normal saline  Vashe moistened gauze application for 1 minute  Hydrofera Ready to wound bed  Cover with gauze and tape  Dressing change 3x per week at home. (Friend to assist).     Continue Augmentin and Doxycycline : continue as prescribed.     Do not get wound wet in shower, pool or tub. May purchase cast cover at local pharmacy to keep dry in shower.     Increase protein to 100g daily for optimal wound healing. Aim for 30g protein per shake, two shakes a day.  Protein:   Egg ~ 6g  Yogurt ~ 15g  Half cup of cottage cheese ~ 15g  Chicken breast ~ 30g  [place] filet ~ 40g     Continue water pill as prescribed by PCP.  Please discuss with PCP about your water pill, since lower leg swelling persists with current medication, dosage, and frequency.

## 2025-07-10 NOTE — WOUND CARE
Multilayer Compression Wrap   (Not Unna) Below the Knee    NAME:  Melania Dave  YOB: 1940  MEDICAL RECORD NUMBER:  [id number]  DATE:  7/10/2025    Multilayer compression wrap: Removed old Multilayer wrap if indicated and wash leg with mild soap/water.  Applied moisturizing agent to dry skin as needed.   Applied primary and secondary dressing as ordered.  Applied multilayered dressing below the knee to right lower leg.  Applied multilayered dressing below the knee to left lower leg.  Instructed patient/caregiver not to remove dressing and to keep it clean and dry.   Instructed patient/caregiver on complications to report to provider, such as pain, numbness in toes, heavy drainage, and slippage of dressing.  Instructed patient on purpose of compression dressing and on activity and exercise recommendations.      Electronically signed by Aliya Tillman RN on 7/10/2025 at 2:11 PM

## 2025-07-14 ENCOUNTER — HOSPITAL ENCOUNTER (OUTPATIENT)
Dept: WOUND CARE | Age: 85
Discharge: HOME | End: 2025-07-14
Payer: MEDICARE

## 2025-07-14 VITALS
DIASTOLIC BLOOD PRESSURE: 60 MMHG | WEIGHT: 137 LBS | SYSTOLIC BLOOD PRESSURE: 126 MMHG | HEART RATE: 87 BPM | TEMPERATURE: 98.6 F | HEIGHT: 64 IN | BODY MASS INDEX: 23.39 KG/M2 | RESPIRATION RATE: 16 BRPM

## 2025-07-14 DIAGNOSIS — L97.922 NON-PRESSURE CHRONIC ULCER OF LEFT LOWER LEG WITH FAT LAYER EXPOSED (HCC): ICD-10-CM

## 2025-07-14 DIAGNOSIS — R60.0 BILATERAL LOWER EXTREMITY EDEMA: Primary | ICD-10-CM

## 2025-07-14 DIAGNOSIS — M86.9 OSTEOMYELITIS OF FIFTH TOE OF LEFT FOOT (HCC): ICD-10-CM

## 2025-07-14 DIAGNOSIS — L97.912 NON-PRESSURE CHRONIC ULCER OF LOWER LEG WITH FAT LAYER EXPOSED, RIGHT (HCC): ICD-10-CM

## 2025-07-14 DIAGNOSIS — I87.313 CHRONIC VENOUS HYPERTENSION (IDIOPATHIC) WITH ULCER OF BILATERAL LOWER EXTREMITY (HCC): ICD-10-CM

## 2025-07-14 DIAGNOSIS — L97.919 CHRONIC VENOUS HYPERTENSION (IDIOPATHIC) WITH ULCER OF BILATERAL LOWER EXTREMITY (HCC): ICD-10-CM

## 2025-07-14 DIAGNOSIS — L97.929 CHRONIC VENOUS HYPERTENSION (IDIOPATHIC) WITH ULCER OF BILATERAL LOWER EXTREMITY (HCC): ICD-10-CM

## 2025-07-14 PROCEDURE — 29581 APPL MULTLAYER CMPRN SYS LEG: CPT

## 2025-07-14 RX ORDER — LIDOCAINE 40 MG/G
CREAM TOPICAL PRN
Status: CANCELLED | OUTPATIENT
Start: 2025-07-14

## 2025-07-14 RX ORDER — BETAMETHASONE DIPROPIONATE 0.5 MG/G
CREAM TOPICAL PRN
Status: CANCELLED | OUTPATIENT
Start: 2025-07-14

## 2025-07-14 RX ORDER — CLOBETASOL PROPIONATE 0.5 MG/G
OINTMENT TOPICAL PRN
Status: CANCELLED | OUTPATIENT
Start: 2025-07-14

## 2025-07-14 RX ORDER — LIDOCAINE HYDROCHLORIDE 20 MG/ML
JELLY TOPICAL PRN
Status: CANCELLED | OUTPATIENT
Start: 2025-07-14

## 2025-07-14 RX ORDER — LIDOCAINE 50 MG/G
OINTMENT TOPICAL PRN
Status: CANCELLED | OUTPATIENT
Start: 2025-07-14

## 2025-07-14 RX ORDER — NEOMYCIN/BACITRACIN/POLYMYXINB 3.5-400-5K
OINTMENT (GRAM) TOPICAL PRN
Status: CANCELLED | OUTPATIENT
Start: 2025-07-14

## 2025-07-14 RX ORDER — BACITRACIN ZINC AND POLYMYXIN B SULFATE 500; 1000 [USP'U]/G; [USP'U]/G
OINTMENT TOPICAL PRN
Status: CANCELLED | OUTPATIENT
Start: 2025-07-14

## 2025-07-14 RX ORDER — LIDOCAINE HYDROCHLORIDE 40 MG/ML
SOLUTION TOPICAL PRN
Status: CANCELLED | OUTPATIENT
Start: 2025-07-14

## 2025-07-14 RX ORDER — TRIAMCINOLONE ACETONIDE 1 MG/G
OINTMENT TOPICAL PRN
Status: CANCELLED | OUTPATIENT
Start: 2025-07-14

## 2025-07-14 RX ORDER — GINSENG 100 MG
CAPSULE ORAL PRN
Status: CANCELLED | OUTPATIENT
Start: 2025-07-14

## 2025-07-14 RX ORDER — GENTAMICIN SULFATE 1 MG/G
OINTMENT TOPICAL PRN
Status: CANCELLED | OUTPATIENT
Start: 2025-07-14

## 2025-07-14 RX ORDER — SILVER SULFADIAZINE 10 MG/G
CREAM TOPICAL PRN
Status: CANCELLED | OUTPATIENT
Start: 2025-07-14

## 2025-07-14 RX ORDER — SODIUM CHLOR/HYPOCHLOROUS ACID 0.033 %
SOLUTION, IRRIGATION IRRIGATION PRN
Status: CANCELLED | OUTPATIENT
Start: 2025-07-14

## 2025-07-14 RX ORDER — MUPIROCIN 2 %
OINTMENT (GRAM) TOPICAL PRN
Status: CANCELLED | OUTPATIENT
Start: 2025-07-14

## 2025-07-14 NOTE — FLOWSHEET NOTE
07/14/25 1536   Right Leg Edema Point of Measurement   Leg circumference 29 cm   Ankle circumference 19.5 cm   Foot circumference 20 cm   Compression Therapy 2 layer compression wrap   Left Leg Edema Point of Measurement   Leg circumference 32.5 cm   Ankle circumference 22.5 cm   Foot circumference 20 cm   Compression Therapy 2 layer compression wrap   Wound 07/10/25 Leg Left;Medial #4   Date First Assessed/Time First Assessed: 07/10/25 1346   Present on Original Admission: Yes  Wound Approximate Age at First Assessment (Weeks): 2 weeks  Primary Wound Type: Vascular Ulcer  Secondary Wound Type - Vascular Ulcer: Venous  Location: Leg  ...   Wound Image    Wound Etiology Venous   Dressing Status Old drainage noted   Wound Cleansed Soap and water   Dressing/Treatment Other (comment)  (polymem)   Wound Length (cm) 0.5 cm   Wound Width (cm) 0.5 cm   Wound Depth (cm) 0.1 cm   Wound Surface Area (cm^2) 0.25 cm^2   Change in Wound Size % (l*w) 75   Wound Volume (cm^3) 0.025 cm^3   Wound Healing % 75   Wound Assessment Pink/red   Drainage Amount Small (< 25%)   Drainage Description Serosanguinous   Odor None   Yolanda-wound Assessment Fragile   Wound Thickness Description not for Pressure Injury Full thickness   Wound 04/18/25 Toe (Comment  which one) Left #5 5th toe   Date First Assessed/Time First Assessed: 04/18/25 1119   Present on Original Admission: No  Wound Approximate Age at First Assessment (Weeks): 1 weeks  Location: (c) Toe (Comment  which one)  Wound Location Orientation: Left  Wound Description (Commen...   Wound Image    Wound Etiology Pressure Stage 4   Dressing Status Intact   Wound Cleansed Soap and water   Dressing/Treatment Hydrofera blue   Wound Length (cm) 0.1 cm   Wound Width (cm) 0.1 cm   Wound Depth (cm) 0.1 cm   Wound Surface Area (cm^2) 0.01 cm^2   Change in Wound Size % (l*w) 87.5   Wound Volume (cm^3) 0.001 cm^3   Wound Healing % 94   Wound Assessment Pink/red   Drainage Amount Scant (moist but

## 2025-07-14 NOTE — WOUND CARE
Multilayer Compression Wrap   (Not Unna) Below the Knee    NAME:  Melania Dave  YOB: 1940  MEDICAL RECORD NUMBER:  [id number]  DATE:  7/14/2025    Multilayer compression wrap: Removed old Multilayer wrap if indicated and wash leg with mild soap/water.  Applied moisturizing agent to dry skin as needed.   Applied primary and secondary dressing as ordered.  Applied multilayered dressing below the knee to right lower leg.  Applied multilayered dressing below the knee to left lower leg.  Instructed patient/caregiver not to remove dressing and to keep it clean and dry.   Instructed patient/caregiver on complications to report to provider, such as pain, numbness in toes, heavy drainage, and slippage of dressing.  Instructed patient on purpose of compression dressing and on activity and exercise recommendations.      Electronically signed by Lilly Delgado RN on 7/14/2025 at 3:42 PM

## 2025-07-18 ENCOUNTER — HOSPITAL ENCOUNTER (OUTPATIENT)
Dept: WOUND CARE | Age: 85
Discharge: HOME | End: 2025-07-18
Payer: MEDICARE

## 2025-07-18 VITALS
RESPIRATION RATE: 18 BRPM | TEMPERATURE: 98.2 F | SYSTOLIC BLOOD PRESSURE: 116 MMHG | DIASTOLIC BLOOD PRESSURE: 55 MMHG | OXYGEN SATURATION: 95 % | HEART RATE: 63 BPM

## 2025-07-18 DIAGNOSIS — M86.9 OSTEOMYELITIS OF FIFTH TOE OF LEFT FOOT (HCC): Chronic | ICD-10-CM

## 2025-07-18 DIAGNOSIS — L97.919 CHRONIC VENOUS HYPERTENSION (IDIOPATHIC) WITH ULCER OF BILATERAL LOWER EXTREMITY (HCC): Chronic | ICD-10-CM

## 2025-07-18 DIAGNOSIS — L97.929 CHRONIC VENOUS HYPERTENSION (IDIOPATHIC) WITH ULCER OF BILATERAL LOWER EXTREMITY (HCC): Chronic | ICD-10-CM

## 2025-07-18 DIAGNOSIS — L97.922 NON-PRESSURE CHRONIC ULCER OF LEFT LOWER LEG WITH FAT LAYER EXPOSED (HCC): ICD-10-CM

## 2025-07-18 DIAGNOSIS — L97.912 NON-PRESSURE CHRONIC ULCER OF LOWER LEG WITH FAT LAYER EXPOSED, RIGHT (HCC): Primary | ICD-10-CM

## 2025-07-18 DIAGNOSIS — R60.0 BILATERAL LOWER EXTREMITY EDEMA: Chronic | ICD-10-CM

## 2025-07-18 DIAGNOSIS — I87.313 CHRONIC VENOUS HYPERTENSION (IDIOPATHIC) WITH ULCER OF BILATERAL LOWER EXTREMITY (HCC): Chronic | ICD-10-CM

## 2025-07-18 PROCEDURE — 29581 APPL MULTLAYER CMPRN SYS LEG: CPT

## 2025-07-18 RX ORDER — TRIAMCINOLONE ACETONIDE 1 MG/G
OINTMENT TOPICAL PRN
OUTPATIENT
Start: 2025-07-18

## 2025-07-18 RX ORDER — CLOBETASOL PROPIONATE 0.5 MG/G
OINTMENT TOPICAL PRN
OUTPATIENT
Start: 2025-07-18

## 2025-07-18 RX ORDER — BACITRACIN ZINC AND POLYMYXIN B SULFATE 500; 1000 [USP'U]/G; [USP'U]/G
OINTMENT TOPICAL PRN
OUTPATIENT
Start: 2025-07-18

## 2025-07-18 RX ORDER — GENTAMICIN SULFATE 1 MG/G
OINTMENT TOPICAL PRN
OUTPATIENT
Start: 2025-07-18

## 2025-07-18 RX ORDER — SILVER SULFADIAZINE 10 MG/G
CREAM TOPICAL PRN
OUTPATIENT
Start: 2025-07-18

## 2025-07-18 RX ORDER — LIDOCAINE 50 MG/G
OINTMENT TOPICAL PRN
OUTPATIENT
Start: 2025-07-18

## 2025-07-18 RX ORDER — MUPIROCIN 2 %
OINTMENT (GRAM) TOPICAL PRN
OUTPATIENT
Start: 2025-07-18

## 2025-07-18 RX ORDER — LIDOCAINE HYDROCHLORIDE 40 MG/ML
SOLUTION TOPICAL PRN
OUTPATIENT
Start: 2025-07-18

## 2025-07-18 RX ORDER — NEOMYCIN/BACITRACIN/POLYMYXINB 3.5-400-5K
OINTMENT (GRAM) TOPICAL PRN
OUTPATIENT
Start: 2025-07-18

## 2025-07-18 RX ORDER — LIDOCAINE 40 MG/G
CREAM TOPICAL PRN
OUTPATIENT
Start: 2025-07-18

## 2025-07-18 RX ORDER — LIDOCAINE HYDROCHLORIDE 20 MG/ML
JELLY TOPICAL PRN
OUTPATIENT
Start: 2025-07-18

## 2025-07-18 RX ORDER — GINSENG 100 MG
CAPSULE ORAL PRN
OUTPATIENT
Start: 2025-07-18

## 2025-07-18 RX ORDER — BETAMETHASONE DIPROPIONATE 0.5 MG/G
CREAM TOPICAL PRN
OUTPATIENT
Start: 2025-07-18

## 2025-07-18 RX ORDER — SODIUM CHLOR/HYPOCHLOROUS ACID 0.033 %
SOLUTION, IRRIGATION IRRIGATION PRN
OUTPATIENT
Start: 2025-07-18

## 2025-07-18 NOTE — WOUND CARE
Multilayer Compression Wrap   (Not Unna) Below the Knee    NAME:  Melania Dave  YOB: 1940  MEDICAL RECORD NUMBER:  [id number]  DATE:  7/18/2025    Multilayer compression wrap: Removed old Multilayer wrap if indicated and wash leg with mild soap/water.  Applied moisturizing agent to dry skin as needed.   Applied primary and secondary dressing as ordered.  Applied multilayered dressing below the knee to right lower leg.  Applied multilayered dressing below the knee to left lower leg.  Instructed patient/caregiver not to remove dressing and to keep it clean and dry.   Instructed patient/caregiver on complications to report to provider, such as pain, numbness in toes, heavy drainage, and slippage of dressing.  Instructed patient on purpose of compression dressing and on activity and exercise recommendations.      Electronically signed by Hunter Wilkes RN on 7/18/2025 at 2:00 PM

## 2025-07-18 NOTE — FLOWSHEET NOTE
Drainage Amount Small (< 25%)   Drainage Description Serosanguinous   Odor None   Yolanda-wound Assessment Fragile   Margins Attached edges   Wound Thickness Description not for Pressure Injury Full thickness   Wound 04/18/25 Toe (Comment  which one) Left #5 5th toe   Date First Assessed/Time First Assessed: 04/18/25 1119   Present on Original Admission: No  Wound Approximate Age at First Assessment (Weeks): 1 weeks  Location: (c) Toe (Comment  which one)  Wound Location Orientation: Left  Wound Description (Commen...   Wound Image    Wound Etiology Pressure Stage 4   Dressing Status Intact   Wound Cleansed Soap and water   Dressing/Treatment Hydrofera blue   Wound Length (cm) 0.1 cm   Wound Width (cm) 0.1 cm   Wound Depth (cm) 0.1 cm   Wound Surface Area (cm^2) 0.01 cm^2   Change in Wound Size % (l*w) 87.5   Wound Volume (cm^3) 0.001 cm^3   Wound Healing % 94   Wound Assessment Pink/red   Drainage Amount Scant (moist but unmeasurable)   Drainage Description Serosanguinous   Odor None   Yolanda-wound Assessment Dry/flaky   Margins Attached edges   Wound Thickness Description not for Pressure Injury Full thickness   Pain Assessment   Pain Assessment None - Denies Pain     Taking ASA

## 2025-07-24 ENCOUNTER — HOSPITAL ENCOUNTER (OUTPATIENT)
Dept: WOUND CARE | Age: 85
Discharge: HOME | End: 2025-07-24
Payer: MEDICARE

## 2025-07-24 VITALS
HEIGHT: 64 IN | BODY MASS INDEX: 22.2 KG/M2 | RESPIRATION RATE: 18 BRPM | SYSTOLIC BLOOD PRESSURE: 116 MMHG | HEART RATE: 57 BPM | TEMPERATURE: 97.1 F | WEIGHT: 130 LBS | DIASTOLIC BLOOD PRESSURE: 78 MMHG

## 2025-07-24 DIAGNOSIS — L97.929 CHRONIC VENOUS HYPERTENSION (IDIOPATHIC) WITH ULCER OF BILATERAL LOWER EXTREMITY (HCC): Chronic | ICD-10-CM

## 2025-07-24 DIAGNOSIS — R60.0 BILATERAL LOWER EXTREMITY EDEMA: Chronic | ICD-10-CM

## 2025-07-24 DIAGNOSIS — I87.313 CHRONIC VENOUS HYPERTENSION (IDIOPATHIC) WITH ULCER OF BILATERAL LOWER EXTREMITY (HCC): Chronic | ICD-10-CM

## 2025-07-24 DIAGNOSIS — L97.912 NON-PRESSURE CHRONIC ULCER OF LOWER LEG WITH FAT LAYER EXPOSED, RIGHT (HCC): Primary | ICD-10-CM

## 2025-07-24 DIAGNOSIS — L97.919 CHRONIC VENOUS HYPERTENSION (IDIOPATHIC) WITH ULCER OF BILATERAL LOWER EXTREMITY (HCC): Chronic | ICD-10-CM

## 2025-07-24 DIAGNOSIS — L97.922 NON-PRESSURE CHRONIC ULCER OF LEFT LOWER LEG WITH FAT LAYER EXPOSED (HCC): ICD-10-CM

## 2025-07-24 DIAGNOSIS — M86.9 OSTEOMYELITIS OF FIFTH TOE OF LEFT FOOT (HCC): Chronic | ICD-10-CM

## 2025-07-24 PROCEDURE — 99212 OFFICE O/P EST SF 10 MIN: CPT

## 2025-07-24 NOTE — FLOWSHEET NOTE
07/24/25 1315   Right Leg Edema Point of Measurement   Leg circumference 29.5 cm   Ankle circumference 21 cm   Foot circumference 21 cm   Compression Therapy 2 layer compression wrap   Left Leg Edema Point of Measurement   Leg circumference 30 cm   Ankle circumference 23 cm   Foot circumference 20 cm   Compression Therapy 2 layer compression wrap   Wound 07/10/25 Leg Right;Lower;Medial #3   Date First Assessed/Time First Assessed: 07/10/25 1345   Wound Approximate Age at First Assessment (Weeks): 2 weeks  Primary Wound Type: Vascular Ulcer  Secondary Wound Type - Vascular Ulcer: Venous  Location: Leg  Wound Location Orientation: Right;Lo...   Wound Image    Wound Etiology Venous   Dressing Status Dry   Wound Cleansed Soap and water   Dressing/Treatment Other (comment)  (polymem)   Wound Length (cm) 1 cm   Wound Width (cm) 0.9 cm   Wound Depth (cm) 0.1 cm   Wound Surface Area (cm^2) 0.9 cm^2   Change in Wound Size % (l*w) -157.14   Wound Volume (cm^3) 0.09 cm^3   Wound Healing % -157   Wound Assessment Epithelialization   Drainage Amount None (dry)   Odor None   Yolanda-wound Assessment Dry/flaky   Margins Attached edges   [REMOVED] Wound 07/10/25 Leg Left;Medial #4   Final Assessment Date/Final Assessment Time: 07/24/25 1343  Date First Assessed/Time First Assessed: 07/10/25 1346   Present on Original Admission: Yes  Wound Approximate Age at First Assessment (Weeks): 2 weeks  Primary Wound Type: Vascular Ulcer  Se...   Wound Image    Wound Etiology Venous   Dressing Status Old drainage noted   Wound Cleansed Soap and water   Dressing/Treatment Other (comment)  (polymem)   Wound Length (cm) 0 cm   Wound Width (cm) 0 cm   Wound Depth (cm) 0 cm   Wound Surface Area (cm^2) 0 cm^2   Change in Wound Size % (l*w) 100   Wound Volume (cm^3) 0 cm^3   Wound Healing % 100   Wound Assessment Epithelialization   Drainage Amount None (dry)   Odor None   Yolanda-wound Assessment Fragile   Margins Attached edges   Wound 04/18/25 Toe

## 2025-07-24 NOTE — WOUND CARE
Discharge Instructions for  [facility]  [address]  [address]  [place] [address]  Phone [phone]   Fax [phone]      NAME:  Melania Dave  YOB: 1940  MEDICAL RECORD NUMBER:  [id number]  DATE:  7/24/2025    Return Appointment:   1 week with Parag Quezada DO      Instructions:   Bilateral lower leg:  Cleanse with Normal Saline  Apply Vashe moistened gauze 1 min post debridement and with dressing changes.  Vaseline to lower legs  Xeroform to wound beds  Cover with abd pad  Secure with rolled guaze  Wear tubigrip from knees to toes     Left 5th toe: (bone fragment removed 06/26/2025)  Cleanse with normal saline  Vashe moistened gauze application for 1 minute  Xeroform to wound bed  Cover with gauze and tape  Dressing change daily     Continue Augmentin and Doxycycline : continue as prescribed.   Referral for Dr. Palmer placed this visit with POA. [address] is the office nearby.     Do not get wound wet in shower, pool or tub. May purchase cast cover at local pharmacy to keep dry in shower.     Increase protein to 100g daily for optimal wound healing. Aim for 30g protein per shake, two shakes a day.  Protein:   Egg ~ 6g  Yogurt ~ 15g  Half cup of cottage cheese ~ 15g  Chicken breast ~ 30g  [place] filet ~ 40g     Continue water pill as prescribed by PCP.  Please discuss with PCP about your water pill, since lower leg swelling persists with current medication, dosage, and frequency.        Should you experience increased redness, swelling, pain, foul odor, size of wound(s), or have a temperature over 101 degrees please contact the [facility] Center at [phone] or if after hours contact your primary care physician or go to the hospital emergency department.    PLEASE NOTE: IF YOU ARE UNABLE TO OBTAIN WOUND SUPPLIES, CONTINUE TO USE THE SUPPLIES YOU HAVE AVAILABLE UNTIL YOU ARE ABLE TO REACH US. IT IS MOST IMPORTANT TO KEEP THE WOUND COVERED

## 2025-07-25 NOTE — PROGRESS NOTES
[facility]   [facility]   Medical Staff Progress Note     Melania Dave  MEDICAL RECORD NUMBER:  [id number]  AGE: 85 y.o.   GENDER: female  : 1940  EPISODE DATE:  2025    Chief complaint and reason for visit:     Chief Complaint   Patient presents with    Wound Check     Bilateral legs and left 5th toe      Patient presenting for follow up evaluation of wound(s) per chief complaint.  Patient here follow-up regarding the left fifth toe osteomyelitis.  Tolerating current antibiotics.  Wounds been improving.  Very slight drainage now.  Patient has the skin tear areas in lower extremities that are improving.  Lower extremity edema is better with compression    Subjective and ROS: Symptoms, wound related issues, or other pertinent wound history since last visit: no new wound care issues. Tolerating current treatment. No systemic complaints above baseline.    History of Wound Context: Per original history and physical on this patient. Changes in history since last evaluation: none    Medical Decision Making:     Problem List Items Addressed This Visit          Circulatory    Chronic venous hypertension (idiopathic) with ulcer of bilateral lower extremity (HCC) (Chronic)       Musculoskeletal and Integument    Osteomyelitis of fifth toe of left foot (HCC) (Chronic)       Other    Bilateral lower extremity edema (Chronic)    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, right (HCC) - Primary    Non-pressure chronic ulcer of left lower leg with fat layer exposed (HCC)       Wounds and Treatment Plan:  Today exploring the fifth toe there was some fullness.  With probing the wound with a Q-tip moderate amount of purulent material was expelled from this today.  Abscess area was drained.  Still able to probe to bone.  Patient failed outpatient conservative treatment and recommend amputation of fifth toe.  Will get orthopedic

## 2025-07-28 ENCOUNTER — OFFICE VISIT (OUTPATIENT)
Dept: INTERNAL MEDICINE CLINIC | Facility: CLINIC | Age: 85
End: 2025-07-28

## 2025-07-28 VITALS
DIASTOLIC BLOOD PRESSURE: 64 MMHG | WEIGHT: 131.2 LBS | BODY MASS INDEX: 22.4 KG/M2 | SYSTOLIC BLOOD PRESSURE: 124 MMHG | HEIGHT: 64 IN

## 2025-07-28 DIAGNOSIS — R60.0 BILATERAL LOWER EXTREMITY EDEMA: Chronic | ICD-10-CM

## 2025-07-28 DIAGNOSIS — I10 ESSENTIAL HYPERTENSION: Primary | ICD-10-CM

## 2025-07-28 DIAGNOSIS — J42 CHRONIC BRONCHITIS, UNSPECIFIED CHRONIC BRONCHITIS TYPE (HCC): ICD-10-CM

## 2025-07-28 DIAGNOSIS — N18.32 CHRONIC KIDNEY DISEASE, STAGE 3B (HCC): ICD-10-CM

## 2025-07-28 DIAGNOSIS — E03.9 ACQUIRED HYPOTHYROIDISM: ICD-10-CM

## 2025-07-28 DIAGNOSIS — G60.9 HEREDITARY AND IDIOPATHIC NEUROPATHY: ICD-10-CM

## 2025-07-28 PROBLEM — L98.9 SKIN LESION OF LEFT ARM: Status: RESOLVED | Noted: 2023-04-18 | Resolved: 2025-07-28

## 2025-07-28 PROBLEM — L03.116 CELLULITIS OF LEFT FOOT: Status: RESOLVED | Noted: 2024-06-20 | Resolved: 2025-07-28

## 2025-07-28 RX ORDER — GABAPENTIN 100 MG/1
100 CAPSULE ORAL 2 TIMES DAILY
Qty: 180 CAPSULE | Refills: 1 | Status: SHIPPED | OUTPATIENT
Start: 2025-07-28 | End: 2026-01-24

## 2025-07-28 RX ORDER — GABAPENTIN 100 MG/1
100 CAPSULE ORAL NIGHTLY
Qty: 90 CAPSULE | Refills: 1 | Status: SHIPPED | OUTPATIENT
Start: 2025-07-28 | End: 2025-07-28 | Stop reason: DRUGHIGH

## 2025-07-28 ASSESSMENT — ENCOUNTER SYMPTOMS: BACK PAIN: 1

## 2025-07-28 NOTE — PROGRESS NOTES
HPI: Melania Dave (: 1940)    States the IRIS is helping with her pain and would like to try it twice daily    Still going for wound care and needs her left 5th toe removed    Had another SCC removed from arm          Problem List:  Patient Active Problem List   Diagnosis    Palpitations    Esophageal reflux    Murmur    IBS (irritable bowel syndrome)    Insomnia, unspecified    Gastritis    ASCVD (arteriosclerotic cardiovascular disease)    Chronic left shoulder pain    Leg cramps    Spinal stenosis of lumbar region with neurogenic claudication    Rotator cuff tear, left    Varicose veins of both legs with edema    Chronic kidney disease, stage 3b (HCC)    Essential hypertension    COPD (chronic obstructive pulmonary disease) (HCC)    Vitamin D deficiency    Osteoporosis, post-menopausal    Chronic bilateral low back pain with bilateral sciatica    Chronic chest wall pain    Nephrolithiasis    Mixed hyperlipidemia    Polymyalgia rheumatica    Difficulty swallowing    Hypothyroidism    Lower extremity edema    Weakness of right leg    Skin atrophy    Atrophia cutis senilis    Sun-damaged skin    History of skin cancer    Cigarette smoker    Long-term corticosteroid use    Adverse effect of statin    At high risk for injury related to fall    Bladder prolapse, female, acquired    Hypoxia    Influenza    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, right (HCC)    Bilateral lower extremity edema    Chronic venous hypertension (idiopathic) with ulcer of bilateral lower extremity (HCC)    Non-pressure chronic ulcer of left lower leg with fat layer exposed (HCC)    Toe ulcer, left, with necrosis of bone (HCC)    Hereditary and idiopathic neuropathy    Lymphedema    Osteomyelitis of fifth toe of left foot (HCC)       History:  Past Medical History:   Diagnosis Date    Adverse effect of anesthesia     hypotension with anesthesia    Back pain     lower back    CAD (coronary artery

## 2025-07-30 ENCOUNTER — HOSPITAL ENCOUNTER (OUTPATIENT)
Dept: WOUND CARE | Age: 85
Discharge: HOME | End: 2025-07-30
Payer: MEDICARE

## 2025-07-30 VITALS
TEMPERATURE: 97.6 F | BODY MASS INDEX: 22.36 KG/M2 | DIASTOLIC BLOOD PRESSURE: 63 MMHG | SYSTOLIC BLOOD PRESSURE: 120 MMHG | HEIGHT: 64 IN | HEART RATE: 66 BPM | RESPIRATION RATE: 18 BRPM | WEIGHT: 131 LBS

## 2025-07-30 DIAGNOSIS — R60.0 BILATERAL LOWER EXTREMITY EDEMA: Primary | ICD-10-CM

## 2025-07-30 DIAGNOSIS — L97.922 NON-PRESSURE CHRONIC ULCER OF LEFT LOWER LEG WITH FAT LAYER EXPOSED (HCC): ICD-10-CM

## 2025-07-30 DIAGNOSIS — L97.912 NON-PRESSURE CHRONIC ULCER OF LOWER LEG WITH FAT LAYER EXPOSED, RIGHT (HCC): ICD-10-CM

## 2025-07-30 DIAGNOSIS — L97.919 CHRONIC VENOUS HYPERTENSION (IDIOPATHIC) WITH ULCER OF BILATERAL LOWER EXTREMITY (HCC): ICD-10-CM

## 2025-07-30 DIAGNOSIS — I87.313 CHRONIC VENOUS HYPERTENSION (IDIOPATHIC) WITH ULCER OF BILATERAL LOWER EXTREMITY (HCC): ICD-10-CM

## 2025-07-30 DIAGNOSIS — M86.9 OSTEOMYELITIS OF FIFTH TOE OF LEFT FOOT (HCC): ICD-10-CM

## 2025-07-30 DIAGNOSIS — L97.929 CHRONIC VENOUS HYPERTENSION (IDIOPATHIC) WITH ULCER OF BILATERAL LOWER EXTREMITY (HCC): ICD-10-CM

## 2025-07-30 PROCEDURE — 29581 APPL MULTLAYER CMPRN SYS LEG: CPT

## 2025-07-30 RX ORDER — BACITRACIN ZINC AND POLYMYXIN B SULFATE 500; 1000 [USP'U]/G; [USP'U]/G
OINTMENT TOPICAL PRN
OUTPATIENT
Start: 2025-07-30

## 2025-07-30 RX ORDER — LIDOCAINE HYDROCHLORIDE 20 MG/ML
JELLY TOPICAL PRN
OUTPATIENT
Start: 2025-07-30

## 2025-07-30 RX ORDER — LIDOCAINE HYDROCHLORIDE 40 MG/ML
SOLUTION TOPICAL PRN
OUTPATIENT
Start: 2025-07-30

## 2025-07-30 RX ORDER — TRIAMCINOLONE ACETONIDE 1 MG/G
OINTMENT TOPICAL PRN
OUTPATIENT
Start: 2025-07-30

## 2025-07-30 RX ORDER — LIDOCAINE 50 MG/G
OINTMENT TOPICAL PRN
OUTPATIENT
Start: 2025-07-30

## 2025-07-30 RX ORDER — BETAMETHASONE DIPROPIONATE 0.5 MG/G
CREAM TOPICAL PRN
OUTPATIENT
Start: 2025-07-30

## 2025-07-30 RX ORDER — SILVER SULFADIAZINE 10 MG/G
CREAM TOPICAL PRN
OUTPATIENT
Start: 2025-07-30

## 2025-07-30 RX ORDER — GINSENG 100 MG
CAPSULE ORAL PRN
OUTPATIENT
Start: 2025-07-30

## 2025-07-30 RX ORDER — MUPIROCIN 2 %
OINTMENT (GRAM) TOPICAL PRN
OUTPATIENT
Start: 2025-07-30

## 2025-07-30 RX ORDER — LIDOCAINE 40 MG/G
CREAM TOPICAL PRN
OUTPATIENT
Start: 2025-07-30

## 2025-07-30 RX ORDER — GENTAMICIN SULFATE 1 MG/G
OINTMENT TOPICAL PRN
OUTPATIENT
Start: 2025-07-30

## 2025-07-30 RX ORDER — SODIUM CHLOR/HYPOCHLOROUS ACID 0.033 %
SOLUTION, IRRIGATION IRRIGATION PRN
OUTPATIENT
Start: 2025-07-30

## 2025-07-30 RX ORDER — NEOMYCIN/BACITRACIN/POLYMYXINB 3.5-400-5K
OINTMENT (GRAM) TOPICAL PRN
OUTPATIENT
Start: 2025-07-30

## 2025-07-30 RX ORDER — CLOBETASOL PROPIONATE 0.5 MG/G
OINTMENT TOPICAL PRN
OUTPATIENT
Start: 2025-07-30

## 2025-07-30 RX ORDER — SODIUM CHLOR/HYPOCHLOROUS ACID 0.033 %
SOLUTION, IRRIGATION IRRIGATION PRN
Status: DISCONTINUED | OUTPATIENT
Start: 2025-07-30 | End: 2025-07-31 | Stop reason: HOSPADM

## 2025-07-30 RX ORDER — LIDOCAINE HYDROCHLORIDE 20 MG/ML
JELLY TOPICAL PRN
Status: DISCONTINUED | OUTPATIENT
Start: 2025-07-30 | End: 2025-07-31 | Stop reason: HOSPADM

## 2025-07-30 RX ADMIN — Medication: at 14:15

## 2025-07-30 RX ADMIN — LIDOCAINE HYDROCHLORIDE: 20 JELLY TOPICAL at 14:16

## 2025-07-30 NOTE — WOUND CARE
Discharge Instructions for  [facility]  [address]  [address]  [place] [address]  Phone [phone]   Fax [phone]      NAME:  Melania Dave  YOB: 1940  MEDICAL RECORD NUMBER:  [id number]  DATE:  7/30/2025    Return Appointment:   2 weeks with Parag Quezada, DO  Change twice weekly in office     Instructions:   Bilateral lower legs and left 5th toe:  Cleanse with Normal Saline  Apply Vashe moistened gauze 1 min post debridement and with dressing changes.  Vaseline to lower legs  Xeroform to wound beds  Cover with abd pad  Secure with 2 layer compression wraps twice weekly in office    Continue Augmentin and Doxycycline : continue as prescribed.   Velcro Compression wraps ordered this visit.   Referral for Dr. Palmer placed at previous visit with POA. [facility] is the office nearby. [phone]     Do not get wound wet in shower, pool or tub. May purchase cast cover at local pharmacy to keep dry in shower.     Increase protein to 100g daily for optimal wound healing. Aim for 30g protein per shake, two shakes a day.  Protein:   Egg ~ 6g  Yogurt ~ 15g  Half cup of cottage cheese ~ 15g  Chicken breast ~ 30g  [place] filet ~ 40g     Continue water pill as prescribed by PCP.  Please discuss with PCP about your water pill, since lower leg swelling persists with current medication, dosage, and frequency.        Should you experience increased redness, swelling, pain, foul odor, size of wound(s), or have a temperature over 101 degrees please contact the Wound Healing Center at [phone] or if after hours contact your primary care physician or go to the hospital emergency department.    PLEASE NOTE: IF YOU ARE UNABLE TO OBTAIN WOUND SUPPLIES, CONTINUE TO USE THE SUPPLIES YOU HAVE AVAILABLE UNTIL YOU ARE ABLE TO REACH US. IT IS MOST IMPORTANT TO KEEP THE WOUND COVERED AT ALL TIMES.    Electronically signed OLE DELACRUZ RN on 7/30/2025 at 2:40 PM

## 2025-07-30 NOTE — WOUND CARE
Multilayer Compression Wrap   (Not Unna) Below the Knee    NAME:  Melania Dave  YOB: 1940  MEDICAL RECORD NUMBER:  [id number]  DATE:  7/30/2025    Multilayer compression wrap: Removed old Multilayer wrap if indicated and wash leg with mild soap/water.  Applied moisturizing agent to dry skin as needed.   Applied primary and secondary dressing as ordered.  Applied multilayered dressing below the knee to right lower leg.  Applied multilayered dressing below the knee to left lower leg.  Instructed patient/caregiver not to remove dressing and to keep it clean and dry.   Instructed patient/caregiver on complications to report to provider, such as pain, numbness in toes, heavy drainage, and slippage of dressing.  Instructed patient on purpose of compression dressing and on activity and exercise recommendations.      Electronically signed by OLE DELACRUZ RN on 7/30/2025 at 2:44 PM

## 2025-07-30 NOTE — FLOWSHEET NOTE
07/30/25 1415   Right Leg Edema Point of Measurement   Leg circumference 33 cm   Ankle circumference 23 cm   Foot circumference 22 cm   Compression Therapy 2 layer compression wrap   Left Leg Edema Point of Measurement   Leg circumference 37 cm   Ankle circumference 25 cm   Foot circumference 22 cm   Compression Therapy 2 layer compression wrap   Wound 07/10/25 Leg Right;Lower;Medial #3   Date First Assessed/Time First Assessed: 07/10/25 1345   Wound Approximate Age at First Assessment (Weeks): 2 weeks  Primary Wound Type: Vascular Ulcer  Secondary Wound Type - Vascular Ulcer: Venous  Location: Leg  Wound Location Orientation: Right;Lo...   Wound Image    Wound Etiology Venous   Dressing Status Dry   Wound Cleansed Soap and water   Dressing/Treatment Open to air   Wound Length (cm) 1 cm   Wound Width (cm) 0.9 cm   Wound Depth (cm) 0.1 cm   Wound Surface Area (cm^2) 0.9 cm^2   Change in Wound Size % (l*w) -157.14   Wound Volume (cm^3) 0.09 cm^3   Wound Healing % -157   Wound Assessment Dry   Drainage Amount None (dry)   Odor None   Yolanda-wound Assessment Dry/flaky   Margins Attached edges   Wound Thickness Description not for Pressure Injury Full thickness   Wound 04/18/25 Toe (Comment  which one) Left #5 5th toe   Date First Assessed/Time First Assessed: 04/18/25 1119   Present on Original Admission: No  Wound Approximate Age at First Assessment (Weeks): 1 weeks  Location: (c) Toe (Comment  which one)  Wound Location Orientation: Left  Wound Description (Commen...   Wound Image    Wound Etiology Pressure Stage 4   Dressing Status Intact   Wound Cleansed Soap and water   Dressing/Treatment Xeroform   Wound Length (cm) 0.1 cm   Wound Width (cm) 0.1 cm   Wound Depth (cm) 0.2 cm   Wound Surface Area (cm^2) 0.01 cm^2   Change in Wound Size % (l*w) 87.5   Wound Volume (cm^3) 0.002 cm^3   Wound Healing % 88   Wound Assessment Dry;Exposed structure bone   Drainage Amount None (dry)   Odor None   Yolanda-wound Assessment

## 2025-08-04 ENCOUNTER — HOSPITAL ENCOUNTER (OUTPATIENT)
Dept: WOUND CARE | Age: 85
Discharge: HOME | End: 2025-08-04
Payer: MEDICARE

## 2025-08-04 VITALS
HEART RATE: 68 BPM | DIASTOLIC BLOOD PRESSURE: 66 MMHG | WEIGHT: 131 LBS | RESPIRATION RATE: 18 BRPM | BODY MASS INDEX: 22.36 KG/M2 | SYSTOLIC BLOOD PRESSURE: 112 MMHG | HEIGHT: 64 IN | TEMPERATURE: 98.1 F

## 2025-08-04 DIAGNOSIS — L97.922 NON-PRESSURE CHRONIC ULCER OF LEFT LOWER LEG WITH FAT LAYER EXPOSED (HCC): ICD-10-CM

## 2025-08-04 DIAGNOSIS — I87.313 CHRONIC VENOUS HYPERTENSION (IDIOPATHIC) WITH ULCER OF BILATERAL LOWER EXTREMITY (HCC): Chronic | ICD-10-CM

## 2025-08-04 DIAGNOSIS — L97.912 NON-PRESSURE CHRONIC ULCER OF LOWER LEG WITH FAT LAYER EXPOSED, RIGHT (HCC): Primary | ICD-10-CM

## 2025-08-04 DIAGNOSIS — L97.919 CHRONIC VENOUS HYPERTENSION (IDIOPATHIC) WITH ULCER OF BILATERAL LOWER EXTREMITY (HCC): Chronic | ICD-10-CM

## 2025-08-04 DIAGNOSIS — L97.929 CHRONIC VENOUS HYPERTENSION (IDIOPATHIC) WITH ULCER OF BILATERAL LOWER EXTREMITY (HCC): Chronic | ICD-10-CM

## 2025-08-04 DIAGNOSIS — R60.0 BILATERAL LOWER EXTREMITY EDEMA: Chronic | ICD-10-CM

## 2025-08-04 DIAGNOSIS — M86.9 OSTEOMYELITIS OF FIFTH TOE OF LEFT FOOT (HCC): Chronic | ICD-10-CM

## 2025-08-04 PROCEDURE — 29581 APPL MULTLAYER CMPRN SYS LEG: CPT

## 2025-08-04 RX ORDER — LIDOCAINE HYDROCHLORIDE 40 MG/ML
SOLUTION TOPICAL PRN
OUTPATIENT
Start: 2025-08-04

## 2025-08-04 RX ORDER — LIDOCAINE 50 MG/G
OINTMENT TOPICAL PRN
OUTPATIENT
Start: 2025-08-04

## 2025-08-04 RX ORDER — CLOBETASOL PROPIONATE 0.5 MG/G
OINTMENT TOPICAL PRN
OUTPATIENT
Start: 2025-08-04

## 2025-08-04 RX ORDER — MUPIROCIN 2 %
OINTMENT (GRAM) TOPICAL PRN
OUTPATIENT
Start: 2025-08-04

## 2025-08-04 RX ORDER — BETAMETHASONE DIPROPIONATE 0.5 MG/G
CREAM TOPICAL PRN
OUTPATIENT
Start: 2025-08-04

## 2025-08-04 RX ORDER — LIDOCAINE 40 MG/G
CREAM TOPICAL PRN
OUTPATIENT
Start: 2025-08-04

## 2025-08-04 RX ORDER — GINSENG 100 MG
CAPSULE ORAL PRN
OUTPATIENT
Start: 2025-08-04

## 2025-08-04 RX ORDER — GENTAMICIN SULFATE 1 MG/G
OINTMENT TOPICAL PRN
OUTPATIENT
Start: 2025-08-04

## 2025-08-04 RX ORDER — TRIAMCINOLONE ACETONIDE 1 MG/G
OINTMENT TOPICAL PRN
OUTPATIENT
Start: 2025-08-04

## 2025-08-04 RX ORDER — BACITRACIN ZINC AND POLYMYXIN B SULFATE 500; 1000 [USP'U]/G; [USP'U]/G
OINTMENT TOPICAL PRN
OUTPATIENT
Start: 2025-08-04

## 2025-08-04 RX ORDER — SILVER SULFADIAZINE 10 MG/G
CREAM TOPICAL PRN
OUTPATIENT
Start: 2025-08-04

## 2025-08-04 RX ORDER — SODIUM CHLOR/HYPOCHLOROUS ACID 0.033 %
SOLUTION, IRRIGATION IRRIGATION PRN
Status: CANCELLED | OUTPATIENT
Start: 2025-08-04

## 2025-08-04 RX ORDER — LIDOCAINE HYDROCHLORIDE 20 MG/ML
JELLY TOPICAL PRN
OUTPATIENT
Start: 2025-08-04

## 2025-08-04 RX ORDER — NEOMYCIN/BACITRACIN/POLYMYXINB 3.5-400-5K
OINTMENT (GRAM) TOPICAL PRN
OUTPATIENT
Start: 2025-08-04

## 2025-08-04 ASSESSMENT — PAIN SCALES - GENERAL: PAINLEVEL_OUTOF10: 3

## 2025-08-07 ENCOUNTER — HOSPITAL ENCOUNTER (OUTPATIENT)
Dept: WOUND CARE | Age: 85
Discharge: HOME | End: 2025-08-07

## 2025-08-07 VITALS
TEMPERATURE: 98.2 F | HEART RATE: 61 BPM | HEIGHT: 64 IN | BODY MASS INDEX: 22.36 KG/M2 | DIASTOLIC BLOOD PRESSURE: 50 MMHG | WEIGHT: 131 LBS | RESPIRATION RATE: 18 BRPM | SYSTOLIC BLOOD PRESSURE: 114 MMHG | OXYGEN SATURATION: 96 %

## 2025-08-07 DIAGNOSIS — L97.912 NON-PRESSURE CHRONIC ULCER OF LOWER LEG WITH FAT LAYER EXPOSED, RIGHT (HCC): ICD-10-CM

## 2025-08-07 DIAGNOSIS — R60.0 BILATERAL LOWER EXTREMITY EDEMA: Primary | ICD-10-CM

## 2025-08-07 DIAGNOSIS — I87.313 CHRONIC VENOUS HYPERTENSION (IDIOPATHIC) WITH ULCER OF BILATERAL LOWER EXTREMITY (HCC): ICD-10-CM

## 2025-08-07 DIAGNOSIS — L97.929 CHRONIC VENOUS HYPERTENSION (IDIOPATHIC) WITH ULCER OF BILATERAL LOWER EXTREMITY (HCC): ICD-10-CM

## 2025-08-07 DIAGNOSIS — L97.919 CHRONIC VENOUS HYPERTENSION (IDIOPATHIC) WITH ULCER OF BILATERAL LOWER EXTREMITY (HCC): ICD-10-CM

## 2025-08-07 DIAGNOSIS — L97.922 NON-PRESSURE CHRONIC ULCER OF LEFT LOWER LEG WITH FAT LAYER EXPOSED (HCC): ICD-10-CM

## 2025-08-07 DIAGNOSIS — M86.9 OSTEOMYELITIS OF FIFTH TOE OF LEFT FOOT (HCC): ICD-10-CM

## 2025-08-07 RX ORDER — GINSENG 100 MG
CAPSULE ORAL PRN
OUTPATIENT
Start: 2025-08-07

## 2025-08-07 RX ORDER — SODIUM CHLOR/HYPOCHLOROUS ACID 0.033 %
SOLUTION, IRRIGATION IRRIGATION PRN
Status: DISCONTINUED | OUTPATIENT
Start: 2025-08-07 | End: 2025-08-08 | Stop reason: HOSPADM

## 2025-08-07 RX ORDER — LIDOCAINE HYDROCHLORIDE 20 MG/ML
JELLY TOPICAL PRN
OUTPATIENT
Start: 2025-08-07

## 2025-08-07 RX ORDER — GENTAMICIN SULFATE 1 MG/G
OINTMENT TOPICAL PRN
OUTPATIENT
Start: 2025-08-07

## 2025-08-07 RX ORDER — SODIUM CHLOR/HYPOCHLOROUS ACID 0.033 %
SOLUTION, IRRIGATION IRRIGATION PRN
OUTPATIENT
Start: 2025-08-07

## 2025-08-07 RX ORDER — BACITRACIN ZINC AND POLYMYXIN B SULFATE 500; 1000 [USP'U]/G; [USP'U]/G
OINTMENT TOPICAL PRN
OUTPATIENT
Start: 2025-08-07

## 2025-08-07 RX ORDER — BETAMETHASONE DIPROPIONATE 0.5 MG/G
CREAM TOPICAL PRN
OUTPATIENT
Start: 2025-08-07

## 2025-08-07 RX ORDER — LIDOCAINE HYDROCHLORIDE 40 MG/ML
SOLUTION TOPICAL PRN
OUTPATIENT
Start: 2025-08-07

## 2025-08-07 RX ORDER — SILVER SULFADIAZINE 10 MG/G
CREAM TOPICAL PRN
OUTPATIENT
Start: 2025-08-07

## 2025-08-07 RX ORDER — MUPIROCIN 2 %
OINTMENT (GRAM) TOPICAL PRN
OUTPATIENT
Start: 2025-08-07

## 2025-08-07 RX ORDER — LIDOCAINE 40 MG/G
CREAM TOPICAL PRN
OUTPATIENT
Start: 2025-08-07

## 2025-08-07 RX ORDER — TRIAMCINOLONE ACETONIDE 1 MG/G
OINTMENT TOPICAL PRN
OUTPATIENT
Start: 2025-08-07

## 2025-08-07 RX ORDER — NEOMYCIN/BACITRACIN/POLYMYXINB 3.5-400-5K
OINTMENT (GRAM) TOPICAL PRN
OUTPATIENT
Start: 2025-08-07

## 2025-08-07 RX ORDER — CLOBETASOL PROPIONATE 0.5 MG/G
OINTMENT TOPICAL PRN
OUTPATIENT
Start: 2025-08-07

## 2025-08-07 RX ORDER — LIDOCAINE 50 MG/G
OINTMENT TOPICAL PRN
OUTPATIENT
Start: 2025-08-07

## 2025-08-07 RX ADMIN — Medication: at 13:58

## 2025-08-07 ASSESSMENT — PAIN SCALES - GENERAL: PAINLEVEL_OUTOF10: 0

## 2025-08-11 ENCOUNTER — HOSPITAL ENCOUNTER (OUTPATIENT)
Dept: WOUND CARE | Age: 85
Discharge: HOME | End: 2025-08-11
Payer: MEDICARE

## 2025-08-11 VITALS
SYSTOLIC BLOOD PRESSURE: 127 MMHG | HEIGHT: 64 IN | HEART RATE: 66 BPM | DIASTOLIC BLOOD PRESSURE: 65 MMHG | BODY MASS INDEX: 22.36 KG/M2 | WEIGHT: 131 LBS

## 2025-08-11 DIAGNOSIS — L97.912 NON-PRESSURE CHRONIC ULCER OF LOWER LEG WITH FAT LAYER EXPOSED, RIGHT (HCC): Primary | ICD-10-CM

## 2025-08-11 DIAGNOSIS — M86.9 OSTEOMYELITIS OF FIFTH TOE OF LEFT FOOT (HCC): Chronic | ICD-10-CM

## 2025-08-11 DIAGNOSIS — L97.929 CHRONIC VENOUS HYPERTENSION (IDIOPATHIC) WITH ULCER OF BILATERAL LOWER EXTREMITY (HCC): Chronic | ICD-10-CM

## 2025-08-11 DIAGNOSIS — I87.313 CHRONIC VENOUS HYPERTENSION (IDIOPATHIC) WITH ULCER OF BILATERAL LOWER EXTREMITY (HCC): Chronic | ICD-10-CM

## 2025-08-11 DIAGNOSIS — R60.0 BILATERAL LOWER EXTREMITY EDEMA: Chronic | ICD-10-CM

## 2025-08-11 DIAGNOSIS — L97.919 CHRONIC VENOUS HYPERTENSION (IDIOPATHIC) WITH ULCER OF BILATERAL LOWER EXTREMITY (HCC): Chronic | ICD-10-CM

## 2025-08-11 DIAGNOSIS — L97.922 NON-PRESSURE CHRONIC ULCER OF LEFT LOWER LEG WITH FAT LAYER EXPOSED (HCC): ICD-10-CM

## 2025-08-11 PROCEDURE — 29581 APPL MULTLAYER CMPRN SYS LEG: CPT

## 2025-08-11 ASSESSMENT — PAIN SCALES - GENERAL: PAINLEVEL_OUTOF10: 3

## 2025-08-14 ENCOUNTER — HOSPITAL ENCOUNTER (OUTPATIENT)
Dept: WOUND CARE | Age: 85
Discharge: HOME | End: 2025-08-14
Payer: MEDICARE

## 2025-08-14 VITALS
SYSTOLIC BLOOD PRESSURE: 115 MMHG | WEIGHT: 131 LBS | DIASTOLIC BLOOD PRESSURE: 57 MMHG | HEIGHT: 64 IN | HEART RATE: 63 BPM | TEMPERATURE: 98.2 F | BODY MASS INDEX: 22.36 KG/M2

## 2025-08-14 DIAGNOSIS — M86.9 OSTEOMYELITIS OF FIFTH TOE OF LEFT FOOT (HCC): ICD-10-CM

## 2025-08-14 DIAGNOSIS — R60.0 BILATERAL LOWER EXTREMITY EDEMA: Primary | ICD-10-CM

## 2025-08-14 DIAGNOSIS — L97.912 NON-PRESSURE CHRONIC ULCER OF LOWER LEG WITH FAT LAYER EXPOSED, RIGHT (HCC): ICD-10-CM

## 2025-08-14 DIAGNOSIS — L97.919 CHRONIC VENOUS HYPERTENSION (IDIOPATHIC) WITH ULCER OF BILATERAL LOWER EXTREMITY (HCC): ICD-10-CM

## 2025-08-14 DIAGNOSIS — L97.929 CHRONIC VENOUS HYPERTENSION (IDIOPATHIC) WITH ULCER OF BILATERAL LOWER EXTREMITY (HCC): ICD-10-CM

## 2025-08-14 DIAGNOSIS — L97.922 NON-PRESSURE CHRONIC ULCER OF LEFT LOWER LEG WITH FAT LAYER EXPOSED (HCC): ICD-10-CM

## 2025-08-14 DIAGNOSIS — I87.313 CHRONIC VENOUS HYPERTENSION (IDIOPATHIC) WITH ULCER OF BILATERAL LOWER EXTREMITY (HCC): ICD-10-CM

## 2025-08-14 PROCEDURE — 11042 DBRDMT SUBQ TIS 1ST 20SQCM/<: CPT

## 2025-08-14 RX ORDER — SILVER SULFADIAZINE 10 MG/G
CREAM TOPICAL PRN
OUTPATIENT
Start: 2025-08-14

## 2025-08-14 RX ORDER — GINSENG 100 MG
CAPSULE ORAL PRN
OUTPATIENT
Start: 2025-08-14

## 2025-08-14 RX ORDER — SODIUM CHLOR/HYPOCHLOROUS ACID 0.033 %
SOLUTION, IRRIGATION IRRIGATION PRN
OUTPATIENT
Start: 2025-08-14

## 2025-08-14 RX ORDER — LIDOCAINE HYDROCHLORIDE 20 MG/ML
JELLY TOPICAL PRN
Status: DISCONTINUED | OUTPATIENT
Start: 2025-08-14 | End: 2025-08-15 | Stop reason: HOSPADM

## 2025-08-14 RX ORDER — MUPIROCIN 2 %
OINTMENT (GRAM) TOPICAL PRN
OUTPATIENT
Start: 2025-08-14

## 2025-08-14 RX ORDER — GENTAMICIN SULFATE 1 MG/G
OINTMENT TOPICAL PRN
OUTPATIENT
Start: 2025-08-14

## 2025-08-14 RX ORDER — LIDOCAINE HYDROCHLORIDE 20 MG/ML
JELLY TOPICAL PRN
OUTPATIENT
Start: 2025-08-14

## 2025-08-14 RX ORDER — NEOMYCIN/BACITRACIN/POLYMYXINB 3.5-400-5K
OINTMENT (GRAM) TOPICAL PRN
OUTPATIENT
Start: 2025-08-14

## 2025-08-14 RX ORDER — BACITRACIN ZINC AND POLYMYXIN B SULFATE 500; 1000 [USP'U]/G; [USP'U]/G
OINTMENT TOPICAL PRN
OUTPATIENT
Start: 2025-08-14

## 2025-08-14 RX ORDER — LIDOCAINE 50 MG/G
OINTMENT TOPICAL PRN
OUTPATIENT
Start: 2025-08-14

## 2025-08-14 RX ORDER — SODIUM CHLOR/HYPOCHLOROUS ACID 0.033 %
SOLUTION, IRRIGATION IRRIGATION PRN
Status: DISCONTINUED | OUTPATIENT
Start: 2025-08-14 | End: 2025-08-15 | Stop reason: HOSPADM

## 2025-08-14 RX ORDER — TRIAMCINOLONE ACETONIDE 1 MG/G
OINTMENT TOPICAL PRN
OUTPATIENT
Start: 2025-08-14

## 2025-08-14 RX ORDER — LIDOCAINE HYDROCHLORIDE 40 MG/ML
SOLUTION TOPICAL PRN
OUTPATIENT
Start: 2025-08-14

## 2025-08-14 RX ORDER — BETAMETHASONE DIPROPIONATE 0.5 MG/G
CREAM TOPICAL PRN
OUTPATIENT
Start: 2025-08-14

## 2025-08-14 RX ORDER — LIDOCAINE 40 MG/G
CREAM TOPICAL PRN
OUTPATIENT
Start: 2025-08-14

## 2025-08-14 RX ORDER — CLOBETASOL PROPIONATE 0.5 MG/G
OINTMENT TOPICAL PRN
OUTPATIENT
Start: 2025-08-14

## 2025-08-14 RX ADMIN — LIDOCAINE HYDROCHLORIDE: 20 JELLY TOPICAL at 14:21

## 2025-08-14 RX ADMIN — Medication: at 14:22

## 2025-08-15 ENCOUNTER — OFFICE VISIT (OUTPATIENT)
Dept: ORTHOPEDIC SURGERY | Age: 85
End: 2025-08-15

## 2025-08-15 DIAGNOSIS — M48.061 FORAMINAL STENOSIS OF LUMBAR REGION: ICD-10-CM

## 2025-08-15 DIAGNOSIS — S32.020G CLOSED COMPRESSION FRACTURE OF L2 LUMBAR VERTEBRA WITH DELAYED HEALING, SUBSEQUENT ENCOUNTER: Primary | ICD-10-CM

## 2025-08-18 DIAGNOSIS — L08.9 LEFT FOOT INFECTION: Primary | ICD-10-CM

## 2025-08-20 ENCOUNTER — HOSPITAL ENCOUNTER (OUTPATIENT)
Dept: WOUND CARE | Age: 85
Discharge: HOME | End: 2025-08-20
Payer: MEDICARE

## 2025-08-20 ENCOUNTER — TELEPHONE (OUTPATIENT)
Age: 85
End: 2025-08-20

## 2025-08-20 VITALS
RESPIRATION RATE: 18 BRPM | HEIGHT: 64 IN | WEIGHT: 132 LBS | DIASTOLIC BLOOD PRESSURE: 74 MMHG | BODY MASS INDEX: 22.53 KG/M2 | HEART RATE: 62 BPM | SYSTOLIC BLOOD PRESSURE: 122 MMHG | TEMPERATURE: 97.7 F

## 2025-08-20 DIAGNOSIS — L97.919 CHRONIC VENOUS HYPERTENSION (IDIOPATHIC) WITH ULCER OF BILATERAL LOWER EXTREMITY (HCC): ICD-10-CM

## 2025-08-20 DIAGNOSIS — R60.0 BILATERAL LOWER EXTREMITY EDEMA: Primary | ICD-10-CM

## 2025-08-20 DIAGNOSIS — M86.9 OSTEOMYELITIS OF FIFTH TOE OF LEFT FOOT (HCC): ICD-10-CM

## 2025-08-20 DIAGNOSIS — L97.912 NON-PRESSURE CHRONIC ULCER OF LOWER LEG WITH FAT LAYER EXPOSED, RIGHT (HCC): ICD-10-CM

## 2025-08-20 DIAGNOSIS — L97.929 CHRONIC VENOUS HYPERTENSION (IDIOPATHIC) WITH ULCER OF BILATERAL LOWER EXTREMITY (HCC): ICD-10-CM

## 2025-08-20 DIAGNOSIS — I87.313 CHRONIC VENOUS HYPERTENSION (IDIOPATHIC) WITH ULCER OF BILATERAL LOWER EXTREMITY (HCC): ICD-10-CM

## 2025-08-20 DIAGNOSIS — L97.922 NON-PRESSURE CHRONIC ULCER OF LEFT LOWER LEG WITH FAT LAYER EXPOSED (HCC): ICD-10-CM

## 2025-08-20 PROCEDURE — 11042 DBRDMT SUBQ TIS 1ST 20SQCM/<: CPT

## 2025-08-20 RX ORDER — LIDOCAINE HYDROCHLORIDE 20 MG/ML
JELLY TOPICAL PRN
Status: DISCONTINUED | OUTPATIENT
Start: 2025-08-20 | End: 2025-08-21 | Stop reason: HOSPADM

## 2025-08-20 RX ORDER — NEOMYCIN/BACITRACIN/POLYMYXINB 3.5-400-5K
OINTMENT (GRAM) TOPICAL PRN
OUTPATIENT
Start: 2025-08-20

## 2025-08-20 RX ORDER — CLOBETASOL PROPIONATE 0.5 MG/G
OINTMENT TOPICAL PRN
OUTPATIENT
Start: 2025-08-20

## 2025-08-20 RX ORDER — BETAMETHASONE DIPROPIONATE 0.5 MG/G
CREAM TOPICAL PRN
OUTPATIENT
Start: 2025-08-20

## 2025-08-20 RX ORDER — LIDOCAINE HYDROCHLORIDE 20 MG/ML
JELLY TOPICAL PRN
OUTPATIENT
Start: 2025-08-20

## 2025-08-20 RX ORDER — MUPIROCIN 2 %
OINTMENT (GRAM) TOPICAL PRN
OUTPATIENT
Start: 2025-08-20

## 2025-08-20 RX ORDER — GENTAMICIN SULFATE 1 MG/G
OINTMENT TOPICAL PRN
OUTPATIENT
Start: 2025-08-20

## 2025-08-20 RX ORDER — LIDOCAINE 40 MG/G
CREAM TOPICAL PRN
OUTPATIENT
Start: 2025-08-20

## 2025-08-20 RX ORDER — LIDOCAINE HYDROCHLORIDE 40 MG/ML
SOLUTION TOPICAL PRN
OUTPATIENT
Start: 2025-08-20

## 2025-08-20 RX ORDER — SODIUM CHLOR/HYPOCHLOROUS ACID 0.033 %
SOLUTION, IRRIGATION IRRIGATION PRN
OUTPATIENT
Start: 2025-08-20

## 2025-08-20 RX ORDER — SILVER SULFADIAZINE 10 MG/G
CREAM TOPICAL PRN
OUTPATIENT
Start: 2025-08-20

## 2025-08-20 RX ORDER — TRIAMCINOLONE ACETONIDE 1 MG/G
OINTMENT TOPICAL PRN
OUTPATIENT
Start: 2025-08-20

## 2025-08-20 RX ORDER — SODIUM CHLOR/HYPOCHLOROUS ACID 0.033 %
SOLUTION, IRRIGATION IRRIGATION PRN
Status: DISCONTINUED | OUTPATIENT
Start: 2025-08-20 | End: 2025-08-21 | Stop reason: HOSPADM

## 2025-08-20 RX ORDER — BACITRACIN ZINC AND POLYMYXIN B SULFATE 500; 1000 [USP'U]/G; [USP'U]/G
OINTMENT TOPICAL PRN
OUTPATIENT
Start: 2025-08-20

## 2025-08-20 RX ORDER — LIDOCAINE 50 MG/G
OINTMENT TOPICAL PRN
OUTPATIENT
Start: 2025-08-20

## 2025-08-20 RX ORDER — GINSENG 100 MG
CAPSULE ORAL PRN
OUTPATIENT
Start: 2025-08-20

## 2025-08-20 RX ADMIN — Medication 118 ML: at 14:55

## 2025-08-20 RX ADMIN — LIDOCAINE HYDROCHLORIDE: 20 JELLY TOPICAL at 14:55

## 2025-08-20 ASSESSMENT — PAIN SCALES - GENERAL: PAINLEVEL_OUTOF10: 0

## 2025-08-22 ENCOUNTER — TELEPHONE (OUTPATIENT)
Dept: WOUND CARE | Age: 85
End: 2025-08-22

## 2025-08-25 ENCOUNTER — HOSPITAL ENCOUNTER (OUTPATIENT)
Dept: WOUND CARE | Age: 85
Discharge: HOME | End: 2025-08-25
Payer: MEDICARE

## 2025-08-25 VITALS — WEIGHT: 132 LBS | BODY MASS INDEX: 22.53 KG/M2 | HEIGHT: 64 IN

## 2025-08-25 DIAGNOSIS — R60.0 BILATERAL LOWER EXTREMITY EDEMA: Primary | ICD-10-CM

## 2025-08-25 DIAGNOSIS — M86.9 OSTEOMYELITIS OF FIFTH TOE OF LEFT FOOT (HCC): ICD-10-CM

## 2025-08-25 DIAGNOSIS — L97.929 CHRONIC VENOUS HYPERTENSION (IDIOPATHIC) WITH ULCER OF BILATERAL LOWER EXTREMITY (HCC): ICD-10-CM

## 2025-08-25 DIAGNOSIS — I87.313 CHRONIC VENOUS HYPERTENSION (IDIOPATHIC) WITH ULCER OF BILATERAL LOWER EXTREMITY (HCC): ICD-10-CM

## 2025-08-25 DIAGNOSIS — L97.922 NON-PRESSURE CHRONIC ULCER OF LEFT LOWER LEG WITH FAT LAYER EXPOSED (HCC): ICD-10-CM

## 2025-08-25 DIAGNOSIS — L97.912 NON-PRESSURE CHRONIC ULCER OF LOWER LEG WITH FAT LAYER EXPOSED, RIGHT (HCC): ICD-10-CM

## 2025-08-25 DIAGNOSIS — L97.919 CHRONIC VENOUS HYPERTENSION (IDIOPATHIC) WITH ULCER OF BILATERAL LOWER EXTREMITY (HCC): ICD-10-CM

## 2025-08-25 PROCEDURE — 99213 OFFICE O/P EST LOW 20 MIN: CPT

## 2025-08-25 RX ORDER — LIDOCAINE HYDROCHLORIDE 40 MG/ML
SOLUTION TOPICAL PRN
OUTPATIENT
Start: 2025-08-25

## 2025-08-25 RX ORDER — CLOBETASOL PROPIONATE 0.5 MG/G
OINTMENT TOPICAL PRN
OUTPATIENT
Start: 2025-08-25

## 2025-08-25 RX ORDER — LIDOCAINE 50 MG/G
OINTMENT TOPICAL PRN
OUTPATIENT
Start: 2025-08-25

## 2025-08-25 RX ORDER — BACITRACIN ZINC AND POLYMYXIN B SULFATE 500; 1000 [USP'U]/G; [USP'U]/G
OINTMENT TOPICAL PRN
OUTPATIENT
Start: 2025-08-25

## 2025-08-25 RX ORDER — SILVER SULFADIAZINE 10 MG/G
CREAM TOPICAL PRN
OUTPATIENT
Start: 2025-08-25

## 2025-08-25 RX ORDER — LIDOCAINE 40 MG/G
CREAM TOPICAL PRN
OUTPATIENT
Start: 2025-08-25

## 2025-08-25 RX ORDER — SODIUM CHLOR/HYPOCHLOROUS ACID 0.033 %
SOLUTION, IRRIGATION IRRIGATION PRN
OUTPATIENT
Start: 2025-08-25

## 2025-08-25 RX ORDER — MUPIROCIN 2 %
OINTMENT (GRAM) TOPICAL PRN
OUTPATIENT
Start: 2025-08-25

## 2025-08-25 RX ORDER — BETAMETHASONE DIPROPIONATE 0.5 MG/G
CREAM TOPICAL PRN
OUTPATIENT
Start: 2025-08-25

## 2025-08-25 RX ORDER — NEOMYCIN/BACITRACIN/POLYMYXINB 3.5-400-5K
OINTMENT (GRAM) TOPICAL PRN
OUTPATIENT
Start: 2025-08-25

## 2025-08-25 RX ORDER — GENTAMICIN SULFATE 1 MG/G
OINTMENT TOPICAL PRN
OUTPATIENT
Start: 2025-08-25

## 2025-08-25 RX ORDER — TRIAMCINOLONE ACETONIDE 1 MG/G
OINTMENT TOPICAL PRN
OUTPATIENT
Start: 2025-08-25

## 2025-08-25 RX ORDER — GINSENG 100 MG
CAPSULE ORAL PRN
OUTPATIENT
Start: 2025-08-25

## 2025-08-25 RX ORDER — LIDOCAINE HYDROCHLORIDE 20 MG/ML
JELLY TOPICAL PRN
OUTPATIENT
Start: 2025-08-25

## 2025-08-25 ASSESSMENT — PAIN SCALES - GENERAL: PAINLEVEL_OUTOF10: 0

## 2025-08-28 ENCOUNTER — HOSPITAL ENCOUNTER (EMERGENCY)
Age: 85
Discharge: HOME | End: 2025-08-28
Attending: EMERGENCY MEDICINE
Payer: MEDICARE

## 2025-08-28 ENCOUNTER — HOSPITAL ENCOUNTER (EMERGENCY)
Dept: ULTRASOUND IMAGING | Age: 85
End: 2025-08-28
Payer: MEDICARE

## 2025-08-28 VITALS
SYSTOLIC BLOOD PRESSURE: 156 MMHG | RESPIRATION RATE: 18 BRPM | HEART RATE: 66 BPM | OXYGEN SATURATION: 97 % | DIASTOLIC BLOOD PRESSURE: 57 MMHG | BODY MASS INDEX: 22.53 KG/M2 | TEMPERATURE: 97.2 F | HEIGHT: 64 IN | WEIGHT: 132 LBS

## 2025-08-28 DIAGNOSIS — T14.8XXA HEMATOMA: Primary | ICD-10-CM

## 2025-08-28 LAB
ALBUMIN SERPL-MCNC: 3.3 G/DL (ref 3.2–4.6)
ALBUMIN/GLOB SERPL: 1.3 (ref 1–1.9)
ALP SERPL-CCNC: 57 U/L (ref 35–104)
ALT SERPL-CCNC: 16 U/L (ref 8–45)
ANION GAP SERPL CALC-SCNC: 11 MMOL/L (ref 7–16)
AST SERPL-CCNC: 16 U/L (ref 15–37)
BILIRUB SERPL-MCNC: 0.3 MG/DL (ref 0–1.2)
BUN SERPL-MCNC: 25 MG/DL (ref 8–23)
CALCIUM SERPL-MCNC: 9.4 MG/DL (ref 8.8–10.2)
CHLORIDE SERPL-SCNC: 103 MMOL/L (ref 98–107)
CO2 SERPL-SCNC: 24 MMOL/L (ref 20–29)
CREAT SERPL-MCNC: 0.63 MG/DL (ref 0.6–1.1)
ERYTHROCYTE [DISTWIDTH] IN BLOOD BY AUTOMATED COUNT: 16.1 % (ref 11.9–14.6)
GLOBULIN SER CALC-MCNC: 2.5 G/DL (ref 2.3–3.5)
GLUCOSE SERPL-MCNC: 103 MG/DL (ref 70–99)
HCT VFR BLD AUTO: 35.5 % (ref 35.8–46.3)
HGB BLD-MCNC: 10.8 G/DL (ref 11.7–15.4)
MCH RBC QN AUTO: 28.7 PG (ref 26.1–32.9)
MCHC RBC AUTO-ENTMCNC: 30.4 G/DL (ref 31.4–35)
MCV RBC AUTO: 94.4 FL (ref 82–102)
NRBC # BLD: 0 K/UL (ref 0–0.2)
NT PRO BNP: 424 PG/ML (ref 0–450)
PLATELET # BLD AUTO: 308 K/UL (ref 150–450)
PMV BLD AUTO: 9.4 FL (ref 9.4–12.3)
POTASSIUM SERPL-SCNC: 4.6 MMOL/L (ref 3.5–5.1)
PROT SERPL-MCNC: 5.8 G/DL (ref 6.3–8.2)
RBC # BLD AUTO: 3.76 M/UL (ref 4.05–5.2)
SODIUM SERPL-SCNC: 138 MMOL/L (ref 136–145)
WBC # BLD AUTO: 9.4 K/UL (ref 4.3–11.1)

## 2025-08-28 PROCEDURE — 80053 COMPREHEN METABOLIC PANEL: CPT

## 2025-08-28 PROCEDURE — 96374 THER/PROPH/DIAG INJ IV PUSH: CPT

## 2025-08-28 PROCEDURE — 83880 ASSAY OF NATRIURETIC PEPTIDE: CPT

## 2025-08-28 PROCEDURE — 85027 COMPLETE CBC AUTOMATED: CPT

## 2025-08-28 PROCEDURE — 6360000002 HC RX W HCPCS: Performed by: STUDENT IN AN ORGANIZED HEALTH CARE EDUCATION/TRAINING PROGRAM

## 2025-08-28 PROCEDURE — 99284 EMERGENCY DEPT VISIT MOD MDM: CPT

## 2025-08-28 PROCEDURE — 93970 EXTREMITY STUDY: CPT

## 2025-08-28 RX ORDER — KETOROLAC TROMETHAMINE 15 MG/ML
15 INJECTION, SOLUTION INTRAMUSCULAR; INTRAVENOUS ONCE
Status: COMPLETED | OUTPATIENT
Start: 2025-08-28 | End: 2025-08-28

## 2025-08-28 RX ADMIN — KETOROLAC TROMETHAMINE 15 MG: 15 INJECTION, SOLUTION INTRAMUSCULAR; INTRAVENOUS at 15:34

## 2025-08-28 ASSESSMENT — PAIN SCALES - GENERAL
PAINLEVEL_OUTOF10: 10
PAINLEVEL_OUTOF10: 4
PAINLEVEL_OUTOF10: 4

## 2025-08-28 ASSESSMENT — PAIN DESCRIPTION - PAIN TYPE: TYPE: ACUTE PAIN

## 2025-08-28 ASSESSMENT — PAIN DESCRIPTION - ORIENTATION
ORIENTATION: RIGHT;LEFT
ORIENTATION: LEFT;RIGHT

## 2025-08-28 ASSESSMENT — PAIN DESCRIPTION - LOCATION
LOCATION: LEG
LOCATION: LEG

## 2025-08-28 ASSESSMENT — PAIN DESCRIPTION - FREQUENCY: FREQUENCY: INTERMITTENT

## 2025-08-28 ASSESSMENT — ENCOUNTER SYMPTOMS
VOMITING: 0
COUGH: 0
FACIAL SWELLING: 0
ABDOMINAL PAIN: 0
SHORTNESS OF BREATH: 0
PHOTOPHOBIA: 0
TROUBLE SWALLOWING: 0

## 2025-08-28 ASSESSMENT — PAIN DESCRIPTION - DESCRIPTORS: DESCRIPTORS: STABBING

## 2025-08-28 ASSESSMENT — PAIN - FUNCTIONAL ASSESSMENT
PAIN_FUNCTIONAL_ASSESSMENT: 0-10
PAIN_FUNCTIONAL_ASSESSMENT: 0-10

## 2025-08-29 ENCOUNTER — CARE COORDINATION (OUTPATIENT)
Dept: CARE COORDINATION | Facility: CLINIC | Age: 85
End: 2025-08-29

## 2025-09-02 ENCOUNTER — OFFICE VISIT (OUTPATIENT)
Dept: INTERNAL MEDICINE CLINIC | Facility: CLINIC | Age: 85
End: 2025-09-02
Payer: MEDICARE

## 2025-09-02 ENCOUNTER — TELEPHONE (OUTPATIENT)
Dept: ORTHOPEDIC SURGERY | Age: 85
End: 2025-09-02

## 2025-09-02 VITALS
TEMPERATURE: 97.4 F | BODY MASS INDEX: 22.4 KG/M2 | HEIGHT: 64 IN | WEIGHT: 131.2 LBS | HEART RATE: 57 BPM | OXYGEN SATURATION: 100 % | DIASTOLIC BLOOD PRESSURE: 60 MMHG | SYSTOLIC BLOOD PRESSURE: 118 MMHG

## 2025-09-02 DIAGNOSIS — F17.218 CIGARETTE NICOTINE DEPENDENCE WITH OTHER NICOTINE-INDUCED DISORDER: ICD-10-CM

## 2025-09-02 DIAGNOSIS — I87.8 CHRONIC VENOUS STASIS: ICD-10-CM

## 2025-09-02 DIAGNOSIS — R60.0 BILATERAL LOWER EXTREMITY EDEMA: Primary | Chronic | ICD-10-CM

## 2025-09-02 PROCEDURE — G8427 DOCREV CUR MEDS BY ELIG CLIN: HCPCS | Performed by: NURSE PRACTITIONER

## 2025-09-02 PROCEDURE — 4004F PT TOBACCO SCREEN RCVD TLK: CPT | Performed by: NURSE PRACTITIONER

## 2025-09-02 PROCEDURE — 1090F PRES/ABSN URINE INCON ASSESS: CPT | Performed by: NURSE PRACTITIONER

## 2025-09-02 PROCEDURE — G8420 CALC BMI NORM PARAMETERS: HCPCS | Performed by: NURSE PRACTITIONER

## 2025-09-02 PROCEDURE — 1160F RVW MEDS BY RX/DR IN RCRD: CPT | Performed by: NURSE PRACTITIONER

## 2025-09-02 PROCEDURE — 1159F MED LIST DOCD IN RCRD: CPT | Performed by: NURSE PRACTITIONER

## 2025-09-02 PROCEDURE — 99214 OFFICE O/P EST MOD 30 MIN: CPT | Performed by: NURSE PRACTITIONER

## 2025-09-02 PROCEDURE — 1123F ACP DISCUSS/DSCN MKR DOCD: CPT | Performed by: NURSE PRACTITIONER

## 2025-09-02 PROCEDURE — G2211 COMPLEX E/M VISIT ADD ON: HCPCS | Performed by: NURSE PRACTITIONER

## 2025-09-02 RX ORDER — ONDANSETRON 4 MG/1
4 TABLET, FILM COATED ORAL 3 TIMES DAILY PRN
Qty: 15 TABLET | Refills: 1 | Status: SHIPPED | OUTPATIENT
Start: 2025-09-02

## 2025-09-02 RX ORDER — FUROSEMIDE 20 MG/1
20 TABLET ORAL
Qty: 24 TABLET | Refills: 2 | Status: SHIPPED | OUTPATIENT
Start: 2025-09-04

## 2025-09-02 RX ORDER — FUROSEMIDE 20 MG/1
20 TABLET ORAL
COMMUNITY
End: 2025-09-02 | Stop reason: SDUPTHER

## 2025-09-04 ENCOUNTER — CARE COORDINATION (OUTPATIENT)
Dept: CARE COORDINATION | Facility: CLINIC | Age: 85
End: 2025-09-04

## 2025-09-04 ENCOUNTER — TELEPHONE (OUTPATIENT)
Dept: INTERNAL MEDICINE CLINIC | Facility: CLINIC | Age: 85
End: 2025-09-04

## (undated) DEVICE — POSTERIOR LAMI VANPLT-LUCAS: Brand: MEDLINE INDUSTRIES, INC.

## (undated) DEVICE — SYSTEM SKIN CLOSURE 42CM DERMABOND PRINEO

## (undated) DEVICE — GDWIRE 3CM FLX-TIP 0.038X150CM -- BX/5 SENSOR

## (undated) DEVICE — CYSTO/BLADDER IRRIGATION SET, REGULATING CLAMP

## (undated) DEVICE — GUIDEWIRE ENDOSCP L150CM DIA0.038IN TIP L3CM PTFE FLX STR

## (undated) DEVICE — GARMENT,MEDLINE,DVT,INT,CALF,MED, GEN2: Brand: MEDLINE

## (undated) DEVICE — CYSTO: Brand: MEDLINE INDUSTRIES, INC.

## (undated) DEVICE — TRAY PREP DRY W/ PREM GLV 2 APPL 6 SPNG 2 UNDPD 1 OVERWRAP

## (undated) DEVICE — SUTURE VCRL SZ 1 L27IN ABSRB UD L36MM CP-1 1/2 CIR REV CUT J268H

## (undated) DEVICE — SUTURE VCRL + SZ 3-0 L18IN ABSRB UD PS-2 3/8 CIR REV CUT VCP497H

## (undated) DEVICE — SOLUTION IRRIG 3000ML 0.9% SOD CHL FLX CONT 0797208] ICU MEDICAL INC]

## (undated) DEVICE — SEALANT HEMOSTAT W/THROM 8ML -- SURGIFLO MATRIX

## (undated) DEVICE — HOOKED-PRONG GRASPING FORCEPS: Brand: TRICEP

## (undated) DEVICE — SUTURE VCRL SZ 2-0 L27IN ABSRB UD L36MM CP-1 1/2 CIR REV J266H

## (undated) DEVICE — URETERAL ACCESS SHEATH SET: Brand: NAVIGATOR HD

## (undated) DEVICE — SOLUTION IRRIG 1000ML 09% SOD CHL USP PIC PLAS CONTAINER

## (undated) DEVICE — PACK SURGICAL PROCEDURE KIT CYSTOSCOPY TOTE

## (undated) DEVICE — SINGLE-USE DIGITAL FLEXIBLE URETEROSCOPE: Brand: LITHOVUE

## (undated) DEVICE — WATER 50W MAX / AIR 8W MAX: Brand: FLEXIVA TRACTIP